# Patient Record
Sex: MALE | Race: BLACK OR AFRICAN AMERICAN | NOT HISPANIC OR LATINO | Employment: FULL TIME | URBAN - METROPOLITAN AREA
[De-identification: names, ages, dates, MRNs, and addresses within clinical notes are randomized per-mention and may not be internally consistent; named-entity substitution may affect disease eponyms.]

---

## 2021-11-22 ENCOUNTER — OFFICE VISIT (OUTPATIENT)
Dept: PRIMARY CARE CLINIC | Facility: CLINIC | Age: 26
End: 2021-11-22
Payer: COMMERCIAL

## 2021-11-22 VITALS
BODY MASS INDEX: 25.65 KG/M2 | WEIGHT: 189.38 LBS | HEIGHT: 72 IN | HEART RATE: 73 BPM | RESPIRATION RATE: 18 BRPM | OXYGEN SATURATION: 97 % | TEMPERATURE: 98 F | SYSTOLIC BLOOD PRESSURE: 129 MMHG | DIASTOLIC BLOOD PRESSURE: 79 MMHG

## 2021-11-22 DIAGNOSIS — K62.5 BRBPR (BRIGHT RED BLOOD PER RECTUM): ICD-10-CM

## 2021-11-22 DIAGNOSIS — Z00.00 WELL ADULT EXAM: Primary | ICD-10-CM

## 2021-11-22 DIAGNOSIS — F32.1 CURRENT MODERATE EPISODE OF MAJOR DEPRESSIVE DISORDER WITHOUT PRIOR EPISODE: ICD-10-CM

## 2021-11-22 DIAGNOSIS — R17 SCLERAL ICTERUS: ICD-10-CM

## 2021-11-22 DIAGNOSIS — D57.1 SICKLE CELL DISEASE WITHOUT CRISIS: ICD-10-CM

## 2021-11-22 PROCEDURE — 99385 PR PREVENTIVE VISIT,NEW,18-39: ICD-10-PCS | Mod: 25,S$GLB,, | Performed by: FAMILY MEDICINE

## 2021-11-22 PROCEDURE — 99999 PR PBB SHADOW E&M-NEW PATIENT-LVL IV: CPT | Mod: PBBFAC,,, | Performed by: FAMILY MEDICINE

## 2021-11-22 PROCEDURE — 99385 PREV VISIT NEW AGE 18-39: CPT | Mod: 25,S$GLB,, | Performed by: FAMILY MEDICINE

## 2021-11-22 PROCEDURE — 99999 PR PBB SHADOW E&M-NEW PATIENT-LVL IV: ICD-10-PCS | Mod: PBBFAC,,, | Performed by: FAMILY MEDICINE

## 2021-11-22 RX ORDER — HYDROXYUREA 500 MG/1
500 CAPSULE ORAL DAILY
Qty: 90 EACH | Refills: 3 | Status: SHIPPED | OUTPATIENT
Start: 2021-11-22 | End: 2023-08-24 | Stop reason: CLARIF

## 2021-11-22 RX ORDER — FOLIC ACID 1 MG/1
1 TABLET ORAL DAILY
Qty: 90 TABLET | Refills: 3 | Status: SHIPPED | OUTPATIENT
Start: 2021-11-22 | End: 2022-11-10

## 2021-11-22 RX ORDER — FLUOXETINE 10 MG/1
10 CAPSULE ORAL DAILY
Qty: 30 CAPSULE | Refills: 0 | Status: SHIPPED | OUTPATIENT
Start: 2021-11-22 | End: 2022-01-03 | Stop reason: SDUPTHER

## 2021-11-29 ENCOUNTER — LAB VISIT (OUTPATIENT)
Dept: LAB | Facility: HOSPITAL | Age: 26
End: 2021-11-29
Attending: FAMILY MEDICINE
Payer: COMMERCIAL

## 2021-11-29 DIAGNOSIS — Z00.00 WELL ADULT EXAM: ICD-10-CM

## 2021-11-29 LAB
BASOPHILS # BLD AUTO: 0.11 K/UL (ref 0–0.2)
BASOPHILS NFR BLD: 1.1 % (ref 0–1.9)
DIFFERENTIAL METHOD: ABNORMAL
EOSINOPHIL # BLD AUTO: 0.7 K/UL (ref 0–0.5)
EOSINOPHIL NFR BLD: 6.6 % (ref 0–8)
ERYTHROCYTE [DISTWIDTH] IN BLOOD BY AUTOMATED COUNT: 22.6 % (ref 11.5–14.5)
HCT VFR BLD AUTO: 20.6 % (ref 40–54)
HGB BLD-MCNC: 7.2 G/DL (ref 14–18)
IMM GRANULOCYTES # BLD AUTO: 0.06 K/UL (ref 0–0.04)
IMM GRANULOCYTES NFR BLD AUTO: 0.6 % (ref 0–0.5)
LYMPHOCYTES # BLD AUTO: 3.1 K/UL (ref 1–4.8)
LYMPHOCYTES NFR BLD: 30.4 % (ref 18–48)
MCH RBC QN AUTO: 28 PG (ref 27–31)
MCHC RBC AUTO-ENTMCNC: 35 G/DL (ref 32–36)
MCV RBC AUTO: 80 FL (ref 82–98)
MONOCYTES # BLD AUTO: 1.1 K/UL (ref 0.3–1)
MONOCYTES NFR BLD: 11 % (ref 4–15)
NEUTROPHILS # BLD AUTO: 5.1 K/UL (ref 1.8–7.7)
NEUTROPHILS NFR BLD: 50.3 % (ref 38–73)
NRBC BLD-RTO: 1 /100 WBC
PLATELET # BLD AUTO: 502 K/UL (ref 150–450)
PMV BLD AUTO: 11 FL (ref 9.2–12.9)
RBC # BLD AUTO: 2.57 M/UL (ref 4.6–6.2)
WBC # BLD AUTO: 10.17 K/UL (ref 3.9–12.7)

## 2021-11-29 PROCEDURE — 80061 LIPID PANEL: CPT | Performed by: FAMILY MEDICINE

## 2021-11-29 PROCEDURE — 36415 COLL VENOUS BLD VENIPUNCTURE: CPT | Mod: PN | Performed by: FAMILY MEDICINE

## 2021-11-29 PROCEDURE — 80053 COMPREHEN METABOLIC PANEL: CPT | Performed by: FAMILY MEDICINE

## 2021-11-29 PROCEDURE — 83036 HEMOGLOBIN GLYCOSYLATED A1C: CPT | Performed by: FAMILY MEDICINE

## 2021-11-29 PROCEDURE — 84443 ASSAY THYROID STIM HORMONE: CPT | Performed by: FAMILY MEDICINE

## 2021-11-29 PROCEDURE — 85025 COMPLETE CBC W/AUTO DIFF WBC: CPT | Performed by: FAMILY MEDICINE

## 2021-11-30 LAB
ALBUMIN SERPL BCP-MCNC: 4 G/DL (ref 3.5–5.2)
ALP SERPL-CCNC: 92 U/L (ref 55–135)
ALT SERPL W/O P-5'-P-CCNC: 34 U/L (ref 10–44)
ANION GAP SERPL CALC-SCNC: 10 MMOL/L (ref 8–16)
AST SERPL-CCNC: 51 U/L (ref 10–40)
BILIRUB SERPL-MCNC: 8.5 MG/DL (ref 0.1–1)
BUN SERPL-MCNC: 5 MG/DL (ref 6–20)
CALCIUM SERPL-MCNC: 9.1 MG/DL (ref 8.7–10.5)
CHLORIDE SERPL-SCNC: 106 MMOL/L (ref 95–110)
CHOLEST SERPL-MCNC: 101 MG/DL (ref 120–199)
CHOLEST/HDLC SERPL: 2.9 {RATIO} (ref 2–5)
CO2 SERPL-SCNC: 19 MMOL/L (ref 23–29)
CREAT SERPL-MCNC: 0.7 MG/DL (ref 0.5–1.4)
EST. GFR  (AFRICAN AMERICAN): >60 ML/MIN/1.73 M^2
EST. GFR  (NON AFRICAN AMERICAN): >60 ML/MIN/1.73 M^2
GLUCOSE SERPL-MCNC: 73 MG/DL (ref 70–110)
HDLC SERPL-MCNC: 35 MG/DL (ref 40–75)
HDLC SERPL: 34.7 % (ref 20–50)
LDLC SERPL CALC-MCNC: 45.4 MG/DL (ref 63–159)
NONHDLC SERPL-MCNC: 66 MG/DL
POTASSIUM SERPL-SCNC: 3.5 MMOL/L (ref 3.5–5.1)
PROT SERPL-MCNC: 7.9 G/DL (ref 6–8.4)
SODIUM SERPL-SCNC: 135 MMOL/L (ref 136–145)
TRIGL SERPL-MCNC: 103 MG/DL (ref 30–150)
TSH SERPL DL<=0.005 MIU/L-ACNC: 1.48 UIU/ML (ref 0.4–4)

## 2021-12-03 DIAGNOSIS — D64.9 SEVERE ANEMIA: Primary | ICD-10-CM

## 2021-12-03 DIAGNOSIS — D57.1 SICKLE CELL DISEASE WITHOUT CRISIS: ICD-10-CM

## 2021-12-03 LAB
EST. AVERAGE GLUCOSE BLD GHB EST-MCNC: NORMAL MG/DL
HBA1C MFR BLD: NORMAL %

## 2021-12-09 ENCOUNTER — PATIENT MESSAGE (OUTPATIENT)
Dept: PRIMARY CARE CLINIC | Facility: CLINIC | Age: 26
End: 2021-12-09
Payer: COMMERCIAL

## 2021-12-09 DIAGNOSIS — D57.1 SICKLE CELL DISEASE WITHOUT CRISIS: Primary | ICD-10-CM

## 2021-12-14 ENCOUNTER — OFFICE VISIT (OUTPATIENT)
Dept: GASTROENTEROLOGY | Facility: CLINIC | Age: 26
End: 2021-12-14
Payer: COMMERCIAL

## 2021-12-14 VITALS — HEIGHT: 72 IN | BODY MASS INDEX: 25.44 KG/M2 | WEIGHT: 187.81 LBS

## 2021-12-14 DIAGNOSIS — K62.5 BRBPR (BRIGHT RED BLOOD PER RECTUM): Primary | ICD-10-CM

## 2021-12-14 DIAGNOSIS — D50.9 IRON DEFICIENCY ANEMIA, UNSPECIFIED IRON DEFICIENCY ANEMIA TYPE: ICD-10-CM

## 2021-12-14 PROCEDURE — 99204 PR OFFICE/OUTPT VISIT, NEW, LEVL IV, 45-59 MIN: ICD-10-PCS | Mod: S$GLB,,, | Performed by: INTERNAL MEDICINE

## 2021-12-14 PROCEDURE — 99999 PR PBB SHADOW E&M-EST. PATIENT-LVL III: ICD-10-PCS | Mod: PBBFAC,,, | Performed by: INTERNAL MEDICINE

## 2021-12-14 PROCEDURE — 99204 OFFICE O/P NEW MOD 45 MIN: CPT | Mod: S$GLB,,, | Performed by: INTERNAL MEDICINE

## 2021-12-14 PROCEDURE — 99999 PR PBB SHADOW E&M-EST. PATIENT-LVL III: CPT | Mod: PBBFAC,,, | Performed by: INTERNAL MEDICINE

## 2021-12-14 RX ORDER — SODIUM, POTASSIUM,MAG SULFATES 17.5-3.13G
1 SOLUTION, RECONSTITUTED, ORAL ORAL DAILY
Qty: 1 KIT | Refills: 0 | Status: SHIPPED | OUTPATIENT
Start: 2021-12-14 | End: 2021-12-16

## 2021-12-23 ENCOUNTER — TELEPHONE (OUTPATIENT)
Dept: HEMATOLOGY/ONCOLOGY | Facility: CLINIC | Age: 26
End: 2021-12-23
Payer: COMMERCIAL

## 2022-01-03 ENCOUNTER — PATIENT MESSAGE (OUTPATIENT)
Dept: PRIMARY CARE CLINIC | Facility: CLINIC | Age: 27
End: 2022-01-03
Payer: COMMERCIAL

## 2022-01-20 ENCOUNTER — PATIENT MESSAGE (OUTPATIENT)
Dept: PRIMARY CARE CLINIC | Facility: CLINIC | Age: 27
End: 2022-01-20
Payer: COMMERCIAL

## 2022-01-20 RX ORDER — FLUOXETINE 10 MG/1
10 CAPSULE ORAL DAILY
Qty: 30 CAPSULE | Refills: 1 | Status: SHIPPED | OUTPATIENT
Start: 2022-01-20 | End: 2022-02-10 | Stop reason: SDUPTHER

## 2022-01-20 NOTE — TELEPHONE ENCOUNTER
No new care gaps identified.  Powered by [employer]. Reference number: [id number].   1/20/2022 4:34:43 PM CST

## 2022-01-27 ENCOUNTER — TELEPHONE (OUTPATIENT)
Dept: GASTROENTEROLOGY | Facility: CLINIC | Age: 27
End: 2022-01-27
Payer: COMMERCIAL

## 2022-01-27 NOTE — TELEPHONE ENCOUNTER
----- Message from Kathie Fraire sent at 1/27/2022  3:08 PM CST -----  Type: Requesting to speak with nurse         Who Called: PT  Regarding: Pt wanting to postpone procedure please advise   Would the patient rather a call back or a response via [employer]? Call back  Best Call Back Number: [phone]  Additional Information: n/a

## 2022-01-31 ENCOUNTER — TELEPHONE (OUTPATIENT)
Dept: ENDOSCOPY | Facility: HOSPITAL | Age: 27
End: 2022-01-31
Payer: COMMERCIAL

## 2022-01-31 NOTE — TELEPHONE ENCOUNTER
Spoke to Parrish Davis to schedule covid test, states he had called to cancel procedure and does not wish to reschedule

## 2022-02-10 ENCOUNTER — OFFICE VISIT (OUTPATIENT)
Dept: PRIMARY CARE CLINIC | Facility: CLINIC | Age: 27
End: 2022-02-10
Payer: COMMERCIAL

## 2022-02-10 ENCOUNTER — LAB VISIT (OUTPATIENT)
Dept: LAB | Facility: HOSPITAL | Age: 27
End: 2022-02-10
Attending: FAMILY MEDICINE
Payer: COMMERCIAL

## 2022-02-10 VITALS
WEIGHT: 193.56 LBS | HEART RATE: 76 BPM | TEMPERATURE: 98 F | OXYGEN SATURATION: 96 % | DIASTOLIC BLOOD PRESSURE: 58 MMHG | SYSTOLIC BLOOD PRESSURE: 126 MMHG | HEIGHT: 72 IN | BODY MASS INDEX: 26.22 KG/M2

## 2022-02-10 DIAGNOSIS — D64.9 SEVERE ANEMIA: ICD-10-CM

## 2022-02-10 DIAGNOSIS — M25.579 ANKLE PAIN, UNSPECIFIED CHRONICITY, UNSPECIFIED LATERALITY: Primary | ICD-10-CM

## 2022-02-10 DIAGNOSIS — M25.579 ANKLE PAIN, UNSPECIFIED CHRONICITY, UNSPECIFIED LATERALITY: ICD-10-CM

## 2022-02-10 DIAGNOSIS — F43.29 MIXED EMOTIONAL FEATURES AS ADJUSTMENT REACTION: ICD-10-CM

## 2022-02-10 DIAGNOSIS — D57.1 SICKLE CELL DISEASE WITHOUT CRISIS: ICD-10-CM

## 2022-02-10 DIAGNOSIS — R17 SCLERAL ICTERUS: ICD-10-CM

## 2022-02-10 PROCEDURE — 82607 VITAMIN B-12: CPT | Performed by: FAMILY MEDICINE

## 2022-02-10 PROCEDURE — 3074F PR MOST RECENT SYSTOLIC BLOOD PRESSURE < 130 MM HG: ICD-10-PCS | Mod: CPTII,S$GLB,, | Performed by: FAMILY MEDICINE

## 2022-02-10 PROCEDURE — 99214 OFFICE O/P EST MOD 30 MIN: CPT | Mod: S$GLB,,, | Performed by: FAMILY MEDICINE

## 2022-02-10 PROCEDURE — 99999 PR PBB SHADOW E&M-EST. PATIENT-LVL IV: CPT | Mod: PBBFAC,,, | Performed by: FAMILY MEDICINE

## 2022-02-10 PROCEDURE — 1159F MED LIST DOCD IN RCRD: CPT | Mod: CPTII,S$GLB,, | Performed by: FAMILY MEDICINE

## 2022-02-10 PROCEDURE — 3074F SYST BP LT 130 MM HG: CPT | Mod: CPTII,S$GLB,, | Performed by: FAMILY MEDICINE

## 2022-02-10 PROCEDURE — 3078F PR MOST RECENT DIASTOLIC BLOOD PRESSURE < 80 MM HG: ICD-10-PCS | Mod: CPTII,S$GLB,, | Performed by: FAMILY MEDICINE

## 2022-02-10 PROCEDURE — 82728 ASSAY OF FERRITIN: CPT | Performed by: FAMILY MEDICINE

## 2022-02-10 PROCEDURE — 1160F RVW MEDS BY RX/DR IN RCRD: CPT | Mod: CPTII,S$GLB,, | Performed by: FAMILY MEDICINE

## 2022-02-10 PROCEDURE — 82746 ASSAY OF FOLIC ACID SERUM: CPT | Performed by: FAMILY MEDICINE

## 2022-02-10 PROCEDURE — 3078F DIAST BP <80 MM HG: CPT | Mod: CPTII,S$GLB,, | Performed by: FAMILY MEDICINE

## 2022-02-10 PROCEDURE — 99214 PR OFFICE/OUTPT VISIT, EST, LEVL IV, 30-39 MIN: ICD-10-PCS | Mod: S$GLB,,, | Performed by: FAMILY MEDICINE

## 2022-02-10 PROCEDURE — 3008F PR BODY MASS INDEX (BMI) DOCUMENTED: ICD-10-PCS | Mod: CPTII,S$GLB,, | Performed by: FAMILY MEDICINE

## 2022-02-10 PROCEDURE — 3008F BODY MASS INDEX DOCD: CPT | Mod: CPTII,S$GLB,, | Performed by: FAMILY MEDICINE

## 2022-02-10 PROCEDURE — 99999 PR PBB SHADOW E&M-EST. PATIENT-LVL IV: ICD-10-PCS | Mod: PBBFAC,,, | Performed by: FAMILY MEDICINE

## 2022-02-10 PROCEDURE — 84550 ASSAY OF BLOOD/URIC ACID: CPT | Performed by: FAMILY MEDICINE

## 2022-02-10 PROCEDURE — 1160F PR REVIEW ALL MEDS BY PRESCRIBER/CLIN PHARMACIST DOCUMENTED: ICD-10-PCS | Mod: CPTII,S$GLB,, | Performed by: FAMILY MEDICINE

## 2022-02-10 PROCEDURE — 85025 COMPLETE CBC W/AUTO DIFF WBC: CPT | Performed by: FAMILY MEDICINE

## 2022-02-10 PROCEDURE — 1159F PR MEDICATION LIST DOCUMENTED IN MEDICAL RECORD: ICD-10-PCS | Mod: CPTII,S$GLB,, | Performed by: FAMILY MEDICINE

## 2022-02-10 PROCEDURE — 84466 ASSAY OF TRANSFERRIN: CPT | Performed by: FAMILY MEDICINE

## 2022-02-10 PROCEDURE — 36415 COLL VENOUS BLD VENIPUNCTURE: CPT | Mod: PN | Performed by: FAMILY MEDICINE

## 2022-02-10 RX ORDER — FLUOXETINE 10 MG/1
10 CAPSULE ORAL DAILY
Qty: 30 CAPSULE | Refills: 0 | Status: SHIPPED | OUTPATIENT
Start: 2022-02-10 | End: 2022-03-09

## 2022-02-10 NOTE — PROGRESS NOTES
Subjective:   Patient ID: Parrish Davis is a 26 y.o. male.    Chief Complaint: Foot Pain and Follow-up      HPI      Ho bilat ankle pain about late nov 21 and then again on the left in about a month.  No trauma.    Patient has sickle cell anemia with recent moderate to severe no crisis now.      Patient queried and denies any further complaints.    ALLERGIES AND MEDICATIONS: updated and reviewed.  Review of patient's allergies indicates:  No Known Allergies    Current Outpatient Medications:     folic acid (FOLVITE) 1 MG tablet, Take 1 tablet (1 mg total) by mouth once daily., Disp: 90 tablet, Rfl: 3    hydroxyurea (HYDREA) 500 mg Cap, Take 1 capsule (500 mg total) by mouth once daily., Disp: 90 each, Rfl: 3    FLUoxetine 10 MG capsule, Take 1 capsule (10 mg total) by mouth once daily., Disp: 30 capsule, Rfl: 0    pneumoc 13-mabel conj-dip cr,PF, (PREVNAR 13, PF,) 0.5 mL Syrg, Inject into the muscle., Disp: 0.5 mL, Rfl: 0    Review of Systems   Constitutional: Negative for activity change, appetite change, chills, diaphoresis, fatigue, fever and unexpected weight change.   HENT: Negative for congestion, ear discharge, ear pain, facial swelling, hearing loss, nosebleeds, postnasal drip, rhinorrhea, sinus pressure, sneezing, sore throat, tinnitus, trouble swallowing and voice change.    Eyes: Negative for photophobia, pain, discharge, redness, itching and visual disturbance.   Respiratory: Negative for cough, chest tightness, shortness of breath and wheezing.    Cardiovascular: Negative for chest pain, palpitations and leg swelling.   Gastrointestinal: Negative for abdominal distention, abdominal pain, anal bleeding, blood in stool, constipation, diarrhea, nausea, rectal pain and vomiting.   Endocrine: Negative for cold intolerance, heat intolerance, polydipsia, polyphagia and polyuria.   Genitourinary: Negative for difficulty urinating, dysuria and flank pain.   Musculoskeletal: Positive for arthralgias.  Negative for back pain, joint swelling, myalgias and neck pain.   Skin: Negative for rash.   Neurological: Negative for dizziness, tremors, seizures, syncope, speech difficulty, weakness, light-headedness, numbness and headaches.   Psychiatric/Behavioral: Negative for behavioral problems, confusion, decreased concentration, dysphoric mood, sleep disturbance and suicidal ideas. The patient is not nervous/anxious and is not hyperactive.        Lab Results   Component Value Date    WBC 7.38 02/10/2022    HGB 7.1 (L) 02/10/2022    HCT 20.1 (L) 02/10/2022    MCV 84 02/10/2022     02/10/2022         CMP  Sodium   Date Value Ref Range Status   11/29/2021 135 (L) 136 - 145 mmol/L Final     Potassium   Date Value Ref Range Status   11/29/2021 3.5 3.5 - 5.1 mmol/L Final     Chloride   Date Value Ref Range Status   11/29/2021 106 95 - 110 mmol/L Final     CO2   Date Value Ref Range Status   11/29/2021 19 (L) 23 - 29 mmol/L Final     Glucose   Date Value Ref Range Status   11/29/2021 73 70 - 110 mg/dL Final     BUN   Date Value Ref Range Status   11/29/2021 5 (L) 6 - 20 mg/dL Final     Creatinine   Date Value Ref Range Status   11/29/2021 0.7 0.5 - 1.4 mg/dL Final     Calcium   Date Value Ref Range Status   11/29/2021 9.1 8.7 - 10.5 mg/dL Final     Total Protein   Date Value Ref Range Status   11/29/2021 7.9 6.0 - 8.4 g/dL Final     Albumin   Date Value Ref Range Status   11/29/2021 4.0 3.5 - 5.2 g/dL Final     Total Bilirubin   Date Value Ref Range Status   11/29/2021 8.5 (H) 0.1 - 1.0 mg/dL Final     Comment:     For infants and newborns, interpretation of results should be based  on gestational age, weight and in agreement with clinical  observations.    Premature Infant recommended reference ranges:  Up to 24 hours.............<8.0 mg/dL  Up to 48 hours............<12.0 mg/dL  3-5 days..................<15.0 mg/dL  6-29 days.................<15.0 mg/dL       Alkaline Phosphatase   Date Value Ref Range Status    11/29/2021 92 55 - 135 U/L Final     AST   Date Value Ref Range Status   11/29/2021 51 (H) 10 - 40 U/L Final     ALT   Date Value Ref Range Status   11/29/2021 34 10 - 44 U/L Final     Anion Gap   Date Value Ref Range Status   11/29/2021 10 8 - 16 mmol/L Final     eGFR if    Date Value Ref Range Status   11/29/2021 >60.0 >60 mL/min/1.73 m^2 Final     eGFR if non    Date Value Ref Range Status   11/29/2021 >60.0 >60 mL/min/1.73 m^2 Final     Comment:     Calculation used to obtain the estimated glomerular filtration  rate (eGFR) is the CKD-EPI equation.           No results found for: LABA1C, HGBA1C     Objective:     Vitals:    02/10/22 1351 02/10/22 1426   BP: (!) 122/50 (!) 126/58   Pulse: 76    Temp: 98 °F (36.7 °C)    TempSrc: Oral    SpO2: 96%    Weight: 87.8 kg (193 lb 9 oz)    Height: 6' (1.829 m)    PainSc: 0-No pain      Body mass index is 26.25 kg/m².    Physical Exam  Vitals and nursing note reviewed.   Constitutional:       Appearance: Normal appearance. He is well-developed and well-nourished.   HENT:      Head: Normocephalic and atraumatic.      Right Ear: Hearing, tympanic membrane, ear canal and external ear normal. No decreased hearing noted. No drainage or swelling.      Left Ear: Hearing, tympanic membrane, ear canal and external ear normal. No decreased hearing noted. No drainage or swelling.      Nose: Nose normal. No rhinorrhea.      Mouth/Throat:      Mouth: Oropharynx is clear and moist.      Pharynx: No oropharyngeal exudate, posterior oropharyngeal edema or posterior oropharyngeal erythema.   Eyes:      General: Lids are normal.         Right eye: No discharge.         Left eye: No discharge.      Extraocular Movements: EOM normal.      Conjunctiva/sclera: Conjunctivae normal.      Right eye: Right conjunctiva is not injected. No exudate.     Left eye: Left conjunctiva is not injected. No exudate.     Pupils: Pupils are equal, round, and reactive to light.    Neck:      Thyroid: No thyroid mass or thyromegaly.      Vascular: Normal carotid pulses. No carotid bruit, hepatojugular reflux or JVD.      Trachea: Trachea normal.   Cardiovascular:      Rate and Rhythm: Normal rate and regular rhythm.      Heart sounds: Normal heart sounds.   Pulmonary:      Effort: Pulmonary effort is normal. No respiratory distress.   Abdominal:      General: Bowel sounds are normal.      Palpations: Abdomen is soft.      Tenderness: There is no abdominal tenderness. Negative signs include Calvert's sign.   Musculoskeletal:      Cervical back: Full passive range of motion without pain. No edema, erythema or rigidity.      Right ankle: Normal.      Left ankle: Normal.   Lymphadenopathy:      Cervical: No cervical adenopathy.   Skin:     General: Skin is warm and dry.   Neurological:      Mental Status: He is alert.   Psychiatric:         Mood and Affect: Mood and affect normal.         Speech: Speech normal.         Behavior: Behavior normal.         Assessment and Plan:   Parrish was seen today for foot pain and follow-up.    Diagnoses and all orders for this visit:    Ankle pain, unspecified chronicity, unspecified laterality  -     Uric Acid; Future    Mixed emotional features as adjustment reaction    Sickle cell disease without crisis    Scleral icterus    Other orders  -     FLUoxetine 10 MG capsule; Take 1 capsule (10 mg total) by mouth once daily.    Check labs as above    Tylenol or anti-inflammatories.  Rest, ice, elevation, compression  Delete    Discussed fluoxetine regarding depressive type symptoms.  Follow-up in about a month to let me know how he is doing.    No follow-ups on file.    THIS NOTE WILL BE SHARED WITH THE PATIENT.

## 2022-02-11 LAB
BASOPHILS # BLD AUTO: 0.12 K/UL (ref 0–0.2)
BASOPHILS NFR BLD: 1.6 % (ref 0–1.9)
DIFFERENTIAL METHOD: ABNORMAL
EOSINOPHIL # BLD AUTO: 0.5 K/UL (ref 0–0.5)
EOSINOPHIL NFR BLD: 6.5 % (ref 0–8)
ERYTHROCYTE [DISTWIDTH] IN BLOOD BY AUTOMATED COUNT: 19.9 % (ref 11.5–14.5)
FERRITIN SERPL-MCNC: 51 NG/ML (ref 20–300)
FOLATE SERPL-MCNC: 11.5 NG/ML (ref 4–24)
HCT VFR BLD AUTO: 20.1 % (ref 40–54)
HGB BLD-MCNC: 7.1 G/DL (ref 14–18)
IMM GRANULOCYTES # BLD AUTO: 0.03 K/UL (ref 0–0.04)
IMM GRANULOCYTES NFR BLD AUTO: 0.4 % (ref 0–0.5)
IRON SERPL-MCNC: 27 UG/DL (ref 45–160)
LYMPHOCYTES # BLD AUTO: 2.4 K/UL (ref 1–4.8)
LYMPHOCYTES NFR BLD: 32.5 % (ref 18–48)
MCH RBC QN AUTO: 30 PG (ref 27–31)
MCHC RBC AUTO-ENTMCNC: 35.7 G/DL (ref 32–36)
MCV RBC AUTO: 84 FL (ref 82–98)
MONOCYTES # BLD AUTO: 1.1 K/UL (ref 0.3–1)
MONOCYTES NFR BLD: 14.5 % (ref 4–15)
NEUTROPHILS # BLD AUTO: 3.3 K/UL (ref 1.8–7.7)
NEUTROPHILS NFR BLD: 44.5 % (ref 38–73)
NRBC BLD-RTO: 1 /100 WBC
PLATELET # BLD AUTO: 314 K/UL (ref 150–450)
PMV BLD AUTO: 11.2 FL (ref 9.2–12.9)
RBC # BLD AUTO: 2.37 M/UL (ref 4.6–6.2)
SATURATED IRON: 7 % (ref 20–50)
TOTAL IRON BINDING CAPACITY: 377 UG/DL (ref 250–450)
TRANSFERRIN SERPL-MCNC: 255 MG/DL (ref 200–375)
URATE SERPL-MCNC: 5 MG/DL (ref 3.4–7)
VIT B12 SERPL-MCNC: 486 PG/ML (ref 210–950)
WBC # BLD AUTO: 7.38 K/UL (ref 3.9–12.7)

## 2022-02-14 PROBLEM — F43.29 MIXED EMOTIONAL FEATURES AS ADJUSTMENT REACTION: Status: ACTIVE | Noted: 2022-02-14

## 2022-02-17 ENCOUNTER — OFFICE VISIT (OUTPATIENT)
Dept: HEMATOLOGY/ONCOLOGY | Facility: CLINIC | Age: 27
End: 2022-02-17
Payer: COMMERCIAL

## 2022-02-17 VITALS
RESPIRATION RATE: 16 BRPM | BODY MASS INDEX: 24.99 KG/M2 | SYSTOLIC BLOOD PRESSURE: 129 MMHG | WEIGHT: 184.5 LBS | HEIGHT: 72 IN | OXYGEN SATURATION: 94 % | DIASTOLIC BLOOD PRESSURE: 63 MMHG | HEART RATE: 64 BPM

## 2022-02-17 DIAGNOSIS — D57.1 SICKLE CELL DISEASE WITHOUT CRISIS: ICD-10-CM

## 2022-02-17 DIAGNOSIS — R17 SCLERAL ICTERUS: Primary | ICD-10-CM

## 2022-02-17 PROCEDURE — 99205 PR OFFICE/OUTPT VISIT, NEW, LEVL V, 60-74 MIN: ICD-10-PCS | Mod: S$GLB,,, | Performed by: INTERNAL MEDICINE

## 2022-02-17 PROCEDURE — 1159F PR MEDICATION LIST DOCUMENTED IN MEDICAL RECORD: ICD-10-PCS | Mod: CPTII,S$GLB,, | Performed by: INTERNAL MEDICINE

## 2022-02-17 PROCEDURE — 3008F PR BODY MASS INDEX (BMI) DOCUMENTED: ICD-10-PCS | Mod: CPTII,S$GLB,, | Performed by: INTERNAL MEDICINE

## 2022-02-17 PROCEDURE — 3078F DIAST BP <80 MM HG: CPT | Mod: CPTII,S$GLB,, | Performed by: INTERNAL MEDICINE

## 2022-02-17 PROCEDURE — 1159F MED LIST DOCD IN RCRD: CPT | Mod: CPTII,S$GLB,, | Performed by: INTERNAL MEDICINE

## 2022-02-17 PROCEDURE — 3074F SYST BP LT 130 MM HG: CPT | Mod: CPTII,S$GLB,, | Performed by: INTERNAL MEDICINE

## 2022-02-17 PROCEDURE — 3074F PR MOST RECENT SYSTOLIC BLOOD PRESSURE < 130 MM HG: ICD-10-PCS | Mod: CPTII,S$GLB,, | Performed by: INTERNAL MEDICINE

## 2022-02-17 PROCEDURE — 99999 PR PBB SHADOW E&M-EST. PATIENT-LVL IV: CPT | Mod: PBBFAC,,, | Performed by: INTERNAL MEDICINE

## 2022-02-17 PROCEDURE — 99999 PR PBB SHADOW E&M-EST. PATIENT-LVL IV: ICD-10-PCS | Mod: PBBFAC,,, | Performed by: INTERNAL MEDICINE

## 2022-02-17 PROCEDURE — 3008F BODY MASS INDEX DOCD: CPT | Mod: CPTII,S$GLB,, | Performed by: INTERNAL MEDICINE

## 2022-02-17 PROCEDURE — 99205 OFFICE O/P NEW HI 60 MIN: CPT | Mod: S$GLB,,, | Performed by: INTERNAL MEDICINE

## 2022-02-17 PROCEDURE — 3078F PR MOST RECENT DIASTOLIC BLOOD PRESSURE < 80 MM HG: ICD-10-PCS | Mod: CPTII,S$GLB,, | Performed by: INTERNAL MEDICINE

## 2022-02-17 PROCEDURE — 84156 ASSAY OF PROTEIN URINE: CPT | Performed by: INTERNAL MEDICINE

## 2022-02-17 NOTE — PROGRESS NOTES
CC: Sickle cell anemia, hematology consultation    HPI: , 26, is here for hematology consultation for sickle cell anemia. He was being followed by pediatrics until age 21. Last pain crisis was in 2016.   He has been experiencing back pain of late. Pain is intermittent, no clear precipitating or alleviating factors.   He had priapism several years ago.        Past Medical History:   Diagnosis Date    Sickle cell anemia     Sickle cell disease          Past Surgical History:   Procedure Laterality Date    chemoport      removed    CHOLECYSTECTOMY  2002    UMBILICAL HERNIA REPAIR  1995       Social History     Socioeconomic History    Marital status: Single   Tobacco Use    Smoking status: Never Smoker    Smokeless tobacco: Never Used   Substance and Sexual Activity    Alcohol use: Yes     Comment: Social drinker on weekends    Drug use: No    Sexual activity: Not Currently       Review of patient's allergies indicates:  No Known Allergies          Current Outpatient Medications   Medication Sig    FLUoxetine 10 MG capsule Take 1 capsule (10 mg total) by mouth once daily.    folic acid (FOLVITE) 1 MG tablet Take 1 tablet (1 mg total) by mouth once daily.    hydroxyurea (HYDREA) 500 mg Cap Take 1 capsule (500 mg total) by mouth once daily.    pneumoc 13-mabel conj-dip cr,PF, (PREVNAR 13, PF,) 0.5 mL Syrg Inject into the muscle.     No current facility-administered medications for this visit.         Review of Systems   Constitutional: Positive for malaise/fatigue. Negative for weight loss.   HENT: Negative for ear discharge and ear pain.    Eyes: Negative for photophobia, pain and discharge.   Respiratory: Negative for hemoptysis, sputum production and shortness of breath.    Cardiovascular: Negative for chest pain, palpitations, orthopnea and claudication.   Gastrointestinal: Negative for blood in stool, constipation, diarrhea, heartburn, melena, nausea and vomiting.   Genitourinary:  Negative for dysuria, frequency, hematuria and urgency.   Musculoskeletal: Negative for back pain, myalgias and neck pain.   Neurological: Negative for dizziness and tremors.   Endo/Heme/Allergies: Does not bruise/bleed easily.   Psychiatric/Behavioral: Negative for substance abuse.           Vitals:    02/17/22 1407   BP: 129/63   Pulse: 64   Resp: 16       Physical Exam  Constitutional:       Appearance: Normal appearance.   HENT:      Head: Normocephalic.   Eyes:      General: Scleral icterus present.   Cardiovascular:      Rate and Rhythm: Normal rate.      Heart sounds: Normal heart sounds. No murmur heard.      Pulmonary:      Effort: Pulmonary effort is normal. No respiratory distress.      Breath sounds: No stridor.   Abdominal:      General: There is no distension.      Palpations: There is no mass.      Tenderness: There is no abdominal tenderness.   Musculoskeletal:         General: No swelling or tenderness.   Lymphadenopathy:      Cervical: No cervical adenopathy.   Skin:     Coloration: Skin is not jaundiced or pale.   Neurological:      General: No focal deficit present.      Mental Status: He is alert and oriented to person, place, and time.      Cranial Nerves: No cranial nerve deficit.       Component      Latest Ref Rng & Units 2/10/2022 11/29/2021 6/1/2016   WBC      3.90 - 12.70 K/uL 7.38     RBC      4.60 - 6.20 M/uL 2.37 (L)     Hemoglobin      14.0 - 18.0 g/dL 7.1 (L)     Hematocrit      40.0 - 54.0 % 20.1 (L)     MCV      82 - 98 fL 84     MCH      27.0 - 31.0 pg 30.0     MCHC      32.0 - 36.0 g/dL 35.7     RDW      11.5 - 14.5 % 19.9 (H)     Platelets      150 - 450 K/uL 314     MPV      9.2 - 12.9 fL 11.2     Immature Granulocytes      0.0 - 0.5 % 0.4     Gran # (ANC)      1.8 - 7.7 K/uL 3.3     Immature Grans (Abs)      0.00 - 0.04 K/uL 0.03     Lymph #      1.0 - 4.8 K/uL 2.4     Mono #      0.3 - 1.0 K/uL 1.1 (H)     Eos #      0.0 - 0.5 K/uL 0.5     Baso #      0.00 - 0.20 K/uL 0.12      nRBC      0 /100 WBC 1 (A)     Gran %      38.0 - 73.0 % 44.5     Lymph %      18.0 - 48.0 % 32.5     Mono %      4.0 - 15.0 % 14.5     Eosinophil %      0.0 - 8.0 % 6.5     Basophil %      0.0 - 1.9 % 1.6     Differential Method       Automated     Hepatitis B Surface Ag         Negative   Hep B C IgM         Negative   Hep A IgM         Negative   Hepatitis C Ab         Negative   Iron      45 - 160 ug/dL 27 (L)     Transferrin      200 - 375 mg/dL 255     TIBC      250 - 450 ug/dL 377     Saturated Iron      20 - 50 % 7 (L)     HIV 1/2 Ag/Ab      Negative   Negative   TSH      0.400 - 4.000 uIU/mL  1.480    Folate      4.0 - 24.0 ng/mL 11.5     Ferritin      20.0 - 300.0 ng/mL 51     Vitamin B-12      210 - 950 pg/mL 486     Uric Acid      3.4 - 7.0 mg/dL 5.0         Assessment:    1. Hemoglobin SS disease      Sickle cell checklist:    1. Annual ophtho exam:   Last done: referral placed on 2/17/22  Findings:      2. Bone health: Ca++d recommended, DEXA ordered on 2/17/22. q3 yr dexa recommended. Vit D: Needs periodic monitoring    3. Screening echo for pHTN:     Last done: ordered on 2/17/22      4. Annual UA + MicroAlb/Cr Ratio:     Last done: 24hr kit given today      5. Folic acid: currently taking 1mg daily    6. Hydrea: currently taking 50 0mg daily    7. Transfusions / iron : Cumulative transfusions: 2  Baseline h/h:   Ferritin: 51 ng/ml on 2/10/22    6. Pain regimen: no regular regimen, PRN Acetaminophen, NSAID  -no frequent admissions with pain crises  -Pain contract: none    7. Osteonecrosis screening: yes    8. Stem cell transplant? No    9. H/o cva? No    10. HCV screening? Negative in 2016    11. Immunizations:     Pneumococcal. 13 then 23. 23 q5 yrs: current  COVID 19 : uptodate  H influenzae:  Annual flu: uptodate  Meningococcal:

## 2022-02-17 NOTE — Clinical Note
24hr urine protein  Referral for dexa Referral for  ophthal clinic ECHO in 1-2 weeks F/u in 3 months with cbc, cmp, ldh, retic count

## 2022-02-22 LAB
PROT 24H UR-MRATE: NORMAL MG/SPEC (ref 0–100)
PROT UR-MCNC: <7 MG/DL (ref 0–15)
URINE COLLECTION DURATION: 24 HR
URINE VOLUME: 2900 ML

## 2022-02-23 ENCOUNTER — TELEPHONE (OUTPATIENT)
Dept: OPHTHALMOLOGY | Facility: CLINIC | Age: 27
End: 2022-02-23
Payer: COMMERCIAL

## 2022-02-23 NOTE — TELEPHONE ENCOUNTER
----- Message from Ayanna Kyle sent at 2/22/2022  8:11 AM CST -----  Regarding: FW: Referral for  ophthal clinic  Needs routine eye exam  ----- Message -----  From: Shannon Toussaint  Sent: 2/21/2022   3:15 PM CST  To: [facility]  Pool, #  Subject: Referral for  ophthal clinic

## 2022-03-03 ENCOUNTER — HOSPITAL ENCOUNTER (OUTPATIENT)
Dept: RADIOLOGY | Facility: CLINIC | Age: 27
Discharge: HOME | End: 2022-03-03
Attending: INTERNAL MEDICINE
Payer: COMMERCIAL

## 2022-03-03 DIAGNOSIS — D57.1 SICKLE CELL DISEASE WITHOUT CRISIS: ICD-10-CM

## 2022-03-03 PROCEDURE — 77080 DEXA BONE DENSITY SPINE HIP: ICD-10-PCS | Mod: 26,,, | Performed by: INTERNAL MEDICINE

## 2022-03-03 PROCEDURE — 77080 DXA BONE DENSITY AXIAL: CPT | Mod: TC

## 2022-03-03 PROCEDURE — 77080 DXA BONE DENSITY AXIAL: CPT | Mod: 26,,, | Performed by: INTERNAL MEDICINE

## 2022-03-04 ENCOUNTER — TELEPHONE (OUTPATIENT)
Dept: RESEARCH | Facility: HOSPITAL | Age: 27
End: 2022-03-04
Payer: COMMERCIAL

## 2022-03-04 NOTE — TELEPHONE ENCOUNTER
Study title: Detection of Reduced Left Ventricular Ejection Fraction and Atrial Arrhymias with Single Lead ECG using Artifical Intelligence  IRB #: 2021.079  IRB approval date: 6/30/2021  Sponsor: [employer]  Site Number: [facility]ef  Subject ID: N/A  Date of Encounter:  3/4/2022    Called to introduce the patient to the EKO clinical trial. Left voicemail with my return number.

## 2022-03-04 NOTE — TELEPHONE ENCOUNTER
Study title: Detection of Reduced Left Ventricular Ejection Fraction and Atrial Arrhymias with Single Lead ECG using Artifical Intelligence  IRB #: 2021.079  IRB approval date: 6/30/2021  Sponsor: [employer]  Site Number: Oef  Subject ID: N/A  Date of Encounter:  3/4/2022     Called patient to introduce the EKO study. Explained overview of study. Patient expressed interest and is willing to see me on 3/7/2022 @[id number]. Voiced understanding. Will schedule accordingly.     Consent form was emailed to patient.

## 2022-03-07 ENCOUNTER — RESEARCH ENCOUNTER (OUTPATIENT)
Dept: RESEARCH | Facility: HOSPITAL | Age: 27
End: 2022-03-07

## 2022-03-07 ENCOUNTER — HOSPITAL ENCOUNTER (OUTPATIENT)
Dept: CARDIOLOGY | Facility: HOSPITAL | Age: 27
Discharge: HOME | End: 2022-03-07
Attending: INTERNAL MEDICINE
Payer: COMMERCIAL

## 2022-03-07 VITALS
HEIGHT: 72 IN | WEIGHT: 184 LBS | BODY MASS INDEX: 24.92 KG/M2 | DIASTOLIC BLOOD PRESSURE: 62 MMHG | SYSTOLIC BLOOD PRESSURE: 150 MMHG | HEART RATE: 85 BPM

## 2022-03-07 DIAGNOSIS — D57.1 SICKLE CELL DISEASE WITHOUT CRISIS: ICD-10-CM

## 2022-03-07 LAB
ASCENDING AORTA: 2.95 CM
AV INDEX (PROSTH): 0.79
AV MEAN GRADIENT: 6 MMHG
AV PEAK GRADIENT: 11 MMHG
AV VALVE AREA: 4.27 CM2
AV VELOCITY RATIO: 0.76
BSA FOR ECHO PROCEDURE: 2.06 M2
CV ECHO LV RWT: 0.25 CM
DOP CALC AO PEAK VEL: 1.66 M/S
DOP CALC AO VTI: 31.06 CM
DOP CALC LVOT AREA: 5.4 CM2
DOP CALC LVOT DIAMETER: 2.62 CM
DOP CALC LVOT PEAK VEL: 1.26 M/S
DOP CALC LVOT STROKE VOLUME: 132.77 CM3
DOP CALCLVOT PEAK VEL VTI: 24.64 CM
E WAVE DECELERATION TIME: 165.59 MSEC
E/A RATIO: 1.77
E/E' RATIO: 4.7 M/S
ECHO LV POSTERIOR WALL: 0.81 CM (ref 0.6–1.1)
EJECTION FRACTION: 60 %
FRACTIONAL SHORTENING: 41 % (ref 28–44)
INTERVENTRICULAR SEPTUM: 0.9 CM (ref 0.6–1.1)
LA MAJOR: 5.7 CM
LA MINOR: 5.13 CM
LA WIDTH: 4.5 CM
LEFT ATRIUM SIZE: 4.54 CM
LEFT ATRIUM VOLUME INDEX MOD: 46.1 ML/M2
LEFT ATRIUM VOLUME INDEX: 45.5 ML/M2
LEFT ATRIUM VOLUME MOD: 95 CM3
LEFT ATRIUM VOLUME: 93.77 CM3
LEFT INTERNAL DIMENSION IN SYSTOLE: 3.82 CM (ref 2.1–4)
LEFT VENTRICLE DIASTOLIC VOLUME INDEX: 103.66 ML/M2
LEFT VENTRICLE DIASTOLIC VOLUME: 213.54 ML
LEFT VENTRICLE MASS INDEX: 112 G/M2
LEFT VENTRICLE SYSTOLIC VOLUME INDEX: 30.4 ML/M2
LEFT VENTRICLE SYSTOLIC VOLUME: 62.62 ML
LEFT VENTRICULAR INTERNAL DIMENSION IN DIASTOLE: 6.47 CM (ref 3.5–6)
LEFT VENTRICULAR MASS: 230.65 G
LV LATERAL E/E' RATIO: 4.04 M/S
LV SEPTAL E/E' RATIO: 5.61 M/S
MV A" WAVE DURATION": 13.42 MSEC
MV PEAK A VEL: 0.57 M/S
MV PEAK E VEL: 1.01 M/S
MV STENOSIS PRESSURE HALF TIME: 48.02 MS
MV VALVE AREA P 1/2 METHOD: 4.58 CM2
PISA TR MAX VEL: 2.32 M/S
PULM VEIN S/D RATIO: 0.76
PV PEAK D VEL: 0.87 M/S
PV PEAK S VEL: 0.66 M/S
QEF: 60 %
RA MAJOR: 4.59 CM
RA PRESSURE: 8 MMHG
RA WIDTH: 4.29 CM
RIGHT VENTRICULAR END-DIASTOLIC DIMENSION: 4.72 CM
RV TISSUE DOPPLER FREE WALL SYSTOLIC VELOCITY 1 (APICAL 4 CHAMBER VIEW): 16.41 CM/S
SINUS: 3.37 CM
STJ: 2.46 CM
TDI LATERAL: 0.25 M/S
TDI SEPTAL: 0.18 M/S
TDI: 0.22 M/S
TR MAX PG: 22 MMHG
TRICUSPID ANNULAR PLANE SYSTOLIC EXCURSION: 3.02 CM
TV REST PULMONARY ARTERY PRESSURE: 30 MMHG

## 2022-03-07 PROCEDURE — 93306 TTE W/DOPPLER COMPLETE: CPT | Mod: 26,,, | Performed by: INTERNAL MEDICINE

## 2022-03-07 PROCEDURE — 93306 ECHO (CUPID ONLY): ICD-10-PCS | Mod: 26,,, | Performed by: INTERNAL MEDICINE

## 2022-03-07 PROCEDURE — 93306 TTE W/DOPPLER COMPLETE: CPT

## 2022-03-07 NOTE — PROGRESS NOTES
Date Consent signed: 3/7/2022    Company/  Name: [employer]     Study Title: Detection of Reduced Left Ventricular Ejection Fraction and Atrial Arrhythmias with Single Lead ECG using  Artificial Intelligence- EKO Study    IRB Number: 2021.079    Principle Investigator: Marcia Bernard MD    Present for Discussion: myself, patient     Is LAR Consenting for Subject: N/A      Juanjo Sims met with patient to review consent form and answer any and all outstanding questions that would present in regard to the EKO clinical trial. After thoroughly discussing the consent form patient did agree to participate in cardiology clinical trial 2021.079.         Prior to the Informed Consent (IC) being signed, or any study protocol required data collection, testing, procedure, or intervention being performed, the following was done and/or discussed:   Patient was given a copy of the IC for review    Purpose of the study and qualifications to participate    Study design, Follow up schedule, and tests or procedures done at each visit   Confidentiality and HIPAA Authorization for Release of Medical Records for the research trial/ subject's rights/research related injury   Risk, Benefits, Alternative Treatments, Compensation and Costs   Participation in the research trial is voluntary and patient may withdraw at anytime   Contact information for study related questions    Patient verbalizes understanding of the above: Yes  Contact information for CRC and PI given to patient: Yes  Patient able to adequately summarize: the purpose of the study, the risks associated with the study, and all procedures, testing, and follow-ups associated with the study: Yes    Parrish Davis signed the informed consent form for the EKO clinical research study with an IRB approval date of 6/30/2021.  Each page of the consent form was reviewed with Parrish Davis and all questions answered satisfactorily. Parrish Davis  signed the consent form and received a copy of same. The original consent was scanned into electronic medical records (EPIC) and filed into the subject's research study binder.      Consent was obtained by: Juanjo Sims       As a participant in the EKO clinical trial, 5 recordings were done by a DUO monitor device. Recordings were done by Juanjo Sims       After all study related procedures were completed, patient was issued a $10.00 stipend for participation via [employer] provided by the sponsor.

## 2022-03-09 ENCOUNTER — PATIENT MESSAGE (OUTPATIENT)
Dept: PRIMARY CARE CLINIC | Facility: CLINIC | Age: 27
End: 2022-03-09
Payer: COMMERCIAL

## 2022-03-15 ENCOUNTER — HOSPITAL ENCOUNTER (EMERGENCY)
Facility: HOSPITAL | Age: 27
Discharge: HOME | End: 2022-03-15
Attending: EMERGENCY MEDICINE
Payer: COMMERCIAL

## 2022-03-15 VITALS
SYSTOLIC BLOOD PRESSURE: 151 MMHG | DIASTOLIC BLOOD PRESSURE: 75 MMHG | TEMPERATURE: 99 F | OXYGEN SATURATION: 98 % | BODY MASS INDEX: 25.06 KG/M2 | HEART RATE: 74 BPM | RESPIRATION RATE: 18 BRPM | HEIGHT: 72 IN | WEIGHT: 185 LBS

## 2022-03-15 DIAGNOSIS — R52 PAIN: Primary | ICD-10-CM

## 2022-03-15 PROCEDURE — 99284 EMERGENCY DEPT VISIT MOD MDM: CPT | Mod: ,,, | Performed by: EMERGENCY MEDICINE

## 2022-03-15 PROCEDURE — 99283 EMERGENCY DEPT VISIT LOW MDM: CPT | Mod: 25

## 2022-03-15 PROCEDURE — 99284 PR EMERGENCY DEPT VISIT,LEVEL IV: ICD-10-PCS | Mod: ,,, | Performed by: EMERGENCY MEDICINE

## 2022-03-15 RX ORDER — HYDROCODONE BITARTRATE AND ACETAMINOPHEN 5; 325 MG/1; MG/1
1 TABLET ORAL EVERY 8 HOURS PRN
Qty: 9 TABLET | Refills: 0 | Status: SHIPPED | OUTPATIENT
Start: 2022-03-15 | End: 2022-03-18

## 2022-03-15 NOTE — ED PROVIDER NOTES
Encounter Date: 3/15/2022       History     Chief Complaint   Patient presents with    Arm Pain     Right FA pain started today, bumped his arm slightly at work today     Parrish Davis is a 26 y.o. male with history of sickle cell disease presenting with chief complaint of right forearm pain.  Patient states he 1st noticed pain they work around 11:00 a.m..  Earlier today around 9:00 a.m. he had very minor trauma stating that he bumped his arm.  He is currently endorsing 5 to 7/10 constant achy and throbbing pain to lateral right forearm.  It is not positional or worse with movement.  He has been taking 400 mg ibuprofen, last dose at 9:45 p.m. patient has also had about a week of of upper respiratory symptoms including cough and sore throat.  He states he had negative COVID test at home.  Patient denies fever shortness of breath.  Patient drove to the emergency department today.  Patient states that his pain crises normally limited to his abdomen.  He states he has never had a pain crisis of his extremities and that this pain is different in quality        Review of patient's allergies indicates:  No Known Allergies  Past Medical History:   Diagnosis Date    Sickle cell anemia     Sickle cell disease      Past Surgical History:   Procedure Laterality Date    chemoport      removed    CHOLECYSTECTOMY  2002    UMBILICAL HERNIA REPAIR  1995     No family history on file.  Social History     Tobacco Use    Smoking status: Never Smoker    Smokeless tobacco: Never Used   Substance Use Topics    Alcohol use: Yes     Comment: Social drinker on weekends    Drug use: No     Review of Systems   Constitutional: Negative for chills and fever.   HENT: Positive for sore throat. Negative for congestion.    Respiratory: Positive for cough. Negative for shortness of breath.    Cardiovascular: Negative for chest pain and palpitations.   Gastrointestinal: Negative for abdominal pain, diarrhea, nausea and vomiting.    Genitourinary: Negative for difficulty urinating and dysuria.   Musculoskeletal: Positive for arthralgias (Right lateral forearm). Negative for myalgias.   Skin: Negative for pallor and rash.   Neurological: Negative for dizziness, weakness and headaches.   Hematological: Does not bruise/bleed easily.   Psychiatric/Behavioral: Negative for agitation and confusion.       Physical Exam     Initial Vitals [03/15/22 0022]   BP Pulse Resp Temp SpO2   (!) 151/75 74 18 98.6 °F (37 °C) 98 %      MAP       --         Physical Exam    Nursing note and vitals reviewed.  Constitutional: He appears well-developed and well-nourished. He is not diaphoretic. No distress.   HENT:   Head: Normocephalic and atraumatic.   Mouth/Throat: Oropharynx is clear and moist.   Eyes: Conjunctivae are normal. No scleral icterus.   Neck: Neck supple. No tracheal deviation present.   Cardiovascular: Normal rate and intact distal pulses.   Pulmonary/Chest: No stridor. No respiratory distress. He has no rales.   Abdominal: Abdomen is soft. Bowel sounds are normal. He exhibits no distension. There is no abdominal tenderness. There is no rebound.   Musculoskeletal:         General: No tenderness or edema.      Cervical back: Neck supple.     Neurological: He is alert and oriented to person, place, and time. He has normal strength. No sensory deficit. GCS score is 15. GCS eye subscore is 4. GCS verbal subscore is 5. GCS motor subscore is 6.   Right arm neurovascularly intact.  No focal tenderness to palpation   Skin: Skin is warm and dry. Capillary refill takes less than 2 seconds. No rash noted. No erythema. No pallor.   Psychiatric: He has a normal mood and affect. Thought content normal.         ED Course   Procedures  Labs Reviewed - No data to display       Imaging Results          X-Ray Forearm Right (Final result)  Result time 03/15/22 01:43:29    Final result by Logan Thorpe MD (03/15/22 01:43:29)                 Impression:      There  is no evidence of fracture or subluxation.      Electronically signed by: Logan Thorpe MD  Date:    03/15/2022  Time:    01:43             Narrative:    EXAMINATION:  XR FOREARM RIGHT    CLINICAL HISTORY:  Pain, unspecified    TECHNIQUE:  AP and lateral views of the right forearm were performed.    COMPARISON:  None    FINDINGS:  No fractures or dislocations.  Ulna minus variance.                                 Medications - No data to display  Medical Decision Making:   Initial Assessment:   26-year-old male with sickle cell disease here with right arm pain  Differential Diagnosis:   Musculoskeletal pain, doubt fracture, doubt sickle cell pain crisis  Clinical Tests:   Radiological Study: Ordered and Reviewed  ED Management:  Plain films right forearm ordered.  Discussed with patient options for pain treatment.  He recently took ibuprofen and is not due for another dose yet.  He drove himself to the ED today and will not be able to get a ride home, patient agreed to defer opioids at this time so he can drive home after workup is complete.  Plain films negative.  Patient likely experiencing musculoskeletal pain unrelated to fracture.  Patient will be discharged with Norco 5 for pain control, instructions to follow-up with PCP and return precautions.            Attending Attestation:   Physician Attestation Statement for Resident:  As the supervising MD   Physician Attestation Statement: I have personally seen and examined this patient.   I agree with the above history. -:   As the supervising MD I agree with the above PE.    As the supervising MD I agree with the above treatment, course, plan, and disposition.  I have reviewed and agree with the residents interpretation of the following: x-rays.  I have reviewed the following: old records at this facility.                         Clinical Impression:   Final diagnoses:  [R52] Pain (Primary)          ED Disposition Condition    Discharge Stable        ED  Prescriptions     Medication Sig Dispense Start Date End Date Auth. Provider    HYDROcodone-acetaminophen (NORCO) 5-325 mg per tablet Take 1 tablet by mouth every 8 (eight) hours as needed for Pain. 9 tablet 3/15/2022 3/18/2022 Yanci Rivera MD        Follow-up Information     Follow up With Specialties Details Why Contact Info    Jovanny Douglas MD Family Medicine Schedule an appointment as soon as possible for a visit   [address] [facility] [address]  [phone]             Amrit Pereyra MD  Resident  03/15/22 0621       Yanci Rivera MD  03/15/22 9307

## 2022-03-15 NOTE — Clinical Note
"Parrish"Mikael Davis was seen and treated in our emergency department on 3/15/2022.  He may return to work on 03/16/2022.       If you have any questions or concerns, please don't hesitate to call.      Yanci Rivera MD"

## 2022-03-15 NOTE — ED TRIAGE NOTES
Pt reports R FA pain after hitting it at work today, he describes that pain as throbbing. He denies numbness/tingling to RUE

## 2022-03-15 NOTE — DISCHARGE INSTRUCTIONS
X-Ray Forearm Right   Final Result      There is no evidence of fracture or subluxation.         Electronically signed by: Logan Thorpe MD   Date:    03/15/2022   Time:    01:43          Treatments you had today:   Medications - No data to display    Follow-Up Plan:  - Follow-up with primary care doctor within 3 - 5 days  - Additional testing and/or evaluation as directed by your primary doctor    Return to the Emergency Department for symptoms including but not limited to: worsening symptoms, shortness of breath or chest pain, vomiting with inability to hold down fluids, fevers greater than 100.4°F, passing out/fainting/unconsciousness, or other concerning symptoms.

## 2022-04-01 RX ORDER — FLUOXETINE 10 MG/1
CAPSULE ORAL
Qty: 90 CAPSULE | Refills: 1 | Status: SHIPPED | OUTPATIENT
Start: 2022-04-01 | End: 2022-10-03 | Stop reason: SDUPTHER

## 2022-04-01 NOTE — TELEPHONE ENCOUNTER
This Rx Request does not qualify for assessment with the [facility]   Please review protocol details and the Care Due Message for extra clinical information    Reasons Rx Request may be deferred:  Patient has been seen in the ED/Hospital since the last PCP visit    Note composed:9:36 AM 04/01/2022

## 2022-04-01 NOTE — TELEPHONE ENCOUNTER
No new care gaps identified.  Powered by [employer]. Reference number: [id number].   4/01/2022 1:27:00 AM CDT

## 2022-05-24 ENCOUNTER — OFFICE VISIT (OUTPATIENT)
Dept: HEMATOLOGY/ONCOLOGY | Facility: CLINIC | Age: 27
End: 2022-05-24
Payer: COMMERCIAL

## 2022-05-24 ENCOUNTER — LAB VISIT (OUTPATIENT)
Dept: LAB | Facility: HOSPITAL | Age: 27
End: 2022-05-24
Attending: FAMILY MEDICINE
Payer: COMMERCIAL

## 2022-05-24 VITALS
RESPIRATION RATE: 16 BRPM | HEIGHT: 72 IN | SYSTOLIC BLOOD PRESSURE: 118 MMHG | OXYGEN SATURATION: 94 % | HEART RATE: 82 BPM | WEIGHT: 185.75 LBS | DIASTOLIC BLOOD PRESSURE: 59 MMHG | BODY MASS INDEX: 25.16 KG/M2

## 2022-05-24 DIAGNOSIS — Z91.89 AT RISK FOR OSTEOPOROSIS: ICD-10-CM

## 2022-05-24 DIAGNOSIS — N48.32 PRIAPISM DUE TO SICKLE CELL DISEASE: ICD-10-CM

## 2022-05-24 DIAGNOSIS — D57.09 PRIAPISM DUE TO SICKLE CELL DISEASE: ICD-10-CM

## 2022-05-24 DIAGNOSIS — D57.1 SICKLE CELL DISEASE WITHOUT CRISIS: Primary | ICD-10-CM

## 2022-05-24 DIAGNOSIS — D57.1 SICKLE CELL DISEASE WITHOUT CRISIS: ICD-10-CM

## 2022-05-24 DIAGNOSIS — R17 SCLERAL ICTERUS: ICD-10-CM

## 2022-05-24 LAB
ALBUMIN SERPL BCP-MCNC: 3.9 G/DL (ref 3.5–5.2)
ALP SERPL-CCNC: 76 U/L (ref 55–135)
ALT SERPL W/O P-5'-P-CCNC: 20 U/L (ref 10–44)
ANION GAP SERPL CALC-SCNC: 7 MMOL/L (ref 8–16)
AST SERPL-CCNC: 34 U/L (ref 10–40)
BASOPHILS # BLD AUTO: 0.14 K/UL (ref 0–0.2)
BASOPHILS NFR BLD: 1.6 % (ref 0–1.9)
BILIRUB SERPL-MCNC: 5.2 MG/DL (ref 0.1–1)
BUN SERPL-MCNC: 7 MG/DL (ref 6–20)
CALCIUM SERPL-MCNC: 9 MG/DL (ref 8.7–10.5)
CHLORIDE SERPL-SCNC: 110 MMOL/L (ref 95–110)
CO2 SERPL-SCNC: 22 MMOL/L (ref 23–29)
CREAT SERPL-MCNC: 0.6 MG/DL (ref 0.5–1.4)
DIFFERENTIAL METHOD: ABNORMAL
EOSINOPHIL # BLD AUTO: 0.5 K/UL (ref 0–0.5)
EOSINOPHIL NFR BLD: 6.3 % (ref 0–8)
ERYTHROCYTE [DISTWIDTH] IN BLOOD BY AUTOMATED COUNT: 21.9 % (ref 11.5–14.5)
EST. GFR  (AFRICAN AMERICAN): >60 ML/MIN/1.73 M^2
EST. GFR  (NON AFRICAN AMERICAN): >60 ML/MIN/1.73 M^2
GLUCOSE SERPL-MCNC: 112 MG/DL (ref 70–110)
HCT VFR BLD AUTO: 20.4 % (ref 40–54)
HGB BLD-MCNC: 7.5 G/DL (ref 14–18)
IMM GRANULOCYTES # BLD AUTO: 0.04 K/UL (ref 0–0.04)
IMM GRANULOCYTES NFR BLD AUTO: 0.5 % (ref 0–0.5)
LDH SERPL L TO P-CCNC: 394 U/L (ref 110–260)
LYMPHOCYTES # BLD AUTO: 2.6 K/UL (ref 1–4.8)
LYMPHOCYTES NFR BLD: 29.9 % (ref 18–48)
MCH RBC QN AUTO: 29.5 PG (ref 27–31)
MCHC RBC AUTO-ENTMCNC: 36.8 G/DL (ref 32–36)
MCV RBC AUTO: 80 FL (ref 82–98)
MONOCYTES # BLD AUTO: 1 K/UL (ref 0.3–1)
MONOCYTES NFR BLD: 11.9 % (ref 4–15)
NEUTROPHILS # BLD AUTO: 4.3 K/UL (ref 1.8–7.7)
NEUTROPHILS NFR BLD: 49.8 % (ref 38–73)
NRBC BLD-RTO: 1 /100 WBC
PLATELET # BLD AUTO: 278 K/UL (ref 150–450)
PMV BLD AUTO: 10.1 FL (ref 9.2–12.9)
POTASSIUM SERPL-SCNC: 4 MMOL/L (ref 3.5–5.1)
PROT SERPL-MCNC: 7.6 G/DL (ref 6–8.4)
RBC # BLD AUTO: 2.54 M/UL (ref 4.6–6.2)
RETICS/RBC NFR AUTO: 16.2 % (ref 0.4–2)
SODIUM SERPL-SCNC: 139 MMOL/L (ref 136–145)
WBC # BLD AUTO: 8.59 K/UL (ref 3.9–12.7)

## 2022-05-24 PROCEDURE — 85025 COMPLETE CBC W/AUTO DIFF WBC: CPT | Performed by: INTERNAL MEDICINE

## 2022-05-24 PROCEDURE — 99999 PR PBB SHADOW E&M-EST. PATIENT-LVL III: CPT | Mod: PBBFAC,,, | Performed by: INTERNAL MEDICINE

## 2022-05-24 PROCEDURE — 3074F SYST BP LT 130 MM HG: CPT | Mod: CPTII,S$GLB,, | Performed by: INTERNAL MEDICINE

## 2022-05-24 PROCEDURE — 99215 OFFICE O/P EST HI 40 MIN: CPT | Mod: S$GLB,,, | Performed by: INTERNAL MEDICINE

## 2022-05-24 PROCEDURE — 99215 PR OFFICE/OUTPT VISIT, EST, LEVL V, 40-54 MIN: ICD-10-PCS | Mod: S$GLB,,, | Performed by: INTERNAL MEDICINE

## 2022-05-24 PROCEDURE — 1159F MED LIST DOCD IN RCRD: CPT | Mod: CPTII,S$GLB,, | Performed by: INTERNAL MEDICINE

## 2022-05-24 PROCEDURE — 1159F PR MEDICATION LIST DOCUMENTED IN MEDICAL RECORD: ICD-10-PCS | Mod: CPTII,S$GLB,, | Performed by: INTERNAL MEDICINE

## 2022-05-24 PROCEDURE — 99999 PR PBB SHADOW E&M-EST. PATIENT-LVL III: ICD-10-PCS | Mod: PBBFAC,,, | Performed by: INTERNAL MEDICINE

## 2022-05-24 PROCEDURE — 3074F PR MOST RECENT SYSTOLIC BLOOD PRESSURE < 130 MM HG: ICD-10-PCS | Mod: CPTII,S$GLB,, | Performed by: INTERNAL MEDICINE

## 2022-05-24 PROCEDURE — 83615 LACTATE (LD) (LDH) ENZYME: CPT | Performed by: INTERNAL MEDICINE

## 2022-05-24 PROCEDURE — 3008F BODY MASS INDEX DOCD: CPT | Mod: CPTII,S$GLB,, | Performed by: INTERNAL MEDICINE

## 2022-05-24 PROCEDURE — 85045 AUTOMATED RETICULOCYTE COUNT: CPT | Performed by: INTERNAL MEDICINE

## 2022-05-24 PROCEDURE — 3008F PR BODY MASS INDEX (BMI) DOCUMENTED: ICD-10-PCS | Mod: CPTII,S$GLB,, | Performed by: INTERNAL MEDICINE

## 2022-05-24 PROCEDURE — 36415 COLL VENOUS BLD VENIPUNCTURE: CPT | Performed by: INTERNAL MEDICINE

## 2022-05-24 PROCEDURE — 3078F DIAST BP <80 MM HG: CPT | Mod: CPTII,S$GLB,, | Performed by: INTERNAL MEDICINE

## 2022-05-24 PROCEDURE — 3078F PR MOST RECENT DIASTOLIC BLOOD PRESSURE < 80 MM HG: ICD-10-PCS | Mod: CPTII,S$GLB,, | Performed by: INTERNAL MEDICINE

## 2022-05-24 PROCEDURE — 80053 COMPREHEN METABOLIC PANEL: CPT | Performed by: INTERNAL MEDICINE

## 2022-05-24 NOTE — PROGRESS NOTES
CC: Sickle cell anemia, hematology follow up    HPI: , 26, is here for hematology follow up for sickle cell anemia. He was being followed by pediatrics until age 21. Last pain crisis was in 2016.   He has been experiencing back pain of late. Pain is intermittent, no clear precipitating or alleviating factors.   He had priapism several years ago.      Interval History: Since his visit here in Feb 2022, he had one ER visit in March 2022 for pain crisis.     Review of patient's allergies indicates:  No Known Allergies          Current Outpatient Medications   Medication Sig    FLUoxetine 10 MG capsule TAKE 1 CAPSULE BY MOUTH EVERY DAY    folic acid (FOLVITE) 1 MG tablet Take 1 tablet (1 mg total) by mouth once daily.    hydroxyurea (HYDREA) 500 mg Cap Take 1 capsule (500 mg total) by mouth once daily.    pneumoc 13-mabel conj-dip cr,PF, (PREVNAR 13, PF,) 0.5 mL Syrg Inject into the muscle.     No current facility-administered medications for this visit.         Review of Systems   Constitutional: Positive for malaise/fatigue. Negative for weight loss.   HENT: Negative for ear discharge and ear pain.    Eyes: Negative for photophobia, pain and discharge.   Respiratory: Negative for hemoptysis, sputum production and shortness of breath.    Cardiovascular: Negative for chest pain, palpitations, orthopnea and claudication.   Gastrointestinal: Negative for blood in stool, constipation, diarrhea, heartburn, melena, nausea and vomiting.   Genitourinary: Negative for dysuria, frequency, hematuria and urgency.   Musculoskeletal: Negative for back pain, myalgias and neck pain.   Neurological: Negative for dizziness and tremors.   Endo/Heme/Allergies: Does not bruise/bleed easily.   Psychiatric/Behavioral: Negative for substance abuse.           Vitals:    05/24/22 0919   BP: (!) 118/59   Pulse: 82   Resp: 16       Physical Exam  Constitutional:       Appearance: Normal appearance.   HENT:      Head:  Normocephalic.   Eyes:      General: Scleral icterus present.   Cardiovascular:      Rate and Rhythm: Normal rate.      Heart sounds: Normal heart sounds. No murmur heard.  Pulmonary:      Effort: Pulmonary effort is normal. No respiratory distress.      Breath sounds: No stridor.   Abdominal:      General: There is no distension.      Palpations: There is no mass.      Tenderness: There is no abdominal tenderness.   Genitourinary:     Penis: Normal.       Testes: Normal.   Musculoskeletal:         General: No swelling or tenderness.   Lymphadenopathy:      Cervical: No cervical adenopathy.   Skin:     Coloration: Skin is not jaundiced or pale.   Neurological:      General: No focal deficit present.      Mental Status: He is alert and oriented to person, place, and time.      Cranial Nerves: No cranial nerve deficit.         2/21/22 24hr urine   Component Ref Range & Units 3 mo ago    Urine Volume mL 2900    Urine Collection Duration Hr 24    Protein, Urine 0 - 15 mg/dL <7    Urine Protein, Timed 0 - 100 mg/Spec Unable to calculate           3/3/22 DEXA scan    Impression:     *Bone density is below the expected range for age.      3/7/22 ECHO     · The left ventricle is mildly enlarged with mild eccentric hypertrophy and normal systolic function.  · The estimated ejection fraction is 60%.  · The quantitatively derived ejection fraction is 60%.  · Normal left ventricular diastolic function.  · Mild right ventricular enlargement with normal right ventricular systolic function.  · The estimated PA systolic pressure is 30 mmHg.  · Intermediate central venous pressure (8 mmHg).         Component      Latest Ref Rng & Units 5/24/2022   WBC      3.90 - 12.70 K/uL 8.59   RBC      4.60 - 6.20 M/uL 2.54 (L)   Hemoglobin      14.0 - 18.0 g/dL 7.5 (L)   Hematocrit      40.0 - 54.0 % 20.4 (L)   MCV      82 - 98 fL 80 (L)   MCH      27.0 - 31.0 pg 29.5   MCHC      32.0 - 36.0 g/dL 36.8 (H)   RDW      11.5 - 14.5 % 21.9 (H)    Platelets      150 - 450 K/uL 278   MPV      9.2 - 12.9 fL 10.1   Immature Granulocytes      0.0 - 0.5 % 0.5   Gran # (ANC)      1.8 - 7.7 K/uL 4.3   Immature Grans (Abs)      0.00 - 0.04 K/uL 0.04   Lymph #      1.0 - 4.8 K/uL 2.6   Mono #      0.3 - 1.0 K/uL 1.0   Eos #      0.0 - 0.5 K/uL 0.5   Baso #      0.00 - 0.20 K/uL 0.14   nRBC      0 /100 WBC 1 (A)   Gran %      38.0 - 73.0 % 49.8   Lymph %      18.0 - 48.0 % 29.9   Mono %      4.0 - 15.0 % 11.9   Eosinophil %      0.0 - 8.0 % 6.3   Basophil %      0.0 - 1.9 % 1.6   Differential Method       Automated   LD      110 - 260 U/L 394 (H)   Retic      0.4 - 2.0 % 16.2 (H)         Assessment:    1. Hemoglobin SS disease      Sickle cell checklist:    1. Annual ophtho exam:   Last done: referral placed on 2/17/22  Findings:      2. Bone health: Ca++d recommended, q3 yr dexa recommended. DEXA done on 3/3/22 showed bone density below the expected range for age.  Vit D: Needs periodic monitoring    3. Screening echo for pHTN:     Last done: 3/7/22; estimated PA systolic pressure is 30 mmHg.    4. Annual UA + MicroAlb/Cr Ratio:     Last done: 24hr urine protein excretion was negligible , on 2/21/22    5. Folic acid: currently taking 1mg daily    6. Hydrea: currently taking 500 mg daily    7. Transfusions / iron : Cumulative transfusions: 2  Baseline h/h:   Ferritin: 51 ng/ml on 2/10/22    6. Pain regimen: no regular regimen, PRN Acetaminophen, NSAID  -no frequent admissions with pain crises  -Pain contract: none    7. Osteonecrosis screening: yes    8. Stem cell transplant? No    9. H/o cva? No    10. Priapism: Yes, once since last visit ( April 2022)  --education provided    10. HCV screening? Negative in 2016    11. Immunizations:     Pneumococcal. 13 then 23. 23 q5 yrs: current  COVID 19 : uptodate  H influenzae:  Annual flu: uptodate  Meningococcal:          BMT Chart Routing      Follow up with physician 6 months.   Follow up with [facility]    Labs CBC, CMP, LDH and  other   Lab interval:  Retic count   Imaging    Pharmacy appointment    Other referrals

## 2022-06-29 ENCOUNTER — OFFICE VISIT (OUTPATIENT)
Dept: OPTOMETRY | Facility: CLINIC | Age: 27
End: 2022-06-29
Payer: COMMERCIAL

## 2022-06-29 DIAGNOSIS — H47.092 ASYMMETRY OF OPTIC NERVE OF LEFT EYE: ICD-10-CM

## 2022-06-29 DIAGNOSIS — D57.1 SICKLE CELL DISEASE WITHOUT CRISIS: Primary | ICD-10-CM

## 2022-06-29 PROCEDURE — 1159F PR MEDICATION LIST DOCUMENTED IN MEDICAL RECORD: ICD-10-PCS | Mod: CPTII,S$GLB,, | Performed by: OPTOMETRIST

## 2022-06-29 PROCEDURE — 92004 COMPRE OPH EXAM NEW PT 1/>: CPT | Mod: S$GLB,,, | Performed by: OPTOMETRIST

## 2022-06-29 PROCEDURE — 92004 PR EYE EXAM, NEW PATIENT,COMPREHESV: ICD-10-PCS | Mod: S$GLB,,, | Performed by: OPTOMETRIST

## 2022-06-29 PROCEDURE — 92250 COLOR FUNDUS PHOTOGRAPHY - OU - BOTH EYES: ICD-10-PCS | Mod: S$GLB,,, | Performed by: OPTOMETRIST

## 2022-06-29 PROCEDURE — 1159F MED LIST DOCD IN RCRD: CPT | Mod: CPTII,S$GLB,, | Performed by: OPTOMETRIST

## 2022-06-29 PROCEDURE — 99999 PR PBB SHADOW E&M-EST. PATIENT-LVL II: CPT | Mod: PBBFAC,,, | Performed by: OPTOMETRIST

## 2022-06-29 PROCEDURE — 99999 PR PBB SHADOW E&M-EST. PATIENT-LVL II: ICD-10-PCS | Mod: PBBFAC,,, | Performed by: OPTOMETRIST

## 2022-06-29 PROCEDURE — 92250 FUNDUS PHOTOGRAPHY W/I&R: CPT | Mod: S$GLB,,, | Performed by: OPTOMETRIST

## 2022-06-29 NOTE — PROGRESS NOTES
HPI     Annual eye exam  JOSE 8 years ago    Patient is here for annual eye exam. No vision issues or complaints  (--)Flashes (-)Floaters  (-)Itch, (-)tear, (-)burn, (-)Dryness. (-) OTC Drops   (--)Photophobia  (-)Glare (-)diplopia (-) headaches          Last edited by KAY Hunt on 6/29/2022 11:04 AM. (History)            Assessment /Plan     For exam results, see Encounter Report.    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

## 2022-10-03 ENCOUNTER — OFFICE VISIT (OUTPATIENT)
Dept: PRIMARY CARE CLINIC | Facility: CLINIC | Age: 27
End: 2022-10-03
Payer: COMMERCIAL

## 2022-10-03 VITALS
DIASTOLIC BLOOD PRESSURE: 58 MMHG | HEART RATE: 72 BPM | HEIGHT: 72 IN | BODY MASS INDEX: 26.37 KG/M2 | WEIGHT: 194.69 LBS | SYSTOLIC BLOOD PRESSURE: 130 MMHG

## 2022-10-03 DIAGNOSIS — D84.821 DRUG-INDUCED IMMUNODEFICIENCY: ICD-10-CM

## 2022-10-03 DIAGNOSIS — R17 SCLERAL ICTERUS: ICD-10-CM

## 2022-10-03 DIAGNOSIS — D57.1 SICKLE CELL DISEASE WITHOUT CRISIS: ICD-10-CM

## 2022-10-03 DIAGNOSIS — F32.1 CURRENT MODERATE EPISODE OF MAJOR DEPRESSIVE DISORDER WITHOUT PRIOR EPISODE: Primary | ICD-10-CM

## 2022-10-03 DIAGNOSIS — Z79.899 DRUG-INDUCED IMMUNODEFICIENCY: ICD-10-CM

## 2022-10-03 PROCEDURE — 99999 PR PBB SHADOW E&M-EST. PATIENT-LVL III: ICD-10-PCS | Mod: PBBFAC,,, | Performed by: FAMILY MEDICINE

## 2022-10-03 PROCEDURE — 1160F PR REVIEW ALL MEDS BY PRESCRIBER/CLIN PHARMACIST DOCUMENTED: ICD-10-PCS | Mod: CPTII,S$GLB,, | Performed by: FAMILY MEDICINE

## 2022-10-03 PROCEDURE — 1159F MED LIST DOCD IN RCRD: CPT | Mod: CPTII,S$GLB,, | Performed by: FAMILY MEDICINE

## 2022-10-03 PROCEDURE — 99213 PR OFFICE/OUTPT VISIT, EST, LEVL III, 20-29 MIN: ICD-10-PCS | Mod: S$GLB,,, | Performed by: FAMILY MEDICINE

## 2022-10-03 PROCEDURE — 3078F PR MOST RECENT DIASTOLIC BLOOD PRESSURE < 80 MM HG: ICD-10-PCS | Mod: CPTII,S$GLB,, | Performed by: FAMILY MEDICINE

## 2022-10-03 PROCEDURE — 99999 PR PBB SHADOW E&M-EST. PATIENT-LVL III: CPT | Mod: PBBFAC,,, | Performed by: FAMILY MEDICINE

## 2022-10-03 PROCEDURE — 1160F RVW MEDS BY RX/DR IN RCRD: CPT | Mod: CPTII,S$GLB,, | Performed by: FAMILY MEDICINE

## 2022-10-03 PROCEDURE — 3008F BODY MASS INDEX DOCD: CPT | Mod: CPTII,S$GLB,, | Performed by: FAMILY MEDICINE

## 2022-10-03 PROCEDURE — 3075F SYST BP GE 130 - 139MM HG: CPT | Mod: CPTII,S$GLB,, | Performed by: FAMILY MEDICINE

## 2022-10-03 PROCEDURE — 3075F PR MOST RECENT SYSTOLIC BLOOD PRESS GE 130-139MM HG: ICD-10-PCS | Mod: CPTII,S$GLB,, | Performed by: FAMILY MEDICINE

## 2022-10-03 PROCEDURE — 3078F DIAST BP <80 MM HG: CPT | Mod: CPTII,S$GLB,, | Performed by: FAMILY MEDICINE

## 2022-10-03 PROCEDURE — 99213 OFFICE O/P EST LOW 20 MIN: CPT | Mod: S$GLB,,, | Performed by: FAMILY MEDICINE

## 2022-10-03 PROCEDURE — 3008F PR BODY MASS INDEX (BMI) DOCUMENTED: ICD-10-PCS | Mod: CPTII,S$GLB,, | Performed by: FAMILY MEDICINE

## 2022-10-03 PROCEDURE — 1159F PR MEDICATION LIST DOCUMENTED IN MEDICAL RECORD: ICD-10-PCS | Mod: CPTII,S$GLB,, | Performed by: FAMILY MEDICINE

## 2022-10-03 RX ORDER — FLUOXETINE 10 MG/1
10 CAPSULE ORAL DAILY
Qty: 90 CAPSULE | Refills: 3 | Status: SHIPPED | OUTPATIENT
Start: 2022-10-03 | End: 2022-11-07 | Stop reason: SDUPTHER

## 2022-10-05 PROBLEM — F32.1 CURRENT MODERATE EPISODE OF MAJOR DEPRESSIVE DISORDER WITHOUT PRIOR EPISODE: Status: ACTIVE | Noted: 2022-10-05

## 2022-10-05 PROBLEM — F43.29 MIXED EMOTIONAL FEATURES AS ADJUSTMENT REACTION: Status: RESOLVED | Noted: 2022-02-14 | Resolved: 2022-10-05

## 2022-10-05 PROBLEM — Z79.899 DRUG-INDUCED IMMUNODEFICIENCY: Status: ACTIVE | Noted: 2022-10-05

## 2022-10-05 PROBLEM — D84.821 DRUG-INDUCED IMMUNODEFICIENCY: Status: ACTIVE | Noted: 2022-10-05

## 2022-10-05 NOTE — PROGRESS NOTES
BP (!) 130/58 (BP Location: Left arm, Patient Position: Sitting, BP Method: Medium (Manual))   Pulse 72   Ht 6' (1.829 m)   Wt 88.3 kg (194 lb 10.7 oz)   BMI 26.40 kg/m²     Chief Complaint: Follow-up        Parrish Davis is a 27 y.o. male here for    Follow-up  This is a new problem. The current episode started more than 1 month ago. The problem occurs constantly. The problem has been rapidly improving. Pertinent negatives include no abdominal pain, anorexia, arthralgias, change in bowel habit, chest pain, chills, congestion, coughing, diaphoresis, fatigue, fever, headaches, joint swelling, myalgias, nausea, neck pain, numbness, rash, sore throat, swollen glands, urinary symptoms, vertigo, visual change, vomiting or weakness.   Depression  Visit Type: follow-up  Patient is not experiencing: anhedonia, chest pain, choking sensation, compulsions, confusion, decreased concentration, depressed mood (improved significantly), dizziness, dry mouth, excessive worry, fatigue, feelings of hopelessness, feelings of worthlessness, hypersomnia, hyperventilation, impotence, insomnia, irritability, malaise, memory impairment, muscle tension, nausea, nervousness/anxiety, obsessions, palpitations, panic, psychomotor agitation, psychomotor retardation, restlessness, shortness of breath, suicidal ideas, suicidal planning, thoughts of death, weight gain and weight loss.  Frequency of symptoms: most days   Severity: mild   Sleep quality: good  Nighttime awakenings: occasional  Compliance with medications:  %        Patient queried and denies any further complaints    SURGICAL AND MEDICAL HISTORY: updated and reviewed.  ALLERGIES updated and reviewed.  Review of patient's allergies indicates:  No Known Allergies  CURRENT OUTPATIENT MEDICATIONS updated and reviewed    Current Outpatient Medications:     folic acid (FOLVITE) 1 MG tablet, Take 1 tablet (1 mg total) by mouth once daily., Disp: 90 tablet, Rfl: 3    hydroxyurea  (HYDREA) 500 mg Cap, Take 1 capsule (500 mg total) by mouth once daily., Disp: 90 each, Rfl: 3    FLUoxetine 10 MG capsule, Take 1 capsule (10 mg total) by mouth once daily., Disp: 90 capsule, Rfl: 3    pneumoc 13-mabel conj-dip cr,PF, (PREVNAR 13, PF,) 0.5 mL Syrg, Inject into the muscle. (Patient not taking: Reported on 10/3/2022), Disp: 0.5 mL, Rfl: 0    Review of Systems   Constitutional:  Negative for activity change, appetite change, chills, diaphoresis, fatigue, fever, irritability, unexpected weight change, weight gain and weight loss.   HENT:  Negative for congestion, ear discharge, ear pain, facial swelling, hearing loss, nosebleeds, postnasal drip, rhinorrhea, sinus pressure, sneezing, sore throat, tinnitus, trouble swallowing and voice change.    Eyes:  Negative for photophobia, pain, discharge, redness, itching and visual disturbance.   Respiratory:  Negative for cough, choking, chest tightness, shortness of breath and wheezing.    Cardiovascular:  Negative for chest pain, palpitations and leg swelling.   Gastrointestinal:  Negative for abdominal distention, abdominal pain, anal bleeding, anorexia, blood in stool, change in bowel habit, constipation, diarrhea, nausea, rectal pain and vomiting.   Endocrine: Negative for cold intolerance, heat intolerance, polydipsia, polyphagia and polyuria.   Genitourinary:  Negative for difficulty urinating, dysuria, flank pain and impotence.   Musculoskeletal:  Negative for arthralgias, back pain, joint swelling, myalgias and neck pain.   Skin:  Negative for rash.   Neurological:  Negative for dizziness, vertigo, tremors, seizures, syncope, speech difficulty, weakness, light-headedness, numbness and headaches.   Psychiatric/Behavioral:  Positive for depression. Negative for behavioral problems, confusion, decreased concentration, dysphoric mood, sleep disturbance and suicidal ideas. The patient is not nervous/anxious, does not have insomnia and is not hyperactive.       BP (!) 130/58 (BP Location: Left arm, Patient Position: Sitting, BP Method: Medium (Manual))   Pulse 72   Ht 6' (1.829 m)   Wt 88.3 kg (194 lb 10.7 oz)   BMI 26.40 kg/m²   Physical Exam  Vitals and nursing note reviewed.   Constitutional:       General: He is not in acute distress.     Appearance: Normal appearance. He is not ill-appearing, toxic-appearing or diaphoretic.   HENT:      Head: Normocephalic and atraumatic.   Eyes:      General: No scleral icterus.        Right eye: No discharge.         Left eye: No discharge.      Extraocular Movements: Extraocular movements intact.      Conjunctiva/sclera: Conjunctivae normal.   Skin:     General: Skin is warm and dry.   Neurological:      Mental Status: He is alert.   Psychiatric:         Mood and Affect: Mood normal.         Behavior: Behavior normal.     Parrish was seen today for follow-up.    Diagnoses and all orders for this visit:    Current moderate episode of major depressive disorder without prior episode    Scleral icterus    Sickle cell disease without crisis    Drug-induced immunodeficiency    Other orders  -     FLUoxetine 10 MG capsule; Take 1 capsule (10 mg total) by mouth once daily.      Reports doing very well w fluoxetine. Refill. F/u 6 mo    Jovanny Douglas MD        ===========================================

## 2022-10-19 ENCOUNTER — HOSPITAL ENCOUNTER (INPATIENT)
Facility: HOSPITAL | Age: 27
LOS: 2 days | Discharge: HOME | DRG: 812 | End: 2022-10-23
Attending: EMERGENCY MEDICINE | Admitting: STUDENT IN AN ORGANIZED HEALTH CARE EDUCATION/TRAINING PROGRAM
Payer: COMMERCIAL

## 2022-10-19 DIAGNOSIS — D57.00 SICKLE CELL ANEMIA WITH PAIN: Primary | ICD-10-CM

## 2022-10-19 DIAGNOSIS — D57.00 SICKLE CELL PAIN CRISIS: ICD-10-CM

## 2022-10-19 LAB
ALBUMIN SERPL BCP-MCNC: 4.3 G/DL (ref 3.5–5.2)
ALP SERPL-CCNC: 97 U/L (ref 55–135)
ALT SERPL W/O P-5'-P-CCNC: 15 U/L (ref 10–44)
ANION GAP SERPL CALC-SCNC: 11 MMOL/L (ref 8–16)
ANISOCYTOSIS BLD QL SMEAR: ABNORMAL
AST SERPL-CCNC: 30 U/L (ref 10–40)
BASO STIPL BLD QL SMEAR: ABNORMAL
BASOPHILS # BLD AUTO: 0.09 K/UL (ref 0–0.2)
BASOPHILS NFR BLD: 0.7 % (ref 0–1.9)
BILIRUB SERPL-MCNC: 7.7 MG/DL (ref 0.1–1)
BUN SERPL-MCNC: 6 MG/DL (ref 6–20)
CALCIUM SERPL-MCNC: 9 MG/DL (ref 8.7–10.5)
CHLORIDE SERPL-SCNC: 104 MMOL/L (ref 95–110)
CO2 SERPL-SCNC: 18 MMOL/L (ref 23–29)
CREAT SERPL-MCNC: 0.7 MG/DL (ref 0.5–1.4)
DIFFERENTIAL METHOD: ABNORMAL
EOSINOPHIL # BLD AUTO: 0 K/UL (ref 0–0.5)
EOSINOPHIL NFR BLD: 0.2 % (ref 0–8)
ERYTHROCYTE [DISTWIDTH] IN BLOOD BY AUTOMATED COUNT: 21.1 % (ref 11.5–14.5)
EST. GFR  (NO RACE VARIABLE): >60 ML/MIN/1.73 M^2
GLUCOSE SERPL-MCNC: 108 MG/DL (ref 70–110)
HCT VFR BLD AUTO: 22.1 % (ref 40–54)
HCV AB SERPL QL IA: NORMAL
HGB BLD-MCNC: 7.8 G/DL (ref 14–18)
HIV 1+2 AB+HIV1 P24 AG SERPL QL IA: NORMAL
HOWELL-JOLLY BOD BLD QL SMEAR: ABNORMAL
HYPOCHROMIA BLD QL SMEAR: ABNORMAL
IMM GRANULOCYTES # BLD AUTO: 0.04 K/UL (ref 0–0.04)
IMM GRANULOCYTES NFR BLD AUTO: 0.3 % (ref 0–0.5)
LACTATE SERPL-SCNC: 0.7 MMOL/L (ref 0.5–2.2)
LDH SERPL L TO P-CCNC: 377 U/L (ref 110–260)
LYMPHOCYTES # BLD AUTO: 1.3 K/UL (ref 1–4.8)
LYMPHOCYTES NFR BLD: 10.6 % (ref 18–48)
MCH RBC QN AUTO: 28.7 PG (ref 27–31)
MCHC RBC AUTO-ENTMCNC: 35.3 G/DL (ref 32–36)
MCV RBC AUTO: 81 FL (ref 82–98)
MONOCYTES # BLD AUTO: 1.3 K/UL (ref 0.3–1)
MONOCYTES NFR BLD: 10.4 % (ref 4–15)
NEUTROPHILS # BLD AUTO: 9.9 K/UL (ref 1.8–7.7)
NEUTROPHILS NFR BLD: 77.8 % (ref 38–73)
NRBC BLD-RTO: 0 /100 WBC
OVALOCYTES BLD QL SMEAR: ABNORMAL
PAPPENHEIMER BOD BLD QL SMEAR: PRESENT
PLATELET # BLD AUTO: 293 K/UL (ref 150–450)
PLATELET BLD QL SMEAR: ABNORMAL
PMV BLD AUTO: 10.1 FL (ref 9.2–12.9)
POIKILOCYTOSIS BLD QL SMEAR: SLIGHT
POLYCHROMASIA BLD QL SMEAR: ABNORMAL
POTASSIUM SERPL-SCNC: 3.6 MMOL/L (ref 3.5–5.1)
PROT SERPL-MCNC: 8.4 G/DL (ref 6–8.4)
RBC # BLD AUTO: 2.72 M/UL (ref 4.6–6.2)
RETICS/RBC NFR AUTO: 15.2 % (ref 0.4–2)
SICKLE CELLS BLD QL SMEAR: ABNORMAL
SODIUM SERPL-SCNC: 133 MMOL/L (ref 136–145)
TARGETS BLD QL SMEAR: ABNORMAL
WBC # BLD AUTO: 12.67 K/UL (ref 3.9–12.7)

## 2022-10-19 PROCEDURE — G0378 HOSPITAL OBSERVATION PER HR: HCPCS

## 2022-10-19 PROCEDURE — 93010 EKG 12-LEAD: ICD-10-PCS | Mod: ,,, | Performed by: INTERNAL MEDICINE

## 2022-10-19 PROCEDURE — 85025 COMPLETE CBC W/AUTO DIFF WBC: CPT | Performed by: EMERGENCY MEDICINE

## 2022-10-19 PROCEDURE — 83615 LACTATE (LD) (LDH) ENZYME: CPT | Performed by: EMERGENCY MEDICINE

## 2022-10-19 PROCEDURE — 96376 TX/PRO/DX INJ SAME DRUG ADON: CPT

## 2022-10-19 PROCEDURE — 94761 N-INVAS EAR/PLS OXIMETRY MLT: CPT

## 2022-10-19 PROCEDURE — 63600175 PHARM REV CODE 636 W HCPCS: Performed by: HOSPITALIST

## 2022-10-19 PROCEDURE — 99285 EMERGENCY DEPT VISIT HI MDM: CPT | Mod: 25

## 2022-10-19 PROCEDURE — 87389 HIV-1 AG W/HIV-1&-2 AB AG IA: CPT | Performed by: PHYSICIAN ASSISTANT

## 2022-10-19 PROCEDURE — 63600175 PHARM REV CODE 636 W HCPCS: Performed by: EMERGENCY MEDICINE

## 2022-10-19 PROCEDURE — 99285 EMERGENCY DEPT VISIT HI MDM: CPT | Mod: ,,, | Performed by: EMERGENCY MEDICINE

## 2022-10-19 PROCEDURE — 96375 TX/PRO/DX INJ NEW DRUG ADDON: CPT

## 2022-10-19 PROCEDURE — S5010 5% DEXTROSE AND 0.45% SALINE: HCPCS | Performed by: HOSPITALIST

## 2022-10-19 PROCEDURE — 86803 HEPATITIS C AB TEST: CPT | Performed by: PHYSICIAN ASSISTANT

## 2022-10-19 PROCEDURE — 85045 AUTOMATED RETICULOCYTE COUNT: CPT | Performed by: EMERGENCY MEDICINE

## 2022-10-19 PROCEDURE — 25000003 PHARM REV CODE 250: Performed by: HOSPITALIST

## 2022-10-19 PROCEDURE — 93005 ELECTROCARDIOGRAM TRACING: CPT

## 2022-10-19 PROCEDURE — 80053 COMPREHEN METABOLIC PANEL: CPT | Performed by: EMERGENCY MEDICINE

## 2022-10-19 PROCEDURE — 93010 ELECTROCARDIOGRAM REPORT: CPT | Mod: ,,, | Performed by: INTERNAL MEDICINE

## 2022-10-19 PROCEDURE — 83605 ASSAY OF LACTIC ACID: CPT | Performed by: EMERGENCY MEDICINE

## 2022-10-19 PROCEDURE — 99285 PR EMERGENCY DEPT VISIT,LEVEL V: ICD-10-PCS | Mod: ,,, | Performed by: EMERGENCY MEDICINE

## 2022-10-19 RX ORDER — AMOXICILLIN 250 MG
1 CAPSULE ORAL 2 TIMES DAILY
Status: DISCONTINUED | OUTPATIENT
Start: 2022-10-19 | End: 2022-10-23 | Stop reason: HOSPADM

## 2022-10-19 RX ORDER — PROCHLORPERAZINE EDISYLATE 5 MG/ML
5 INJECTION INTRAMUSCULAR; INTRAVENOUS EVERY 6 HOURS PRN
Status: DISCONTINUED | OUTPATIENT
Start: 2022-10-19 | End: 2022-10-23 | Stop reason: HOSPADM

## 2022-10-19 RX ORDER — HYDROMORPHONE HYDROCHLORIDE 1 MG/ML
2 INJECTION, SOLUTION INTRAMUSCULAR; INTRAVENOUS; SUBCUTANEOUS
Status: DISCONTINUED | OUTPATIENT
Start: 2022-10-19 | End: 2022-10-22

## 2022-10-19 RX ORDER — HYDROMORPHONE HYDROCHLORIDE 1 MG/ML
1 INJECTION, SOLUTION INTRAMUSCULAR; INTRAVENOUS; SUBCUTANEOUS
Status: COMPLETED | OUTPATIENT
Start: 2022-10-19 | End: 2022-10-19

## 2022-10-19 RX ORDER — ACETAMINOPHEN 325 MG/1
650 TABLET ORAL EVERY 6 HOURS PRN
Status: DISCONTINUED | OUTPATIENT
Start: 2022-10-19 | End: 2022-10-23 | Stop reason: HOSPADM

## 2022-10-19 RX ORDER — NALOXONE HCL 0.4 MG/ML
0.02 VIAL (ML) INJECTION
Status: DISCONTINUED | OUTPATIENT
Start: 2022-10-19 | End: 2022-10-23 | Stop reason: HOSPADM

## 2022-10-19 RX ORDER — DEXTROSE MONOHYDRATE AND SODIUM CHLORIDE 5; .45 G/100ML; G/100ML
INJECTION, SOLUTION INTRAVENOUS CONTINUOUS
Status: DISCONTINUED | OUTPATIENT
Start: 2022-10-19 | End: 2022-10-20

## 2022-10-19 RX ORDER — FLUOXETINE 10 MG/1
10 CAPSULE ORAL DAILY
Status: DISCONTINUED | OUTPATIENT
Start: 2022-10-20 | End: 2022-10-23 | Stop reason: HOSPADM

## 2022-10-19 RX ORDER — KETOROLAC TROMETHAMINE 30 MG/ML
30 INJECTION, SOLUTION INTRAMUSCULAR; INTRAVENOUS EVERY 6 HOURS
Status: COMPLETED | OUTPATIENT
Start: 2022-10-20 | End: 2022-10-21

## 2022-10-19 RX ORDER — FOLIC ACID 1 MG/1
1 TABLET ORAL DAILY
Status: DISCONTINUED | OUTPATIENT
Start: 2022-10-20 | End: 2022-10-23 | Stop reason: HOSPADM

## 2022-10-19 RX ORDER — ONDANSETRON 2 MG/ML
4 INJECTION INTRAMUSCULAR; INTRAVENOUS
Status: COMPLETED | OUTPATIENT
Start: 2022-10-19 | End: 2022-10-19

## 2022-10-19 RX ORDER — HYDROXYUREA 500 MG/1
500 CAPSULE ORAL DAILY
Status: DISCONTINUED | OUTPATIENT
Start: 2022-10-20 | End: 2022-10-23 | Stop reason: HOSPADM

## 2022-10-19 RX ORDER — POLYETHYLENE GLYCOL 3350 17 G/17G
17 POWDER, FOR SOLUTION ORAL 2 TIMES DAILY PRN
Status: DISCONTINUED | OUTPATIENT
Start: 2022-10-19 | End: 2022-10-23 | Stop reason: HOSPADM

## 2022-10-19 RX ORDER — HYDROMORPHONE HYDROCHLORIDE 1 MG/ML
1 INJECTION, SOLUTION INTRAMUSCULAR; INTRAVENOUS; SUBCUTANEOUS
Status: DISCONTINUED | OUTPATIENT
Start: 2022-10-19 | End: 2022-10-22

## 2022-10-19 RX ORDER — BISACODYL 10 MG
10 SUPPOSITORY, RECTAL RECTAL DAILY PRN
Status: DISCONTINUED | OUTPATIENT
Start: 2022-10-19 | End: 2022-10-23 | Stop reason: HOSPADM

## 2022-10-19 RX ORDER — KETOROLAC TROMETHAMINE 30 MG/ML
10 INJECTION, SOLUTION INTRAMUSCULAR; INTRAVENOUS
Status: COMPLETED | OUTPATIENT
Start: 2022-10-19 | End: 2022-10-19

## 2022-10-19 RX ORDER — SODIUM CHLORIDE 0.9 % (FLUSH) 0.9 %
10 SYRINGE (ML) INJECTION
Status: DISCONTINUED | OUTPATIENT
Start: 2022-10-19 | End: 2022-10-23 | Stop reason: HOSPADM

## 2022-10-19 RX ORDER — ONDANSETRON 2 MG/ML
4 INJECTION INTRAMUSCULAR; INTRAVENOUS EVERY 6 HOURS PRN
Status: DISCONTINUED | OUTPATIENT
Start: 2022-10-19 | End: 2022-10-23 | Stop reason: HOSPADM

## 2022-10-19 RX ADMIN — HYDROMORPHONE HYDROCHLORIDE 1 MG: 1 INJECTION, SOLUTION INTRAMUSCULAR; INTRAVENOUS; SUBCUTANEOUS at 07:10

## 2022-10-19 RX ADMIN — SODIUM CHLORIDE, SODIUM LACTATE, POTASSIUM CHLORIDE, AND CALCIUM CHLORIDE 1000 ML: .6; .31; .03; .02 INJECTION, SOLUTION INTRAVENOUS at 07:10

## 2022-10-19 RX ADMIN — HYDROMORPHONE HYDROCHLORIDE 1 MG: 1 INJECTION, SOLUTION INTRAMUSCULAR; INTRAVENOUS; SUBCUTANEOUS at 09:10

## 2022-10-19 RX ADMIN — HYDROMORPHONE HYDROCHLORIDE 2 MG: 1 INJECTION, SOLUTION INTRAMUSCULAR; INTRAVENOUS; SUBCUTANEOUS at 11:10

## 2022-10-19 RX ADMIN — KETOROLAC TROMETHAMINE 10 MG: 30 INJECTION, SOLUTION INTRAMUSCULAR; INTRAVENOUS at 07:10

## 2022-10-19 RX ADMIN — KETOROLAC TROMETHAMINE 30 MG: 30 INJECTION, SOLUTION INTRAMUSCULAR; INTRAVENOUS at 11:10

## 2022-10-19 RX ADMIN — DEXTROSE AND SODIUM CHLORIDE: 5; .45 INJECTION, SOLUTION INTRAVENOUS at 09:10

## 2022-10-19 RX ADMIN — ONDANSETRON 4 MG: 2 INJECTION INTRAMUSCULAR; INTRAVENOUS at 07:10

## 2022-10-20 LAB
ABO + RH BLD: NORMAL
ABO + RH BLD: NORMAL
ALBUMIN SERPL BCP-MCNC: 3.7 G/DL (ref 3.5–5.2)
ALP SERPL-CCNC: 95 U/L (ref 55–135)
ALT SERPL W/O P-5'-P-CCNC: 17 U/L (ref 10–44)
ANION GAP SERPL CALC-SCNC: 8 MMOL/L (ref 8–16)
APTT BLDCRRT: 27.7 SEC (ref 21–32)
AST SERPL-CCNC: 34 U/L (ref 10–40)
BASOPHILS # BLD AUTO: 0.1 K/UL (ref 0–0.2)
BASOPHILS NFR BLD: 0.8 % (ref 0–1.9)
BILIRUB SERPL-MCNC: 10.2 MG/DL (ref 0.1–1)
BILIRUB UR QL STRIP: NEGATIVE
BILIRUB UR QL STRIP: NEGATIVE
BLD GP AB SCN CELLS X3 SERPL QL: NORMAL
BLD GP AB SCN CELLS X3 SERPL QL: NORMAL
BLD PROD TYP BPU: NORMAL
BLOOD UNIT EXPIRATION DATE: NORMAL
BLOOD UNIT TYPE CODE: 9500
BLOOD UNIT TYPE: NORMAL
BUN SERPL-MCNC: 5 MG/DL (ref 6–20)
CALCIUM SERPL-MCNC: 8.6 MG/DL (ref 8.7–10.5)
CHLORIDE SERPL-SCNC: 106 MMOL/L (ref 95–110)
CLARITY UR REFRACT.AUTO: CLEAR
CLARITY UR REFRACT.AUTO: CLEAR
CO2 SERPL-SCNC: 22 MMOL/L (ref 23–29)
CODING SYSTEM: NORMAL
COLOR UR AUTO: YELLOW
COLOR UR AUTO: YELLOW
CREAT SERPL-MCNC: 0.8 MG/DL (ref 0.5–1.4)
DIFFERENTIAL METHOD: ABNORMAL
DISPENSE STATUS: NORMAL
EOSINOPHIL # BLD AUTO: 0.2 K/UL (ref 0–0.5)
EOSINOPHIL NFR BLD: 1.4 % (ref 0–8)
ERYTHROCYTE [DISTWIDTH] IN BLOOD BY AUTOMATED COUNT: 21.1 % (ref 11.5–14.5)
EST. GFR  (NO RACE VARIABLE): >60 ML/MIN/1.73 M^2
GLUCOSE SERPL-MCNC: 101 MG/DL (ref 70–110)
GLUCOSE UR QL STRIP: NEGATIVE
GLUCOSE UR QL STRIP: NEGATIVE
HCT VFR BLD AUTO: 19.2 % (ref 40–54)
HGB BLD-MCNC: 6.7 G/DL (ref 14–18)
HGB UR QL STRIP: NEGATIVE
HGB UR QL STRIP: NEGATIVE
IMM GRANULOCYTES # BLD AUTO: 0.08 K/UL (ref 0–0.04)
IMM GRANULOCYTES NFR BLD AUTO: 0.6 % (ref 0–0.5)
INR PPP: 1.3 (ref 0.8–1.2)
KETONES UR QL STRIP: NEGATIVE
KETONES UR QL STRIP: NEGATIVE
LEUKOCYTE ESTERASE UR QL STRIP: NEGATIVE
LEUKOCYTE ESTERASE UR QL STRIP: NEGATIVE
LYMPHOCYTES # BLD AUTO: 2.8 K/UL (ref 1–4.8)
LYMPHOCYTES NFR BLD: 21.9 % (ref 18–48)
MCH RBC QN AUTO: 29.4 PG (ref 27–31)
MCHC RBC AUTO-ENTMCNC: 34.9 G/DL (ref 32–36)
MCV RBC AUTO: 84 FL (ref 82–98)
MONOCYTES # BLD AUTO: 1.6 K/UL (ref 0.3–1)
MONOCYTES NFR BLD: 12.7 % (ref 4–15)
NEUTROPHILS # BLD AUTO: 8.1 K/UL (ref 1.8–7.7)
NEUTROPHILS NFR BLD: 62.6 % (ref 38–73)
NITRITE UR QL STRIP: NEGATIVE
NITRITE UR QL STRIP: NEGATIVE
NRBC BLD-RTO: 1 /100 WBC
PH UR STRIP: 5 [PH] (ref 5–8)
PH UR STRIP: 5 [PH] (ref 5–8)
PHOSPHATE SERPL-MCNC: 4.2 MG/DL (ref 2.7–4.5)
PLATELET # BLD AUTO: 194 K/UL (ref 150–450)
PMV BLD AUTO: 10.7 FL (ref 9.2–12.9)
POTASSIUM SERPL-SCNC: 3.8 MMOL/L (ref 3.5–5.1)
PROT SERPL-MCNC: 7.5 G/DL (ref 6–8.4)
PROT UR QL STRIP: NEGATIVE
PROT UR QL STRIP: NEGATIVE
PROTHROMBIN TIME: 12.9 SEC (ref 9–12.5)
RBC # BLD AUTO: 2.28 M/UL (ref 4.6–6.2)
SODIUM SERPL-SCNC: 136 MMOL/L (ref 136–145)
SP GR UR STRIP: 1.01 (ref 1–1.03)
SP GR UR STRIP: 1.01 (ref 1–1.03)
TRANS ERYTHROCYTES VOL PATIENT: NORMAL ML
URN SPEC COLLECT METH UR: NORMAL
URN SPEC COLLECT METH UR: NORMAL
WBC # BLD AUTO: 12.86 K/UL (ref 3.9–12.7)

## 2022-10-20 PROCEDURE — 63700000 PHARM REV CODE 250 ALT 637 W/O HCPCS: Performed by: STUDENT IN AN ORGANIZED HEALTH CARE EDUCATION/TRAINING PROGRAM

## 2022-10-20 PROCEDURE — 96361 HYDRATE IV INFUSION ADD-ON: CPT

## 2022-10-20 PROCEDURE — 84100 ASSAY OF PHOSPHORUS: CPT | Performed by: HOSPITALIST

## 2022-10-20 PROCEDURE — 85025 COMPLETE CBC W/AUTO DIFF WBC: CPT | Performed by: HOSPITALIST

## 2022-10-20 PROCEDURE — 94761 N-INVAS EAR/PLS OXIMETRY MLT: CPT

## 2022-10-20 PROCEDURE — 63600175 PHARM REV CODE 636 W HCPCS: Performed by: STUDENT IN AN ORGANIZED HEALTH CARE EDUCATION/TRAINING PROGRAM

## 2022-10-20 PROCEDURE — 63600175 PHARM REV CODE 636 W HCPCS: Performed by: HOSPITALIST

## 2022-10-20 PROCEDURE — 81003 URINALYSIS AUTO W/O SCOPE: CPT | Performed by: EMERGENCY MEDICINE

## 2022-10-20 PROCEDURE — 36415 COLL VENOUS BLD VENIPUNCTURE: CPT | Performed by: STUDENT IN AN ORGANIZED HEALTH CARE EDUCATION/TRAINING PROGRAM

## 2022-10-20 PROCEDURE — 25000003 PHARM REV CODE 250: Performed by: HOSPITALIST

## 2022-10-20 PROCEDURE — 27201040 HC RC 50 FILTER: Performed by: STUDENT IN AN ORGANIZED HEALTH CARE EDUCATION/TRAINING PROGRAM

## 2022-10-20 PROCEDURE — 85610 PROTHROMBIN TIME: CPT | Performed by: HOSPITALIST

## 2022-10-20 PROCEDURE — P9021 RED BLOOD CELLS UNIT: HCPCS | Performed by: STUDENT IN AN ORGANIZED HEALTH CARE EDUCATION/TRAINING PROGRAM

## 2022-10-20 PROCEDURE — 99220 PR INITIAL OBSERVATION CARE,LEVL III: ICD-10-PCS | Mod: ,,, | Performed by: HOSPITALIST

## 2022-10-20 PROCEDURE — 25000003 PHARM REV CODE 250: Performed by: STUDENT IN AN ORGANIZED HEALTH CARE EDUCATION/TRAINING PROGRAM

## 2022-10-20 PROCEDURE — 85730 THROMBOPLASTIN TIME PARTIAL: CPT | Performed by: HOSPITALIST

## 2022-10-20 PROCEDURE — G0378 HOSPITAL OBSERVATION PER HR: HCPCS

## 2022-10-20 PROCEDURE — 99220 PR INITIAL OBSERVATION CARE,LEVL III: CPT | Mod: ,,, | Performed by: HOSPITALIST

## 2022-10-20 PROCEDURE — 36415 COLL VENOUS BLD VENIPUNCTURE: CPT | Performed by: HOSPITALIST

## 2022-10-20 PROCEDURE — 96365 THER/PROPH/DIAG IV INF INIT: CPT

## 2022-10-20 PROCEDURE — 86901 BLOOD TYPING SEROLOGIC RH(D): CPT | Mod: 91 | Performed by: STUDENT IN AN ORGANIZED HEALTH CARE EDUCATION/TRAINING PROGRAM

## 2022-10-20 PROCEDURE — 36430 TRANSFUSION BLD/BLD COMPNT: CPT

## 2022-10-20 PROCEDURE — 80053 COMPREHEN METABOLIC PANEL: CPT | Performed by: HOSPITALIST

## 2022-10-20 PROCEDURE — 86902 BLOOD TYPE ANTIGEN DONOR EA: CPT | Performed by: STUDENT IN AN ORGANIZED HEALTH CARE EDUCATION/TRAINING PROGRAM

## 2022-10-20 PROCEDURE — 86905 BLOOD TYPING RBC ANTIGENS: CPT | Performed by: STUDENT IN AN ORGANIZED HEALTH CARE EDUCATION/TRAINING PROGRAM

## 2022-10-20 PROCEDURE — 96376 TX/PRO/DX INJ SAME DRUG ADON: CPT

## 2022-10-20 PROCEDURE — 86920 COMPATIBILITY TEST SPIN: CPT | Performed by: STUDENT IN AN ORGANIZED HEALTH CARE EDUCATION/TRAINING PROGRAM

## 2022-10-20 RX ORDER — ACETAMINOPHEN 500 MG
1000 TABLET ORAL ONCE
Status: COMPLETED | OUTPATIENT
Start: 2022-10-20 | End: 2022-10-20

## 2022-10-20 RX ORDER — SODIUM CHLORIDE 9 MG/ML
INJECTION, SOLUTION INTRAVENOUS CONTINUOUS
Status: DISCONTINUED | OUTPATIENT
Start: 2022-10-20 | End: 2022-10-23 | Stop reason: HOSPADM

## 2022-10-20 RX ORDER — AZITHROMYCIN 250 MG/1
500 TABLET, FILM COATED ORAL DAILY
Status: DISCONTINUED | OUTPATIENT
Start: 2022-10-20 | End: 2022-10-23 | Stop reason: HOSPADM

## 2022-10-20 RX ORDER — HYDROCODONE BITARTRATE AND ACETAMINOPHEN 500; 5 MG/1; MG/1
TABLET ORAL
Status: DISCONTINUED | OUTPATIENT
Start: 2022-10-20 | End: 2022-10-23 | Stop reason: HOSPADM

## 2022-10-20 RX ADMIN — AZITHROMYCIN MONOHYDRATE 500 MG: 250 TABLET ORAL at 08:10

## 2022-10-20 RX ADMIN — KETOROLAC TROMETHAMINE 30 MG: 30 INJECTION, SOLUTION INTRAMUSCULAR; INTRAVENOUS at 05:10

## 2022-10-20 RX ADMIN — HYDROMORPHONE HYDROCHLORIDE 1 MG: 1 INJECTION, SOLUTION INTRAMUSCULAR; INTRAVENOUS; SUBCUTANEOUS at 03:10

## 2022-10-20 RX ADMIN — KETOROLAC TROMETHAMINE 30 MG: 30 INJECTION, SOLUTION INTRAMUSCULAR; INTRAVENOUS at 11:10

## 2022-10-20 RX ADMIN — HYDROMORPHONE HYDROCHLORIDE 1 MG: 1 INJECTION, SOLUTION INTRAMUSCULAR; INTRAVENOUS; SUBCUTANEOUS at 09:10

## 2022-10-20 RX ADMIN — FOLIC ACID 1 MG: 1 TABLET ORAL at 08:10

## 2022-10-20 RX ADMIN — HYDROXYUREA 500 MG: 500 CAPSULE ORAL at 08:10

## 2022-10-20 RX ADMIN — SENNOSIDES AND DOCUSATE SODIUM 1 TABLET: 50; 8.6 TABLET ORAL at 08:10

## 2022-10-20 RX ADMIN — FLUOXETINE 10 MG: 10 CAPSULE ORAL at 08:10

## 2022-10-20 RX ADMIN — SODIUM CHLORIDE: 0.9 INJECTION, SOLUTION INTRAVENOUS at 05:10

## 2022-10-20 RX ADMIN — CEFTRIAXONE 1 G: 1 INJECTION, SOLUTION INTRAVENOUS at 09:10

## 2022-10-20 RX ADMIN — HYDROMORPHONE HYDROCHLORIDE 2 MG: 1 INJECTION, SOLUTION INTRAMUSCULAR; INTRAVENOUS; SUBCUTANEOUS at 05:10

## 2022-10-20 RX ADMIN — ACETAMINOPHEN 1000 MG: 500 TABLET ORAL at 12:10

## 2022-10-20 RX ADMIN — HYDROMORPHONE HYDROCHLORIDE 2 MG: 1 INJECTION, SOLUTION INTRAMUSCULAR; INTRAVENOUS; SUBCUTANEOUS at 02:10

## 2022-10-20 RX ADMIN — SENNOSIDES AND DOCUSATE SODIUM 1 TABLET: 50; 8.6 TABLET ORAL at 09:10

## 2022-10-20 NOTE — PLAN OF CARE
[facility] - Med Surg  Initial Discharge Assessment       Primary Care Provider: Jovanny Douglas MD    Admission Diagnosis: Sickle cell anemia with pain [D57.00]  Sickle cell pain crisis [D57.00]    Admission Date: 10/19/2022  Expected Discharge Date: 10/22/2022    Discharge Barriers Identified: None    Payor: BLUE CROSS BLUE SHIELD / Plan: BLUE CONNECT / Product Type: HMO /     Extended Emergency Contact Information  Primary Emergency Contact: Davis,Roz   [facility]  Home Phone: [phone]  Relation: Mother    Discharge Plan A: Home  Discharge Plan B: Home with family      [id number] - [place] - [address]  [address]  [place] [address]  Phone: [phone] Fax: [phone]    [id number] - [place], [place] - [address]  [address]  [place] [address]  Phone: [phone] Fax: [phone]      Initial Assessment (most recent)       Adult Discharge Assessment - 10/20/22 1317          Discharge Assessment    Assessment Type Discharge Planning Assessment     Confirmed/corrected address, phone number and insurance Yes     Confirmed Demographics Correct on Facesheet     Source of Information patient     Communicated [place] with patient/caregiver Yes     Reason For Admission sickle cell pain     Lives With other (see comments)   roommates    Do you expect to return to your current living situation? Yes     Do you have help at home or someone to help you manage your care at home? Yes     Who are your caregiver(s) and their phone number(s)? roz davis (mother) [phone]     Prior to hospitilization cognitive status: Alert/Oriented     Current cognitive status: Alert/Oriented     Walking or Climbing Stairs Difficulty none     Dressing/Bathing Difficulty none     Equipment Currently Used at Home none     Readmission within 30 days? No     Patient currently being followed by outpatient case management? No     Do you currently have service(s)  that help you manage your care at home? No     Do you take prescription medications? Yes     Do you have prescription coverage? Yes     Coverage bcbs     Do you have any problems affording any of your prescribed medications? No     Is the patient taking medications as prescribed? yes     Who is going to help you get home at discharge? rambo brown (mother) [phone]     How do you get to doctors appointments? car, drives self     Are you on dialysis? No     Do you take coumadin? No     Discharge Plan A Home     Discharge Plan B Home with family     DME Needed Upon Discharge  none     Discharge Plan discussed with: Patient     Discharge Barriers Identified None                      Cm spoke to patient in the room.  Lives with roommates at the address on the facesheet. No home health. No home o2.  No dme.  IADL's.  He will call someone at [place] , but may need a ride home.     DCP: home     Lennie Gee RN [facility] [phone]

## 2022-10-20 NOTE — PLAN OF CARE
Hospital Medicine Plan of Care Note    Admission H&P dated earlier this morning reviewed, and agree with assessment and plan as documented. Pt seen and examined this morning on rounds, GERMAN.     Pain well-controlled on current regimen. 1u pRBCs transfused this morning (Hb 6.7). Overall clinically improving.      Yosi Rios MD  Attending Physician  Department of Hospital Medicine  Epic secure chat preferred, or ext. [phone]  10/20/2022

## 2022-10-20 NOTE — ASSESSMENT & PLAN NOTE
Start patient on hypotonic fluids, p.r.n. IV pain medications, scheduled Toradol for 48 hours, p.r.n. acetaminophen.  -no acute infectious concerns chest x-ray clear no hypoxia no acute concern for acute chest syndrome.  -continue home medications of folic acid and hydroxyurea  -trend CBC, transfuse if hemoglobin less than 7   Trend CMP

## 2022-10-20 NOTE — ED PROVIDER NOTES
Encounter Date: 10/19/2022    SCRIBE #1 NOTE: I, Vale Davis, am scribing for, and in the presence of,  Richar Davila DO. I have scribed the entire note.     History     Chief Complaint   Patient presents with    Sickle Cell Pain Crisis     Whole L side of body and lower back     Time patient was seen by the provider: 7:10 PM      The patient is a 27 y.o. male with past medical history of sickle cell anemia and sickle cell disease who presents to the ED with a complaint of a sickle cell crisis with onset today. The patient notes that he is only feeling pain to the left side of his body but denies any focal neurologic deficits, weakness, paresthesias or paralysis.. Associated symptoms include 1 episode of rectal bleeding that was bright red.  He denies any associated abdominal pain, melena, hematemesis or hemoptysis.  He denies hematuria, cough, nausea, fever, chills, or any concern for an STD.  His pain is moderate severe, located on his joints of his upper and lower extremity.  He denies any chest pain, lightheadedness, dizziness, headaches or visual changes.  He does not have pain medication at home and is currently only on hydroxyurea.    The history is provided by the patient and medical records. No  was used.   Review of patient's allergies indicates:  No Known Allergies  Past Medical History:   Diagnosis Date    Sickle cell anemia     Sickle cell disease      Past Surgical History:   Procedure Laterality Date    chemoport      removed    CHOLECYSTECTOMY  2002    UMBILICAL HERNIA REPAIR  1995     History reviewed. No pertinent family history.  Social History     Tobacco Use    Smoking status: Never    Smokeless tobacco: Never   Substance Use Topics    Alcohol use: Yes     Comment: Social drinker on weekends    Drug use: No     Review of Systems   Constitutional:  Negative for chills and fever.   HENT:  Negative for congestion.    Eyes:  Negative for photophobia and visual disturbance.    Respiratory:  Negative for cough, chest tightness and shortness of breath.    Cardiovascular:  Negative for chest pain, palpitations and leg swelling.   Gastrointestinal:  Positive for blood in stool. Negative for abdominal pain, anal bleeding, diarrhea, nausea and vomiting.   Endocrine: Negative for polyphagia and polyuria.   Genitourinary:  Negative for frequency and hematuria.   Musculoskeletal:  Positive for arthralgias and myalgias. Negative for gait problem, joint swelling, neck pain and neck stiffness.   Skin:  Negative for color change and wound.   Neurological:  Negative for tremors, seizures, facial asymmetry, speech difficulty, weakness, light-headedness, numbness and headaches.     Physical Exam     Initial Vitals [10/19/22 1835]   BP Pulse Resp Temp SpO2   (!) 144/79 100 18 97.4 °F (36.3 °C) 97 %      MAP       --         Physical Exam    Nursing note and vitals reviewed.    Gen/Constitutional: Interactive.  Moderate emotional distress  Head: Normocephalic, Atraumatic  Neck: supple, no masses or LAD, no JVD  Eyes: PERRLA, conjunctiva clear  Ears, Nose and Throat: No rhinorrhea or stridor.  Cardiac:  Regular rate, Reg Rhythm, No murmur  Pulmonary: CTA Bilat, no wheezes, rhonchi, rales.  No increased work of breathing.  GI: Abdomen soft, non-tender, non-distended; no rebound or guarding  : No CVA tenderness.  Musculoskeletal: Extremities warm, well perfused, no erythema, no edema; proximal joints including hip, pelvis, knee and distal joints at the ankle, wrist without swelling, edema, erythema or limited range of motion.  2+ distal pulses, sensory intact to light touch.  Skin: No rashes, cyanosis or jaundice.  Neuro: Alert and Oriented x 3; No focal motor or sensory deficits.    Psych: Normal affect     ED Course   Procedures  Labs Reviewed   CBC W/ AUTO DIFFERENTIAL - Abnormal; Notable for the following components:       Result Value    RBC 2.72 (*)     Hemoglobin 7.8 (*)     Hematocrit 22.1 (*)      MCV 81 (*)     RDW 21.1 (*)     Gran # (ANC) 9.9 (*)     Mono # 1.3 (*)     Gran % 77.8 (*)     Lymph % 10.6 (*)     Sickle Cells Moderate (*)     All other components within normal limits   COMPREHENSIVE METABOLIC PANEL - Abnormal; Notable for the following components:    Sodium 133 (*)     CO2 18 (*)     Total Bilirubin 7.7 (*)     All other components within normal limits   RETICULOCYTES - Abnormal; Notable for the following components:    Retic 15.2 (*)     All other components within normal limits   LACTATE DEHYDROGENASE - Abnormal; Notable for the following components:     (*)     All other components within normal limits   HIV 1 / 2 ANTIBODY    Narrative:     Release to patient->Immediate   HEPATITIS C ANTIBODY    Narrative:     Release to patient->Immediate   LACTIC ACID, PLASMA     EKG Readings: (Independently Interpreted)   Initial Reading: No STEMI. Rhythm: Normal Sinus Rhythm. Heart Rate: 94. Axis: Normal.   Normal Intervals   ECG Results              EKG 12-lead (Final result)  Result time 10/20/22 12:16:10      Final result by Interface, Lab [facility] (10/20/22 12:16:10)                   Narrative:    Test Reason : D57.00,    Vent. Rate : 094 BPM     Atrial Rate : 094 BPM     P-R Int : 158 ms          QRS Dur : 088 ms      QT Int : 374 ms       P-R-T Axes : 083 046 041 degrees     QTc Int : 467 ms    Normal sinus rhythm  Normal ECG  No previous ECGs available  Confirmed by ROMANA PRESTON MD ([id number]) on 10/20/2022 12:16:02 PM    Referred By: [email or web address]   SELF           Confirmed By:ROMANA PRESTON MD                                  Imaging Results              X-Ray Chest PA And Lateral (Final result)  Result time 10/19/22 21:00:41      Final result by Logan Thorpe MD (10/19/22 21:00:41)                   Impression:      No acute cardiopulmonary process.      Electronically signed by: Logan Thorpe MD  Date:    10/19/2022  Time:    21:00               Narrative:    EXAMINATION:  XR CHEST  PA AND LATERAL    CLINICAL HISTORY:  sickle cell pain;    TECHNIQUE:  PA and lateral views of the chest were performed.    COMPARISON:  06/01/2016.    FINDINGS:  There is no consolidation, effusion, or pneumothorax.    Cardiomediastinal silhouette is unremarkable.    Regional osseous structures are similar to prior.                                    X-Rays:   Independently Interpreted Readings:   Chest X-Ray: Normal heart size.  No infiltrates.  No acute abnormalities. No pneumothorax or free air   Medications   HYDROmorphone injection 2 mg (2 mg Intravenous Given 10/20/22 0553)   HYDROmorphone injection 1 mg (1 mg Intravenous Given 10/20/22 1554)   ketorolac injection 30 mg (30 mg Intravenous Given 10/20/22 1723)   ondansetron injection 4 mg (has no administration in time range)   prochlorperazine injection Soln 5 mg (has no administration in time range)   FLUoxetine capsule 10 mg (10 mg Oral Given 10/20/22 0831)   folic acid tablet 1 mg (1 mg Oral Given 10/20/22 0831)   hydroxyurea capsule 500 mg (500 mg Oral Given 10/20/22 0831)   sodium chloride 0.9% flush 10 mL (has no administration in time range)   naloxone 0.4 mg/mL injection 0.02 mg (has no administration in time range)   senna-docusate 8.6-50 mg per tablet 1 tablet (1 tablet Oral Given 10/20/22 0831)   acetaminophen tablet 650 mg (has no administration in time range)   polyethylene glycol packet 17 g (has no administration in time range)   bisacodyL suppository 10 mg (has no administration in time range)   0.9%  NaCl infusion ( Intravenous New Bag 10/20/22 0552)   0.9%  NaCl infusion (for blood administration) (has no administration in time range)   azithromycin tablet 500 mg (500 mg Oral Given 10/20/22 0831)   cefTRIAXone (ROCEPHIN) 1 g/50 mL D5W IVPB (0 g Intravenous Stopped 10/20/22 1002)   HYDROmorphone injection 1 mg (1 mg Intravenous Given 10/19/22 1925)   ondansetron injection 4 mg (4 mg Intravenous Given 10/19/22 1925)   ketorolac injection 9.999  mg (9.999 mg Intravenous Given 10/19/22 1925)   lactated ringers bolus 1,000 mL (0 mLs Intravenous Stopped 10/19/22 2046)   HYDROmorphone injection 1 mg (1 mg Intravenous Given 10/19/22 1953)   HYDROmorphone injection 1 mg (1 mg Intravenous Given 10/19/22 2129)   acetaminophen tablet 1,000 mg (1,000 mg Oral Given 10/20/22 0056)     Medical Decision Making:   History:   Old Medical Records: I decided to obtain old medical records.  Initial Assessment:   The patient is a 27 y.o. male with past medical history of sickle cell anemia and sickle cell disease who presents to the ED with a complaint of a sickle cell crisis with onset today.   Differential Diagnosis:   Sickle cell pain crisis, sickle cell anemia, septic arthritis, acute chest syndrome, poor pain control, rectal bleeding  Independently Interpreted Test(s):   I have ordered and independently interpreted X-rays - see prior notes.  I have ordered and independently interpreted EKG Reading(s) - see prior notes  Clinical Tests:   Lab Tests: Ordered and Reviewed  Radiological Study: Ordered and Reviewed  Medical Tests: Ordered and Reviewed  Other:   I have discussed this case with another health care provider.       <> Summary of the Discussion: Hospital medicine     Emergent evaluation of patient presenting with sickle cell disease with vaso-occlusive pain and concern for sickle cell crisis.  He is currently afebrile vital signs are stable.  He has tried anti-inflammatory/NSAID at home without relief.  He is not currently on any opiate pain medication.  He continues to take folic acid and hydroxyurea.  He is currently afebrile on re-evaluation without tachycardia.  ECG obtained with no signs of ischemia or STEMI on my read.  Chest x-ray negative for acute infectious etiology, pneumonia, pneumothorax or free air on my read.  Remainder of his physical exam findings without significant swelling of his proximal joints or distal joints.  He is ambulatory and able to  bear weight.  Pain is moderate severe.  IV line placed, patient given IV fluids, IV Toradol, and IV opiate for pain control.  He has moderate pain relief initially but this returns very quickly.  He requires 2 other doses of IV pain treatment.  Labs stable from baseline with no significant anemia.  No significant lactic acidosis, LDH stable and reticulocytes elevated.  Discussed case with hospital medicine will admit to observation for vaso-occlusive crisis, anemia/RBC trend and pain control.  Patient agreeable to observation plan.    Complexity: High - level 5       Scribe Attestation:   Scribe #1: I performed the above scribed service and the documentation accurately describes the services I performed. I attest to the accuracy of the note.            I, Dr. Richar Davila, personally performed the services described in this documentation. All medical record entries made by the scribe were at my direction and in my presence.  I have reviewed the chart and agree that the record reflects my personal performance and is accurate and complete.          Clinical Impression:   Final diagnoses:  [D57.00] Sickle cell pain crisis  [D57.00] Sickle cell anemia with pain (Primary)      ED Disposition Condition    Observation Stable               Richar Davila DO, FAAEM  Emergency Staff Physician   Dept of Emergency Medicine   [facility]  Spectralink: [id number]        Disclaimer: This note has been generated using voice-recognition software. There may be typographical errors that have been missed during proof-reading.       Richar Davila DO  10/20/22 2111

## 2022-10-20 NOTE — ED TRIAGE NOTES
Parrish Davis, a 27 y.o. male presents to the ED w/ complaint of sickle cell pain crisis. Generalized body pain.     Triage note:  Chief Complaint   Patient presents with    Sickle Cell Pain Crisis     Whole L side of body and lower back     Review of patient's allergies indicates:  No Known Allergies  Past Medical History:   Diagnosis Date    Sickle cell anemia     Sickle cell disease

## 2022-10-20 NOTE — ASSESSMENT & PLAN NOTE
Patient has unspecified depression which is mild and is currently controlled. Will Continue anti-depressant medications. We will not consult psychiatry at this time. Patient does not display psychosis at this time. Continue to monitor closely and adjust plan of care as needed.    Continue home fluoxetine

## 2022-10-20 NOTE — SUBJECTIVE & OBJECTIVE
Past Medical History:   Diagnosis Date    Sickle cell anemia     Sickle cell disease        Past Surgical History:   Procedure Laterality Date    chemoport      removed    CHOLECYSTECTOMY  2002    UMBILICAL HERNIA REPAIR  1995       Review of patient's allergies indicates:  No Known Allergies    No current facility-administered medications on file prior to encounter.     Current Outpatient Medications on File Prior to Encounter   Medication Sig    FLUoxetine 10 MG capsule Take 1 capsule (10 mg total) by mouth once daily.    folic acid (FOLVITE) 1 MG tablet Take 1 tablet (1 mg total) by mouth once daily.    hydroxyurea (HYDREA) 500 mg Cap Take 1 capsule (500 mg total) by mouth once daily.     Family History    None       Tobacco Use    Smoking status: Never    Smokeless tobacco: Never   Substance and Sexual Activity    Alcohol use: Yes     Comment: Social drinker on weekends    Drug use: No    Sexual activity: Not Currently     Review of Systems   Constitutional:  Positive for activity change. Negative for appetite change, chills, fatigue and fever.   HENT:  Negative for sinus pressure, trouble swallowing and voice change.    Respiratory:  Negative for cough, chest tightness and stridor.    Cardiovascular:  Negative for chest pain, palpitations and leg swelling.   Gastrointestinal:  Positive for blood in stool. Negative for abdominal distention, abdominal pain, diarrhea and vomiting.   Genitourinary:  Negative for dysuria.   Musculoskeletal:  Negative for back pain and joint swelling.   Skin:  Negative for rash.   Neurological:  Negative for light-headedness and numbness.   Psychiatric/Behavioral:  Negative for confusion.    Objective:     Vital Signs (Most Recent):  Temp: 98.4 °F (36.9 °C) (10/19/22 2320)  Pulse: (!) 57 (10/19/22 2320)  Resp: 18 (10/19/22 2331)  BP: 137/73 (10/19/22 2320)  SpO2: 95 % (10/19/22 2320)   Vital Signs (24h Range):  Temp:  [97.1 °F (36.2 °C)-98.4 °F (36.9 °C)] 98.4 °F (36.9 °C)  Pulse:   [] 57  Resp:  [17-22] 18  SpO2:  [95 %-97 %] 95 %  BP: (115-144)/(69-79) 137/73     Weight: 87.1 kg (192 lb)  Body mass index is 26.04 kg/m².    Physical Exam  Vitals and nursing note reviewed.   Constitutional:       General: He is not in acute distress.     Appearance: He is not toxic-appearing.   HENT:      Head: Normocephalic and atraumatic.      Mouth/Throat:      Pharynx: No oropharyngeal exudate.   Eyes:      General: No scleral icterus.  Cardiovascular:      Rate and Rhythm: Normal rate and regular rhythm.      Pulses: Normal pulses.      Heart sounds: No murmur heard.  Pulmonary:      Effort: Pulmonary effort is normal. No respiratory distress.      Breath sounds: Normal breath sounds. No wheezing or rales.   Abdominal:      General: Bowel sounds are normal. There is no distension.      Palpations: Abdomen is soft.      Tenderness: There is no abdominal tenderness. There is no guarding.   Musculoskeletal:      Comments: Tenderness of the right lower leg below the knee, there is no bilateral knee joint effusions or swelling   Lymphadenopathy:      Cervical: No cervical adenopathy.   Skin:     General: Skin is warm and dry.      Coloration: Skin is not jaundiced.      Findings: No bruising.   Neurological:      General: No focal deficit present.      Mental Status: He is alert and oriented to person, place, and time.           Significant Labs: All pertinent labs within the past 24 hours have been reviewed.  CBC:   Recent Labs   Lab 10/19/22  1919   WBC 12.67   HGB 7.8*   HCT 22.1*        CMP:   Recent Labs   Lab 10/19/22  1919   *   K 3.6      CO2 18*      BUN 6   CREATININE 0.7   CALCIUM 9.0   PROT 8.4   ALBUMIN 4.3   BILITOT 7.7*   ALKPHOS 97   AST 30   ALT 15   ANIONGAP 11     Cardiac Markers: No results for input(s): CKMB, MYOGLOBIN, BNP, TROPISTAT in the last 48 hours.  Coagulation: No results for input(s): PT, INR, APTT in the last 48 hours.  Lactic Acid:   Recent Labs    Lab 10/19/22  1919   LACTATE 0.7     Urine Studies: No results for input(s): COLORU, APPEARANCEUA, PHUR, SPECGRAV, PROTEINUA, GLUCUA, KETONESU, BILIRUBINUA, OCCULTUA, NITRITE, UROBILINOGEN, LEUKOCYTESUR, RBCUA, WBCUA, BACTERIA, SQUAMEPITHEL, HYALINECASTS in the last 48 hours.    Invalid input(s): WRIGHTSUR    Significant Imaging: I have reviewed all pertinent imaging results/findings within the past 24 hours.  XR CHEST PA AND LATERAL     CLINICAL HISTORY:  sickle cell pain;     TECHNIQUE:  PA and lateral views of the chest were performed.     COMPARISON:  06/01/2016.     FINDINGS:  There is no consolidation, effusion, or pneumothorax.     Cardiomediastinal silhouette is unremarkable.     Regional osseous structures are similar to prior.     Impression:     No acute cardiopulmonary process.        Electronically signed by: Logan Thorpe MD  Date:                                            10/19/2022  Time:                                           21:00

## 2022-10-20 NOTE — HPI
25-year-old  man with history of sickle cell anemia, status post cholecystectomy, presents to the emergency department with concerns of uncontrolled pain.  He reports in the last 2 days at the onset of left-sided pain in musculoskeletal areas his left shoulder arm left-sided chest and left thigh, uncontrolled with use of NSAIDs at home, patient is not on any long-acting or immediate release home p.r.n. opiate medications.  He denies any potential precipitating factors for worsening pains and, last hospitalization for vaso-occlusive crisis was in 2016.    His primary care doctor is Jovanny Douglas and hematologist is Dr. Hi @ [facility], he denies any URI symptoms and or sick contacts    He denies any chest pain chest pressure no nausea vomiting or bowel habit changes, no joint pain or swelling no history of AVN in the past.  No tobacco alcohol or drug use.    ED treatment received Dilaudid 1 mg IV x3, Toradol 10 mg IV x1, lactated Ringer's 1 L bolus x1

## 2022-10-20 NOTE — H&P
[facility] Medicine  History & Physical    Patient Name: Parrish Davis  MRN: [id number]  Patient Class: OP- Observation  Admission Date: 10/19/2022  Attending Physician: Yosi Rios MD [facility]  Admitting Physician: Brigido Hope MD  Primary Care Provider: Jovanny Douglas MD       Patient information was obtained from patient, past medical records and ER records.     Subjective:     Principal Problem:Sickle-cell disease with vaso-occlusive pain    Chief Complaint:   Chief Complaint   Patient presents with    Sickle Cell Pain Crisis     Whole L side of body and lower back        HPI: 25-year-old  man with history of sickle cell anemia, status post cholecystectomy, presents to the emergency department with concerns of uncontrolled pain.  He reports in the last 2 days at the onset of left-sided pain in musculoskeletal areas his left shoulder arm left-sided chest and left thigh, uncontrolled with use of NSAIDs at home, patient is not on any long-acting or immediate release home p.r.n. opiate medications.  He denies any potential precipitating factors for worsening pains and, last hospitalization for vaso-occlusive crisis was in 2016.    His primary care doctor is Jovanny Douglas and hematologist is Dr. Hi @ [facility], he denies any URI symptoms and or sick contacts    He denies any chest pain chest pressure no nausea vomiting or bowel habit changes, no joint pain or swelling no history of AVN in the past.  No tobacco alcohol or drug use.    ED treatment received Dilaudid 1 mg IV x3, Toradol 10 mg IV x1, lactated Ringer's 1 L bolus x1      Past Medical History:   Diagnosis Date    Sickle cell anemia     Sickle cell disease        Past Surgical History:   Procedure Laterality Date    chemoport      removed    CHOLECYSTECTOMY  2002    UMBILICAL HERNIA REPAIR  1995       Review of patient's allergies indicates:  No Known Allergies    No current facility-administered  medications on file prior to encounter.     Current Outpatient Medications on File Prior to Encounter   Medication Sig    FLUoxetine 10 MG capsule Take 1 capsule (10 mg total) by mouth once daily.    folic acid (FOLVITE) 1 MG tablet Take 1 tablet (1 mg total) by mouth once daily.    hydroxyurea (HYDREA) 500 mg Cap Take 1 capsule (500 mg total) by mouth once daily.     Family History    None       Tobacco Use    Smoking status: Never    Smokeless tobacco: Never   Substance and Sexual Activity    Alcohol use: Yes     Comment: Social drinker on weekends    Drug use: No    Sexual activity: Not Currently     Review of Systems   Constitutional:  Positive for activity change. Negative for appetite change, chills, fatigue and fever.   HENT:  Negative for sinus pressure, trouble swallowing and voice change.    Respiratory:  Negative for cough, chest tightness and stridor.    Cardiovascular:  Negative for chest pain, palpitations and leg swelling.   Gastrointestinal:  Positive for blood in stool. Negative for abdominal distention, abdominal pain, diarrhea and vomiting.   Genitourinary:  Negative for dysuria.   Musculoskeletal:  Negative for back pain and joint swelling.   Skin:  Negative for rash.   Neurological:  Negative for light-headedness and numbness.   Psychiatric/Behavioral:  Negative for confusion.    Objective:     Vital Signs (Most Recent):  Temp: 98.4 °F (36.9 °C) (10/19/22 2320)  Pulse: (!) 57 (10/19/22 2320)  Resp: 18 (10/19/22 2331)  BP: 137/73 (10/19/22 2320)  SpO2: 95 % (10/19/22 2320)   Vital Signs (24h Range):  Temp:  [97.1 °F (36.2 °C)-98.4 °F (36.9 °C)] 98.4 °F (36.9 °C)  Pulse:  [] 57  Resp:  [17-22] 18  SpO2:  [95 %-97 %] 95 %  BP: (115-144)/(69-79) 137/73     Weight: 87.1 kg (192 lb)  Body mass index is 26.04 kg/m².    Physical Exam  Vitals and nursing note reviewed.   Constitutional:       General: He is not in acute distress.     Appearance: He is not toxic-appearing.   HENT:      Head:  Normocephalic and atraumatic.      Mouth/Throat:      Pharynx: No oropharyngeal exudate.   Eyes:      General: No scleral icterus.  Cardiovascular:      Rate and Rhythm: Normal rate and regular rhythm.      Pulses: Normal pulses.      Heart sounds: No murmur heard.  Pulmonary:      Effort: Pulmonary effort is normal. No respiratory distress.      Breath sounds: Normal breath sounds. No wheezing or rales.   Abdominal:      General: Bowel sounds are normal. There is no distension.      Palpations: Abdomen is soft.      Tenderness: There is no abdominal tenderness. There is no guarding.   Musculoskeletal:      Comments: Tenderness of the right lower leg below the knee, there is no bilateral knee joint effusions or swelling   Lymphadenopathy:      Cervical: No cervical adenopathy.   Skin:     General: Skin is warm and dry.      Coloration: Skin is not jaundiced.      Findings: No bruising.   Neurological:      General: No focal deficit present.      Mental Status: He is alert and oriented to person, place, and time.           Significant Labs: All pertinent labs within the past 24 hours have been reviewed.  CBC:   Recent Labs   Lab 10/19/22  1919   WBC 12.67   HGB 7.8*   HCT 22.1*        CMP:   Recent Labs   Lab 10/19/22  1919   *   K 3.6      CO2 18*      BUN 6   CREATININE 0.7   CALCIUM 9.0   PROT 8.4   ALBUMIN 4.3   BILITOT 7.7*   ALKPHOS 97   AST 30   ALT 15   ANIONGAP 11     Cardiac Markers: No results for input(s): CKMB, MYOGLOBIN, BNP, TROPISTAT in the last 48 hours.  Coagulation: No results for input(s): PT, INR, APTT in the last 48 hours.  Lactic Acid:   Recent Labs   Lab 10/19/22  1919   LACTATE 0.7     Urine Studies: No results for input(s): COLORU, APPEARANCEUA, PHUR, SPECGRAV, PROTEINUA, GLUCUA, KETONESU, BILIRUBINUA, OCCULTUA, NITRITE, UROBILINOGEN, LEUKOCYTESUR, RBCUA, WBCUA, BACTERIA, SQUAMEPITHEL, HYALINECASTS in the last 48 hours.    Invalid input(s): WRIGHTSUR    Significant  Imaging: I have reviewed all pertinent imaging results/findings within the past 24 hours.  XR CHEST PA AND LATERAL     CLINICAL HISTORY:  sickle cell pain;     TECHNIQUE:  PA and lateral views of the chest were performed.     COMPARISON:  06/01/2016.     FINDINGS:  There is no consolidation, effusion, or pneumothorax.     Cardiomediastinal silhouette is unremarkable.     Regional osseous structures are similar to prior.     Impression:     No acute cardiopulmonary process.        Electronically signed by: Logan Thorpe MD  Date:                                            10/19/2022  Time:                                           21:00      Assessment/Plan:     * Sickle-cell disease with vaso-occlusive pain  Start patient on hypotonic fluids, p.r.n. IV pain medications, scheduled Toradol for 48 hours, p.r.n. acetaminophen.  -no acute infectious concerns chest x-ray clear no hypoxia no acute concern for acute chest syndrome.  -continue home medications of folic acid and hydroxyurea  -trend CBC, transfuse if hemoglobin less than 7   Trend CMP      Current moderate episode of major depressive disorder without prior episode  Patient has unspecified depression which is mild and is currently controlled. Will Continue anti-depressant medications. We will not consult psychiatry at this time. Patient does not display psychosis at this time. Continue to monitor closely and adjust plan of care as needed.    Continue home fluoxetine      VTE Risk Mitigation (From admission, onward)         Ordered     Place sequential compression device  Until discontinued         10/19/22 2147     IP VTE LOW RISK PATIENT  Once         10/19/22 2147                   Brigido Hope MD  Department of Hospital Medicine   [facility]

## 2022-10-21 LAB
ALBUMIN SERPL BCP-MCNC: 3.4 G/DL (ref 3.5–5.2)
ALP SERPL-CCNC: 100 U/L (ref 55–135)
ALT SERPL W/O P-5'-P-CCNC: 20 U/L (ref 10–44)
ANION GAP SERPL CALC-SCNC: 4 MMOL/L (ref 8–16)
AST SERPL-CCNC: 36 U/L (ref 10–40)
BASOPHILS # BLD AUTO: 0.1 K/UL (ref 0–0.2)
BASOPHILS NFR BLD: 0.9 % (ref 0–1.9)
BILIRUB SERPL-MCNC: 8.2 MG/DL (ref 0.1–1)
BUN SERPL-MCNC: 8 MG/DL (ref 6–20)
CALCIUM SERPL-MCNC: 8.4 MG/DL (ref 8.7–10.5)
CHLORIDE SERPL-SCNC: 107 MMOL/L (ref 95–110)
CO2 SERPL-SCNC: 24 MMOL/L (ref 23–29)
CREAT SERPL-MCNC: 0.7 MG/DL (ref 0.5–1.4)
DIFFERENTIAL METHOD: ABNORMAL
EOSINOPHIL # BLD AUTO: 0.4 K/UL (ref 0–0.5)
EOSINOPHIL NFR BLD: 3.4 % (ref 0–8)
ERYTHROCYTE [DISTWIDTH] IN BLOOD BY AUTOMATED COUNT: 21.5 % (ref 11.5–14.5)
EST. GFR  (NO RACE VARIABLE): >60 ML/MIN/1.73 M^2
GLUCOSE SERPL-MCNC: 70 MG/DL (ref 70–110)
HCT VFR BLD AUTO: 23.1 % (ref 40–54)
HGB BLD-MCNC: 8 G/DL (ref 14–18)
IMM GRANULOCYTES # BLD AUTO: 0.1 K/UL (ref 0–0.04)
IMM GRANULOCYTES NFR BLD AUTO: 0.9 % (ref 0–0.5)
LYMPHOCYTES # BLD AUTO: 3.1 K/UL (ref 1–4.8)
LYMPHOCYTES NFR BLD: 27.4 % (ref 18–48)
MAGNESIUM SERPL-MCNC: 1.8 MG/DL (ref 1.6–2.6)
MCH RBC QN AUTO: 29.5 PG (ref 27–31)
MCHC RBC AUTO-ENTMCNC: 34.6 G/DL (ref 32–36)
MCV RBC AUTO: 85 FL (ref 82–98)
MONOCYTES # BLD AUTO: 1.5 K/UL (ref 0.3–1)
MONOCYTES NFR BLD: 13.4 % (ref 4–15)
NEUTROPHILS # BLD AUTO: 6.2 K/UL (ref 1.8–7.7)
NEUTROPHILS NFR BLD: 54 % (ref 38–73)
NRBC BLD-RTO: 1 /100 WBC
PHOSPHATE SERPL-MCNC: 4.1 MG/DL (ref 2.7–4.5)
PLATELET # BLD AUTO: 283 K/UL (ref 150–450)
PMV BLD AUTO: 10.5 FL (ref 9.2–12.9)
POTASSIUM SERPL-SCNC: 4.3 MMOL/L (ref 3.5–5.1)
PROT SERPL-MCNC: 6.9 G/DL (ref 6–8.4)
RBC # BLD AUTO: 2.71 M/UL (ref 4.6–6.2)
SODIUM SERPL-SCNC: 135 MMOL/L (ref 136–145)
WBC # BLD AUTO: 11.38 K/UL (ref 3.9–12.7)

## 2022-10-21 PROCEDURE — 36415 COLL VENOUS BLD VENIPUNCTURE: CPT | Performed by: STUDENT IN AN ORGANIZED HEALTH CARE EDUCATION/TRAINING PROGRAM

## 2022-10-21 PROCEDURE — 85025 COMPLETE CBC W/AUTO DIFF WBC: CPT | Performed by: STUDENT IN AN ORGANIZED HEALTH CARE EDUCATION/TRAINING PROGRAM

## 2022-10-21 PROCEDURE — 63700000 PHARM REV CODE 250 ALT 637 W/O HCPCS: Performed by: STUDENT IN AN ORGANIZED HEALTH CARE EDUCATION/TRAINING PROGRAM

## 2022-10-21 PROCEDURE — 96366 THER/PROPH/DIAG IV INF ADDON: CPT

## 2022-10-21 PROCEDURE — 83735 ASSAY OF MAGNESIUM: CPT | Performed by: STUDENT IN AN ORGANIZED HEALTH CARE EDUCATION/TRAINING PROGRAM

## 2022-10-21 PROCEDURE — 11000001 HC ACUTE MED/SURG PRIVATE ROOM

## 2022-10-21 PROCEDURE — 80053 COMPREHEN METABOLIC PANEL: CPT | Performed by: STUDENT IN AN ORGANIZED HEALTH CARE EDUCATION/TRAINING PROGRAM

## 2022-10-21 PROCEDURE — 99226 PR SUBSEQUENT OBSERVATION CARE,LEVEL III: CPT | Mod: ,,, | Performed by: STUDENT IN AN ORGANIZED HEALTH CARE EDUCATION/TRAINING PROGRAM

## 2022-10-21 PROCEDURE — 84100 ASSAY OF PHOSPHORUS: CPT | Performed by: STUDENT IN AN ORGANIZED HEALTH CARE EDUCATION/TRAINING PROGRAM

## 2022-10-21 PROCEDURE — 99226 PR SUBSEQUENT OBSERVATION CARE,LEVEL III: ICD-10-PCS | Mod: ,,, | Performed by: STUDENT IN AN ORGANIZED HEALTH CARE EDUCATION/TRAINING PROGRAM

## 2022-10-21 PROCEDURE — 25000003 PHARM REV CODE 250: Performed by: HOSPITALIST

## 2022-10-21 PROCEDURE — 96361 HYDRATE IV INFUSION ADD-ON: CPT

## 2022-10-21 PROCEDURE — 96376 TX/PRO/DX INJ SAME DRUG ADON: CPT

## 2022-10-21 PROCEDURE — 94761 N-INVAS EAR/PLS OXIMETRY MLT: CPT

## 2022-10-21 PROCEDURE — 63600175 PHARM REV CODE 636 W HCPCS: Performed by: STUDENT IN AN ORGANIZED HEALTH CARE EDUCATION/TRAINING PROGRAM

## 2022-10-21 PROCEDURE — 25000003 PHARM REV CODE 250: Performed by: STUDENT IN AN ORGANIZED HEALTH CARE EDUCATION/TRAINING PROGRAM

## 2022-10-21 PROCEDURE — 63600175 PHARM REV CODE 636 W HCPCS: Performed by: HOSPITALIST

## 2022-10-21 RX ADMIN — KETOROLAC TROMETHAMINE 30 MG: 30 INJECTION, SOLUTION INTRAMUSCULAR; INTRAVENOUS at 06:10

## 2022-10-21 RX ADMIN — HYDROMORPHONE HYDROCHLORIDE 1 MG: 1 INJECTION, SOLUTION INTRAMUSCULAR; INTRAVENOUS; SUBCUTANEOUS at 09:10

## 2022-10-21 RX ADMIN — HYDROXYUREA 500 MG: 500 CAPSULE ORAL at 09:10

## 2022-10-21 RX ADMIN — SODIUM CHLORIDE: 0.9 INJECTION, SOLUTION INTRAVENOUS at 06:10

## 2022-10-21 RX ADMIN — FLUOXETINE 10 MG: 10 CAPSULE ORAL at 09:10

## 2022-10-21 RX ADMIN — SODIUM CHLORIDE: 0.9 INJECTION, SOLUTION INTRAVENOUS at 02:10

## 2022-10-21 RX ADMIN — KETOROLAC TROMETHAMINE 30 MG: 30 INJECTION, SOLUTION INTRAMUSCULAR; INTRAVENOUS at 11:10

## 2022-10-21 RX ADMIN — AZITHROMYCIN MONOHYDRATE 500 MG: 250 TABLET ORAL at 09:10

## 2022-10-21 RX ADMIN — FOLIC ACID 1 MG: 1 TABLET ORAL at 09:10

## 2022-10-21 RX ADMIN — CEFTRIAXONE 1 G: 1 INJECTION, SOLUTION INTRAVENOUS at 11:10

## 2022-10-21 RX ADMIN — HYDROMORPHONE HYDROCHLORIDE 2 MG: 1 INJECTION, SOLUTION INTRAMUSCULAR; INTRAVENOUS; SUBCUTANEOUS at 09:10

## 2022-10-21 RX ADMIN — SENNOSIDES AND DOCUSATE SODIUM 1 TABLET: 50; 8.6 TABLET ORAL at 09:10

## 2022-10-21 RX ADMIN — HYDROMORPHONE HYDROCHLORIDE 1 MG: 1 INJECTION, SOLUTION INTRAMUSCULAR; INTRAVENOUS; SUBCUTANEOUS at 04:10

## 2022-10-21 RX ADMIN — HYDROMORPHONE HYDROCHLORIDE 2 MG: 1 INJECTION, SOLUTION INTRAMUSCULAR; INTRAVENOUS; SUBCUTANEOUS at 02:10

## 2022-10-21 RX ADMIN — KETOROLAC TROMETHAMINE 30 MG: 30 INJECTION, SOLUTION INTRAMUSCULAR; INTRAVENOUS at 05:10

## 2022-10-21 RX ADMIN — SENNOSIDES AND DOCUSATE SODIUM 1 TABLET: 50; 8.6 TABLET ORAL at 08:10

## 2022-10-21 RX ADMIN — SODIUM CHLORIDE: 0.9 INJECTION, SOLUTION INTRAVENOUS at 10:10

## 2022-10-21 NOTE — PROGRESS NOTES
[facility] Medicine  Progress Note    Patient Name: Parrish Davis  MRN: [id number]  Patient Class: OP- Observation   Admission Date: 10/19/2022  Length of Stay: 0 days  Attending Physician: Yosi Rios MD  Primary Care Provider: Jovanny Douglas MD        Subjective:     Principal Problem:Sickle-cell disease with vaso-occlusive pain        HPI:  25-year-old  man with history of sickle cell anemia, status post cholecystectomy, presents to the emergency department with concerns of uncontrolled pain.  He reports in the last 2 days at the onset of left-sided pain in musculoskeletal areas his left shoulder arm left-sided chest and left thigh, uncontrolled with use of NSAIDs at home, patient is not on any long-acting or immediate release home p.r.n. opiate medications.  He denies any potential precipitating factors for worsening pains and, last hospitalization for vaso-occlusive crisis was in 2016.    His primary care doctor is Jovanny Douglas and hematologist is Dr. Hi @ [facility], he denies any URI symptoms and or sick contacts    He denies any chest pain chest pressure no nausea vomiting or bowel habit changes, no joint pain or swelling no history of AVN in the past.  No tobacco alcohol or drug use.    ED treatment received Dilaudid 1 mg IV x3, Toradol 10 mg IV x1, lactated Ringer's 1 L bolus x1      Overview/Hospital Course:  Pt admitted to [facility]. 1u pRBCs transfused on 10/20 for Hb 6.7 with subsequent improvement. Mild leukocytosis resolved with azithro/rocephin (no infectious source identified). Pain mildly improved into 10/21.      Interval History: Pt seen and examined this morning on eugene PORTER. BL leg pain persists, though improved. No additional complaints. Care plan reviewed. Otherwise, doing well and with no further complaints at this time.      Objective:     Vital Signs (Most Recent):  Temp: 98.4 °F (36.9 °C) (10/21/22 0747)  Pulse: 70 (10/21/22 0747)  Resp: 16  (10/21/22 0926)  BP: (!) 141/78 (10/21/22 0747)  SpO2: (!) 93 % (10/21/22 0747)   Vital Signs (24h Range):  Temp:  [96.5 °F (35.8 °C)-98.4 °F (36.9 °C)] 98.4 °F (36.9 °C)  Pulse:  [60-86] 70  Resp:  [16-18] 16  SpO2:  [92 %-95 %] 93 %  BP: (129-152)/(67-79) 141/78     Weight: 87.1 kg (192 lb)  Body mass index is 26.04 kg/m².    Intake/Output Summary (Last 24 hours) at 10/21/2022 1242  Last data filed at 10/21/2022 0931  Gross per 24 hour   Intake 975.83 ml   Output 2250 ml   Net -1274.17 ml        Physical Exam  Gen: in NAD, appears stated age  Neuro: AAOx4, CN2-12 grossly intact BL; motor, sensory, and strength grossly intact BL  HEENT: NTNC, EOMI, PERRLA, MMM; no thyromegaly or lymphadenopathy; no JVD appreciated  CVS: RRR, no m/r/g; S1/S2 auscultated with no S3 or S4; capillary refill < 2 sec  Resp: lungs CTAB, no w/r/r; no belabored breathing or accessory muscle use appreciated   Abd: BS+ in all 4 quadrants; NTND, soft to palpation; no organomegaly appreciated   Extrem: pulses full, equal, and regular over all 4 extremities; no UE or LE edema BL      Significant Labs: All pertinent labs within the past 24 hours have been reviewed.    Significant Imaging: I have reviewed all pertinent imaging results/findings within the past 24 hours.      Assessment/Plan:      * Sickle-cell disease with vaso-occlusive pain  - Interval history and physical exam findings as described above  - Pain consistent with usual sickle cell pain crises  - H/H improved s/p 1u pRBCs  - Continue home PO pain regimen  - PRN IV provided for breakthrough  - Supportive IVF provided  - Supplemental O2 NC  - PRN zofran for nausea  - PRN benadryl for itching  - Bowel regimen provided  - Continue home hydroxyurea and folic acid  - Will continue to monitor    Current moderate episode of major depressive disorder without prior episode  - Currently asymptomatic  - Continue home SSRI regimen      VTE Risk Mitigation (From admission, onward)          Ordered     Place sequential compression device  Until discontinued         10/19/22 2147     IP VTE LOW RISK PATIENT  Once         10/19/22 2147                Discharge Planning   [place]: 10/23/2022     Code Status: Full Code   Is the patient medically ready for discharge?: No    Reason for patient still in hospital (select all that apply): Patient trending condition  Discharge Plan A: Home          Yosi Rios MD  Attending Physician  Department of Hospital Medicine  Epic secure chat preferred, or ext. [phone]  10/21/2022

## 2022-10-21 NOTE — HOSPITAL COURSE
Pt admitted to [facility]. 1u pRBCs transfused on 10/20 for Hb 6.7 with subsequent improvement. Mild leukocytosis resolved with azithro/rocephin (no infectious source identified). Pain mildly improved into 10/21. Pain continued to improve and he was weaned to oral pain meds prior to discharge home.

## 2022-10-21 NOTE — CONSULTS
Thank you for your consult to [facility]. We have reviewed the patient chart. This patient does not meet criteria for [facility] service at this time due to Patient is a new admission. Will hand back to In-house service.    Felecia Sams MD

## 2022-10-21 NOTE — PLAN OF CARE
Problem: Adult Inpatient Plan of Care  Goal: Plan of Care Review  Outcome: Ongoing, Progressing  Goal: Patient-Specific Goal (Individualized)  Outcome: Ongoing, Progressing  Goal: Absence of Hospital-Acquired Illness or Injury  Outcome: Ongoing, Progressing  Goal: Optimal Comfort and Wellbeing  Outcome: Ongoing, Progressing  Goal: Readiness for Transition of Care  Outcome: Ongoing, Progressing     Problem: Fall Injury Risk  Goal: Absence of Fall and Fall-Related Injury  Outcome: Ongoing, Progressing       Reviewed plan of care with emphasis on sickle cell disease process and pain management.  Patient verbalized understanding and agreement with plan of care.

## 2022-10-21 NOTE — ASSESSMENT & PLAN NOTE
- Interval history and physical exam findings as described above  - Pain consistent with usual sickle cell pain crises  - H/H improved s/p 1u pRBCs  - Continue home PO pain regimen  - PRN IV provided for breakthrough  - Supportive IVF provided  - Supplemental O2 NC  - PRN zofran for nausea  - PRN benadryl for itching  - Bowel regimen provided  - Continue home hydroxyurea and folic acid  - Will continue to monitor

## 2022-10-21 NOTE — PLAN OF CARE
Problem: Adult Inpatient Plan of Care  Goal: Plan of Care Review  Outcome: Ongoing, Progressing  Goal: Patient-Specific Goal (Individualized)  Outcome: Ongoing, Progressing  Goal: Absence of Hospital-Acquired Illness or Injury  Outcome: Ongoing, Progressing  Goal: Optimal Comfort and Wellbeing  Outcome: Ongoing, Progressing  Goal: Readiness for Transition of Care  Outcome: Ongoing, Progressing   VSS. Uneventful shift.

## 2022-10-21 NOTE — SUBJECTIVE & OBJECTIVE
Interval History: Pt seen and examined this morning on eugene DINGGUSTAVOON. BL leg pain persists, though improved. No additional complaints. Care plan reviewed. Otherwise, doing well and with no further complaints at this time.      Objective:     Vital Signs (Most Recent):  Temp: 98.4 °F (36.9 °C) (10/21/22 0747)  Pulse: 70 (10/21/22 0747)  Resp: 16 (10/21/22 0926)  BP: (!) 141/78 (10/21/22 0747)  SpO2: (!) 93 % (10/21/22 0747)   Vital Signs (24h Range):  Temp:  [96.5 °F (35.8 °C)-98.4 °F (36.9 °C)] 98.4 °F (36.9 °C)  Pulse:  [60-86] 70  Resp:  [16-18] 16  SpO2:  [92 %-95 %] 93 %  BP: (129-152)/(67-79) 141/78     Weight: 87.1 kg (192 lb)  Body mass index is 26.04 kg/m².    Intake/Output Summary (Last 24 hours) at 10/21/2022 1242  Last data filed at 10/21/2022 0931  Gross per 24 hour   Intake 975.83 ml   Output 2250 ml   Net -1274.17 ml        Physical Exam  Gen: in NAD, appears stated age  Neuro: AAOx4, CN2-12 grossly intact BL; motor, sensory, and strength grossly intact BL  HEENT: NTNC, EOMI, PERRLA, MMM; no thyromegaly or lymphadenopathy; no JVD appreciated  CVS: RRR, no m/r/g; S1/S2 auscultated with no S3 or S4; capillary refill < 2 sec  Resp: lungs CTAB, no w/r/r; no belabored breathing or accessory muscle use appreciated   Abd: BS+ in all 4 quadrants; NTND, soft to palpation; no organomegaly appreciated   Extrem: pulses full, equal, and regular over all 4 extremities; no UE or LE edema BL      Significant Labs: All pertinent labs within the past 24 hours have been reviewed.    Significant Imaging: I have reviewed all pertinent imaging results/findings within the past 24 hours.

## 2022-10-22 PROBLEM — R60.0 LEG EDEMA, RIGHT: Status: ACTIVE | Noted: 2022-10-22

## 2022-10-22 LAB
ALBUMIN SERPL BCP-MCNC: 3.4 G/DL (ref 3.5–5.2)
ALP SERPL-CCNC: 110 U/L (ref 55–135)
ALT SERPL W/O P-5'-P-CCNC: 20 U/L (ref 10–44)
ANION GAP SERPL CALC-SCNC: 5 MMOL/L (ref 8–16)
AST SERPL-CCNC: 38 U/L (ref 10–40)
BASOPHILS # BLD AUTO: 0.08 K/UL (ref 0–0.2)
BASOPHILS NFR BLD: 0.8 % (ref 0–1.9)
BILIRUB SERPL-MCNC: 6.6 MG/DL (ref 0.1–1)
BUN SERPL-MCNC: 7 MG/DL (ref 6–20)
CALCIUM SERPL-MCNC: 8.6 MG/DL (ref 8.7–10.5)
CHLORIDE SERPL-SCNC: 109 MMOL/L (ref 95–110)
CO2 SERPL-SCNC: 24 MMOL/L (ref 23–29)
CREAT SERPL-MCNC: 0.7 MG/DL (ref 0.5–1.4)
DIFFERENTIAL METHOD: ABNORMAL
EOSINOPHIL # BLD AUTO: 0.5 K/UL (ref 0–0.5)
EOSINOPHIL NFR BLD: 5 % (ref 0–8)
ERYTHROCYTE [DISTWIDTH] IN BLOOD BY AUTOMATED COUNT: 22.4 % (ref 11.5–14.5)
EST. GFR  (NO RACE VARIABLE): >60 ML/MIN/1.73 M^2
GLUCOSE SERPL-MCNC: 71 MG/DL (ref 70–110)
HCT VFR BLD AUTO: 22.4 % (ref 40–54)
HGB BLD-MCNC: 7.7 G/DL (ref 14–18)
IMM GRANULOCYTES # BLD AUTO: 0.06 K/UL (ref 0–0.04)
IMM GRANULOCYTES NFR BLD AUTO: 0.6 % (ref 0–0.5)
LYMPHOCYTES # BLD AUTO: 2.8 K/UL (ref 1–4.8)
LYMPHOCYTES NFR BLD: 28.8 % (ref 18–48)
MAGNESIUM SERPL-MCNC: 1.8 MG/DL (ref 1.6–2.6)
MCH RBC QN AUTO: 29.5 PG (ref 27–31)
MCHC RBC AUTO-ENTMCNC: 34.4 G/DL (ref 32–36)
MCV RBC AUTO: 86 FL (ref 82–98)
MONOCYTES # BLD AUTO: 1.2 K/UL (ref 0.3–1)
MONOCYTES NFR BLD: 12.2 % (ref 4–15)
NEUTROPHILS # BLD AUTO: 5.1 K/UL (ref 1.8–7.7)
NEUTROPHILS NFR BLD: 52.6 % (ref 38–73)
NRBC BLD-RTO: 1 /100 WBC
PHOSPHATE SERPL-MCNC: 4.1 MG/DL (ref 2.7–4.5)
PLATELET # BLD AUTO: 275 K/UL (ref 150–450)
PMV BLD AUTO: 10.6 FL (ref 9.2–12.9)
POTASSIUM SERPL-SCNC: 4 MMOL/L (ref 3.5–5.1)
PROT SERPL-MCNC: 7.2 G/DL (ref 6–8.4)
RBC # BLD AUTO: 2.61 M/UL (ref 4.6–6.2)
SODIUM SERPL-SCNC: 138 MMOL/L (ref 136–145)
WBC # BLD AUTO: 9.66 K/UL (ref 3.9–12.7)

## 2022-10-22 PROCEDURE — 63700000 PHARM REV CODE 250 ALT 637 W/O HCPCS: Performed by: STUDENT IN AN ORGANIZED HEALTH CARE EDUCATION/TRAINING PROGRAM

## 2022-10-22 PROCEDURE — 84100 ASSAY OF PHOSPHORUS: CPT | Performed by: STUDENT IN AN ORGANIZED HEALTH CARE EDUCATION/TRAINING PROGRAM

## 2022-10-22 PROCEDURE — 99233 SBSQ HOSP IP/OBS HIGH 50: CPT | Mod: GT,,, | Performed by: INTERNAL MEDICINE

## 2022-10-22 PROCEDURE — 80053 COMPREHEN METABOLIC PANEL: CPT | Performed by: STUDENT IN AN ORGANIZED HEALTH CARE EDUCATION/TRAINING PROGRAM

## 2022-10-22 PROCEDURE — 99233 PR SUBSEQUENT HOSPITAL CARE,LEVL III: ICD-10-PCS | Mod: GT,,, | Performed by: INTERNAL MEDICINE

## 2022-10-22 PROCEDURE — 25000003 PHARM REV CODE 250: Performed by: INTERNAL MEDICINE

## 2022-10-22 PROCEDURE — 11000001 HC ACUTE MED/SURG PRIVATE ROOM

## 2022-10-22 PROCEDURE — 63600175 PHARM REV CODE 636 W HCPCS: Performed by: HOSPITALIST

## 2022-10-22 PROCEDURE — 63600175 PHARM REV CODE 636 W HCPCS: Performed by: STUDENT IN AN ORGANIZED HEALTH CARE EDUCATION/TRAINING PROGRAM

## 2022-10-22 PROCEDURE — 36415 COLL VENOUS BLD VENIPUNCTURE: CPT | Performed by: STUDENT IN AN ORGANIZED HEALTH CARE EDUCATION/TRAINING PROGRAM

## 2022-10-22 PROCEDURE — 85025 COMPLETE CBC W/AUTO DIFF WBC: CPT | Performed by: STUDENT IN AN ORGANIZED HEALTH CARE EDUCATION/TRAINING PROGRAM

## 2022-10-22 PROCEDURE — 25000003 PHARM REV CODE 250: Performed by: HOSPITALIST

## 2022-10-22 PROCEDURE — 83735 ASSAY OF MAGNESIUM: CPT | Performed by: STUDENT IN AN ORGANIZED HEALTH CARE EDUCATION/TRAINING PROGRAM

## 2022-10-22 PROCEDURE — 25000003 PHARM REV CODE 250: Performed by: STUDENT IN AN ORGANIZED HEALTH CARE EDUCATION/TRAINING PROGRAM

## 2022-10-22 RX ORDER — OXYCODONE HYDROCHLORIDE 10 MG/1
10 TABLET ORAL EVERY 4 HOURS PRN
Status: DISCONTINUED | OUTPATIENT
Start: 2022-10-22 | End: 2022-10-23 | Stop reason: HOSPADM

## 2022-10-22 RX ORDER — HYDROMORPHONE HYDROCHLORIDE 1 MG/ML
2 INJECTION, SOLUTION INTRAMUSCULAR; INTRAVENOUS; SUBCUTANEOUS
Status: DISCONTINUED | OUTPATIENT
Start: 2022-10-22 | End: 2022-10-23 | Stop reason: HOSPADM

## 2022-10-22 RX ADMIN — OXYCODONE HYDROCHLORIDE 10 MG: 10 TABLET ORAL at 11:10

## 2022-10-22 RX ADMIN — FLUOXETINE 10 MG: 10 CAPSULE ORAL at 08:10

## 2022-10-22 RX ADMIN — HYDROXYUREA 500 MG: 500 CAPSULE ORAL at 08:10

## 2022-10-22 RX ADMIN — OXYCODONE HYDROCHLORIDE 10 MG: 10 TABLET ORAL at 05:10

## 2022-10-22 RX ADMIN — SODIUM CHLORIDE: 0.9 INJECTION, SOLUTION INTRAVENOUS at 12:10

## 2022-10-22 RX ADMIN — SODIUM CHLORIDE: 0.9 INJECTION, SOLUTION INTRAVENOUS at 06:10

## 2022-10-22 RX ADMIN — IBUPROFEN 600 MG: 200 TABLET, FILM COATED ORAL at 11:10

## 2022-10-22 RX ADMIN — HYDROMORPHONE HYDROCHLORIDE 1 MG: 1 INJECTION, SOLUTION INTRAMUSCULAR; INTRAVENOUS; SUBCUTANEOUS at 01:10

## 2022-10-22 RX ADMIN — FOLIC ACID 1 MG: 1 TABLET ORAL at 08:10

## 2022-10-22 RX ADMIN — SENNOSIDES AND DOCUSATE SODIUM 1 TABLET: 50; 8.6 TABLET ORAL at 08:10

## 2022-10-22 RX ADMIN — SODIUM CHLORIDE: 0.9 INJECTION, SOLUTION INTRAVENOUS at 08:10

## 2022-10-22 RX ADMIN — AZITHROMYCIN MONOHYDRATE 500 MG: 250 TABLET ORAL at 08:10

## 2022-10-22 RX ADMIN — HYDROMORPHONE HYDROCHLORIDE 1 MG: 1 INJECTION, SOLUTION INTRAMUSCULAR; INTRAVENOUS; SUBCUTANEOUS at 06:10

## 2022-10-22 RX ADMIN — CEFTRIAXONE 1 G: 1 INJECTION, SOLUTION INTRAVENOUS at 08:10

## 2022-10-22 NOTE — ASSESSMENT & PLAN NOTE
Associated with pain - LE US ordered and neg; likely due to sickle cell crisis; improved at discharge

## 2022-10-22 NOTE — PLAN OF CARE
Problem: Adult Inpatient Plan of Care  Goal: Plan of Care Review  Outcome: Ongoing, Progressing  Goal: Patient-Specific Goal (Individualized)  Outcome: Ongoing, Progressing  Goal: Absence of Hospital-Acquired Illness or Injury  Outcome: Ongoing, Progressing  Goal: Optimal Comfort and Wellbeing  Outcome: Ongoing, Progressing  Goal: Readiness for Transition of Care  Outcome: Ongoing, Progressing     Problem: Fall Injury Risk  Goal: Absence of Fall and Fall-Related Injury  Outcome: Ongoing, Progressing   VSS. Uneventful shift.

## 2022-10-22 NOTE — NURSING
AOX4, room air, ambulates independently.  Had lower extremity ultrasound today.  Pain med regiment was decreased and tolerated well.

## 2022-10-22 NOTE — PLAN OF CARE
Problem: Adult Inpatient Plan of Care  Goal: Plan of Care Review  Outcome: Ongoing, Progressing  Goal: Patient-Specific Goal (Individualized)  Outcome: Ongoing, Progressing  Goal: Absence of Hospital-Acquired Illness or Injury  Outcome: Ongoing, Progressing  Goal: Optimal Comfort and Wellbeing  Outcome: Ongoing, Progressing  Goal: Readiness for Transition of Care  Outcome: Ongoing, Progressing     Problem: Fall Injury Risk  Goal: Absence of Fall and Fall-Related Injury  Outcome: Ongoing, Progressing       Reviewed plan of care with emphasis on pain management and safety.  Patient verbalized understanding and agreement with plan of care.

## 2022-10-22 NOTE — PROGRESS NOTES
[facility] Medicine  Telemedicine Progress Note    Patient Name: Parrish Davis  MRN: [id number]  Patient Class: IP- Inpatient   Admission Date: 10/19/2022  Length of Stay: 1 days  Attending Physician: Felecia Sams MD  Primary Care Provider: Jovanny Douglas MD          Subjective:     Principal Problem:Sickle-cell disease with vaso-occlusive pain        HPI:  25-year-old  man with history of sickle cell anemia, status post cholecystectomy, presents to the emergency department with concerns of uncontrolled pain.  He reports in the last 2 days at the onset of left-sided pain in musculoskeletal areas his left shoulder arm left-sided chest and left thigh, uncontrolled with use of NSAIDs at home, patient is not on any long-acting or immediate release home p.r.n. opiate medications.  He denies any potential precipitating factors for worsening pains and, last hospitalization for vaso-occlusive crisis was in 2016.    His primary care doctor is Jovanny Douglas and hematologist is Dr. Hi @ [facility], he denies any URI symptoms and or sick contacts    He denies any chest pain chest pressure no nausea vomiting or bowel habit changes, no joint pain or swelling no history of AVN in the past.  No tobacco alcohol or drug use.    ED treatment received Dilaudid 1 mg IV x3, Toradol 10 mg IV x1, lactated Ringer's 1 L bolus x1      Overview/Hospital Course:  Pt admitted to [facility]. 1u pRBCs transfused on 10/20 for Hb 6.7 with subsequent improvement. Mild leukocytosis resolved with azithro/rocephin (no infectious source identified). Pain mildly improved into 10/21.        This encounter was provided through telemedicine.  Patient was transferred to the telemedicine service on:  10/22/2022   The patient location is: 650/650 A admitted 10/19/2022  7:06 PM.    Interval History/Overnight Events:   Clinical record since admit reviewed.    Patient able to provide adequate history.  Complains of right  leg pain rated 4-5/10; right leg mildly edematous compared to left; he has been having regular BM's; he usually takes OTC pain meds at home    Review of Systems   Constitutional:  Positive for fatigue. Negative for activity change and fever.   Respiratory:  Negative for shortness of breath.    Cardiovascular:  Negative for chest pain.   Gastrointestinal:  Negative for constipation.      Inpatient Medications:  Scheduled Meds:   azithromycin  500 mg Oral Daily    cefTRIAXone (ROCEPHIN) IVPB  1 g Intravenous Q24H    FLUoxetine  10 mg Oral Daily    folic acid  1 mg Oral Daily    hydroxyurea  500 mg Oral Daily    senna-docusate 8.6-50 mg  1 tablet Oral BID     Continuous Infusions:   sodium chloride 0.9% Stopped (10/22/22 1145)     PRN Meds:.sodium chloride, acetaminophen, bisacodyL, HYDROmorphone, ibuprofen, naloxone, ondansetron, oxyCODONE, polyethylene glycol, prochlorperazine, sodium chloride 0.9%      Objective:     Temp:  [97 °F (36.1 °C)-98.6 °F (37 °C)] 98.6 °F (37 °C)  Pulse:  [56-66] 66  Resp:  [16-20] 16  SpO2:  [92 %-96 %] 92 %  BP: (135-140)/(77-83) 140/83      Intake/Output Summary (Last 24 hours) at 10/22/2022 1507  Last data filed at 10/22/2022 1334  Gross per 24 hour   Intake 4922.18 ml   Output 1800 ml   Net 3122.18 ml        Body mass index is 29.19 kg/m².    Physical Exam  Vitals and nursing note reviewed.   Constitutional:       General: He is not in acute distress.     Appearance: Normal appearance. He is normal weight. He is not ill-appearing.   HENT:      Head: Normocephalic and atraumatic.      Right Ear: Hearing normal.      Left Ear: Hearing normal.      Nose: Nose normal.   Eyes:      General: No scleral icterus.        Right eye: No discharge.         Left eye: No discharge.      Extraocular Movements: Extraocular movements intact.   Cardiovascular:      Rate and Rhythm: Normal rate.   Pulmonary:      Effort: Pulmonary effort is normal. No accessory muscle usage or respiratory distress.    Musculoskeletal:      Right lower leg: Edema present.   Neurological:      General: No focal deficit present.      Mental Status: He is alert and oriented to person, place, and time.      Cranial Nerves: No cranial nerve deficit.      Motor: No weakness.   Psychiatric:         Attention and Perception: Attention normal.         Mood and Affect: Mood normal.         Speech: Speech normal.         Behavior: Behavior is cooperative.        Labs:  Recent Results (from the past 24 hour(s))   Phosphorus    Collection Time: 10/22/22  4:06 AM   Result Value Ref Range    Phosphorus 4.1 2.7 - 4.5 mg/dL   Magnesium    Collection Time: 10/22/22  4:06 AM   Result Value Ref Range    Magnesium 1.8 1.6 - 2.6 mg/dL   Comprehensive Metabolic Panel    Collection Time: 10/22/22  4:06 AM   Result Value Ref Range    Sodium 138 136 - 145 mmol/L    Potassium 4.0 3.5 - 5.1 mmol/L    Chloride 109 95 - 110 mmol/L    CO2 24 23 - 29 mmol/L    Glucose 71 70 - 110 mg/dL    BUN 7 6 - 20 mg/dL    Creatinine 0.7 0.5 - 1.4 mg/dL    Calcium 8.6 (L) 8.7 - 10.5 mg/dL    Total Protein 7.2 6.0 - 8.4 g/dL    Albumin 3.4 (L) 3.5 - 5.2 g/dL    Total Bilirubin 6.6 (H) 0.1 - 1.0 mg/dL    Alkaline Phosphatase 110 55 - 135 U/L    AST 38 10 - 40 U/L    ALT 20 10 - 44 U/L    Anion Gap 5 (L) 8 - 16 mmol/L    eGFR >60.0 >60 mL/min/1.73 m^2   CBC Auto Differential    Collection Time: 10/22/22  4:06 AM   Result Value Ref Range    WBC 9.66 3.90 - 12.70 K/uL    RBC 2.61 (L) 4.60 - 6.20 M/uL    Hemoglobin 7.7 (L) 14.0 - 18.0 g/dL    Hematocrit 22.4 (L) 40.0 - 54.0 %    MCV 86 82 - 98 fL    MCH 29.5 27.0 - 31.0 pg    MCHC 34.4 32.0 - 36.0 g/dL    RDW 22.4 (H) 11.5 - 14.5 %    Platelets 275 150 - 450 K/uL    MPV 10.6 9.2 - 12.9 fL    Immature Granulocytes 0.6 (H) 0.0 - 0.5 %    Gran # (ANC) 5.1 1.8 - 7.7 K/uL    Immature Grans (Abs) 0.06 (H) 0.00 - 0.04 K/uL    Lymph # 2.8 1.0 - 4.8 K/uL    Mono # 1.2 (H) 0.3 - 1.0 K/uL    Eos # 0.5 0.0 - 0.5 K/uL    Baso # 0.08 0.00 -  0.20 K/uL    nRBC 1 (A) 0 /100 WBC    Gran % 52.6 38.0 - 73.0 %    Lymph % 28.8 18.0 - 48.0 %    Mono % 12.2 4.0 - 15.0 %    Eosinophil % 5.0 0.0 - 8.0 %    Basophil % 0.8 0.0 - 1.9 %    Differential Method Automated         No results found for: [id number]    Recent Labs   Lab 10/20/22  0313 10/21/22  0456 10/22/22  0406   WBC 12.86* 11.38 9.66   LYMPH 21.9  2.8 27.4  3.1 28.8  2.8   HGB 6.7* 8.0* 7.7*   HCT 19.2* 23.1* 22.4*    283 275     Recent Labs   Lab 10/20/22  0313 10/21/22  0456 10/22/22  0406    135* 138   K 3.8 4.3 4.0    107 109   CO2 22* 24 24   BUN 5* 8 7   CREATININE 0.8 0.7 0.7    70 71   CALCIUM 8.6* 8.4* 8.6*   MG  --  1.8 1.8   PHOS 4.2 4.1 4.1     Recent Labs   Lab 10/20/22  0313 10/21/22  0456 10/22/22  0406   ALKPHOS 95 100 110   ALT 17 20 20   AST 34 36 38   ALBUMIN 3.7 3.4* 3.4*   PROT 7.5 6.9 7.2   BILITOT 10.2* 8.2* 6.6*   INR 1.3*  --   --         Recent Labs     10/19/22  1919   *       All labs within the last 24 hours were reviewed.     Microbiology:  Microbiology Results (last 7 days)       ** No results found for the last 168 hours. **              Imaging  ECG Results              EKG 12-lead (Final result)  Result time 10/20/22 12:16:10      Final result by Interface, Lab In [facility] (10/20/22 12:16:10)                   Narrative:    Test Reason : D57.00,    Vent. Rate : 094 BPM     Atrial Rate : 094 BPM     P-R Int : 158 ms          QRS Dur : 088 ms      QT Int : 374 ms       P-R-T Axes : 083 046 041 degrees     QTc Int : 467 ms    Normal sinus rhythm  Normal ECG  No previous ECGs available  Confirmed by ROMANA PRESTNO MD ([id number]) on 10/20/2022 12:16:02 PM    Referred By: TERESITA   SELF           Confirmed By:ROMANA PRSETON MD                                    Results for orders placed during the hospital encounter of 03/07/22    Echo    Interpretation Summary  · Moderate left atrial enlargement.  · The left ventricle is mildly enlarged with  mild eccentric hypertrophy and normal systolic function.  · The estimated ejection fraction is 60%.  · The quantitatively derived ejection fraction is 60%.  · Normal left ventricular diastolic function.  · Mild right ventricular enlargement with normal right ventricular systolic function.  · The estimated PA systolic pressure is 30 mmHg.  · Intermediate central venous pressure (8 mmHg).      US Lower Extremity Veins Bilateral  Narrative: EXAMINATION:  US LOWER EXTREMITY VEINS BILATERAL    CLINICAL HISTORY:  right LE edema/pain;    TECHNIQUE:  Duplex and color flow Doppler and dynamic compression was performed of the bilateral lower extremity veins was performed.    COMPARISON:  None    FINDINGS:  Right thigh veins: The common femoral, femoral, popliteal, upper greater saphenous, and deep femoral veins are patent and free of thrombus. The veins are normally compressible and have normal phasic flow and augmentation response.    Right calf veins: The visualized calf veins are patent.    Left thigh veins: The common femoral, femoral, popliteal, upper greater saphenous, and deep femoral veins are patent and free of thrombus. The veins are normally compressible and have normal phasic flow and augmentation response.    Left calf veins: The visualized calf veins are patent.    Miscellaneous: None  Impression: No evidence of deep venous thrombosis in either lower extremity.    Electronically signed by resident: Norma Viramontes  Date:    10/22/2022  Time:    13:35    Electronically signed by: Jaleesa Stallings MD  Date:    10/22/2022  Time:    14:30      All imaging within the last 24 hours was reviewed.       Discharge Planning   [place]: 10/23/2022     Code Status: Full Code   Is the patient medically ready for discharge?: No    Reason for patient still in hospital (select all that apply): Patient trending condition and Treatment  Discharge Plan A: Home            Assessment/Plan:      * Sickle-cell disease with vaso-occlusive  pain  - Interval history and physical exam findings as described above  - Pain consistent with usual sickle cell pain crises  - H/H improved s/p 1u pRBCs  - Continue home PO pain regimen  - PRN IV provided for breakthrough  - Supportive IVF provided  - Supplemental O2 NC  - PRN zofran for nausea  - PRN benadryl for itching  - Bowel regimen provided  - Continue home hydroxyurea and folic acid  - oxycodone po started on 10/22 in anticipation of discharge on 10/23  - Will continue to monitor    Leg edema, right  Associated with pain - LE US ordered but may be due to sickle cell crisis      Current moderate episode of major depressive disorder without prior episode  - Currently asymptomatic  - Continue home SSRI regimen    VTE Risk Mitigation (From admission, onward)           Ordered     Place sequential compression device  Until discontinued         10/19/22 2147     IP VTE LOW RISK PATIENT  Once         10/19/22 2147                    High Risk Conditions:  Patient is currently receiving parenteral controlled substances: Dilaudid      I have completed this tele-visit with the assistance of a telepresenter.    The attending portion of this evaluation, treatment, and documentation was performed per Felecia Sams MD via Telemedicine [employer] using the secure [employer] software platform with 2 way audio/video. The provider was located off-site and the patient is located in the hospital. The aforementioned video software was utilized to document the relevant history and physical exam    Felecia Sams MD  Department of Hospital Medicine   [facility]

## 2022-10-22 NOTE — CONSULTS
Thank you for your consult to [facility]. We have reviewed the patient chart. This patient does meet criteria for [facility] service at this time. Will assume care on 10/22/22 at 7AM.    Felecia Sams MD

## 2022-10-22 NOTE — ASSESSMENT & PLAN NOTE
- Interval history and physical exam findings as described above  - Pain consistent with usual sickle cell pain crises  - H/H improved s/p 1u pRBCs  - Continue home PO pain regimen  - PRN IV provided for breakthrough  - Supportive IVF provided  - Supplemental O2 NC  - PRN zofran for nausea  - PRN benadryl for itching  - Bowel regimen provided  - Continue home hydroxyurea and folic acid  - oxycodone po started on 10/22 in anticipation of discharge on 10/23  - Will continue to monitor

## 2022-10-22 NOTE — SUBJECTIVE & OBJECTIVE
This encounter was provided through telemedicine.  Patient was transferred to the telemedicine service on:  10/22/2022   The patient location is: 650/650 A admitted 10/19/2022  7:06 PM.    Interval History/Overnight Events:   Clinical record since admit reviewed.    Patient able to provide adequate history.  Complains of right leg pain rated 4-5/10; right leg mildly edematous compared to left; he has been having regular BM's; he usually takes OTC pain meds at home    Review of Systems   Constitutional:  Positive for fatigue. Negative for activity change and fever.   Respiratory:  Negative for shortness of breath.    Cardiovascular:  Negative for chest pain.   Gastrointestinal:  Negative for constipation.      Inpatient Medications:  Scheduled Meds:   azithromycin  500 mg Oral Daily    cefTRIAXone (ROCEPHIN) IVPB  1 g Intravenous Q24H    FLUoxetine  10 mg Oral Daily    folic acid  1 mg Oral Daily    hydroxyurea  500 mg Oral Daily    senna-docusate 8.6-50 mg  1 tablet Oral BID     Continuous Infusions:   sodium chloride 0.9% Stopped (10/22/22 1145)     PRN Meds:.sodium chloride, acetaminophen, bisacodyL, HYDROmorphone, ibuprofen, naloxone, ondansetron, oxyCODONE, polyethylene glycol, prochlorperazine, sodium chloride 0.9%      Objective:     Temp:  [97 °F (36.1 °C)-98.6 °F (37 °C)] 98.6 °F (37 °C)  Pulse:  [56-66] 66  Resp:  [16-20] 16  SpO2:  [92 %-96 %] 92 %  BP: (135-140)/(77-83) 140/83      Intake/Output Summary (Last 24 hours) at 10/22/2022 1507  Last data filed at 10/22/2022 1334  Gross per 24 hour   Intake 4922.18 ml   Output 1800 ml   Net 3122.18 ml        Body mass index is 29.19 kg/m².    Physical Exam  Vitals and nursing note reviewed.   Constitutional:       General: He is not in acute distress.     Appearance: Normal appearance. He is normal weight. He is not ill-appearing.   HENT:      Head: Normocephalic and atraumatic.      Right Ear: Hearing normal.      Left Ear: Hearing normal.      Nose: Nose  normal.   Eyes:      General: No scleral icterus.        Right eye: No discharge.         Left eye: No discharge.      Extraocular Movements: Extraocular movements intact.   Cardiovascular:      Rate and Rhythm: Normal rate.   Pulmonary:      Effort: Pulmonary effort is normal. No accessory muscle usage or respiratory distress.   Musculoskeletal:      Right lower leg: Edema present.   Neurological:      General: No focal deficit present.      Mental Status: He is alert and oriented to person, place, and time.      Cranial Nerves: No cranial nerve deficit.      Motor: No weakness.   Psychiatric:         Attention and Perception: Attention normal.         Mood and Affect: Mood normal.         Speech: Speech normal.         Behavior: Behavior is cooperative.        Labs:  Recent Results (from the past 24 hour(s))   Phosphorus    Collection Time: 10/22/22  4:06 AM   Result Value Ref Range    Phosphorus 4.1 2.7 - 4.5 mg/dL   Magnesium    Collection Time: 10/22/22  4:06 AM   Result Value Ref Range    Magnesium 1.8 1.6 - 2.6 mg/dL   Comprehensive Metabolic Panel    Collection Time: 10/22/22  4:06 AM   Result Value Ref Range    Sodium 138 136 - 145 mmol/L    Potassium 4.0 3.5 - 5.1 mmol/L    Chloride 109 95 - 110 mmol/L    CO2 24 23 - 29 mmol/L    Glucose 71 70 - 110 mg/dL    BUN 7 6 - 20 mg/dL    Creatinine 0.7 0.5 - 1.4 mg/dL    Calcium 8.6 (L) 8.7 - 10.5 mg/dL    Total Protein 7.2 6.0 - 8.4 g/dL    Albumin 3.4 (L) 3.5 - 5.2 g/dL    Total Bilirubin 6.6 (H) 0.1 - 1.0 mg/dL    Alkaline Phosphatase 110 55 - 135 U/L    AST 38 10 - 40 U/L    ALT 20 10 - 44 U/L    Anion Gap 5 (L) 8 - 16 mmol/L    eGFR >60.0 >60 mL/min/1.73 m^2   CBC Auto Differential    Collection Time: 10/22/22  4:06 AM   Result Value Ref Range    WBC 9.66 3.90 - 12.70 K/uL    RBC 2.61 (L) 4.60 - 6.20 M/uL    Hemoglobin 7.7 (L) 14.0 - 18.0 g/dL    Hematocrit 22.4 (L) 40.0 - 54.0 %    MCV 86 82 - 98 fL    MCH 29.5 27.0 - 31.0 pg    MCHC 34.4 32.0 - 36.0 g/dL     RDW 22.4 (H) 11.5 - 14.5 %    Platelets 275 150 - 450 K/uL    MPV 10.6 9.2 - 12.9 fL    Immature Granulocytes 0.6 (H) 0.0 - 0.5 %    Gran # (ANC) 5.1 1.8 - 7.7 K/uL    Immature Grans (Abs) 0.06 (H) 0.00 - 0.04 K/uL    Lymph # 2.8 1.0 - 4.8 K/uL    Mono # 1.2 (H) 0.3 - 1.0 K/uL    Eos # 0.5 0.0 - 0.5 K/uL    Baso # 0.08 0.00 - 0.20 K/uL    nRBC 1 (A) 0 /100 WBC    Gran % 52.6 38.0 - 73.0 %    Lymph % 28.8 18.0 - 48.0 %    Mono % 12.2 4.0 - 15.0 %    Eosinophil % 5.0 0.0 - 8.0 %    Basophil % 0.8 0.0 - 1.9 %    Differential Method Automated         No results found for: [id number]    Recent Labs   Lab 10/20/22  0313 10/21/22  0456 10/22/22  0406   WBC 12.86* 11.38 9.66   LYMPH 21.9  2.8 27.4  3.1 28.8  2.8   HGB 6.7* 8.0* 7.7*   HCT 19.2* 23.1* 22.4*    283 275     Recent Labs   Lab 10/20/22  0313 10/21/22  0456 10/22/22  0406    135* 138   K 3.8 4.3 4.0    107 109   CO2 22* 24 24   BUN 5* 8 7   CREATININE 0.8 0.7 0.7    70 71   CALCIUM 8.6* 8.4* 8.6*   MG  --  1.8 1.8   PHOS 4.2 4.1 4.1     Recent Labs   Lab 10/20/22  0313 10/21/22  0456 10/22/22  0406   ALKPHOS 95 100 110   ALT 17 20 20   AST 34 36 38   ALBUMIN 3.7 3.4* 3.4*   PROT 7.5 6.9 7.2   BILITOT 10.2* 8.2* 6.6*   INR 1.3*  --   --         Recent Labs     10/19/22  1919   *       All labs within the last 24 hours were reviewed.     Microbiology:  Microbiology Results (last 7 days)       ** No results found for the last 168 hours. **              Imaging  ECG Results              EKG 12-lead (Final result)  Result time 10/20/22 12:16:10      Final result by Interface, Lab In [facility] (10/20/22 12:16:10)                   Narrative:    Test Reason : D57.00,    Vent. Rate : 094 BPM     Atrial Rate : 094 BPM     P-R Int : 158 ms          QRS Dur : 088 ms      QT Int : 374 ms       P-R-T Axes : 083 046 041 degrees     QTc Int : 467 ms    Normal sinus rhythm  Normal ECG  No previous ECGs available  Confirmed by KARY CONTRERAS,  ROMANA ([id number]) on 10/20/2022 12:16:02 PM    Referred By: TERESITA   SELF           Confirmed By:ROMANA PRESTON MD                                    Results for orders placed during the hospital encounter of 03/07/22    Echo    Interpretation Summary  · Moderate left atrial enlargement.  · The left ventricle is mildly enlarged with mild eccentric hypertrophy and normal systolic function.  · The estimated ejection fraction is 60%.  · The quantitatively derived ejection fraction is 60%.  · Normal left ventricular diastolic function.  · Mild right ventricular enlargement with normal right ventricular systolic function.  · The estimated PA systolic pressure is 30 mmHg.  · Intermediate central venous pressure (8 mmHg).      US Lower Extremity Veins Bilateral  Narrative: EXAMINATION:  US LOWER EXTREMITY VEINS BILATERAL    CLINICAL HISTORY:  right LE edema/pain;    TECHNIQUE:  Duplex and color flow Doppler and dynamic compression was performed of the bilateral lower extremity veins was performed.    COMPARISON:  None    FINDINGS:  Right thigh veins: The common femoral, femoral, popliteal, upper greater saphenous, and deep femoral veins are patent and free of thrombus. The veins are normally compressible and have normal phasic flow and augmentation response.    Right calf veins: The visualized calf veins are patent.    Left thigh veins: The common femoral, femoral, popliteal, upper greater saphenous, and deep femoral veins are patent and free of thrombus. The veins are normally compressible and have normal phasic flow and augmentation response.    Left calf veins: The visualized calf veins are patent.    Miscellaneous: None  Impression: No evidence of deep venous thrombosis in either lower extremity.    Electronically signed by resident: Norma Viramontes  Date:    10/22/2022  Time:    13:35    Electronically signed by: Jaleesa Stallings MD  Date:    10/22/2022  Time:    14:30      All imaging within the last 24 hours was  reviewed.       Discharge Planning   [place]: 10/23/2022     Code Status: Full Code   Is the patient medically ready for discharge?: No    Reason for patient still in hospital (select all that apply): Patient trending condition and Treatment  Discharge Plan A: Home

## 2022-10-23 VITALS
TEMPERATURE: 97 F | SYSTOLIC BLOOD PRESSURE: 138 MMHG | HEART RATE: 81 BPM | BODY MASS INDEX: 29.1 KG/M2 | DIASTOLIC BLOOD PRESSURE: 75 MMHG | RESPIRATION RATE: 18 BRPM | WEIGHT: 192 LBS | OXYGEN SATURATION: 95 % | HEIGHT: 68 IN

## 2022-10-23 LAB
ALBUMIN SERPL BCP-MCNC: 3.7 G/DL (ref 3.5–5.2)
ALP SERPL-CCNC: 133 U/L (ref 55–135)
ALT SERPL W/O P-5'-P-CCNC: 23 U/L (ref 10–44)
ANION GAP SERPL CALC-SCNC: 6 MMOL/L (ref 8–16)
AST SERPL-CCNC: 36 U/L (ref 10–40)
BASOPHILS # BLD AUTO: 0.09 K/UL (ref 0–0.2)
BASOPHILS NFR BLD: 1 % (ref 0–1.9)
BILIRUB SERPL-MCNC: 6.5 MG/DL (ref 0.1–1)
BUN SERPL-MCNC: 8 MG/DL (ref 6–20)
CALCIUM SERPL-MCNC: 9 MG/DL (ref 8.7–10.5)
CHLORIDE SERPL-SCNC: 108 MMOL/L (ref 95–110)
CO2 SERPL-SCNC: 24 MMOL/L (ref 23–29)
CREAT SERPL-MCNC: 0.7 MG/DL (ref 0.5–1.4)
DIFFERENTIAL METHOD: ABNORMAL
EOSINOPHIL # BLD AUTO: 0.5 K/UL (ref 0–0.5)
EOSINOPHIL NFR BLD: 5 % (ref 0–8)
ERYTHROCYTE [DISTWIDTH] IN BLOOD BY AUTOMATED COUNT: 22.3 % (ref 11.5–14.5)
EST. GFR  (NO RACE VARIABLE): >60 ML/MIN/1.73 M^2
GLUCOSE SERPL-MCNC: 83 MG/DL (ref 70–110)
HCT VFR BLD AUTO: 22.2 % (ref 40–54)
HGB BLD-MCNC: 7.5 G/DL (ref 14–18)
IMM GRANULOCYTES # BLD AUTO: 0.06 K/UL (ref 0–0.04)
IMM GRANULOCYTES NFR BLD AUTO: 0.7 % (ref 0–0.5)
LYMPHOCYTES # BLD AUTO: 2.2 K/UL (ref 1–4.8)
LYMPHOCYTES NFR BLD: 23.4 % (ref 18–48)
MAGNESIUM SERPL-MCNC: 1.7 MG/DL (ref 1.6–2.6)
MCH RBC QN AUTO: 29.5 PG (ref 27–31)
MCHC RBC AUTO-ENTMCNC: 33.8 G/DL (ref 32–36)
MCV RBC AUTO: 87 FL (ref 82–98)
MONOCYTES # BLD AUTO: 1.3 K/UL (ref 0.3–1)
MONOCYTES NFR BLD: 14 % (ref 4–15)
NEUTROPHILS # BLD AUTO: 5.2 K/UL (ref 1.8–7.7)
NEUTROPHILS NFR BLD: 55.9 % (ref 38–73)
NRBC BLD-RTO: 1 /100 WBC
PHOSPHATE SERPL-MCNC: 4.1 MG/DL (ref 2.7–4.5)
PLATELET # BLD AUTO: 247 K/UL (ref 150–450)
PMV BLD AUTO: 10.2 FL (ref 9.2–12.9)
POTASSIUM SERPL-SCNC: 4 MMOL/L (ref 3.5–5.1)
PROT SERPL-MCNC: 7.6 G/DL (ref 6–8.4)
RBC # BLD AUTO: 2.54 M/UL (ref 4.6–6.2)
SODIUM SERPL-SCNC: 138 MMOL/L (ref 136–145)
WBC # BLD AUTO: 9.22 K/UL (ref 3.9–12.7)

## 2022-10-23 PROCEDURE — 25000003 PHARM REV CODE 250: Performed by: INTERNAL MEDICINE

## 2022-10-23 PROCEDURE — 63600175 PHARM REV CODE 636 W HCPCS: Performed by: STUDENT IN AN ORGANIZED HEALTH CARE EDUCATION/TRAINING PROGRAM

## 2022-10-23 PROCEDURE — 36415 COLL VENOUS BLD VENIPUNCTURE: CPT | Performed by: STUDENT IN AN ORGANIZED HEALTH CARE EDUCATION/TRAINING PROGRAM

## 2022-10-23 PROCEDURE — 99239 PR HOSPITAL DISCHARGE DAY,>30 MIN: ICD-10-PCS | Mod: GT,,, | Performed by: INTERNAL MEDICINE

## 2022-10-23 PROCEDURE — 85025 COMPLETE CBC W/AUTO DIFF WBC: CPT | Performed by: STUDENT IN AN ORGANIZED HEALTH CARE EDUCATION/TRAINING PROGRAM

## 2022-10-23 PROCEDURE — 83735 ASSAY OF MAGNESIUM: CPT | Performed by: STUDENT IN AN ORGANIZED HEALTH CARE EDUCATION/TRAINING PROGRAM

## 2022-10-23 PROCEDURE — 25000003 PHARM REV CODE 250: Performed by: HOSPITALIST

## 2022-10-23 PROCEDURE — 99239 HOSP IP/OBS DSCHRG MGMT >30: CPT | Mod: GT,,, | Performed by: INTERNAL MEDICINE

## 2022-10-23 PROCEDURE — 63700000 PHARM REV CODE 250 ALT 637 W/O HCPCS: Performed by: STUDENT IN AN ORGANIZED HEALTH CARE EDUCATION/TRAINING PROGRAM

## 2022-10-23 PROCEDURE — 25000003 PHARM REV CODE 250: Performed by: STUDENT IN AN ORGANIZED HEALTH CARE EDUCATION/TRAINING PROGRAM

## 2022-10-23 PROCEDURE — 84100 ASSAY OF PHOSPHORUS: CPT | Performed by: STUDENT IN AN ORGANIZED HEALTH CARE EDUCATION/TRAINING PROGRAM

## 2022-10-23 PROCEDURE — 80053 COMPREHEN METABOLIC PANEL: CPT | Performed by: STUDENT IN AN ORGANIZED HEALTH CARE EDUCATION/TRAINING PROGRAM

## 2022-10-23 RX ORDER — OXYCODONE HYDROCHLORIDE 10 MG/1
10 TABLET ORAL EVERY 6 HOURS PRN
Qty: 17 TABLET | Refills: 0 | Status: ON HOLD | OUTPATIENT
Start: 2022-10-23 | End: 2022-10-27 | Stop reason: HOSPADM

## 2022-10-23 RX ADMIN — SODIUM CHLORIDE: 0.9 INJECTION, SOLUTION INTRAVENOUS at 01:10

## 2022-10-23 RX ADMIN — HYDROXYUREA 500 MG: 500 CAPSULE ORAL at 10:10

## 2022-10-23 RX ADMIN — SODIUM CHLORIDE: 0.9 INJECTION, SOLUTION INTRAVENOUS at 10:10

## 2022-10-23 RX ADMIN — AZITHROMYCIN MONOHYDRATE 500 MG: 250 TABLET ORAL at 10:10

## 2022-10-23 RX ADMIN — FLUOXETINE 10 MG: 10 CAPSULE ORAL at 10:10

## 2022-10-23 RX ADMIN — SENNOSIDES AND DOCUSATE SODIUM 1 TABLET: 50; 8.6 TABLET ORAL at 10:10

## 2022-10-23 RX ADMIN — CEFTRIAXONE 1 G: 1 INJECTION, SOLUTION INTRAVENOUS at 10:10

## 2022-10-23 RX ADMIN — OXYCODONE HYDROCHLORIDE 10 MG: 10 TABLET ORAL at 04:10

## 2022-10-23 RX ADMIN — FOLIC ACID 1 MG: 1 TABLET ORAL at 10:10

## 2022-10-23 NOTE — PLAN OF CARE
Problem: Adult Inpatient Plan of Care  Goal: Plan of Care Review  Outcome: Ongoing, Progressing  Goal: Absence of Hospital-Acquired Illness or Injury  Outcome: Ongoing, Progressing     Problem: Fall Injury Risk  Goal: Absence of Fall and Fall-Related Injury  Outcome: Ongoing, Progressing     Problem: Pain Acute  Goal: Acceptable Pain Control and Functional Ability  Outcome: Ongoing, Progressing       POC reviewed with patient. Good pain control during night.

## 2022-10-23 NOTE — NURSING
AOX4, room air, ambulates independently.  Pain is more controlled  with less meds than on admit.  Patient demonstrates a good understanding of POC.  Cleared for discharge.

## 2022-10-23 NOTE — PLAN OF CARE
Problem: Adult Inpatient Plan of Care  Goal: Plan of Care Review  Outcome: Ongoing, Progressing  Goal: Patient-Specific Goal (Individualized)  Outcome: Ongoing, Progressing  Goal: Absence of Hospital-Acquired Illness or Injury  Outcome: Ongoing, Progressing  Goal: Optimal Comfort and Wellbeing  Outcome: Ongoing, Progressing  Goal: Readiness for Transition of Care  Outcome: Ongoing, Progressing     Problem: Fall Injury Risk  Goal: Absence of Fall and Fall-Related Injury  Outcome: Ongoing, Progressing     Problem: Pain Acute  Goal: Acceptable Pain Control and Functional Ability  Outcome: Ongoing, Progressing       Reviewed plan of care with emphasis on sickle cell disease process and meds.  Patient verbalized understanding and agreement with plan of care.

## 2022-10-24 PROCEDURE — 94761 N-INVAS EAR/PLS OXIMETRY MLT: CPT

## 2022-10-24 PROCEDURE — 93010 ELECTROCARDIOGRAM REPORT: CPT | Mod: ,,, | Performed by: INTERNAL MEDICINE

## 2022-10-24 PROCEDURE — 99285 PR EMERGENCY DEPT VISIT,LEVEL V: ICD-10-PCS | Mod: ,,, | Performed by: STUDENT IN AN ORGANIZED HEALTH CARE EDUCATION/TRAINING PROGRAM

## 2022-10-24 PROCEDURE — 99285 EMERGENCY DEPT VISIT HI MDM: CPT | Mod: ,,, | Performed by: STUDENT IN AN ORGANIZED HEALTH CARE EDUCATION/TRAINING PROGRAM

## 2022-10-24 PROCEDURE — 93010 EKG 12-LEAD: ICD-10-PCS | Mod: ,,, | Performed by: INTERNAL MEDICINE

## 2022-10-24 PROCEDURE — 93005 ELECTROCARDIOGRAM TRACING: CPT

## 2022-10-25 ENCOUNTER — PATIENT OUTREACH (OUTPATIENT)
Dept: ADMINISTRATIVE | Facility: CLINIC | Age: 27
End: 2022-10-25
Payer: COMMERCIAL

## 2022-10-25 ENCOUNTER — HOSPITAL ENCOUNTER (INPATIENT)
Facility: HOSPITAL | Age: 27
LOS: 2 days | Discharge: HOME | DRG: 812 | End: 2022-10-27
Attending: STUDENT IN AN ORGANIZED HEALTH CARE EDUCATION/TRAINING PROGRAM | Admitting: STUDENT IN AN ORGANIZED HEALTH CARE EDUCATION/TRAINING PROGRAM
Payer: COMMERCIAL

## 2022-10-25 DIAGNOSIS — D72.829 LEUKOCYTOSIS, UNSPECIFIED TYPE: ICD-10-CM

## 2022-10-25 DIAGNOSIS — D57.00 SICKLE CELL CRISIS: Primary | ICD-10-CM

## 2022-10-25 DIAGNOSIS — R07.9 CHEST PAIN, UNSPECIFIED TYPE: ICD-10-CM

## 2022-10-25 DIAGNOSIS — R07.9 CHEST PAIN: ICD-10-CM

## 2022-10-25 PROBLEM — K62.89 RECTAL PAIN: Status: ACTIVE | Noted: 2022-10-25

## 2022-10-25 LAB
ALBUMIN SERPL BCP-MCNC: 4 G/DL (ref 3.5–5.2)
ALBUMIN SERPL BCP-MCNC: 4 G/DL (ref 3.5–5.2)
ALBUMIN SERPL BCP-MCNC: 4.4 G/DL (ref 3.5–5.2)
ALP SERPL-CCNC: 177 U/L (ref 55–135)
ALP SERPL-CCNC: 193 U/L (ref 55–135)
ALT SERPL W/O P-5'-P-CCNC: 38 U/L (ref 10–44)
ALT SERPL W/O P-5'-P-CCNC: 41 U/L (ref 10–44)
ANION GAP SERPL CALC-SCNC: 10 MMOL/L (ref 8–16)
ANION GAP SERPL CALC-SCNC: 11 MMOL/L (ref 8–16)
AST SERPL-CCNC: 68 U/L (ref 10–40)
AST SERPL-CCNC: 71 U/L (ref 10–40)
BASOPHILS # BLD AUTO: 0.14 K/UL (ref 0–0.2)
BASOPHILS NFR BLD: 1.1 % (ref 0–1.9)
BILIRUB DIRECT SERPL-MCNC: 2.1 MG/DL (ref 0.1–0.3)
BILIRUB SERPL-MCNC: 9.2 MG/DL (ref 0.1–1)
BILIRUB SERPL-MCNC: 9.9 MG/DL (ref 0.1–1)
BILIRUB UR QL STRIP: NEGATIVE
BUN SERPL-MCNC: 10 MG/DL (ref 6–20)
BUN SERPL-MCNC: 11 MG/DL (ref 6–20)
CALCIUM SERPL-MCNC: 9.2 MG/DL (ref 8.7–10.5)
CALCIUM SERPL-MCNC: 9.5 MG/DL (ref 8.7–10.5)
CHLORIDE SERPL-SCNC: 103 MMOL/L (ref 95–110)
CHLORIDE SERPL-SCNC: 103 MMOL/L (ref 95–110)
CLARITY UR REFRACT.AUTO: CLEAR
CO2 SERPL-SCNC: 19 MMOL/L (ref 23–29)
CO2 SERPL-SCNC: 20 MMOL/L (ref 23–29)
COLOR UR AUTO: YELLOW
CREAT SERPL-MCNC: 0.8 MG/DL (ref 0.5–1.4)
CREAT SERPL-MCNC: 0.8 MG/DL (ref 0.5–1.4)
DIFFERENTIAL METHOD: ABNORMAL
EOSINOPHIL # BLD AUTO: 0.1 K/UL (ref 0–0.5)
EOSINOPHIL NFR BLD: 0.6 % (ref 0–8)
ERYTHROCYTE [DISTWIDTH] IN BLOOD BY AUTOMATED COUNT: 21.6 % (ref 11.5–14.5)
EST. GFR  (NO RACE VARIABLE): >60 ML/MIN/1.73 M^2
EST. GFR  (NO RACE VARIABLE): >60 ML/MIN/1.73 M^2
GLUCOSE SERPL-MCNC: 114 MG/DL (ref 70–110)
GLUCOSE SERPL-MCNC: 97 MG/DL (ref 70–110)
GLUCOSE UR QL STRIP: NEGATIVE
HAPTOGLOB SERPL-MCNC: <10 MG/DL (ref 30–250)
HCT VFR BLD AUTO: 23.6 % (ref 40–54)
HGB BLD-MCNC: 8.4 G/DL (ref 14–18)
HGB UR QL STRIP: NEGATIVE
IMM GRANULOCYTES # BLD AUTO: 0.18 K/UL (ref 0–0.04)
IMM GRANULOCYTES NFR BLD AUTO: 1.4 % (ref 0–0.5)
KETONES UR QL STRIP: NEGATIVE
LACTATE SERPL-SCNC: 2 MMOL/L (ref 0.5–2.2)
LDH SERPL L TO P-CCNC: 536 U/L (ref 110–260)
LEUKOCYTE ESTERASE UR QL STRIP: NEGATIVE
LYMPHOCYTES # BLD AUTO: 1.7 K/UL (ref 1–4.8)
LYMPHOCYTES NFR BLD: 12.6 % (ref 18–48)
MCH RBC QN AUTO: 29.4 PG (ref 27–31)
MCHC RBC AUTO-ENTMCNC: 35.6 G/DL (ref 32–36)
MCV RBC AUTO: 83 FL (ref 82–98)
MONOCYTES # BLD AUTO: 1.6 K/UL (ref 0.3–1)
MONOCYTES NFR BLD: 12.1 % (ref 4–15)
NEUTROPHILS # BLD AUTO: 9.4 K/UL (ref 1.8–7.7)
NEUTROPHILS NFR BLD: 72.2 % (ref 38–73)
NITRITE UR QL STRIP: NEGATIVE
NRBC BLD-RTO: 1 /100 WBC
PH UR STRIP: 6 [PH] (ref 5–8)
PHOSPHATE SERPL-MCNC: 4.5 MG/DL (ref 2.7–4.5)
PLATELET # BLD AUTO: 306 K/UL (ref 150–450)
PMV BLD AUTO: 9.4 FL (ref 9.2–12.9)
POTASSIUM SERPL-SCNC: 3.8 MMOL/L (ref 3.5–5.1)
POTASSIUM SERPL-SCNC: 4.3 MMOL/L (ref 3.5–5.1)
PROCALCITONIN SERPL IA-MCNC: 0.11 NG/ML
PROT SERPL-MCNC: 8.2 G/DL (ref 6–8.4)
PROT SERPL-MCNC: 8.9 G/DL (ref 6–8.4)
PROT UR QL STRIP: NEGATIVE
RBC # BLD AUTO: 2.86 M/UL (ref 4.6–6.2)
RETICS/RBC NFR AUTO: 12.4 % (ref 0.4–2)
SODIUM SERPL-SCNC: 132 MMOL/L (ref 136–145)
SODIUM SERPL-SCNC: 134 MMOL/L (ref 136–145)
SP GR UR STRIP: 1.01 (ref 1–1.03)
TROPONIN I SERPL DL<=0.01 NG/ML-MCNC: 0.01 NG/ML (ref 0–0.03)
TROPONIN I SERPL DL<=0.01 NG/ML-MCNC: <0.006 NG/ML (ref 0–0.03)
URN SPEC COLLECT METH UR: NORMAL
WBC # BLD AUTO: 13.05 K/UL (ref 3.9–12.7)

## 2022-10-25 PROCEDURE — 25500020 PHARM REV CODE 255: Performed by: STUDENT IN AN ORGANIZED HEALTH CARE EDUCATION/TRAINING PROGRAM

## 2022-10-25 PROCEDURE — 80053 COMPREHEN METABOLIC PANEL: CPT | Performed by: STUDENT IN AN ORGANIZED HEALTH CARE EDUCATION/TRAINING PROGRAM

## 2022-10-25 PROCEDURE — 20600001 HC STEP DOWN PRIVATE ROOM

## 2022-10-25 PROCEDURE — 85025 COMPLETE CBC W/AUTO DIFF WBC: CPT | Performed by: STUDENT IN AN ORGANIZED HEALTH CARE EDUCATION/TRAINING PROGRAM

## 2022-10-25 PROCEDURE — 99285 EMERGENCY DEPT VISIT HI MDM: CPT | Mod: 25

## 2022-10-25 PROCEDURE — 81003 URINALYSIS AUTO W/O SCOPE: CPT | Performed by: STUDENT IN AN ORGANIZED HEALTH CARE EDUCATION/TRAINING PROGRAM

## 2022-10-25 PROCEDURE — 27000221 HC OXYGEN, UP TO 24 HOURS

## 2022-10-25 PROCEDURE — 96361 HYDRATE IV INFUSION ADD-ON: CPT

## 2022-10-25 PROCEDURE — 84484 ASSAY OF TROPONIN QUANT: CPT | Mod: 91 | Performed by: STUDENT IN AN ORGANIZED HEALTH CARE EDUCATION/TRAINING PROGRAM

## 2022-10-25 PROCEDURE — 84484 ASSAY OF TROPONIN QUANT: CPT

## 2022-10-25 PROCEDURE — 96375 TX/PRO/DX INJ NEW DRUG ADDON: CPT

## 2022-10-25 PROCEDURE — 84145 PROCALCITONIN (PCT): CPT

## 2022-10-25 PROCEDURE — 63600175 PHARM REV CODE 636 W HCPCS: Performed by: STUDENT IN AN ORGANIZED HEALTH CARE EDUCATION/TRAINING PROGRAM

## 2022-10-25 PROCEDURE — 80069 RENAL FUNCTION PANEL: CPT

## 2022-10-25 PROCEDURE — 83605 ASSAY OF LACTIC ACID: CPT | Performed by: STUDENT IN AN ORGANIZED HEALTH CARE EDUCATION/TRAINING PROGRAM

## 2022-10-25 PROCEDURE — 25000003 PHARM REV CODE 250

## 2022-10-25 PROCEDURE — 63600175 PHARM REV CODE 636 W HCPCS

## 2022-10-25 PROCEDURE — 87040 BLOOD CULTURE FOR BACTERIA: CPT | Performed by: STUDENT IN AN ORGANIZED HEALTH CARE EDUCATION/TRAINING PROGRAM

## 2022-10-25 PROCEDURE — 84075 ASSAY ALKALINE PHOSPHATASE: CPT

## 2022-10-25 PROCEDURE — 99223 1ST HOSP IP/OBS HIGH 75: CPT | Mod: ,,, | Performed by: STUDENT IN AN ORGANIZED HEALTH CARE EDUCATION/TRAINING PROGRAM

## 2022-10-25 PROCEDURE — 94761 N-INVAS EAR/PLS OXIMETRY MLT: CPT

## 2022-10-25 PROCEDURE — 36415 COLL VENOUS BLD VENIPUNCTURE: CPT

## 2022-10-25 PROCEDURE — 83615 LACTATE (LD) (LDH) ENZYME: CPT

## 2022-10-25 PROCEDURE — 83010 ASSAY OF HAPTOGLOBIN QUANT: CPT

## 2022-10-25 PROCEDURE — 96374 THER/PROPH/DIAG INJ IV PUSH: CPT

## 2022-10-25 PROCEDURE — 63700000 PHARM REV CODE 250 ALT 637 W/O HCPCS

## 2022-10-25 PROCEDURE — 99223 PR INITIAL HOSPITAL CARE,LEVL III: ICD-10-PCS | Mod: ,,, | Performed by: STUDENT IN AN ORGANIZED HEALTH CARE EDUCATION/TRAINING PROGRAM

## 2022-10-25 PROCEDURE — 25000003 PHARM REV CODE 250: Performed by: STUDENT IN AN ORGANIZED HEALTH CARE EDUCATION/TRAINING PROGRAM

## 2022-10-25 PROCEDURE — 85045 AUTOMATED RETICULOCYTE COUNT: CPT | Performed by: STUDENT IN AN ORGANIZED HEALTH CARE EDUCATION/TRAINING PROGRAM

## 2022-10-25 PROCEDURE — 96376 TX/PRO/DX INJ SAME DRUG ADON: CPT

## 2022-10-25 RX ORDER — IBUPROFEN 200 MG
16 TABLET ORAL
Status: DISCONTINUED | OUTPATIENT
Start: 2022-10-25 | End: 2022-10-27 | Stop reason: HOSPADM

## 2022-10-25 RX ORDER — FOLIC ACID 1 MG/1
1 TABLET ORAL DAILY
Status: DISCONTINUED | OUTPATIENT
Start: 2022-10-25 | End: 2022-10-27 | Stop reason: HOSPADM

## 2022-10-25 RX ORDER — HYDROMORPHONE HYDROCHLORIDE 1 MG/ML
1 INJECTION, SOLUTION INTRAMUSCULAR; INTRAVENOUS; SUBCUTANEOUS
Status: COMPLETED | OUTPATIENT
Start: 2022-10-25 | End: 2022-10-25

## 2022-10-25 RX ORDER — NALOXONE HCL 0.4 MG/ML
0.02 VIAL (ML) INJECTION
Status: DISCONTINUED | OUTPATIENT
Start: 2022-10-25 | End: 2022-10-27 | Stop reason: HOSPADM

## 2022-10-25 RX ORDER — AZITHROMYCIN 250 MG/1
500 TABLET, FILM COATED ORAL DAILY
Status: DISCONTINUED | OUTPATIENT
Start: 2022-10-25 | End: 2022-10-25

## 2022-10-25 RX ORDER — TALC
6 POWDER (GRAM) TOPICAL NIGHTLY PRN
Status: DISCONTINUED | OUTPATIENT
Start: 2022-10-25 | End: 2022-10-27 | Stop reason: HOSPADM

## 2022-10-25 RX ORDER — SODIUM CHLORIDE 450 MG/100ML
INJECTION, SOLUTION INTRAVENOUS CONTINUOUS
Status: DISCONTINUED | OUTPATIENT
Start: 2022-10-25 | End: 2022-10-26

## 2022-10-25 RX ORDER — FLUOXETINE 10 MG/1
10 CAPSULE ORAL DAILY
Status: DISCONTINUED | OUTPATIENT
Start: 2022-10-25 | End: 2022-10-27 | Stop reason: HOSPADM

## 2022-10-25 RX ORDER — ONDANSETRON 8 MG/1
8 TABLET, ORALLY DISINTEGRATING ORAL EVERY 8 HOURS PRN
Status: DISCONTINUED | OUTPATIENT
Start: 2022-10-25 | End: 2022-10-27 | Stop reason: HOSPADM

## 2022-10-25 RX ORDER — ACETAMINOPHEN 325 MG/1
650 TABLET ORAL EVERY 4 HOURS PRN
Status: DISCONTINUED | OUTPATIENT
Start: 2022-10-25 | End: 2022-10-27 | Stop reason: HOSPADM

## 2022-10-25 RX ORDER — TALC
6 POWDER (GRAM) TOPICAL NIGHTLY PRN
Status: DISCONTINUED | OUTPATIENT
Start: 2022-10-25 | End: 2022-10-25

## 2022-10-25 RX ORDER — HYDROCODONE BITARTRATE AND ACETAMINOPHEN 10; 325 MG/1; MG/1
1 TABLET ORAL EVERY 6 HOURS PRN
Status: DISCONTINUED | OUTPATIENT
Start: 2022-10-25 | End: 2022-10-27 | Stop reason: HOSPADM

## 2022-10-25 RX ORDER — SODIUM CHLORIDE 0.9 % (FLUSH) 0.9 %
10 SYRINGE (ML) INJECTION
Status: DISCONTINUED | OUTPATIENT
Start: 2022-10-25 | End: 2022-10-27 | Stop reason: HOSPADM

## 2022-10-25 RX ORDER — MORPHINE SULFATE 2 MG/ML
6 INJECTION, SOLUTION INTRAMUSCULAR; INTRAVENOUS EVERY 4 HOURS PRN
Status: DISCONTINUED | OUTPATIENT
Start: 2022-10-25 | End: 2022-10-26

## 2022-10-25 RX ORDER — IBUPROFEN 600 MG/1
600 TABLET ORAL EVERY 6 HOURS PRN
Status: DISCONTINUED | OUTPATIENT
Start: 2022-10-25 | End: 2022-10-27 | Stop reason: HOSPADM

## 2022-10-25 RX ORDER — ACETAMINOPHEN 500 MG
500 TABLET ORAL EVERY 8 HOURS PRN
COMMUNITY

## 2022-10-25 RX ORDER — CALCIUM CARBONATE 200(500)MG
2-3 TABLET,CHEWABLE ORAL 2 TIMES DAILY PRN
COMMUNITY

## 2022-10-25 RX ORDER — AMOXICILLIN 250 MG
2 CAPSULE ORAL 2 TIMES DAILY
Status: DISCONTINUED | OUTPATIENT
Start: 2022-10-25 | End: 2022-10-27 | Stop reason: HOSPADM

## 2022-10-25 RX ORDER — IBUPROFEN 200 MG
24 TABLET ORAL
Status: DISCONTINUED | OUTPATIENT
Start: 2022-10-25 | End: 2022-10-27 | Stop reason: HOSPADM

## 2022-10-25 RX ORDER — HYDROMORPHONE HYDROCHLORIDE 1 MG/ML
1 INJECTION, SOLUTION INTRAMUSCULAR; INTRAVENOUS; SUBCUTANEOUS EVERY 4 HOURS PRN
Status: DISCONTINUED | OUTPATIENT
Start: 2022-10-25 | End: 2022-10-25

## 2022-10-25 RX ORDER — KETOROLAC TROMETHAMINE 30 MG/ML
10 INJECTION, SOLUTION INTRAMUSCULAR; INTRAVENOUS
Status: COMPLETED | OUTPATIENT
Start: 2022-10-25 | End: 2022-10-25

## 2022-10-25 RX ORDER — SODIUM CHLORIDE, SODIUM LACTATE, POTASSIUM CHLORIDE, CALCIUM CHLORIDE 600; 310; 30; 20 MG/100ML; MG/100ML; MG/100ML; MG/100ML
INJECTION, SOLUTION INTRAVENOUS CONTINUOUS
Status: DISCONTINUED | OUTPATIENT
Start: 2022-10-25 | End: 2022-10-25

## 2022-10-25 RX ORDER — HYDROCODONE BITARTRATE AND ACETAMINOPHEN 5; 325 MG/1; MG/1
1 TABLET ORAL EVERY 6 HOURS PRN
Status: DISCONTINUED | OUTPATIENT
Start: 2022-10-25 | End: 2022-10-25

## 2022-10-25 RX ORDER — POLYETHYLENE GLYCOL 3350 17 G/17G
17 POWDER, FOR SOLUTION ORAL DAILY
Status: DISCONTINUED | OUTPATIENT
Start: 2022-10-25 | End: 2022-10-27 | Stop reason: HOSPADM

## 2022-10-25 RX ORDER — GLUCAGON 1 MG
1 KIT INJECTION
Status: DISCONTINUED | OUTPATIENT
Start: 2022-10-25 | End: 2022-10-27 | Stop reason: HOSPADM

## 2022-10-25 RX ORDER — HYDROXYUREA 500 MG/1
500 CAPSULE ORAL DAILY
Status: DISCONTINUED | OUTPATIENT
Start: 2022-10-25 | End: 2022-10-27 | Stop reason: HOSPADM

## 2022-10-25 RX ORDER — SODIUM CHLORIDE 0.9 % (FLUSH) 0.9 %
10 SYRINGE (ML) INJECTION EVERY 12 HOURS PRN
Status: DISCONTINUED | OUTPATIENT
Start: 2022-10-25 | End: 2022-10-27 | Stop reason: HOSPADM

## 2022-10-25 RX ADMIN — SODIUM CHLORIDE, SODIUM LACTATE, POTASSIUM CHLORIDE, AND CALCIUM CHLORIDE 1000 ML: .6; .31; .03; .02 INJECTION, SOLUTION INTRAVENOUS at 03:10

## 2022-10-25 RX ADMIN — HYDROXYUREA 500 MG: 500 CAPSULE ORAL at 08:10

## 2022-10-25 RX ADMIN — HYDROMORPHONE HYDROCHLORIDE 1 MG: 1 INJECTION, SOLUTION INTRAMUSCULAR; INTRAVENOUS; SUBCUTANEOUS at 07:10

## 2022-10-25 RX ADMIN — HYDROCODONE BITARTRATE AND ACETAMINOPHEN 1 TABLET: 10; 325 TABLET ORAL at 06:10

## 2022-10-25 RX ADMIN — POLYETHYLENE GLYCOL 3350 17 G: 17 POWDER, FOR SOLUTION ORAL at 08:10

## 2022-10-25 RX ADMIN — SENNOSIDES AND DOCUSATE SODIUM 2 TABLET: 50; 8.6 TABLET ORAL at 08:10

## 2022-10-25 RX ADMIN — SODIUM CHLORIDE, SODIUM LACTATE, POTASSIUM CHLORIDE, AND CALCIUM CHLORIDE: .6; .31; .03; .02 INJECTION, SOLUTION INTRAVENOUS at 05:10

## 2022-10-25 RX ADMIN — FLUOXETINE 10 MG: 10 CAPSULE ORAL at 08:10

## 2022-10-25 RX ADMIN — AZITHROMYCIN MONOHYDRATE 500 MG: 250 TABLET ORAL at 08:10

## 2022-10-25 RX ADMIN — IOHEXOL 100 ML: 350 INJECTION, SOLUTION INTRAVENOUS at 02:10

## 2022-10-25 RX ADMIN — FOLIC ACID 1 MG: 1 TABLET ORAL at 08:10

## 2022-10-25 RX ADMIN — MORPHINE SULFATE 6 MG: 2 INJECTION, SOLUTION INTRAMUSCULAR; INTRAVENOUS at 09:10

## 2022-10-25 RX ADMIN — SODIUM CHLORIDE: 450 INJECTION, SOLUTION INTRAVENOUS at 07:10

## 2022-10-25 RX ADMIN — KETOROLAC TROMETHAMINE 10 MG: 30 INJECTION, SOLUTION INTRAMUSCULAR; INTRAVENOUS at 01:10

## 2022-10-25 RX ADMIN — MORPHINE SULFATE 6 MG: 2 INJECTION, SOLUTION INTRAMUSCULAR; INTRAVENOUS at 11:10

## 2022-10-25 RX ADMIN — HYDROMORPHONE HYDROCHLORIDE 1 MG: 1 INJECTION, SOLUTION INTRAMUSCULAR; INTRAVENOUS; SUBCUTANEOUS at 03:10

## 2022-10-25 RX ADMIN — IBUPROFEN 600 MG: 600 TABLET ORAL at 11:10

## 2022-10-25 RX ADMIN — CEFTRIAXONE 1 G: 1 INJECTION, SOLUTION INTRAVENOUS at 06:10

## 2022-10-25 RX ADMIN — SENNOSIDES AND DOCUSATE SODIUM 2 TABLET: 50; 8.6 TABLET ORAL at 09:10

## 2022-10-25 RX ADMIN — HYDROMORPHONE HYDROCHLORIDE 1 MG: 1 INJECTION, SOLUTION INTRAMUSCULAR; INTRAVENOUS; SUBCUTANEOUS at 01:10

## 2022-10-25 NOTE — HPI
27 y.o M with depression and sickle cell disease s/p cholecystectomy presenting with sickle cell pain crisis. Patient reports he started experiencing burning, aching anterior chest wall pain radiating to his back and upper arms immediately after participating in a sexual encounter. The pain's severity progressed and it became associated with shortness of breath. The patient took his home PO roxicodone 10mg 3 hours apart but felt no relief and thus presented to the ED for evaluation.     Of note, patient recently hospitalized 10/19-10/23 for vaso-occlusive pain involving his left chest and left LE requiring transfusion of 1u pRBCs. Prior to then his last crisis was in March 2022 and prior to that in 2016. Patient reports that he has been having good PO intake and maintains adequate hydration everyday. Endorsed medication adherence to his hydroxyurea and folvite. Denied fevers, chills, nausea, vomiting, dysuria or URI symptoms. Denied sick contacts. Denied history of acute chest syndrome or exchange transfusions in the past. Follows with Hematologist Dr. Hi.     In the ED, patient was afebrile, tachycardic to 105bpm, normotensive, saturating >95% on room air. Labs notable for leukocytosis (WBC 13), Hg/hct 8.4/23.6 (previously 7.5/22.2, showing degree of hemoconcentration), Na 134, CO2 20, TBili 9.9 (previously 6.5), , AST 71. Reticulocytes 12.4%. Trop negative. LA WNL. UA without infection. EKG showed sinus tachycardia with non-specific ST changes. CXR was negative for acute process. Chest CTA was negative for PE but did show slight left basilar ground grass attentuation that could be related to motion artifact vs edema vs infectious/inflammatory causes. He was administered Dilaudid 1mg x2, IV toradol 10mg and 1L LR.

## 2022-10-25 NOTE — PLAN OF CARE
Pt involved in plan of care and communicating needs throughout shift. Pt alert and oriented x4. PRN pain medications given for c/o of pain with moderate relief. Cont half normal saline @ 75 ml/hr. PO intake adeqaute. All VSS; no acute events so far this shift.  Pt remaining free from falls or injury throughout shift; bed locked and in lowest position; call light within reach.  Pt instructed to call for assistance as needed.  Q1H rounding done on pt. WCTM.

## 2022-10-25 NOTE — MEDICAL/APP STUDENT
Hospital Medicine Student   History and Physical  [facility] HOSP MED 1  10/25/2022  12:05 PM    SUBJECTIVE:     Chief Complaint:  Sickle cell pain crisis (chest pain)    History of Present Illness:  Mr. Parrish Davis is a 27 y.o. male with a relevant medical history of sickle cell disease and depression who is being treated by [facility] for Sickle-cell disease with vaso-occlusive pain.    Mr. Davis was recently hospitalized (Oct 19th to 23rd for left chest vast-occlusive pain, left LE req 1u pRBC transfusion). Crises before: March 2022, 2016. Prior to this admission, was participating in sexual activity, had burning, aching anterior chest wall pain (radiating to back and upper arms). Pain progressed 10/10, also started experiencing shortness of breath. Took prescribed 2x PO roxicodone 10mg 3 hours apart and had no relief, presented to ED. Was also having blood on wiping after bowel motion bright red, rectal pain with stools.     Pertinent positives: good PO intake, adequate hydration, adherent to hydroxyurea and folic acid  Pertinent negatives: no fevers, chills, nausea, vomiting, dysuria, URI sx, sick contacts, hx of acute chest syndrome, exchange transfusions    Review of Systems   Constitutional: Negative.    HENT: Negative.     Eyes: Negative.    Respiratory: Negative.     Cardiovascular:  Positive for chest pain (3-4/10, dull/numb).   Gastrointestinal:  Positive for blood in stool (Hematochezia).   Genitourinary: Negative.    Musculoskeletal: Negative.    Skin: Negative.    Neurological: Negative.    Endo/Heme/Allergies: Negative.    Psychiatric/Behavioral: Negative.     (Conducted this morning)      HISTORY:     Past Medical History:   Diagnosis Date    Sickle cell anemia     Sickle cell disease        Past Surgical History:   Procedure Laterality Date    chemoport      removed    CHOLECYSTECTOMY  2002    UMBILICAL HERNIA REPAIR  1995       History reviewed. No pertinent family history.    Social  History     Socioeconomic History    Marital status: Single   Tobacco Use    Smoking status: Never    Smokeless tobacco: Never   Substance and Sexual Activity    Alcohol use: Yes     Comment: Social drinker on weekends    Drug use: No    Sexual activity: Not Currently       MEDICATIONS & ALLERGIES:     No current facility-administered medications on file prior to encounter.     Current Outpatient Medications on File Prior to Encounter   Medication Sig Dispense Refill    FLUoxetine 10 MG capsule Take 1 capsule (10 mg total) by mouth once daily. 90 capsule 3    oxyCODONE (ROXICODONE) 10 mg Tab immediate release tablet Take 1 tablet (10 mg total) by mouth every 6 (six) hours as needed for Pain. 17 tablet 0    folic acid (FOLVITE) 1 MG tablet Take 1 tablet (1 mg total) by mouth once daily. 90 tablet 3    hydroxyurea (HYDREA) 500 mg Cap Take 1 capsule (500 mg total) by mouth once daily. 90 each 3       Review of patient's allergies indicates:  No Known Allergies    OBJECTIVE:     Vital Signs Recent:  Temp: 98.3 °F (36.8 °C) (10/25/22 1129)  Pulse: 65 (10/25/22 1129)  Resp: 17 (10/25/22 1156)  BP: 130/63 (10/25/22 1129)  SpO2: 97 % (10/25/22 1129)  Oxygen Documentation:    Flow (L/min): 2           O2 Device (Oxygen Therapy): nasal cannula         Vital Signs Range (Last 24H):  Temp:  [98.1 °F (36.7 °C)-99.1 °F (37.3 °C)]   Pulse:  []   Resp:  [17-20]   BP: (120-136)/(57-74)   SpO2:  [88 %-97 %]        Weight:  Body mass index is 26.43 kg/m².  Wt Readings from Last 3 Encounters:   10/25/22 88.4 kg (194 lb 14.2 oz)   10/19/22 87.1 kg (192 lb)   10/03/22 88.3 kg (194 lb 10.7 oz)        Physical Exam  Constitutional:       Appearance: Normal appearance. He is normal weight.   HENT:      Head: Normocephalic.   Eyes:      Pupils: Pupils are equal, round, and reactive to light.   Cardiovascular:      Rate and Rhythm: Normal rate and regular rhythm.      Pulses: Normal pulses.      Heart sounds: Normal heart sounds.    Pulmonary:      Effort: Pulmonary effort is normal.      Breath sounds: Normal breath sounds.   Abdominal:      General: There is no distension.      Palpations: Abdomen is soft.      Tenderness: There is no guarding or rebound.   Musculoskeletal:         General: Normal range of motion.   Skin:     General: Skin is warm.   Neurological:      General: No focal deficit present.      Mental Status: He is alert and oriented to person, place, and time.   Psychiatric:         Mood and Affect: Mood normal.         Behavior: Behavior normal.          Sodium (mmol/L)   Date Value   10/25/2022 132 (L)   10/25/2022 134 (L)   10/23/2022 138     Potassium (mmol/L)   Date Value   10/25/2022 4.3   10/25/2022 3.8   10/23/2022 4.0     Chloride (mmol/L)   Date Value   10/25/2022 103   10/25/2022 103   10/23/2022 108     CO2 (mmol/L)   Date Value   10/25/2022 19 (L)   10/25/2022 20 (L)   10/23/2022 24     BUN (mg/dL)   Date Value   10/25/2022 10   10/25/2022 11   10/23/2022 8     Creatinine (mg/dL)   Date Value   10/25/2022 0.8   10/25/2022 0.8   10/23/2022 0.7     Glucose (mg/dL)   Date Value   10/25/2022 97   10/25/2022 114 (H)   10/23/2022 83     Calcium (mg/dL)   Date Value   10/25/2022 9.2   10/25/2022 9.5   10/23/2022 9.0     Magnesium (mg/dL)   Date Value   10/23/2022 1.7   10/22/2022 1.8   10/21/2022 1.8     Phosphorus (mg/dL)   Date Value   10/25/2022 4.5   10/23/2022 4.1   10/22/2022 4.1     Alkaline Phosphatase (U/L)   Date Value   10/25/2022 177 (H)   10/25/2022 193 (H)   10/23/2022 133     ALT (U/L)   Date Value   10/25/2022 38   10/25/2022 41   10/23/2022 23     AST (U/L)   Date Value   10/25/2022 68 (H)   10/25/2022 71 (H)   10/23/2022 36     Albumin (g/dL)   Date Value   10/25/2022 4.0   10/25/2022 4.0   10/25/2022 4.4     Total Protein (g/dL)   Date Value   10/25/2022 8.2   10/25/2022 8.9 (H)   10/23/2022 7.6     Total Bilirubin (mg/dL)   Date Value   10/25/2022 9.2 (H)   10/25/2022 9.9 (H)   10/23/2022 6.5 (H)      INR (no units)   Date Value   10/20/2022 1.3 (H)       WBC (K/uL)   Date Value   10/25/2022 13.05 (H)   10/23/2022 9.22   10/22/2022 9.66     Hemoglobin (g/dL)   Date Value   10/25/2022 8.4 (L)   10/23/2022 7.5 (L)   10/22/2022 7.7 (L)     Platelets (K/uL)   Date Value   10/25/2022 306   10/23/2022 247   10/22/2022 275       Diagnostic Results:    Haptoglobin <10  Na 132  CO2 19  Total Bilii 9.2  Direct Bili 2.1  AST 68  Alk Phos 177    ASSESSMENT -- PLAN:   Mr. Parrish Davis is a 27 y.o. male with a relevant medical history of sickle cell disease and depression who is being treated by Hospital Medicine for Sickle-cell disease with vaso-occlusive pain. He is currently stable.    Active Hospital Problems    Diagnosis  POA    *Sickle-cell disease with vaso-occlusive pain [D57.00]  Yes    Rectal pain [K62.89]  Yes    Current moderate episode of major depressive disorder without prior episode [F32.1]  Yes      Resolved Hospital Problems   No resolved problems to display.        Plan    Acute Chest Syndrome  Previously assumed CAP with azithromycin + ceftriaxone. Ceasing antibiotics for time being due to lack of evidence for acute / ongoing infection.    Sickle Cell Disease  Continue hydroxyurea and folic acid  Consult heme onc team if persistent chest pain or worsening symptoms, or for adjustment/addition of medications; recs appreciated.    Pain medications    IVF fluids    Monitor vitals    Moderate depressive episode  On fluoxetine    Rectal pain / bleeding  Most likely external hemorrhoids.   Phenyleph-min oil-petrolatum ointment    Discharge planning:   Discharge dependent on resolution of sickle cell crisis

## 2022-10-25 NOTE — ASSESSMENT & PLAN NOTE
Patient endorsed pain with stooling and blood upon wiping concerning for anal fissure, however given no privacy in ED CCR, rectal exam was not performed to confirm.   He reports one sexual partner since May with consistent condom use, however would need to r/o anal warts as cause for rectal bleeding and pain  - Perform physical exam once patient is in private room  - Analpram prn for rectal pain if needed

## 2022-10-25 NOTE — ED NOTES
Parrish Davis, an 27 y.o. male presents to the ED c/o chest pain that began about 1930. Reports pain is all over chest, not any specific area. Reports it feels like he can't breathe. Denies cough, congestion, fever. Patient bent over in recliner, moaning in pain. Took oxycodone at 1930 and 2230, no relief. Hx of sickle cell.      Review of patient's allergies indicates:  No Known Allergies  Chief Complaint   Patient presents with    Shortness of Breath     SOB and CP that started around 1830. Pt also endorses back pain. Pt moan and groaning in triage.      Past Medical History:   Diagnosis Date    Sickle cell anemia     Sickle cell disease

## 2022-10-25 NOTE — ED PROVIDER NOTES
Source of History  Patient  Prior documentation    Chief Complaint    Shortness of Breath (SOB and CP that started around 1830. Pt also endorses back pain. Pt moan and groaning in triage. )      History of Present Illness    Parrish Davis is a 27 y.o. male presenting with sickle cell crisis with unrelenting chest pain for the last several hours.  Acute in onset, no strenuous activity with onset, never had this kind of chest pain before.  He has never had acute chest syndrome.  Was recently admitted for pain in the abdomen and left side, has been taking the oxycodone at home without relief.  Poorly described chest pain.  He takes hydroxyurea and folate.  He follows with hematologist Dr Hi and PCP Dr Douglas.  He reports that he has no associated diaphoresis, nausea, vomiting.  No known cardiac disease, in himself or family members.  He denies any leg swelling.  No fever or URI symptoms.  When he was discharged from the hospital 2 days ago he did not have any associated chest pain at that time.    Review of Systems    As per HPI and below:  Constitutional symptoms:  No chills, no sweats, no fever   Skin symptoms:  No rash    Eye symptoms:  No blurred vision  ENMT symptoms:  No sore throat    Respiratory symptoms:  No shortness of breath  Cardiovascular symptoms:  + chest pain   Gastrointestinal symptoms:  No abdominal pain, no nausea, no vomiting, no diarrhea  Genitourinary symptoms:  No dysuria  Musculoskeletal symptoms:  No back pain, No joint pains or aches    Neurologic symptoms:  No headache, no weakness  Endocrine symptoms:  None except as in HPI     Past History    As per HPI and below:  Past Medical History:   Diagnosis Date    Sickle cell anemia     Sickle cell disease        Past Surgical History:   Procedure Laterality Date    chemoport      removed    CHOLECYSTECTOMY  2002    UMBILICAL HERNIA REPAIR  1995       Social History     Socioeconomic History    Marital status: Single   Tobacco Use     Smoking status: Never    Smokeless tobacco: Never   Substance and Sexual Activity    Alcohol use: Yes     Comment: Social drinker on weekends    Drug use: No    Sexual activity: Not Currently       History reviewed. No pertinent family history.    Review of patient's allergies indicates:  No Known Allergies    No current facility-administered medications on file prior to encounter.     Current Outpatient Medications on File Prior to Encounter   Medication Sig Dispense Refill    FLUoxetine 10 MG capsule Take 1 capsule (10 mg total) by mouth once daily. 90 capsule 3    oxyCODONE (ROXICODONE) 10 mg Tab immediate release tablet Take 1 tablet (10 mg total) by mouth every 6 (six) hours as needed for Pain. 17 tablet 0    folic acid (FOLVITE) 1 MG tablet Take 1 tablet (1 mg total) by mouth once daily. 90 tablet 3    hydroxyurea (HYDREA) 500 mg Cap Take 1 capsule (500 mg total) by mouth once daily. 90 each 3       Physical Exam    Reviewed nursing notes.  Vitals:    10/25/22 0139 10/25/22 0300 10/25/22 0354 10/25/22 0512   BP:   (!) 120/57 135/65   Pulse:   80 73   Resp: 20 18 18 18   Temp:   98.3 °F (36.8 °C) 99 °F (37.2 °C)   TempSrc:   Oral Oral   SpO2:   95% (!) 92%   Weight:         General:  Alert, uncomfortable in the room, prefers his side.   Skin:  Warm, dry, intact.  No rash.  Head:  Normocephalic, atraumatic.    Neck:  Supple.   HEENT:  Pupils are equal and round, appropriate for room, extraocular movements are intact.  Normal phonation.  Moist mucous membranes.  Cardiovascular:  tachycardic rate and rhythm, Normal peripheral perfusion, No edema.  Normal pulses.  Respiratory:  Lungs are clear to auscultation, respirations are non-labored, breath sounds are equal.    Gastrointestinal:  Soft, Nontender, Non distended.    Back:  Nontender. Normal gait.  Ambulatory.  Musculoskeletal:  Normal range of motion observed.  Neurological:  Alert and oriented to person, place, time, and situation.  No focal deficits observed.    Psychiatric:  Cooperative, appropriate mood & affect.       Initial MDM    27-year-old male with sickle cell disease who is presenting with acute onset of chest pain without any associated provocative factors.  He does not typically have chest pain with his sickle cell crisis.  This is atypical for him.  Tachycardic initially, not hypoxic.  Blood pressure within normal limits.  Afebrile.  Concern for acute chest syndrome..  Will check chest x-ray, CBC, CMP, troponin, EKG.  Will give pain control for now.    I decided to obtain the patient's medical records.    Medications   sodium chloride 0.9% flush 10 mL (has no administration in time range)   melatonin tablet 6 mg (has no administration in time range)   sodium chloride 0.9% flush 10 mL (has no administration in time range)   naloxone 0.4 mg/mL injection 0.02 mg (has no administration in time range)   glucose chewable tablet 16 g (has no administration in time range)   glucose chewable tablet 24 g (has no administration in time range)   dextrose 10% bolus 125 mL (has no administration in time range)   dextrose 10% bolus 250 mL (has no administration in time range)   glucagon (human recombinant) injection 1 mg (has no administration in time range)   acetaminophen tablet 650 mg (has no administration in time range)   HYDROcodone-acetaminophen 5-325 mg per tablet 1 tablet (has no administration in time range)   HYDROcodone-acetaminophen  mg per tablet 1 tablet (has no administration in time range)   HYDROmorphone injection 1 mg (has no administration in time range)   ondansetron disintegrating tablet 8 mg (has no administration in time range)   lactated ringers infusion (has no administration in time range)   FLUoxetine capsule 10 mg (has no administration in time range)   folic acid tablet 1 mg (has no administration in time range)   hydroxyurea capsule 500 mg (has no administration in time range)   cefTRIAXone (ROCEPHIN) 1 g/50 mL D5W IVPB (has no  administration in time range)   azithromycin tablet 500 mg (has no administration in time range)   HYDROmorphone injection 1 mg (1 mg Intravenous Given 10/25/22 0139)   ketorolac injection 9.999 mg (9.999 mg Intravenous Given 10/25/22 0139)   iohexoL (OMNIPAQUE 350) injection 100 mL (100 mLs Intravenous Given 10/25/22 0252)   HYDROmorphone injection 1 mg (1 mg Intravenous Given 10/25/22 0300)   lactated ringers bolus 1,000 mL (1,000 mLs Intravenous New Bag 10/25/22 0335)       Results and ED Course    Labs Reviewed   CBC W/ AUTO DIFFERENTIAL - Abnormal; Notable for the following components:       Result Value    WBC 13.05 (*)     RBC 2.86 (*)     Hemoglobin 8.4 (*)     Hematocrit 23.6 (*)     RDW 21.6 (*)     Immature Granulocytes 1.4 (*)     Gran # (ANC) 9.4 (*)     Immature Grans (Abs) 0.18 (*)     Mono # 1.6 (*)     nRBC 1 (*)     Lymph % 12.6 (*)     All other components within normal limits   COMPREHENSIVE METABOLIC PANEL - Abnormal; Notable for the following components:    Sodium 134 (*)     CO2 20 (*)     Glucose 114 (*)     Total Protein 8.9 (*)     Total Bilirubin 9.9 (*)     Alkaline Phosphatase 193 (*)     AST 71 (*)     All other components within normal limits   RETICULOCYTES - Abnormal; Notable for the following components:    Retic 12.4 (*)     All other components within normal limits   CULTURE, BLOOD   CULTURE, BLOOD   URINALYSIS, REFLEX TO URINE CULTURE    Narrative:     Specimen Source->Urine   TROPONIN I   LACTIC ACID, PLASMA       Imaging Results              CTA Chest Non-Coronary (PE Studies) (Final result)  Result time 10/25/22 04:17:27      Final result by Denise Morales MD (10/25/22 04:17:27)                   Impression:      1. Technically limited exam due to suboptimal opacification of the pulmonary arteries.  No definite evidence of large central pulmonary embolus or compelling evidence of pulmonary embolus to the proximal segmental levels.  Evaluation for more distal pulmonary embolus  is precluded.Correlation with d-dimer and lower extremity venous Doppler ultrasound as clinically warranted.  2. Slight left basilar ground-glass attenuation which could relate to respiratory motion artifact versus asymmetric edema or infectious or non infectious inflammatory process.      Electronically signed by: Denise Morales MD  Date:    10/25/2022  Time:    04:17               Narrative:    EXAMINATION:  CTA CHEST NON CORONARY (PE STUDIES)    CLINICAL HISTORY:  Pulmonary embolism (PE) suspected, high prob;    TECHNIQUE:  Low dose axial images, sagittal and coronal reformations were obtained from the thoracic inlet to the lung bases following the IV administration of 100 mL of Omnipaque 350.  Contrast timing was optimized to evaluate the pulmonary arteries.  MIP images were performed.    COMPARISON:  None    FINDINGS:  The thoracic aorta maintains normal caliber, contour, and course without significant atherosclerotic calcification within its course.  There is no evidence of aneurysmal dilation or dissection. The heart is not enlarged and there is no evidence of pericardial effusion.  Slight increased soft tissue attenuation the anterior mediastinum presumably relates to residual thymic tissue.  The esophagus maintains a normal course and caliber.There is no axillary or mediastinal adenopathy.  Hilar contours are within normal limits..    Trachea is midline and the proximal airways are patent.  Please note evaluation of pulmonary parenchyma is limited due to respiratory motion artifact.  There is no evidence of confluent airspace consolidation, pleural fluid or pneumothorax.  Questionable slight left basilar ground-glass attenuation which could be artifactual relate to asymmetric edema versus infectious or non infectious inflammatory process.  Head    Please note there is suboptimal opacification of the pulmonary arteries which limits evaluation for pulmonary embolus.  No large central filling defect to suggest  central pulmonary embolus or compelling evidence of pulmonary embolus 2 the proximal segmental level.  Evaluation for more distal PE, most notably at the lung bases is precluded.  Correlation with d-dimer and lower extremity venous Doppler ultrasound as clinically warranted.    Visualized structures of the upper abdomen demonstrate cholecystectomy and diminutive size of the spleen.  Visualized osseous structures are intact.                                       X-Ray Chest PA And Lateral (Final result)  Result time 10/25/22 02:06:11      Final result by Denise Morales MD (10/25/22 02:06:11)                   Impression:      No radiographic evidence of acute intra-thoracic process.      Electronically signed by: Denise Morales MD  Date:    10/25/2022  Time:    02:06               Narrative:    EXAMINATION:  XR CHEST PA AND LATERAL    CLINICAL HISTORY:  chest pain;    TECHNIQUE:  PA and lateral views of the chest were performed.    COMPARISON:  10/19/2022    FINDINGS:  The cardiomediastinal silhouette is within normal limits. Mediastinal structures are midline.  The lungs appear symmetrically aerated without definite focal alveolar consolidation. No large pleural effusion or pneumothorax is appreciated.The visualized osseous structures are intact.                                               EKG (if obtained)    EKG  Performed: 2353  Rate/Rhythm/Axis: 106 bpm, nml rhythm, nml axis  QRS 90 ms  Qtc 464 ms  Impression:  sinus tachycardia.  EKG without evidence of Na channel blockade, K channel blockade, there is no prolonged QTc or digoxin effect.  There is no STEMI or signs of ischemia.      Impression and Plan    27 y.o. male with concern for sickle cell crisis, no opacity on chest x-ray.  CT PE study ordered for further evaluation given chest x-ray did not diagnostic.  There is no pulmonary embolism.  Troponin negative.  Pain control provided.  White blood cell count of 13.  Added blood cultures.  Will observe off  antibiotics for now given no fever.  If fever does present, then would consider antibiotics at that time.  Admit to Hospital Medicine for further care.  Patient in agreement with plan.         Final diagnoses:  [D57.00] Sickle cell crisis (Primary)  [R07.9] Chest pain, unspecified type  [D72.829] Leukocytosis, unspecified type      ED Disposition Condition    Admit Stable                  Andrew Stephen DO  10/25/22 0536

## 2022-10-25 NOTE — ASSESSMENT & PLAN NOTE
- Continue home Fluoxetine 10mg     Problem: Falls - Risk of  Goal: *Absence of Falls  Description  Document Patti Kline Fall Risk and appropriate interventions in the flowsheet. Outcome: Progressing Towards Goal  Note: Fall Risk Interventions:  Mobility Interventions: Bed/chair exit alarm, Patient to call before getting OOB    Mentation Interventions: Bed/chair exit alarm    Medication Interventions: Bed/chair exit alarm, Patient to call before getting OOB, Teach patient to arise slowly    Elimination Interventions: Call light in reach    History of Falls Interventions: Bed/chair exit alarm         Problem: Patient Education: Go to Patient Education Activity  Goal: Patient/Family Education  Outcome: Progressing Towards Goal     Problem: Discharge Planning  Goal: *Discharge to safe environment  Outcome: Progressing Towards Goal     Problem: Patient Education: Go to Patient Education Activity  Goal: Patient/Family Education  Outcome: Progressing Towards Goal     Problem: Pressure Injury - Risk of  Goal: *Prevention of pressure injury  Description  Document Juan Scale and appropriate interventions in the flowsheet.   Outcome: Progressing Towards Goal  Note: Pressure Injury Interventions:  Sensory Interventions: Assess changes in LOC, Float heels, Maintain/enhance activity level    Moisture Interventions: Check for incontinence Q2 hours and as needed    Activity Interventions: Increase time out of bed    Mobility Interventions: HOB 30 degrees or less    Nutrition Interventions: Document food/fluid/supplement intake    Friction and Shear Interventions: HOB 30 degrees or less, Minimize layers                Problem: Patient Education: Go to Patient Education Activity  Goal: Patient/Family Education  Outcome: Progressing Towards Goal     Problem: Pain  Goal: *Control of Pain  Outcome: Progressing Towards Goal     Problem: Breathing Pattern - Ineffective  Goal: *Absence of hypoxia  Outcome: Progressing Towards Goal  Goal: *Use of effective breathing techniques  Outcome: Progressing Towards Goal  Goal: *PALLIATIVE CARE:  Alleviation of Dyspnea  Outcome: Progressing Towards Goal     Problem: Patient Education: Go to Patient Education Activity  Goal: Patient/Family Education  Outcome: Progressing Towards Goal

## 2022-10-25 NOTE — H&P
[facility]adon Lin - Oncology ([facility])  [facility] Medicine  History & Physical    Patient Name: Parrish Davis  MRN: [id number]  Patient Class: IP- Inpatient  Admission Date: 10/25/2022  Attending Physician: Shantanu Adams MD   Primary Care Provider: Jovanny Douglas MD         Patient information was obtained from patient, past medical records and ER records.     Subjective:     Principal Problem:Sickle-cell disease with vaso-occlusive pain    Chief Complaint:   Chief Complaint   Patient presents with    Shortness of Breath     SOB and CP that started around 1830. Pt also endorses back pain. Pt moan and groaning in triage.         HPI: 27 y.o M with depression and sickle cell disease s/p cholecystectomy presenting with sickle cell pain crisis. Patient reports he started experiencing burning, aching anterior chest wall pain radiating to his back and upper arms immediately after participating in a sexual encounter. The pain's severity progressed and it became associated with shortness of breath. The patient took his home PO roxicodone 10mg 3 hours apart but felt no relief and thus presented to the ED for evaluation.     Of note, patient recently hospitalized 10/19-10/23 for vaso-occlusive pain involving his left chest and left LE requiring transfusion of 1u pRBCs. Prior to then his last crisis was in March 2022 and prior to that in 2016. Patient reports that he has been having good PO intake and maintains adequate hydration everyday. Endorsed medication adherence to his hydroxyurea and folvite. Denied fevers, chills, nausea, vomiting, dysuria or URI symptoms. Denied sick contacts. Denied history of acute chest syndrome or exchange transfusions in the past. Follows with Hematologist Dr. Hi.     In the ED, patient was afebrile, tachycardic to 105bpm, normotensive, saturating >95% on room air. Labs notable for leukocytosis (WBC 13), Hg/hct 8.4/23.6 (previously 7.5/22.2, showing degree of hemoconcentration), Na 134, CO2  20, TBili 9.9 (previously 6.5), , AST 71. Reticulocytes 12.4%. Trop negative. LA WNL. UA without infection. EKG showed sinus tachycardia with non-specific ST changes. CXR was negative for acute process. Chest CTA was negative for PE but did show slight left basilar ground grass attentuation that could be related to motion artifact vs edema vs infectious/inflammatory causes. He was administered Dilaudid 1mg x2, IV toradol 10mg and 1L LR.      Past Medical History:   Diagnosis Date    Sickle cell anemia     Sickle cell disease        Past Surgical History:   Procedure Laterality Date    chemoport      removed    CHOLECYSTECTOMY  2002    UMBILICAL HERNIA REPAIR  1995       Review of patient's allergies indicates:  No Known Allergies    No current facility-administered medications on file prior to encounter.     Current Outpatient Medications on File Prior to Encounter   Medication Sig    FLUoxetine 10 MG capsule Take 1 capsule (10 mg total) by mouth once daily.    oxyCODONE (ROXICODONE) 10 mg Tab immediate release tablet Take 1 tablet (10 mg total) by mouth every 6 (six) hours as needed for Pain.    folic acid (FOLVITE) 1 MG tablet Take 1 tablet (1 mg total) by mouth once daily.    hydroxyurea (HYDREA) 500 mg Cap Take 1 capsule (500 mg total) by mouth once daily.     Family History    None       Tobacco Use    Smoking status: Never    Smokeless tobacco: Never   Substance and Sexual Activity    Alcohol use: Yes     Comment: Social drinker on weekends    Drug use: No    Sexual activity: Not Currently     Review of Systems   Constitutional:  Negative for appetite change, chills, diaphoresis and fever.   HENT:  Negative for congestion and rhinorrhea.    Eyes:  Negative for visual disturbance.   Respiratory:  Positive for chest tightness and shortness of breath. Negative for cough and wheezing.    Cardiovascular:  Positive for chest pain. Negative for palpitations and leg swelling.   Gastrointestinal:   Positive for blood in stool (Blood on wiping after BM) and rectal pain (with stooling). Negative for abdominal pain, constipation, diarrhea and nausea.   Genitourinary:  Negative for dysuria, frequency and urgency.   Musculoskeletal:  Positive for arthralgias and back pain.   Skin:  Negative for rash and wound.   Neurological:  Negative for weakness and headaches.   Psychiatric/Behavioral:  Negative for confusion and decreased concentration.    Objective:     Vital Signs (Most Recent):  Temp: 99 °F (37.2 °C) (10/25/22 0512)  Pulse: 73 (10/25/22 0512)  Resp: 18 (10/25/22 0512)  BP: 135/65 (10/25/22 0512)  SpO2: (!) 92 % (10/25/22 0512)   Vital Signs (24h Range):  Temp:  [98.3 °F (36.8 °C)-99.1 °F (37.3 °C)] 99 °F (37.2 °C)  Pulse:  [] 73  Resp:  [18-20] 18  SpO2:  [92 %-97 %] 92 %  BP: (120-136)/(57-74) 135/65     Weight: 86.2 kg (190 lb)  Body mass index is 28.89 kg/m².    Physical Exam  Vitals reviewed.   Constitutional:       Appearance: Normal appearance. He is ill-appearing.   HENT:      Head: Normocephalic and atraumatic.      Nose: Nose normal.      Mouth/Throat:      Mouth: Mucous membranes are moist.      Pharynx: Oropharynx is clear.   Eyes:      General: Scleral icterus present.      Extraocular Movements: Extraocular movements intact.   Cardiovascular:      Rate and Rhythm: Normal rate and regular rhythm.      Pulses: Normal pulses.      Heart sounds: Normal heart sounds.   Pulmonary:      Effort: Pulmonary effort is normal. No respiratory distress.      Breath sounds: Normal breath sounds. No wheezing or rales.   Abdominal:      General: Abdomen is flat. Bowel sounds are normal.      Palpations: Abdomen is soft.      Tenderness: There is no abdominal tenderness.   Musculoskeletal:         General: Tenderness (ankles and knees TTP) present. No swelling. Normal range of motion.      Cervical back: Normal range of motion.   Skin:     General: Skin is warm and dry.      Capillary Refill: Capillary  refill takes less than 2 seconds.   Neurological:      General: No focal deficit present.      Mental Status: He is alert. Mental status is at baseline.   Psychiatric:         Mood and Affect: Mood normal.         Behavior: Behavior normal.         Thought Content: Thought content normal.         Judgment: Judgment normal.           Significant Labs: All pertinent labs within the past 24 hours have been reviewed.  CBC:   Recent Labs   Lab 10/23/22  0558 10/25/22  0134   WBC 9.22 13.05*   HGB 7.5* 8.4*   HCT 22.2* 23.6*    306     CMP:   Recent Labs   Lab 10/23/22  0558 10/25/22  0134    134*   K 4.0 3.8    103   CO2 24 20*   GLU 83 114*   BUN 8 11   CREATININE 0.7 0.8   CALCIUM 9.0 9.5   PROT 7.6 8.9*   ALBUMIN 3.7 4.4   BILITOT 6.5* 9.9*   ALKPHOS 133 193*   AST 36 71*   ALT 23 41   ANIONGAP 6* 11     Cardiac Markers: No results for input(s): CKMB, MYOGLOBIN, BNP, TROPISTAT in the last 48 hours.  Lactic Acid:   Recent Labs   Lab 10/25/22  0134   LACTATE 2.0     Urine Studies:   Recent Labs   Lab 10/25/22  0226   COLORU Yellow   APPEARANCEUA Clear   PHUR 6.0   SPECGRAV 1.010   PROTEINUA Negative   GLUCUA Negative   KETONESU Negative   BILIRUBINUA Negative   OCCULTUA Negative   NITRITE Negative   LEUKOCYTESUR Negative       Significant Imaging: I have reviewed all pertinent imaging results/findings within the past 24 hours.  CTA Chest Non-Coronary (PE Studies)   Final Result      1. Technically limited exam due to suboptimal opacification of the pulmonary arteries.  No definite evidence of large central pulmonary embolus or compelling evidence of pulmonary embolus to the proximal segmental levels.  Evaluation for more distal pulmonary embolus is precluded.Correlation with d-dimer and lower extremity venous Doppler ultrasound as clinically warranted.   2. Slight left basilar ground-glass attenuation which could relate to respiratory motion artifact versus asymmetric edema or infectious or non  infectious inflammatory process.         Electronically signed by: Denise Morales MD   Date:    10/25/2022   Time:    04:17      X-Ray Chest PA And Lateral   Final Result      No radiographic evidence of acute intra-thoracic process.         Electronically signed by: Denise Morales MD   Date:    10/25/2022   Time:    02:06            Assessment/Plan:     * Sickle-cell disease with vaso-occlusive pain  27 y.o M with SCD and depression presenting with vaso-occlusive crisis involving his chest, back, BL UE precipitated by sexual activity. EKG showed sinus tachycardia with nonspecific ST changes. Troponin negative. CXR did not show pulmonary infiltrate but Chest CTA did show slight left basilar ground grass opacity that could be related to motion artifact vs edema vs infectious vs inflammatory process. Pain had improved significantly by time of assessment s/p 1L LR, 10mg IV toradol and 2x 1mg IV dilaudid administered in ED. Labs notable for leukocytosis (WBC 13), Hg/hct 8.4/23.6 (previously 7.5/22.2, showing degree of hemoconcentration), Na 134, CO2 20, TBili 9.9 (previously 6.5), , AST 71. Reticulocytes 12.4%. Trop negative. LA WNL. UA without infection.     - Continuous IVF with LR 75/hr, close monitoring of volume status to ensure patient does not develop hypervolemia.  - Continuous pulse oximetry, provide supplemental oxygen if needed for goal SpO2 > 95%  - F/u Procal, Resp Cx. Will treat for CAP out of abundance of caution given CTA findings and leukocytosis.   - Continue home hydroxyurea 500mg qd + Folvite 1mg q.d.  - F/u LDH, Haptoglobin, repeat Troponin  - Continuous telemetry  - IV + PO PRN pain meds available, if not adequate, patient may need PCA pump  - Scheduled oral bowel regimen while patient is on substantial amounts of opioids   - No need for pRBC transfusion at this time  - Low threshold to consult H/O if patients CP persists    Current moderate episode of major depressive disorder without prior  episode  - Continue home Fluoxetine 10mg      Rectal pain  Patient endorsed pain with stooling and blood upon wiping concerning for anal fissure, however given no privacy in ED CCR, rectal exam was not performed to confirm.   He reports one sexual partner since May with consistent condom use, however would need to r/o anal warts as cause for rectal bleeding and pain  - Perform physical exam once patient is in private room  - Analpram prn for rectal pain if needed      VTE Risk Mitigation (From admission, onward)         Ordered     IP VTE LOW RISK PATIENT  Once         10/25/22 0520     Place sequential compression device  Until discontinued         10/25/22 0422                   Emerita Glover MD  Department of Hospital Medicine   [facility] - Oncology ([facility])

## 2022-10-25 NOTE — ASSESSMENT & PLAN NOTE
27 y.o M with SCD and depression presenting with vaso-occlusive crisis involving his chest, back, BL UE precipitated by sexual activity. EKG showed sinus tachycardia with nonspecific ST changes. Troponin negative. CXR did not show pulmonary infiltrate but Chest CTA did show slight left basilar ground grass opacity that could be related to motion artifact vs edema vs infectious vs inflammatory process. Pain had improved significantly by time of assessment s/p 1L LR, 10mg IV toradol and 2x 1mg IV dilaudid administered in ED. Labs notable for leukocytosis (WBC 13), Hg/hct 8.4/23.6 (previously 7.5/22.2, showing degree of hemoconcentration), Na 134, CO2 20, TBili 9.9 (previously 6.5), , AST 71. Reticulocytes 12.4%. Trop negative. LA WNL. UA without infection.     - Continuous IVF with LR 75/hr, close monitoring of volume status to ensure patient does not develop hypervolemia.  - Continuous pulse oximetry, provide supplemental oxygen if needed for goal SpO2 > 95%  - F/u Procal, Resp Cx. Will treat for CAP out of abundance of caution given CTA findings and leukocytosis.   - Continue home hydroxyurea 500mg qd + Folvite 1mg q.d.  - F/u LDH, Haptoglobin, repeat Troponin  - Continuous telemetry  - IV + PO PRN pain meds available, if not adequate, patient may need PCA pump  - Scheduled oral bowel regimen while patient is on substantial amounts of opioids   - No need for pRBC transfusion at this time  - Low threshold to consult H/O if patients CP persists

## 2022-10-25 NOTE — SUBJECTIVE & OBJECTIVE
Past Medical History:   Diagnosis Date    Sickle cell anemia     Sickle cell disease        Past Surgical History:   Procedure Laterality Date    chemoport      removed    CHOLECYSTECTOMY  2002    UMBILICAL HERNIA REPAIR  1995       Review of patient's allergies indicates:  No Known Allergies    No current facility-administered medications on file prior to encounter.     Current Outpatient Medications on File Prior to Encounter   Medication Sig    FLUoxetine 10 MG capsule Take 1 capsule (10 mg total) by mouth once daily.    oxyCODONE (ROXICODONE) 10 mg Tab immediate release tablet Take 1 tablet (10 mg total) by mouth every 6 (six) hours as needed for Pain.    folic acid (FOLVITE) 1 MG tablet Take 1 tablet (1 mg total) by mouth once daily.    hydroxyurea (HYDREA) 500 mg Cap Take 1 capsule (500 mg total) by mouth once daily.     Family History    None       Tobacco Use    Smoking status: Never    Smokeless tobacco: Never   Substance and Sexual Activity    Alcohol use: Yes     Comment: Social drinker on weekends    Drug use: No    Sexual activity: Not Currently     Review of Systems   Constitutional:  Negative for appetite change, chills, diaphoresis and fever.   HENT:  Negative for congestion and rhinorrhea.    Eyes:  Negative for visual disturbance.   Respiratory:  Positive for chest tightness and shortness of breath. Negative for cough and wheezing.    Cardiovascular:  Positive for chest pain. Negative for palpitations and leg swelling.   Gastrointestinal:  Positive for blood in stool (Blood on wiping after BM) and rectal pain (with stooling). Negative for abdominal pain, constipation, diarrhea and nausea.   Genitourinary:  Negative for dysuria, frequency and urgency.   Musculoskeletal:  Positive for arthralgias and back pain.   Skin:  Negative for rash and wound.   Neurological:  Negative for weakness and headaches.   Psychiatric/Behavioral:  Negative for confusion and decreased concentration.    Objective:      Vital Signs (Most Recent):  Temp: 99 °F (37.2 °C) (10/25/22 0512)  Pulse: 73 (10/25/22 0512)  Resp: 18 (10/25/22 0512)  BP: 135/65 (10/25/22 0512)  SpO2: (!) 92 % (10/25/22 0512)   Vital Signs (24h Range):  Temp:  [98.3 °F (36.8 °C)-99.1 °F (37.3 °C)] 99 °F (37.2 °C)  Pulse:  [] 73  Resp:  [18-20] 18  SpO2:  [92 %-97 %] 92 %  BP: (120-136)/(57-74) 135/65     Weight: 86.2 kg (190 lb)  Body mass index is 28.89 kg/m².    Physical Exam  Vitals reviewed.   Constitutional:       Appearance: Normal appearance. He is ill-appearing.   HENT:      Head: Normocephalic and atraumatic.      Nose: Nose normal.      Mouth/Throat:      Mouth: Mucous membranes are moist.      Pharynx: Oropharynx is clear.   Eyes:      General: Scleral icterus present.      Extraocular Movements: Extraocular movements intact.   Cardiovascular:      Rate and Rhythm: Normal rate and regular rhythm.      Pulses: Normal pulses.      Heart sounds: Normal heart sounds.   Pulmonary:      Effort: Pulmonary effort is normal. No respiratory distress.      Breath sounds: Normal breath sounds. No wheezing or rales.   Abdominal:      General: Abdomen is flat. Bowel sounds are normal.      Palpations: Abdomen is soft.      Tenderness: There is no abdominal tenderness.   Musculoskeletal:         General: Tenderness (ankles and knees TTP) present. No swelling. Normal range of motion.      Cervical back: Normal range of motion.   Skin:     General: Skin is warm and dry.      Capillary Refill: Capillary refill takes less than 2 seconds.   Neurological:      General: No focal deficit present.      Mental Status: He is alert. Mental status is at baseline.   Psychiatric:         Mood and Affect: Mood normal.         Behavior: Behavior normal.         Thought Content: Thought content normal.         Judgment: Judgment normal.           Significant Labs: All pertinent labs within the past 24 hours have been reviewed.  CBC:   Recent Labs   Lab 10/23/22  6780  10/25/22  0134   WBC 9.22 13.05*   HGB 7.5* 8.4*   HCT 22.2* 23.6*    306     CMP:   Recent Labs   Lab 10/23/22  0558 10/25/22  0134    134*   K 4.0 3.8    103   CO2 24 20*   GLU 83 114*   BUN 8 11   CREATININE 0.7 0.8   CALCIUM 9.0 9.5   PROT 7.6 8.9*   ALBUMIN 3.7 4.4   BILITOT 6.5* 9.9*   ALKPHOS 133 193*   AST 36 71*   ALT 23 41   ANIONGAP 6* 11     Cardiac Markers: No results for input(s): CKMB, MYOGLOBIN, BNP, TROPISTAT in the last 48 hours.  Lactic Acid:   Recent Labs   Lab 10/25/22  0134   LACTATE 2.0     Urine Studies:   Recent Labs   Lab 10/25/22  0226   COLORU Yellow   APPEARANCEUA Clear   PHUR 6.0   SPECGRAV 1.010   PROTEINUA Negative   GLUCUA Negative   KETONESU Negative   BILIRUBINUA Negative   OCCULTUA Negative   NITRITE Negative   LEUKOCYTESUR Negative       Significant Imaging: I have reviewed all pertinent imaging results/findings within the past 24 hours.  CTA Chest Non-Coronary (PE Studies)   Final Result      1. Technically limited exam due to suboptimal opacification of the pulmonary arteries.  No definite evidence of large central pulmonary embolus or compelling evidence of pulmonary embolus to the proximal segmental levels.  Evaluation for more distal pulmonary embolus is precluded.Correlation with d-dimer and lower extremity venous Doppler ultrasound as clinically warranted.   2. Slight left basilar ground-glass attenuation which could relate to respiratory motion artifact versus asymmetric edema or infectious or non infectious inflammatory process.         Electronically signed by: Denise Morales MD   Date:    10/25/2022   Time:    04:17      X-Ray Chest PA And Lateral   Final Result      No radiographic evidence of acute intra-thoracic process.         Electronically signed by: Denise Morales MD   Date:    10/25/2022   Time:    02:06

## 2022-10-25 NOTE — PHARMACY MED REC
"Admission Medication History     The home medication history was taken by Neli Crawford.    You may go to "Admission" then "Reconcile Home Medications" tabs to review and/or act upon these items.     The home medication list has been updated by the Pharmacy department.   Please read ALL comments highlighted in yellow.   Please address this information as you see fit.    Feel free to contact us if you have any questions or require assistance.        Medications listed below were obtained from: Patient/family  PTA Medications   Medication Sig    acetaminophen (TYLENOL) 500 MG tablet   Take 500 mg by mouth every 8 (eight) hours as needed for Pain.    calcium carbonate (TUMS) 200 mg calcium (500 mg) chewable tablet   Take 2-3 tablets by mouth 2 (two) times daily as needed for Heartburn.    FLUoxetine 10 MG capsule   Take 1 capsule (10 mg total) by mouth once daily.    folic acid (FOLVITE) 1 MG tablet   Take 1 tablet (1 mg total) by mouth once daily.    hydroxyurea (HYDREA) 500 mg Cap   Take 1 capsule (500 mg total) by mouth once daily.    oxyCODONE (ROXICODONE) 10 mg Tab immediate release tablet Take 1 tablet (10 mg total) by mouth every 6 (six) hours as needed for Pain.       Neli Crawford  EXT [id number]                  .        "

## 2022-10-25 NOTE — PROGRESS NOTES
Pt transferred from ED and placed in 804without any complications. AAOx4. Oriented to nurse, room, and call light. Assessment performed. No skin breakdown noted. VSS. NSR on telemetry. All questions answered at this time. Will cont to juany pt.

## 2022-10-26 LAB
ALBUMIN SERPL BCP-MCNC: 3.6 G/DL (ref 3.5–5.2)
ALBUMIN SERPL BCP-MCNC: 3.6 G/DL (ref 3.5–5.2)
ALP SERPL-CCNC: 166 U/L (ref 55–135)
ALT SERPL W/O P-5'-P-CCNC: 34 U/L (ref 10–44)
ANION GAP SERPL CALC-SCNC: 7 MMOL/L (ref 8–16)
AST SERPL-CCNC: 56 U/L (ref 10–40)
BASOPHILS # BLD AUTO: 0.09 K/UL (ref 0–0.2)
BASOPHILS NFR BLD: 0.8 % (ref 0–1.9)
BILIRUB DIRECT SERPL-MCNC: 1.7 MG/DL (ref 0.1–0.3)
BILIRUB SERPL-MCNC: 7.9 MG/DL (ref 0.1–1)
BUN SERPL-MCNC: 8 MG/DL (ref 6–20)
CALCIUM SERPL-MCNC: 8.9 MG/DL (ref 8.7–10.5)
CHLORIDE SERPL-SCNC: 104 MMOL/L (ref 95–110)
CO2 SERPL-SCNC: 22 MMOL/L (ref 23–29)
CREAT SERPL-MCNC: 0.6 MG/DL (ref 0.5–1.4)
DIFFERENTIAL METHOD: ABNORMAL
EOSINOPHIL # BLD AUTO: 0.5 K/UL (ref 0–0.5)
EOSINOPHIL NFR BLD: 4 % (ref 0–8)
ERYTHROCYTE [DISTWIDTH] IN BLOOD BY AUTOMATED COUNT: 21.8 % (ref 11.5–14.5)
EST. GFR  (NO RACE VARIABLE): >60 ML/MIN/1.73 M^2
GLUCOSE SERPL-MCNC: 86 MG/DL (ref 70–110)
HCT VFR BLD AUTO: 21.1 % (ref 40–54)
HGB BLD-MCNC: 7.6 G/DL (ref 14–18)
IMM GRANULOCYTES # BLD AUTO: 0.07 K/UL (ref 0–0.04)
IMM GRANULOCYTES NFR BLD AUTO: 0.6 % (ref 0–0.5)
LYMPHOCYTES # BLD AUTO: 2.5 K/UL (ref 1–4.8)
LYMPHOCYTES NFR BLD: 21.6 % (ref 18–48)
MCH RBC QN AUTO: 30.4 PG (ref 27–31)
MCHC RBC AUTO-ENTMCNC: 36 G/DL (ref 32–36)
MCV RBC AUTO: 84 FL (ref 82–98)
MONOCYTES # BLD AUTO: 1.5 K/UL (ref 0.3–1)
MONOCYTES NFR BLD: 12.8 % (ref 4–15)
NEUTROPHILS # BLD AUTO: 6.8 K/UL (ref 1.8–7.7)
NEUTROPHILS NFR BLD: 60.2 % (ref 38–73)
NRBC BLD-RTO: 1 /100 WBC
PHOSPHATE SERPL-MCNC: 3.7 MG/DL (ref 2.7–4.5)
PLATELET # BLD AUTO: 268 K/UL (ref 150–450)
PMV BLD AUTO: 9.9 FL (ref 9.2–12.9)
POTASSIUM SERPL-SCNC: 3.9 MMOL/L (ref 3.5–5.1)
PROT SERPL-MCNC: 7.5 G/DL (ref 6–8.4)
RBC # BLD AUTO: 2.5 M/UL (ref 4.6–6.2)
SODIUM SERPL-SCNC: 133 MMOL/L (ref 136–145)
WBC # BLD AUTO: 11.38 K/UL (ref 3.9–12.7)

## 2022-10-26 PROCEDURE — 63600175 PHARM REV CODE 636 W HCPCS: Performed by: STUDENT IN AN ORGANIZED HEALTH CARE EDUCATION/TRAINING PROGRAM

## 2022-10-26 PROCEDURE — 25000003 PHARM REV CODE 250

## 2022-10-26 PROCEDURE — 63600175 PHARM REV CODE 636 W HCPCS

## 2022-10-26 PROCEDURE — 94799 UNLISTED PULMONARY SVC/PX: CPT

## 2022-10-26 PROCEDURE — 80069 RENAL FUNCTION PANEL: CPT

## 2022-10-26 PROCEDURE — 20600001 HC STEP DOWN PRIVATE ROOM

## 2022-10-26 PROCEDURE — 84075 ASSAY ALKALINE PHOSPHATASE: CPT

## 2022-10-26 PROCEDURE — 36415 COLL VENOUS BLD VENIPUNCTURE: CPT

## 2022-10-26 PROCEDURE — 85025 COMPLETE CBC W/AUTO DIFF WBC: CPT

## 2022-10-26 PROCEDURE — 99233 PR SUBSEQUENT HOSPITAL CARE,LEVL III: ICD-10-PCS | Mod: ,,, | Performed by: STUDENT IN AN ORGANIZED HEALTH CARE EDUCATION/TRAINING PROGRAM

## 2022-10-26 PROCEDURE — 99233 SBSQ HOSP IP/OBS HIGH 50: CPT | Mod: ,,, | Performed by: STUDENT IN AN ORGANIZED HEALTH CARE EDUCATION/TRAINING PROGRAM

## 2022-10-26 RX ORDER — MORPHINE SULFATE 4 MG/ML
4 INJECTION, SOLUTION INTRAMUSCULAR; INTRAVENOUS EVERY 8 HOURS PRN
Status: DISCONTINUED | OUTPATIENT
Start: 2022-10-26 | End: 2022-10-27

## 2022-10-26 RX ORDER — ENOXAPARIN SODIUM 100 MG/ML
40 INJECTION SUBCUTANEOUS EVERY 24 HOURS
Status: DISCONTINUED | OUTPATIENT
Start: 2022-10-26 | End: 2022-10-27 | Stop reason: HOSPADM

## 2022-10-26 RX ADMIN — POLYETHYLENE GLYCOL 3350 17 G: 17 POWDER, FOR SOLUTION ORAL at 08:10

## 2022-10-26 RX ADMIN — HYDROCODONE BITARTRATE AND ACETAMINOPHEN 1 TABLET: 10; 325 TABLET ORAL at 05:10

## 2022-10-26 RX ADMIN — FOLIC ACID 1 MG: 1 TABLET ORAL at 08:10

## 2022-10-26 RX ADMIN — HYDROCODONE BITARTRATE AND ACETAMINOPHEN 1 TABLET: 10; 325 TABLET ORAL at 09:10

## 2022-10-26 RX ADMIN — SENNOSIDES AND DOCUSATE SODIUM 2 TABLET: 50; 8.6 TABLET ORAL at 08:10

## 2022-10-26 RX ADMIN — ENOXAPARIN SODIUM 40 MG: 100 INJECTION SUBCUTANEOUS at 05:10

## 2022-10-26 RX ADMIN — HYDROCODONE BITARTRATE AND ACETAMINOPHEN 1 TABLET: 10; 325 TABLET ORAL at 01:10

## 2022-10-26 RX ADMIN — MORPHINE SULFATE 6 MG: 2 INJECTION, SOLUTION INTRAMUSCULAR; INTRAVENOUS at 06:10

## 2022-10-26 RX ADMIN — SENNOSIDES AND DOCUSATE SODIUM 2 TABLET: 50; 8.6 TABLET ORAL at 10:10

## 2022-10-26 RX ADMIN — FLUOXETINE 10 MG: 10 CAPSULE ORAL at 08:10

## 2022-10-26 RX ADMIN — MORPHINE SULFATE 4 MG: 4 INJECTION INTRAVENOUS at 10:10

## 2022-10-26 RX ADMIN — HYDROXYUREA 500 MG: 500 CAPSULE ORAL at 08:10

## 2022-10-26 NOTE — PLAN OF CARE
[facility] - Oncology (Hospital)  Initial Discharge Assessment       Primary Care Provider: Jovanny Douglas MD    Admission Diagnosis: Sickle cell crisis [D57.00]  Chest pain [R07.9]  Leukocytosis, unspecified type [D72.829]  Chest pain, unspecified type [R07.9]    Admission Date: 10/25/2022  Expected Discharge Date: 10/29/2022    Discharge Barriers Identified: None    Payor: BLUE CROSS BLUE [employer]IELD / Plan: BLUE CONNECT / Product Type: HMO /     Extended Emergency Contact Information  Primary Emergency Contact: DavisGregorRoz   [facility]  Home Phone: [phone]  Relation: Mother    Discharge Plan A: Home with family  Discharge Plan B: Home      [id number] - [place], [place] - [address]  [address]  [place] [address]  Phone: [phone] Fax: [phone]      Transferred from:     Past Medical History:   Diagnosis Date    Sickle cell anemia     Sickle cell disease          CM met with patient in room for Discharge Planning Assessment.  Patient was able to answer questions.  Per patient, he lives with an unnamed roomate in a 2-story home with 20 steps to upstairs bedroom, and 3 step(s) to enter.   Per patient, he was independent with ADLS and used no DME for ambulation. Per patient, he is not on dialysis and does not take Coumadin.   Patient will have help from his unnamed roomate upon discharge.   Discharge Planning discussed with patient.  All questions addressed.  CM will follow for needs.      Initial Assessment (most recent)       Adult Discharge Assessment - 10/26/22 1023          Discharge Assessment    Assessment Type Discharge Planning Assessment     Confirmed/corrected address, phone number and insurance Yes     Confirmed Demographics Correct on Facesheet     Source of Information patient     When was your last doctors appointment? 10/03/22     Communicated [place] with patient/caregiver Date not available/Unable to determine     Reason For Admission Sickle-cell  disease with vaso-occlusive pain     Lives With other (see comments)   unnamed roomate    Facility Arrived From: Home     Do you expect to return to your current living situation? Yes     Do you have help at home or someone to help you manage your care at home? Yes     Who are your caregiver(s) and their phone number(s)? unnamed roomate     Prior to hospitilization cognitive status: Alert/Oriented     Current cognitive status: Alert/Oriented     Walking or Climbing Stairs Difficulty none     Dressing/Bathing Difficulty none     Equipment Currently Used at Home none     Readmission within 30 days? --   Discharged recently 10/23/2022 [facility]    Patient currently being followed by outpatient case management? No     Do you currently have service(s) that help you manage your care at home? No     Do you take prescription medications? Yes     Do you have prescription coverage? Yes     Coverage [employer] - BLUE CONNECT     Do you have any problems affording any of your prescribed medications? No     Is the patient taking medications as prescribed? yes     Who is going to help you get home at discharge? unnamed roomate     How do you get to doctors appointments? car, drives self     Are you on dialysis? No     Do you take coumadin? No     Discharge Plan A Home with family     Discharge Plan B Home     DME Needed Upon Discharge  other (see comments)   TBD    Discharge Plan discussed with: Patient     Discharge Barriers Identified None                            PCP:  Jovanny Douglas MD  [phone]        Pharmacy:    [id number] - [place], [place] - [address]  [address]  [place] [address]  Phone: [phone] Fax: [phone]        Emergency Contacts:  Extended Emergency Contact Information  Primary Emergency Contact: Roz Davis   [facility]  Home Phone: [phone]  Relation: Mother      Insurance:    Payor: BLUE CROSS BLUE SHIELD / Plan: BLUE CONNECT /  Product Type: HMO /     Sherrill You RN     [phone]      10/26/2022  10:34 AM

## 2022-10-26 NOTE — ASSESSMENT & PLAN NOTE
27 y.o M with SCD and depression presenting with vaso-occlusive crisis involving his chest, back, BL UE precipitated by sexual activity. EKG showed sinus tachycardia with nonspecific ST changes. Troponin negative. CXR did not show pulmonary infiltrate but Chest CTA did show slight left basilar ground grass opacity that could be related to motion artifact vs edema vs infectious vs inflammatory process. Pain had improved significantly by time of assessment s/p 1L LR, 10mg IV toradol and 2x 1mg IV dilaudid administered in ED. Labs notable for leukocytosis (WBC 13), Hg/hct 8.4/23.6 (previously 7.5/22.2, showing degree of hemoconcentration), Na 134, CO2 20, TBili 9.9 (previously 6.5), , AST 71. Reticulocytes 12.4%. Trop negative. LA WNL. UA without infection.     - Continuous pulse oximetry, provide supplemental oxygen if needed for goal SpO2 > 95%  - F/u Procal, Resp Cx. Will treat for CAP out of abundance of caution given CTA findings and leukocytosis.   - Continue home hydroxyurea 500mg qd + Folvite 1mg q.d.  - Continuous telemetry  - IV + PO PRN pain meds available, if not adequate, patient may need PCA pump  - Scheduled oral bowel regimen while patient is on substantial amounts of opioids   - Added Gorge for L-glutamine supplementation  - Added incentive spirometry to prevent atelectasis

## 2022-10-26 NOTE — SUBJECTIVE & OBJECTIVE
Interval History: Pt reports chest pain at 9PM last night, which was resolved mostly after taking pain PRNs. Discontinued IVF due to patient feeling a little short of breath when lying down. Patient currently reports feeling fine and not having pain. Will add Gorge for L-glutamine supplementation. No new complaints.    Review of Systems   Constitutional:  Negative for appetite change, chills, diaphoresis and fever.   HENT:  Negative for congestion and rhinorrhea.    Eyes:  Negative for visual disturbance.   Respiratory:  Positive for shortness of breath. Negative for cough, chest tightness and wheezing.    Cardiovascular:  Positive for chest pain. Negative for palpitations and leg swelling.   Gastrointestinal:  Negative for abdominal pain, blood in stool and rectal pain.   Genitourinary:  Negative for dysuria, frequency and urgency.   Musculoskeletal:  Negative for arthralgias and back pain.   Skin:  Negative for rash and wound.   Neurological:  Negative for weakness and headaches.   Psychiatric/Behavioral:  Negative for confusion and decreased concentration.    Objective:     Vital Signs (Most Recent):  Temp: 99.7 °F (37.6 °C) (10/26/22 1530)  Pulse: 76 (10/26/22 1543)  Resp: 18 (10/26/22 1543)  BP: 123/62 (10/26/22 1530)  SpO2: 97 % (10/26/22 1543) Vital Signs (24h Range):  Temp:  [97.5 °F (36.4 °C)-99.7 °F (37.6 °C)] 99.7 °F (37.6 °C)  Pulse:  [62-86] 76  Resp:  [16-20] 18  SpO2:  [89 %-97 %] 97 %  BP: (121-146)/(60-67) 123/62     Weight: 88.4 kg (194 lb 14.2 oz)  Body mass index is 26.43 kg/m².    Intake/Output Summary (Last 24 hours) at 10/26/2022 1700  Last data filed at 10/26/2022 1610  Gross per 24 hour   Intake 2413.38 ml   Output --   Net 2413.38 ml      Physical Exam  Vitals reviewed.   Constitutional:       Appearance: Normal appearance. He is not ill-appearing.   HENT:      Head: Normocephalic and atraumatic.      Nose: Nose normal.      Mouth/Throat:      Mouth: Mucous membranes are moist.       Pharynx: Oropharynx is clear.   Eyes:      General: Scleral icterus present.      Extraocular Movements: Extraocular movements intact.   Cardiovascular:      Rate and Rhythm: Normal rate and regular rhythm.      Pulses: Normal pulses.      Heart sounds: Normal heart sounds.   Pulmonary:      Effort: Pulmonary effort is normal. No respiratory distress.      Breath sounds: Normal breath sounds. No wheezing or rales.   Abdominal:      General: Abdomen is flat. Bowel sounds are normal.      Palpations: Abdomen is soft.      Tenderness: There is no abdominal tenderness.   Musculoskeletal:         General: No swelling. Normal range of motion.      Cervical back: Normal range of motion.   Skin:     General: Skin is warm and dry.      Capillary Refill: Capillary refill takes less than 2 seconds.   Neurological:      General: No focal deficit present.      Mental Status: He is alert. Mental status is at baseline.   Psychiatric:         Mood and Affect: Mood normal.         Behavior: Behavior normal.         Thought Content: Thought content normal.         Judgment: Judgment normal.       Significant Labs: All pertinent labs within the past 24 hours have been reviewed.  CBC:   Recent Labs   Lab 10/25/22  0134 10/26/22  0546   WBC 13.05* 11.38   HGB 8.4* 7.6*   HCT 23.6* 21.1*    268     CMP:   Recent Labs   Lab 10/25/22  0134 10/25/22  0525 10/26/22  0546   * 132* 133*   K 3.8 4.3 3.9    103 104   CO2 20* 19* 22*   * 97 86   BUN 11 10 8   CREATININE 0.8 0.8 0.6   CALCIUM 9.5 9.2 8.9   PROT 8.9* 8.2 7.5   ALBUMIN 4.4 4.0  4.0 3.6  3.6   BILITOT 9.9* 9.2* 7.9*   ALKPHOS 193* 177* 166*   AST 71* 68* 56*   ALT 41 38 34   ANIONGAP 11 10 7*       Significant Imaging: I have reviewed all pertinent imaging results/findings within the past 24 hours.

## 2022-10-26 NOTE — HOSPITAL COURSE
Patient was admitted to hospital medicine for further management of acute pain episode due to sickle cell disease. Patient was started on IVF at 75 ml/hr and was continued on folic acid and hydroxyurea, with pain medication PRN to manage additional pain. Pt was pain-free until 9PM when he had one episode of chest pain that was not as severe as on presentation. Was relieved with pain PRNs. Pt reported being pain-free the following morning and throughout the day. Began supplementation with Gorge for L-glutamine and kept patient overnight for monitoring. On 10/27, patient remained pain free until later in the morning when he endorsed some mild chest pain which resolved on its own. Patient was medically stable otherwise and ready for discharge, will be given a short course of pain medication to take home if pain recurs. Pt was educated on supplementation with L-glutamine in the hopes of preventing future pain episodes. All questions and concerns were addressed.

## 2022-10-26 NOTE — PLAN OF CARE
Plan of care reviewed with patient. Afebrile. Free from falls or injury. Norco and Morphine given prn for pain. 1/2NS infusing at 75. Senna given as scheduled. Pt up independently. Bed locked in lowest position, non skid socks on, call light within reach. Pt instructed to call if any assistance is needed. Vitals stable. Will cont to juany pt.   no

## 2022-10-26 NOTE — PROGRESS NOTES
[facility] - Oncology ([facility])  [facility] Medicine  Progress Note    Patient Name: Parrish Davis  MRN: [id number]  Patient Class: IP- Inpatient   Admission Date: 10/25/2022  Length of Stay: 1 days  Attending Physician: Shantanu Adams MD  Primary Care Provider: Jovanny Douglas MD        Subjective:     Principal Problem:Sickle-cell disease with vaso-occlusive pain      HPI:  27 y.o M with depression and sickle cell disease s/p cholecystectomy presenting with sickle cell pain crisis. Patient reports he started experiencing burning, aching anterior chest wall pain radiating to his back and upper arms immediately after participating in a sexual encounter. The pain's severity progressed and it became associated with shortness of breath. The patient took his home PO roxicodone 10mg 3 hours apart but felt no relief and thus presented to the ED for evaluation.     Of note, patient recently hospitalized 10/19-10/23 for vaso-occlusive pain involving his left chest and left LE requiring transfusion of 1u pRBCs. Prior to then his last crisis was in March 2022 and prior to that in 2016. Patient reports that he has been having good PO intake and maintains adequate hydration everyday. Endorsed medication adherence to his hydroxyurea and folvite. Denied fevers, chills, nausea, vomiting, dysuria or URI symptoms. Denied sick contacts. Denied history of acute chest syndrome or exchange transfusions in the past. Follows with Hematologist Dr. Hi.     In the ED, patient was afebrile, tachycardic to 105bpm, normotensive, saturating >95% on room air. Labs notable for leukocytosis (WBC 13), Hg/hct 8.4/23.6 (previously 7.5/22.2, showing degree of hemoconcentration), Na 134, CO2 20, TBili 9.9 (previously 6.5), , AST 71. Reticulocytes 12.4%. Trop negative. LA WNL. UA without infection. EKG showed sinus tachycardia with non-specific ST changes. CXR was negative for acute process. Chest CTA was negative for PE but did show  slight left basilar ground grass attentuation that could be related to motion artifact vs edema vs infectious/inflammatory causes. He was administered Dilaudid 1mg x2, IV toradol 10mg and 1L LR.      Overview/Hospital Course:  Patient was admitted to hospital medicine for further management of acute pain episode due to sickle cell disease. Patient was started on IVF at 75 ml/hr and was continued on folic acid and hydroxyurea, with pain medication PRN to manage additional pain. Pt was pain-free until 9PM when he had one episode of chest pain that was not as severe as on presentation. Was relieved with pain PRNs. Pt reported being pain-free the following morning and throughout the day. Began supplementation with Gorge for L-glutamine. Continuing to monitor pt and evaluate for pain.      Interval History: Pt reports chest pain at 9PM last night, which was resolved mostly after taking pain PRNs. Discontinued IVF due to patient feeling a little short of breath when lying down. Patient currently reports feeling fine and not having pain. Will add Gorge for L-glutamine supplementation. No new complaints.    Review of Systems   Constitutional:  Negative for appetite change, chills, diaphoresis and fever.   HENT:  Negative for congestion and rhinorrhea.    Eyes:  Negative for visual disturbance.   Respiratory:  Positive for shortness of breath. Negative for cough, chest tightness and wheezing.    Cardiovascular:  Positive for chest pain. Negative for palpitations and leg swelling.   Gastrointestinal:  Negative for abdominal pain, blood in stool and rectal pain.   Genitourinary:  Negative for dysuria, frequency and urgency.   Musculoskeletal:  Negative for arthralgias and back pain.   Skin:  Negative for rash and wound.   Neurological:  Negative for weakness and headaches.   Psychiatric/Behavioral:  Negative for confusion and decreased concentration.    Objective:     Vital Signs (Most Recent):  Temp: 99.7 °F (37.6 °C)  (10/26/22 1530)  Pulse: 76 (10/26/22 1543)  Resp: 18 (10/26/22 1543)  BP: 123/62 (10/26/22 1530)  SpO2: 97 % (10/26/22 1543) Vital Signs (24h Range):  Temp:  [97.5 °F (36.4 °C)-99.7 °F (37.6 °C)] 99.7 °F (37.6 °C)  Pulse:  [62-86] 76  Resp:  [16-20] 18  SpO2:  [89 %-97 %] 97 %  BP: (121-146)/(60-67) 123/62     Weight: 88.4 kg (194 lb 14.2 oz)  Body mass index is 26.43 kg/m².    Intake/Output Summary (Last 24 hours) at 10/26/2022 1700  Last data filed at 10/26/2022 1610  Gross per 24 hour   Intake 2413.38 ml   Output --   Net 2413.38 ml      Physical Exam  Vitals reviewed.   Constitutional:       Appearance: Normal appearance. He is not ill-appearing.   HENT:      Head: Normocephalic and atraumatic.      Nose: Nose normal.      Mouth/Throat:      Mouth: Mucous membranes are moist.      Pharynx: Oropharynx is clear.   Eyes:      General: Scleral icterus present.      Extraocular Movements: Extraocular movements intact.   Cardiovascular:      Rate and Rhythm: Normal rate and regular rhythm.      Pulses: Normal pulses.      Heart sounds: Normal heart sounds.   Pulmonary:      Effort: Pulmonary effort is normal. No respiratory distress.      Breath sounds: Normal breath sounds. No wheezing or rales.   Abdominal:      General: Abdomen is flat. Bowel sounds are normal.      Palpations: Abdomen is soft.      Tenderness: There is no abdominal tenderness.   Musculoskeletal:         General: No swelling. Normal range of motion.      Cervical back: Normal range of motion.   Skin:     General: Skin is warm and dry.      Capillary Refill: Capillary refill takes less than 2 seconds.   Neurological:      General: No focal deficit present.      Mental Status: He is alert. Mental status is at baseline.   Psychiatric:         Mood and Affect: Mood normal.         Behavior: Behavior normal.         Thought Content: Thought content normal.         Judgment: Judgment normal.       Significant Labs: All pertinent labs within the past 24  hours have been reviewed.  CBC:   Recent Labs   Lab 10/25/22  0134 10/26/22  0546   WBC 13.05* 11.38   HGB 8.4* 7.6*   HCT 23.6* 21.1*    268     CMP:   Recent Labs   Lab 10/25/22  0134 10/25/22  0525 10/26/22  0546   * 132* 133*   K 3.8 4.3 3.9    103 104   CO2 20* 19* 22*   * 97 86   BUN 11 10 8   CREATININE 0.8 0.8 0.6   CALCIUM 9.5 9.2 8.9   PROT 8.9* 8.2 7.5   ALBUMIN 4.4 4.0  4.0 3.6  3.6   BILITOT 9.9* 9.2* 7.9*   ALKPHOS 193* 177* 166*   AST 71* 68* 56*   ALT 41 38 34   ANIONGAP 11 10 7*       Significant Imaging: I have reviewed all pertinent imaging results/findings within the past 24 hours.      Assessment/Plan:      * Sickle-cell disease with vaso-occlusive pain  27 y.o M with SCD and depression presenting with vaso-occlusive crisis involving his chest, back, BL UE precipitated by sexual activity. EKG showed sinus tachycardia with nonspecific ST changes. Troponin negative. CXR did not show pulmonary infiltrate but Chest CTA did show slight left basilar ground grass opacity that could be related to motion artifact vs edema vs infectious vs inflammatory process. Pain had improved significantly by time of assessment s/p 1L LR, 10mg IV toradol and 2x 1mg IV dilaudid administered in ED. Labs notable for leukocytosis (WBC 13), Hg/hct 8.4/23.6 (previously 7.5/22.2, showing degree of hemoconcentration), Na 134, CO2 20, TBili 9.9 (previously 6.5), , AST 71. Reticulocytes 12.4%. Trop negative. LA WNL. UA without infection.     - Continuous pulse oximetry, provide supplemental oxygen if needed for goal SpO2 > 95%  - F/u Procal, Resp Cx. Will treat for CAP out of abundance of caution given CTA findings and leukocytosis.   - Continue home hydroxyurea 500mg qd + Folvite 1mg q.d.  - Continuous telemetry  - IV + PO PRN pain meds available, if not adequate, patient may need PCA pump  - Scheduled oral bowel regimen while patient is on substantial amounts of opioids   - Added Gorge for  L-glutamine supplementation  - Added incentive spirometry to prevent atelectasis    Rectal pain  Patient endorsed pain with stooling and blood upon wiping concerning for anal fissure, however given no privacy in ED CCR, rectal exam was not performed to confirm.   He reports one sexual partner since May with consistent condom use, however would need to r/o anal warts as cause for rectal bleeding and pain  - Perform physical exam once patient is in private room  - Analpram prn for rectal pain if needed    Current moderate episode of major depressive disorder without prior episode  - Continue home Fluoxetine 10mg      VTE Risk Mitigation (From admission, onward)           Ordered     enoxaparin injection 40 mg  Daily         10/26/22 1134     IP VTE LOW RISK PATIENT  Once         10/25/22 0520     Place sequential compression device  Until discontinued         10/25/22 0422                    Discharge Planning   [place]: 10/29/2022     Code Status: Full Code   Is the patient medically ready for discharge?:     Reason for patient still in hospital (select all that apply): Patient trending condition and Pending disposition  Discharge Plan A: Home with family          Tal Pearce MD  Department of Hospital Medicine   [facility] - Oncology ([facility])

## 2022-10-27 VITALS
BODY MASS INDEX: 26.4 KG/M2 | WEIGHT: 194.88 LBS | RESPIRATION RATE: 20 BRPM | HEART RATE: 81 BPM | HEIGHT: 72 IN | OXYGEN SATURATION: 95 % | TEMPERATURE: 98 F | DIASTOLIC BLOOD PRESSURE: 59 MMHG | SYSTOLIC BLOOD PRESSURE: 120 MMHG

## 2022-10-27 LAB
ALBUMIN SERPL BCP-MCNC: 3.7 G/DL (ref 3.5–5.2)
ALBUMIN SERPL BCP-MCNC: 3.7 G/DL (ref 3.5–5.2)
ALP SERPL-CCNC: 170 U/L (ref 55–135)
ALT SERPL W/O P-5'-P-CCNC: 37 U/L (ref 10–44)
ANION GAP SERPL CALC-SCNC: 12 MMOL/L (ref 8–16)
AST SERPL-CCNC: 56 U/L (ref 10–40)
BASOPHILS # BLD AUTO: 0.1 K/UL (ref 0–0.2)
BASOPHILS NFR BLD: 0.9 % (ref 0–1.9)
BILIRUB DIRECT SERPL-MCNC: 2.1 MG/DL (ref 0.1–0.3)
BILIRUB SERPL-MCNC: 9.1 MG/DL (ref 0.1–1)
BUN SERPL-MCNC: 7 MG/DL (ref 6–20)
CALCIUM SERPL-MCNC: 9 MG/DL (ref 8.7–10.5)
CHLORIDE SERPL-SCNC: 105 MMOL/L (ref 95–110)
CO2 SERPL-SCNC: 19 MMOL/L (ref 23–29)
CREAT SERPL-MCNC: 0.7 MG/DL (ref 0.5–1.4)
DIFFERENTIAL METHOD: ABNORMAL
EOSINOPHIL # BLD AUTO: 0.5 K/UL (ref 0–0.5)
EOSINOPHIL NFR BLD: 3.9 % (ref 0–8)
ERYTHROCYTE [DISTWIDTH] IN BLOOD BY AUTOMATED COUNT: 22.2 % (ref 11.5–14.5)
EST. GFR  (NO RACE VARIABLE): >60 ML/MIN/1.73 M^2
GLUCOSE SERPL-MCNC: 81 MG/DL (ref 70–110)
HCT VFR BLD AUTO: 22.4 % (ref 40–54)
HGB BLD-MCNC: 7.9 G/DL (ref 14–18)
IMM GRANULOCYTES # BLD AUTO: 0.06 K/UL (ref 0–0.04)
IMM GRANULOCYTES NFR BLD AUTO: 0.5 % (ref 0–0.5)
LYMPHOCYTES # BLD AUTO: 2.5 K/UL (ref 1–4.8)
LYMPHOCYTES NFR BLD: 21.7 % (ref 18–48)
MCH RBC QN AUTO: 30.5 PG (ref 27–31)
MCHC RBC AUTO-ENTMCNC: 35.3 G/DL (ref 32–36)
MCV RBC AUTO: 87 FL (ref 82–98)
MONOCYTES # BLD AUTO: 1.7 K/UL (ref 0.3–1)
MONOCYTES NFR BLD: 14.9 % (ref 4–15)
NEUTROPHILS # BLD AUTO: 6.6 K/UL (ref 1.8–7.7)
NEUTROPHILS NFR BLD: 58.1 % (ref 38–73)
NRBC BLD-RTO: 1 /100 WBC
PHOSPHATE SERPL-MCNC: 3.8 MG/DL (ref 2.7–4.5)
PLATELET # BLD AUTO: 278 K/UL (ref 150–450)
PMV BLD AUTO: 10.6 FL (ref 9.2–12.9)
POTASSIUM SERPL-SCNC: 4 MMOL/L (ref 3.5–5.1)
PROT SERPL-MCNC: 7.8 G/DL (ref 6–8.4)
RBC # BLD AUTO: 2.59 M/UL (ref 4.6–6.2)
SODIUM SERPL-SCNC: 136 MMOL/L (ref 136–145)
WBC # BLD AUTO: 11.41 K/UL (ref 3.9–12.7)

## 2022-10-27 PROCEDURE — 99239 HOSP IP/OBS DSCHRG MGMT >30: CPT | Mod: ,,, | Performed by: STUDENT IN AN ORGANIZED HEALTH CARE EDUCATION/TRAINING PROGRAM

## 2022-10-27 PROCEDURE — 84075 ASSAY ALKALINE PHOSPHATASE: CPT

## 2022-10-27 PROCEDURE — 25000003 PHARM REV CODE 250

## 2022-10-27 PROCEDURE — 99239 PR HOSPITAL DISCHARGE DAY,>30 MIN: ICD-10-PCS | Mod: ,,, | Performed by: STUDENT IN AN ORGANIZED HEALTH CARE EDUCATION/TRAINING PROGRAM

## 2022-10-27 PROCEDURE — 36415 COLL VENOUS BLD VENIPUNCTURE: CPT

## 2022-10-27 PROCEDURE — 80069 RENAL FUNCTION PANEL: CPT

## 2022-10-27 PROCEDURE — 85025 COMPLETE CBC W/AUTO DIFF WBC: CPT

## 2022-10-27 RX ORDER — HYDROCODONE BITARTRATE AND ACETAMINOPHEN 10; 325 MG/1; MG/1
1 TABLET ORAL EVERY 6 HOURS PRN
Qty: 6 TABLET | Refills: 0 | Status: SHIPPED | OUTPATIENT
Start: 2022-10-27 | End: 2022-10-27

## 2022-10-27 RX ORDER — HYDROCODONE BITARTRATE AND ACETAMINOPHEN 10; 325 MG/1; MG/1
1 TABLET ORAL EVERY 6 HOURS PRN
Qty: 6 TABLET | Refills: 0 | Status: SHIPPED | OUTPATIENT
Start: 2022-10-27 | End: 2022-10-27 | Stop reason: SDUPTHER

## 2022-10-27 RX ORDER — HYDROCODONE BITARTRATE AND ACETAMINOPHEN 10; 325 MG/1; MG/1
1 TABLET ORAL EVERY 6 HOURS PRN
Qty: 20 TABLET | Refills: 0 | Status: SHIPPED | OUTPATIENT
Start: 2022-10-27 | End: 2022-10-27 | Stop reason: SDUPTHER

## 2022-10-27 RX ADMIN — SENNOSIDES AND DOCUSATE SODIUM 2 TABLET: 50; 8.6 TABLET ORAL at 08:10

## 2022-10-27 RX ADMIN — FOLIC ACID 1 MG: 1 TABLET ORAL at 08:10

## 2022-10-27 RX ADMIN — HYDROXYUREA 500 MG: 500 CAPSULE ORAL at 08:10

## 2022-10-27 RX ADMIN — FLUOXETINE 10 MG: 10 CAPSULE ORAL at 08:10

## 2022-10-27 RX ADMIN — HYDROCODONE BITARTRATE AND ACETAMINOPHEN 1 TABLET: 10; 325 TABLET ORAL at 10:10

## 2022-10-27 NOTE — PLAN OF CARE
[facility] - Oncology (Hospital)  Discharge Final Note    Primary Care Provider: Jovanny Douglas MD    Expected Discharge Date: 10/27/2022    Patient discharged 10/27/2022 home with no needs.    Future Appointments   Date Time Provider Department Center   10/31/2022  9:00 AM Jovanny Douglas MD [facility] [place]         Final Discharge Note (most recent)       Final Note - 10/27/22 1626          Final Note    Assessment Type Final Discharge Note     Anticipated Discharge Disposition Home or Self Care     Hospital Resources/Appts/Education Provided Appointments scheduled and added to AVS        Post-Acute Status    Post-Acute Authorization Other     Other Status No Post-Acute Service Needs     Discharge Delays None known at this time                     Important Message from Medicare             Contact Info       Jovanny Douglas MD   Specialty: Family Medicine   Relationship: PCP - General    [address]  [facility] [address]   Phone: [phone]       Next Steps: Follow up in 2 week(s)    Nidhi Hi MD   Specialty: Hematology and Oncology    [address]  [facility] [address]   Phone: [phone]       Next Steps: Follow up in 1 week(s)          Sherrill You RN     [phone]

## 2022-10-27 NOTE — DISCHARGE SUMMARY
[facility] - Oncology ([facility])  [facility] Medicine  Discharge Summary      Patient Name: Parrish Davis  MRN: [id number]  Patient Class: IP- Inpatient  Admission Date: 10/25/2022  Hospital Length of Stay: 2 days  Discharge Date and Time:  10/27/2022 5:10 PM  Attending Physician: No att. providers found   Discharging Provider: Tal Pearec MD  Primary Care Provider: Jovanny Douglas MD  [facility] Medicine Team: [facility]OSP MED 1 Tal Pearce MD    HPI:   27 y.o M with depression and sickle cell disease s/p cholecystectomy presenting with sickle cell pain crisis. Patient reports he started experiencing burning, aching anterior chest wall pain radiating to his back and upper arms immediately after participating in a sexual encounter. The pain's severity progressed and it became associated with shortness of breath. The patient took his home PO roxicodone 10mg 3 hours apart but felt no relief and thus presented to the ED for evaluation.     Of note, patient recently hospitalized 10/19-10/23 for vaso-occlusive pain involving his left chest and left LE requiring transfusion of 1u pRBCs. Prior to then his last crisis was in March 2022 and prior to that in 2016. Patient reports that he has been having good PO intake and maintains adequate hydration everyday. Endorsed medication adherence to his hydroxyurea and folvite. Denied fevers, chills, nausea, vomiting, dysuria or URI symptoms. Denied sick contacts. Denied history of acute chest syndrome or exchange transfusions in the past. Follows with Hematologist Dr. Hi.     In the ED, patient was afebrile, tachycardic to 105bpm, normotensive, saturating >95% on room air. Labs notable for leukocytosis (WBC 13), Hg/hct 8.4/23.6 (previously 7.5/22.2, showing degree of hemoconcentration), Na 134, CO2 20, TBili 9.9 (previously 6.5), , AST 71. Reticulocytes 12.4%. Trop negative. LA WNL. UA without infection. EKG showed sinus tachycardia with non-specific ST changes. CXR was  negative for acute process. Chest CTA was negative for PE but did show slight left basilar ground grass attentuation that could be related to motion artifact vs edema vs infectious/inflammatory causes. He was administered Dilaudid 1mg x2, IV toradol 10mg and 1L LR.      * No surgery found *      Hospital Course:   Patient was admitted to hospital medicine for further management of acute pain episode due to sickle cell disease. Patient was started on IVF at 75 ml/hr and was continued on folic acid and hydroxyurea, with pain medication PRN to manage additional pain. Pt was pain-free until 9PM when he had one episode of chest pain that was not as severe as on presentation. Was relieved with pain PRNs. Pt reported being pain-free the following morning and throughout the day. Began supplementation with Gorge for L-glutamine and kept patient overnight for monitoring. On 10/27, patient remained pain free until later in the morning when he endorsed some mild chest pain which resolved on its own. Patient was medically stable otherwise and ready for discharge, will be given a short course of pain medication to take home if pain recurs. Pt was educated on supplementation with L-glutamine in the hopes of preventing future pain episodes. All questions and concerns were addressed.       Goals of Care Treatment Preferences:  Code Status: Full Code      Consults:     * Sickle-cell disease with vaso-occlusive pain  27 y.o M with SCD and depression presenting with vaso-occlusive crisis involving his chest, back, BL UE precipitated by sexual activity. EKG showed sinus tachycardia with nonspecific ST changes. Troponin negative. CXR did not show pulmonary infiltrate but Chest CTA did show slight left basilar ground grass opacity that could be related to motion artifact vs edema vs infectious vs inflammatory process. Pain had improved significantly by time of assessment s/p 1L LR, 10mg IV toradol and 2x 1mg IV dilaudid administered in  ED. Labs notable for leukocytosis (WBC 13), Hg/hct 8.4/23.6 (previously 7.5/22.2, showing degree of hemoconcentration), Na 134, CO2 20, TBili 9.9 (previously 6.5), , AST 71. Reticulocytes 12.4%. Trop negative. LA WNL. UA without infection.     - Continuous pulse oximetry, provide supplemental oxygen if needed for goal SpO2 > 95%  - F/u Procal, Resp Cx. Will treat for CAP out of abundance of caution given CTA findings and leukocytosis.   - Continue home hydroxyurea 500mg qd + Folvite 1mg q.d.  - Continuous telemetry  - IV + PO PRN pain meds available, if not adequate, patient may need PCA pump  - Scheduled oral bowel regimen while patient is on substantial amounts of opioids   - Added Gorge for L-glutamine supplementation  - Added incentive spirometry to prevent atelectasis      Final Active Diagnoses:    Diagnosis Date Noted POA    PRINCIPAL PROBLEM:  Sickle-cell disease with vaso-occlusive pain [D57.00] 10/19/2022 Yes    Rectal pain [K62.89] 10/25/2022 Yes    Current moderate episode of major depressive disorder without prior episode [F32.1] 10/05/2022 Yes      Problems Resolved During this Admission:       Discharged Condition: stable    Disposition: Home or Self Care    Follow Up:   Follow-up Information     Jovanny Douglas MD Follow up in 2 week(s).    Specialty: Family Medicine  Contact information:  [address]  [facility] [id number] [phone]             Nidhi Hi MD Follow up in 1 week(s).    Specialty: Hematology and Oncology  Contact information:  [address]  [facility] [address] [phone]                       Patient Instructions:   No discharge procedures on file.    Significant Diagnostic Studies: Labs:   CMP   Recent Labs   Lab 10/26/22  0546 10/27/22  0448   * 136   K 3.9 4.0    105   CO2 22* 19*   GLU 86 81   BUN 8 7   CREATININE 0.6 0.7   CALCIUM 8.9 9.0   PROT 7.5 7.8   ALBUMIN 3.6  3.6 3.7  3.7   BILITOT 7.9* 9.1*   ALKPHOS 166* 170*   AST 56*  56*   ALT 34 37   ANIONGAP 7* 12    and CBC   Recent Labs   Lab 10/26/22  0546 10/27/22  0448   WBC 11.38 11.41   HGB 7.6* 7.9*   HCT 21.1* 22.4*    278       Pending Diagnostic Studies:     None         Medications:  Reconciled Home Medications:      Medication List      START taking these medications    phenyleph-min oil-petrolatum 0.25-14-74.9 % Oint  Place 1 applicator rectally 4 (four) times daily as needed (Hemorrhoid discomfort).        CONTINUE taking these medications    acetaminophen 500 MG tablet  Commonly known as: TYLENOL  Take 500 mg by mouth every 8 (eight) hours as needed for Pain.     calcium carbonate 200 mg calcium (500 mg) chewable tablet  Commonly known as: TUMS  Take 2-3 tablets by mouth 2 (two) times daily as needed for Heartburn.     FLUoxetine 10 MG capsule  Take 1 capsule (10 mg total) by mouth once daily.     folic acid 1 MG tablet  Commonly known as: FOLVITE  Take 1 tablet (1 mg total) by mouth once daily.     hydroxyurea 500 mg Cap  Commonly known as: HYDREA  Take 1 capsule (500 mg total) by mouth once daily.        STOP taking these medications    oxyCODONE 10 mg Tab immediate release tablet  Commonly known as: ROXICODONE            Indwelling Lines/Drains at time of discharge:   Lines/Drains/Airways     None                 Time spent on the discharge of patient: 35 minutes         Tal Pearce MD  Department of Hospital Medicine  [facility] - Oncology ([facility])

## 2022-10-27 NOTE — PLAN OF CARE
Plan of care reviewed with patient. Afebrile. Free from falls or injury. Morphine given once for pain. Senna given as scheduled. Pt up independently. BM this shift. Bed locked in lowest position, non skid socks on, call light within reach. Pt instructed to call if any assistance is needed. Vitals stable. Plan to d/c home today. Will cont to juany pt.

## 2022-10-27 NOTE — NURSING
POC reviewed with patient, no questions at this time. No acute events. VSS on room air, afebrile. Pt denied severe pain this shift. Mild discomfort in chest reported, MD aware. Ambulates to the bathroom with steady gait. Discharge education and medication regimen discussed with patient, pt reports no questions at this time. PIV removed. Medications delivered to bedside from pharmacy. Pt discharged home in private car with close follow up outpatient.

## 2022-10-27 NOTE — DISCHARGE SUMMARY
[facility] Medicine  Discharge Summary      Patient Name: Parrish Davis  MRN: [id number]  Patient Class: IP- Inpatient  Admission Date: 10/19/2022  Hospital Length of Stay: 2 days  Discharge Date and Time: 10/23/2022  1:01 PM  Attending Physician: No att. providers found   Discharging Provider: Felecia Sams MD  Primary Care Provider: Jovanny Douglas MD      HPI:   25-year-old  man with history of sickle cell anemia, status post cholecystectomy, presents to the emergency department with concerns of uncontrolled pain.  He reports in the last 2 days at the onset of left-sided pain in musculoskeletal areas his left shoulder arm left-sided chest and left thigh, uncontrolled with use of NSAIDs at home, patient is not on any long-acting or immediate release home p.r.n. opiate medications.  He denies any potential precipitating factors for worsening pains and, last hospitalization for vaso-occlusive crisis was in 2016.    His primary care doctor is Jovanny Douglas and hematologist is Dr. Hi @ [facility], he denies any URI symptoms and or sick contacts    He denies any chest pain chest pressure no nausea vomiting or bowel habit changes, no joint pain or swelling no history of AVN in the past.  No tobacco alcohol or drug use.    ED treatment received Dilaudid 1 mg IV x3, Toradol 10 mg IV x1, lactated Ringer's 1 L bolus x1      * No surgery found *      Hospital Course:   Pt admitted to [facility]. 1u pRBCs transfused on 10/20 for Hb 6.7 with subsequent improvement. Mild leukocytosis resolved with azithro/rocephin (no infectious source identified). Pain mildly improved into 10/21. Pain continued to improve and he was weaned to oral pain meds prior to discharge home.        Goals of Care Treatment Preferences:  Code Status: Full Code      Consults:   Consults (From admission, onward)        Status Ordering Provider     Inpatient virtual consult to Hospital Medicine  Once        Provider:  (Not  yet assigned)    Completed YESSENIA WEBB     Inpatient virtual consult to Hospital Medicine  Once        Provider:  (Not yet assigned)    Completed YESSENIA WEBB          * Sickle-cell disease with vaso-occlusive pain  - Interval history and physical exam findings as described above  - Pain consistent with usual sickle cell pain crises  - H/H improved s/p 1u pRBCs  - Continue home PO pain regimen  - PRN IV provided for breakthrough  - Supportive IVF provided  - Supplemental O2 NC  - PRN zofran for nausea  - PRN benadryl for itching  - Bowel regimen provided  - Continue home hydroxyurea and folic acid  - oxycodone po started on 10/22 in anticipation of discharge on 10/23  - Will continue to monitor    Leg edema, right  Associated with pain - LE US ordered and neg; likely due to sickle cell crisis; improved at discharge      Current moderate episode of major depressive disorder without prior episode  - Currently asymptomatic  - Continue home SSRI regimen      Final Active Diagnoses:    Diagnosis Date Noted POA    PRINCIPAL PROBLEM:  Sickle-cell disease with vaso-occlusive pain [D57.00] 10/19/2022 Yes    Leg edema, right [R60.0] 10/22/2022 Yes    Current moderate episode of major depressive disorder without prior episode [F32.1] 10/05/2022 Yes      Problems Resolved During this Admission:       Discharged Condition: stable    Disposition: Home or Self Care    Follow Up:    Patient Instructions:      Diet Adult Regular     Notify your health care provider if you experience any of the following:  temperature >100.4     Notify your health care provider if you experience any of the following:  persistent nausea and vomiting or diarrhea     Notify your health care provider if you experience any of the following:  severe uncontrolled pain     Notify your health care provider if you experience any of the following:  difficulty breathing or increased cough     Notify your health care provider if you experience any of  the following:  severe persistent headache     Notify your health care provider if you experience any of the following:  worsening rash     Notify your health care provider if you experience any of the following:  persistent dizziness, light-headedness, or visual disturbances     Notify your health care provider if you experience any of the following:  increased confusion or weakness     Activity as tolerated       Significant Diagnostic Studies: as above    Pending Diagnostic Studies:     None         Medications:  Reconciled Home Medications:      Medication List      CONTINUE taking these medications    FLUoxetine 10 MG capsule  Take 1 capsule (10 mg total) by mouth once daily.     folic acid 1 MG tablet  Commonly known as: FOLVITE  Take 1 tablet (1 mg total) by mouth once daily.     hydroxyurea 500 mg Cap  Commonly known as: HYDREA  Take 1 capsule (500 mg total) by mouth once daily.            Indwelling Lines/Drains at time of discharge:   Lines/Drains/Airways     None                 Time spent on the discharge of patient: 32 minutes         The attending portion of this evaluation, treatment, and documentation was performed per Felecia Sams MD via Telemedicine AudioVisual using the secure One[employer] software platform with 2 way audio/video. The provider was located off-site and the patient is located in the hospital. The aforementioned video software was utilized to document the relevant history and physical exam    Felecia Sams MD  Department of Hospital Medicine  [facility]

## 2022-10-27 NOTE — DISCHARGE INSTRUCTIONS
Please begin supplementing with L-glutamine, which you can buy as an over the counter supplement, typically in powder form. Based on weight-based dosing, the amount we recommend that you take daily is about 15g in order to receive benefits of supplementation. There has been evidence to suggest that L-glutamine can prevent recurrences of sickle cell pain crisis episodes. Please follow up with your primary care provider and your hematologist when you can to discuss further management of your condition. If your symptoms return and are more severe or frequent, please return to the emergency department.

## 2022-10-30 LAB
BACTERIA BLD CULT: NORMAL
BACTERIA BLD CULT: NORMAL

## 2022-10-31 ENCOUNTER — TELEPHONE (OUTPATIENT)
Dept: HEMATOLOGY/ONCOLOGY | Facility: CLINIC | Age: 27
End: 2022-10-31
Payer: COMMERCIAL

## 2022-10-31 ENCOUNTER — OFFICE VISIT (OUTPATIENT)
Dept: PRIMARY CARE CLINIC | Facility: CLINIC | Age: 27
End: 2022-10-31
Payer: COMMERCIAL

## 2022-10-31 VITALS
WEIGHT: 186.5 LBS | HEIGHT: 72 IN | DIASTOLIC BLOOD PRESSURE: 60 MMHG | TEMPERATURE: 98 F | HEART RATE: 73 BPM | BODY MASS INDEX: 25.26 KG/M2 | SYSTOLIC BLOOD PRESSURE: 120 MMHG

## 2022-10-31 DIAGNOSIS — F32.1 CURRENT MODERATE EPISODE OF MAJOR DEPRESSIVE DISORDER WITHOUT PRIOR EPISODE: ICD-10-CM

## 2022-10-31 DIAGNOSIS — D64.9 SYMPTOMATIC ANEMIA: ICD-10-CM

## 2022-10-31 DIAGNOSIS — R17 SCLERAL ICTERUS: ICD-10-CM

## 2022-10-31 DIAGNOSIS — D57.1 SICKLE CELL DISEASE WITHOUT CRISIS: ICD-10-CM

## 2022-10-31 DIAGNOSIS — Z09 HOSPITAL DISCHARGE FOLLOW-UP: Primary | ICD-10-CM

## 2022-10-31 PROCEDURE — 3074F PR MOST RECENT SYSTOLIC BLOOD PRESSURE < 130 MM HG: ICD-10-PCS | Mod: CPTII,S$GLB,, | Performed by: FAMILY MEDICINE

## 2022-10-31 PROCEDURE — 99214 PR OFFICE/OUTPT VISIT, EST, LEVL IV, 30-39 MIN: ICD-10-PCS | Mod: S$GLB,,, | Performed by: FAMILY MEDICINE

## 2022-10-31 PROCEDURE — 3074F SYST BP LT 130 MM HG: CPT | Mod: CPTII,S$GLB,, | Performed by: FAMILY MEDICINE

## 2022-10-31 PROCEDURE — 99999 PR PBB SHADOW E&M-EST. PATIENT-LVL III: CPT | Mod: PBBFAC,,, | Performed by: FAMILY MEDICINE

## 2022-10-31 PROCEDURE — 99999 PR PBB SHADOW E&M-EST. PATIENT-LVL III: ICD-10-PCS | Mod: PBBFAC,,, | Performed by: FAMILY MEDICINE

## 2022-10-31 PROCEDURE — 1160F RVW MEDS BY RX/DR IN RCRD: CPT | Mod: CPTII,S$GLB,, | Performed by: FAMILY MEDICINE

## 2022-10-31 PROCEDURE — 1160F PR REVIEW ALL MEDS BY PRESCRIBER/CLIN PHARMACIST DOCUMENTED: ICD-10-PCS | Mod: CPTII,S$GLB,, | Performed by: FAMILY MEDICINE

## 2022-10-31 PROCEDURE — 3078F PR MOST RECENT DIASTOLIC BLOOD PRESSURE < 80 MM HG: ICD-10-PCS | Mod: CPTII,S$GLB,, | Performed by: FAMILY MEDICINE

## 2022-10-31 PROCEDURE — 99214 OFFICE O/P EST MOD 30 MIN: CPT | Mod: S$GLB,,, | Performed by: FAMILY MEDICINE

## 2022-10-31 PROCEDURE — 1159F MED LIST DOCD IN RCRD: CPT | Mod: CPTII,S$GLB,, | Performed by: FAMILY MEDICINE

## 2022-10-31 PROCEDURE — 3078F DIAST BP <80 MM HG: CPT | Mod: CPTII,S$GLB,, | Performed by: FAMILY MEDICINE

## 2022-10-31 PROCEDURE — 1159F PR MEDICATION LIST DOCUMENTED IN MEDICAL RECORD: ICD-10-PCS | Mod: CPTII,S$GLB,, | Performed by: FAMILY MEDICINE

## 2022-10-31 NOTE — PROGRESS NOTES
"/60 (BP Location: Left arm, Patient Position: Sitting, BP Method: Medium (Manual))   Pulse 73   Temp 98.1 °F (36.7 °C) (Oral)   Ht 6' (1.829 m)   Wt 84.6 kg (186 lb 8.2 oz)   BMI 25.30 kg/m²     Chief Complaint: No chief complaint on file.        Parrish Davis is a very nice 27 y.o. male here for    HPI    Hosp discharge f/u:    Doing well post discharge.    Hosp discharge summary for 10/27/22 as follows:    "Patient Name: Parrish Davis  MRN: [id number]  Patient Class: IP- Inpatient  Admission Date: 10/19/2022  Hospital Length of Stay: 2 days  Discharge Date and Time: 10/23/2022  1:01 PM  Attending Physician: No att. providers found   Discharging Provider: Felecia Sams MD  Primary Care Provider: Jovanny Douglas MD        HPI:   25-year-old  man with history of sickle cell anemia, status post cholecystectomy, presents to the emergency department with concerns of uncontrolled pain.  He reports in the last 2 days at the onset of left-sided pain in musculoskeletal areas his left shoulder arm left-sided chest and left thigh, uncontrolled with use of NSAIDs at home, patient is not on any long-acting or immediate release home p.r.n. opiate medications.  He denies any potential precipitating factors for worsening pains and, last hospitalization for vaso-occlusive crisis was in 2016.    His primary care doctor is Jovanny Douglsa and hematologist is Dr. Hi @ [facility], he denies any URI symptoms and or sick contacts    He denies any chest pain chest pressure no nausea vomiting or bowel habit changes, no joint pain or swelling no history of AVN in the past.  No tobacco alcohol or drug use.    ED treatment received Dilaudid 1 mg IV x3, Toradol 10 mg IV x1, lactated Ringer's 1 L bolus x1        * No surgery found *       Hospital Course:   Pt admitted to [facility]. 1u pRBCs transfused on 10/20 for Hb 6.7 with subsequent improvement. Mild leukocytosis resolved with azithro/rocephin (no infectious " "source identified). Pain mildly improved into 10/21. Pain continued to improve and he was weaned to oral pain meds prior to discharge home."                 Patient queried and denies any further complaints    SURGICAL AND MEDICAL HISTORY: updated and reviewed.  ALLERGIES updated and reviewed.  Review of patient's allergies indicates:  No Known Allergies  CURRENT OUTPATIENT MEDICATIONS updated and reviewed    Current Outpatient Medications:     acetaminophen (TYLENOL) 500 MG tablet, Take 500 mg by mouth every 8 (eight) hours as needed for Pain., Disp: , Rfl:     calcium carbonate (TUMS) 200 mg calcium (500 mg) chewable tablet, Take 2-3 tablets by mouth 2 (two) times daily as needed for Heartburn., Disp: , Rfl:     FLUoxetine 10 MG capsule, Take 1 capsule (10 mg total) by mouth once daily., Disp: 90 capsule, Rfl: 3    folic acid (FOLVITE) 1 MG tablet, Take 1 tablet (1 mg total) by mouth once daily., Disp: 90 tablet, Rfl: 3    hydroxyurea (HYDREA) 500 mg Cap, Take 1 capsule (500 mg total) by mouth once daily., Disp: 90 each, Rfl: 3    phenyleph-min oil-petrolatum 0.25-14-74.9 % Oint, Place 1 applicator rectally 4 (four) times daily as needed (Hemorrhoid discomfort)., Disp: 56 g, Rfl: 1    Review of Systems   Constitutional:  Negative for activity change, appetite change, chills, diaphoresis, fatigue, fever and unexpected weight change.   HENT:  Negative for congestion, ear discharge, ear pain, facial swelling, hearing loss, nosebleeds, postnasal drip, rhinorrhea, sinus pressure, sneezing, sore throat, tinnitus, trouble swallowing and voice change.    Eyes:  Negative for photophobia, pain, discharge, redness, itching and visual disturbance.   Respiratory:  Negative for cough, chest tightness, shortness of breath and wheezing.    Cardiovascular:  Negative for chest pain, palpitations and leg swelling.   Gastrointestinal:  Negative for abdominal distention, abdominal pain, anal bleeding, blood in stool, constipation, " diarrhea, nausea, rectal pain and vomiting.   Endocrine: Negative for cold intolerance, heat intolerance, polydipsia, polyphagia and polyuria.   Genitourinary:  Negative for difficulty urinating, dysuria and flank pain.   Musculoskeletal:  Negative for arthralgias, back pain, joint swelling, myalgias and neck pain.   Skin:  Negative for rash.   Neurological:  Negative for dizziness, tremors, seizures, syncope, speech difficulty, weakness, light-headedness, numbness and headaches.   Psychiatric/Behavioral:  Negative for behavioral problems, confusion, decreased concentration, dysphoric mood, sleep disturbance and suicidal ideas. The patient is not nervous/anxious and is not hyperactive.      /60 (BP Location: Left arm, Patient Position: Sitting, BP Method: Medium (Manual))   Pulse 73   Temp 98.1 °F (36.7 °C) (Oral)   Ht 6' (1.829 m)   Wt 84.6 kg (186 lb 8.2 oz)   BMI 25.30 kg/m²   Physical Exam  Vitals and nursing note reviewed.   Constitutional:       General: He is not in acute distress.     Appearance: Normal appearance. He is not ill-appearing, toxic-appearing or diaphoretic.   HENT:      Head: Normocephalic and atraumatic.   Eyes:      General: Scleral icterus present.         Right eye: No discharge.         Left eye: No discharge.      Extraocular Movements: Extraocular movements intact.      Conjunctiva/sclera: Conjunctivae normal.   Skin:     General: Skin is warm and dry.   Neurological:      Mental Status: He is alert.   Psychiatric:         Mood and Affect: Mood normal.         Behavior: Behavior normal.     Diagnoses and all orders for this visit:    Hospital discharge follow-up    Symptomatic anemia    Sickle cell disease without crisis    Scleral icterus    Current moderate episode of major depressive disorder without prior episode      Reviewed old pertinent medical records available.     All lab results personally reviewed and interpreted by me including last year, if available.    Feels  like he is doing well with fluoxetine    Follow-up with Hematology as recommended.    Follow-up with me in 3-6 months.  Since he is seeing Hematology regularly, 6 months it is adequate    Has had flu immunization this year.  Pneumonia vaccine up today.  Tdap needed soon      Jovanny Douglas MD    ========

## 2022-10-31 NOTE — TELEPHONE ENCOUNTER
----- Message from Cathleen Cheng sent at 10/31/2022  9:14 AM CDT -----  Please assist with scheduling patient a follow up appointment to see  based on recommendation from PCP.

## 2022-11-01 PROBLEM — D64.9 SYMPTOMATIC ANEMIA: Status: ACTIVE | Noted: 2022-11-01

## 2022-11-08 ENCOUNTER — OFFICE VISIT (OUTPATIENT)
Dept: HEMATOLOGY/ONCOLOGY | Facility: CLINIC | Age: 27
End: 2022-11-08
Payer: COMMERCIAL

## 2022-11-08 VITALS
DIASTOLIC BLOOD PRESSURE: 60 MMHG | RESPIRATION RATE: 17 BRPM | WEIGHT: 187.81 LBS | HEIGHT: 72 IN | HEART RATE: 84 BPM | TEMPERATURE: 98 F | BODY MASS INDEX: 25.44 KG/M2 | SYSTOLIC BLOOD PRESSURE: 132 MMHG | OXYGEN SATURATION: 94 %

## 2022-11-08 DIAGNOSIS — D57.00 SICKLE-CELL DISEASE WITH VASO-OCCLUSIVE PAIN: ICD-10-CM

## 2022-11-08 DIAGNOSIS — D64.9 SYMPTOMATIC ANEMIA: ICD-10-CM

## 2022-11-08 DIAGNOSIS — D57.1 SICKLE CELL DISEASE WITHOUT CRISIS: Primary | ICD-10-CM

## 2022-11-08 PROCEDURE — 1111F DSCHRG MED/CURRENT MED MERGE: CPT | Mod: CPTII,S$GLB,, | Performed by: INTERNAL MEDICINE

## 2022-11-08 PROCEDURE — 3078F DIAST BP <80 MM HG: CPT | Mod: CPTII,S$GLB,, | Performed by: INTERNAL MEDICINE

## 2022-11-08 PROCEDURE — 99999 PR PBB SHADOW E&M-EST. PATIENT-LVL III: CPT | Mod: PBBFAC,,, | Performed by: INTERNAL MEDICINE

## 2022-11-08 PROCEDURE — 99999 PR PBB SHADOW E&M-EST. PATIENT-LVL III: ICD-10-PCS | Mod: PBBFAC,,, | Performed by: INTERNAL MEDICINE

## 2022-11-08 PROCEDURE — 3075F PR MOST RECENT SYSTOLIC BLOOD PRESS GE 130-139MM HG: ICD-10-PCS | Mod: CPTII,S$GLB,, | Performed by: INTERNAL MEDICINE

## 2022-11-08 PROCEDURE — 3075F SYST BP GE 130 - 139MM HG: CPT | Mod: CPTII,S$GLB,, | Performed by: INTERNAL MEDICINE

## 2022-11-08 PROCEDURE — 3008F PR BODY MASS INDEX (BMI) DOCUMENTED: ICD-10-PCS | Mod: CPTII,S$GLB,, | Performed by: INTERNAL MEDICINE

## 2022-11-08 PROCEDURE — 3078F PR MOST RECENT DIASTOLIC BLOOD PRESSURE < 80 MM HG: ICD-10-PCS | Mod: CPTII,S$GLB,, | Performed by: INTERNAL MEDICINE

## 2022-11-08 PROCEDURE — 1111F PR DISCHARGE MEDS RECONCILED W/ CURRENT OUTPATIENT MED LIST: ICD-10-PCS | Mod: CPTII,S$GLB,, | Performed by: INTERNAL MEDICINE

## 2022-11-08 PROCEDURE — 99215 PR OFFICE/OUTPT VISIT, EST, LEVL V, 40-54 MIN: ICD-10-PCS | Mod: S$GLB,,, | Performed by: INTERNAL MEDICINE

## 2022-11-08 PROCEDURE — 3008F BODY MASS INDEX DOCD: CPT | Mod: CPTII,S$GLB,, | Performed by: INTERNAL MEDICINE

## 2022-11-08 PROCEDURE — 99215 OFFICE O/P EST HI 40 MIN: CPT | Mod: S$GLB,,, | Performed by: INTERNAL MEDICINE

## 2022-11-08 RX ORDER — HYDROCODONE BITARTRATE AND ACETAMINOPHEN 5; 325 MG/1; MG/1
1 TABLET ORAL EVERY 6 HOURS PRN
Qty: 60 TABLET | Refills: 0 | Status: SHIPPED | OUTPATIENT
Start: 2022-11-08 | End: 2023-08-24

## 2022-11-08 NOTE — PROGRESS NOTES
CC: Sickle cell anemia, hematology follow up    HPI: , 27, is here for hematology follow up for sickle cell anemia. He was being followed by pediatrics until age 21. Last pain crisis was in 2016.   He has been experiencing back pain of late. Pain is intermittent, no clear precipitating or alleviating factors.   He had priapism several years ago.      Interval History: Since his visit here in May 2022, he had one ER visit in October 2022 and again hospitalization for sickle pain nikolay in the same month. No clear triggers were identified for these episodes.      Review of patient's allergies indicates:  No Known Allergies          Current Outpatient Medications   Medication Sig    acetaminophen (TYLENOL) 500 MG tablet Take 500 mg by mouth every 8 (eight) hours as needed for Pain.    calcium carbonate (TUMS) 200 mg calcium (500 mg) chewable tablet Take 2-3 tablets by mouth 2 (two) times daily as needed for Heartburn.    FLUoxetine 10 MG capsule Take 1 capsule (10 mg total) by mouth once daily.    folic acid (FOLVITE) 1 MG tablet Take 1 tablet (1 mg total) by mouth once daily.    hydroxyurea (HYDREA) 500 mg Cap Take 1 capsule (500 mg total) by mouth once daily.    phenyleph-min oil-petrolatum 0.25-14-74.9 % Oint Place 1 applicator rectally 4 (four) times daily as needed (Hemorrhoid discomfort).     No current facility-administered medications for this visit.         Review of Systems   Constitutional:  Positive for malaise/fatigue. Negative for weight loss.   HENT:  Negative for ear discharge and ear pain.    Eyes:  Negative for photophobia, pain and discharge.   Respiratory:  Negative for hemoptysis, sputum production and shortness of breath.    Cardiovascular:  Negative for chest pain, palpitations, orthopnea and claudication.   Gastrointestinal:  Negative for blood in stool, constipation, diarrhea, heartburn, melena, nausea and vomiting.   Genitourinary:  Negative for dysuria, frequency,  hematuria and urgency.   Musculoskeletal:  Negative for back pain, myalgias and neck pain.   Neurological:  Negative for dizziness and tremors.   Endo/Heme/Allergies:  Does not bruise/bleed easily.   Psychiatric/Behavioral:  Negative for substance abuse.          Vitals:    11/08/22 0825   BP: 132/60   Pulse: 84   Resp: 17   Temp: 98.3 °F (36.8 °C)       Physical Exam  Constitutional:       Appearance: Normal appearance.   HENT:      Head: Normocephalic.   Eyes:      General: Scleral icterus present.   Cardiovascular:      Rate and Rhythm: Normal rate.      Heart sounds: Normal heart sounds. No murmur heard.  Pulmonary:      Effort: Pulmonary effort is normal. No respiratory distress.      Breath sounds: No stridor.   Abdominal:      General: There is no distension.      Palpations: There is no mass.      Tenderness: There is no abdominal tenderness.   Genitourinary:     Penis: Normal.       Testes: Normal.   Musculoskeletal:         General: No swelling or tenderness.   Lymphadenopathy:      Cervical: No cervical adenopathy.   Skin:     Coloration: Skin is not jaundiced or pale.   Neurological:      General: No focal deficit present.      Mental Status: He is alert and oriented to person, place, and time.      Cranial Nerves: No cranial nerve deficit.       2/21/22 24hr urine   Component Ref Range & Units 3 mo ago    Urine Volume mL 2900    Urine Collection Duration Hr 24    Protein, Urine 0 - 15 mg/dL <7    Urine Protein, Timed 0 - 100 mg/Spec Unable to calculate           3/3/22 DEXA scan    Impression:     *Bone density is below the expected range for age.      3/7/22 ECHO     The left ventricle is mildly enlarged with mild eccentric hypertrophy and normal systolic function.  The estimated ejection fraction is 60%.  The quantitatively derived ejection fraction is 60%.  Normal left ventricular diastolic function.  Mild right ventricular enlargement with normal right ventricular systolic function.  The estimated  PA systolic pressure is 30 mmHg.  Intermediate central venous pressure (8 mmHg).       Component      Latest Ref Rng & Units 10/27/2022 10/27/2022 10/25/2022           4:48 AM  4:48 AM    WBC      3.90 - 12.70 K/uL  11.41    RBC      4.60 - 6.20 M/uL  2.59 (L)    Hemoglobin      14.0 - 18.0 g/dL  7.9 (L)    Hematocrit      40.0 - 54.0 %  22.4 (L)    MCV      82 - 98 fL  87    MCH      27.0 - 31.0 pg  30.5    MCHC      32.0 - 36.0 g/dL  35.3    RDW      11.5 - 14.5 %  22.2 (H)    Platelets      150 - 450 K/uL  278    MPV      9.2 - 12.9 fL  10.6    Immature Granulocytes      0.0 - 0.5 %  0.5    Gran # (ANC)      1.8 - 7.7 K/uL  6.6    Immature Grans (Abs)      0.00 - 0.04 K/uL  0.06 (H)    Lymph #      1.0 - 4.8 K/uL  2.5    Mono #      0.3 - 1.0 K/uL  1.7 (H)    Eos #      0.0 - 0.5 K/uL  0.5    Baso #      0.00 - 0.20 K/uL  0.10    nRBC      0 /100 WBC  1 (A)    Gran %      38.0 - 73.0 %  58.1    Lymph %      18.0 - 48.0 %  21.7    Mono %      4.0 - 15.0 %  14.9    Eosinophil %      0.0 - 8.0 %  3.9    Basophil %      0.0 - 1.9 %  0.9    Differential Method        Automated    Glucose      70 - 110 mg/dL  81    Sodium      136 - 145 mmol/L  136    Potassium      3.5 - 5.1 mmol/L  4.0    Chloride      95 - 110 mmol/L  105    CO2      23 - 29 mmol/L  19 (L)    BUN      6 - 20 mg/dL  7    Calcium      8.7 - 10.5 mg/dL  9.0    Creatinine      0.5 - 1.4 mg/dL  0.7    Albumin      3.5 - 5.2 g/dL 3.7 3.7    Phosphorus      2.7 - 4.5 mg/dL  3.8    eGFR      >60 mL/min/1.73 m:2  >60.0    Anion Gap      8 - 16 mmol/L  12    PROTEIN TOTAL      6.0 - 8.4 g/dL 7.8     BILIRUBIN TOTAL      0.1 - 1.0 mg/dL 9.1 (H)     Bilirubin, Direct      0.1 - 0.3 mg/dL 2.1 (H)     AST      10 - 40 U/L 56 (H)     ALT      10 - 44 U/L 37     Alkaline Phosphatase      55 - 135 U/L 170 (H)     Retic      0.4 - 2.0 %   12.4 (H)   LD      110 - 260 U/L   536 (H)   Haptoglobin      30 - 250 mg/dL   <10 (L)         Assessment:    1. Hemoglobin SS  disease    -He has had 3-4 pain crises in 2022  -Discussed starting voxletor   -In the phase 3 clinical trial, a  total of 274 participants were randomly assigned in a 1:1:1 ratio to receive a once-daily oral dose of 1500 mg of voxelotor, 900 mg of voxelotor, or placebo.   -Most participants had sickle cell anemia (homozygous hemoglobin S or hemoglobin Sß0-thalassemia), and approximately two thirds were receiving hydroxyurea at baseline.   -In the intention-to-treat analysis, a significantly higher percentage of participants had a hemoglobin response in the 1500-mg voxelotor group (51%; 95% confidence interval [CI], 41 to 61) than in the placebo group (7%; 95% CI, 1 to 12).   -Anemia worsened between baseline and week 24 in fewer participants in each voxelotor dose group than in those receiving placebo.  - At week 24, the 1500-mg voxelotor group had significantly greater reductions from baseline in the indirect bilirubin level and percentage of reticulocytes than the placebo group.   -The percentage of participants with an adverse event that occurred or worsened during the treatment period was similar across the trial groups. Adverse events of at least grade 3 occurred in 26% of the participants in the 1500-mg voxelotor group, 23% in the 900-mg voxelotor group, and 26% in the placebo group.   -discussed the potential adverse effects  -he will use imodium for diarrhea  -he will use acetaminophen for sickle cell pain; if pain persists, he will use hydrocodone-APAP    Adverse effects of voxeletor:      >10%:  Dermatologic: Skin rash (15%)  Gastrointestinal: Abdominal pain (23%), diarrhea (23%), nausea (19%)  Nervous system: Headache (32%; severe headache: 1%)  Miscellaneous: Fever (15%)  1% to 10%:  Cardiovascular: Pulmonary embolism (1%)  Hypersensitivity: Hypersensitivity reaction (<10%; severe hypersensitivity reaction: 1%)    Sickle cell checklist:    1. Annual ophtho exam:   Last done: referral placed on  2/17/22  Findings:      2. Bone health: Ca++d recommended, q3 yr dexa recommended. DEXA done on 3/3/22 showed bone density below the expected range for age.  Vit D: Needs periodic monitoring    3. Screening echo for pHTN:     Last done: 3/7/22; estimated PA systolic pressure is 30 mmHg.    4. Annual UA + MicroAlb/Cr Ratio:     Last done: 24hr urine protein excretion was negligible , on 2/21/22    5. Folic acid: currently taking 1mg daily    6. Hydrea: currently taking 500 mg daily    7. Transfusions / iron : Cumulative transfusions: 2  Baseline h/h:   Ferritin: 51 ng/ml on 2/10/22    6. Pain regimen: no regular regimen, PRN Acetaminophen, NSAID  -no frequent admissions with pain crises  -Pain contract: none    7. Osteonecrosis screening: yes    8. Stem cell transplant? No    9. H/o cva? No    10. Priapism: Yes, once since last visit ( April 2022)  --education provided    10. HCV screening? Negative in 2016    11. Immunizations:     Pneumococcal. 12 then 23. 23 q5 yrs: current  COVID 19 : uptodate  H influenzae:  Annual flu: uptodate  Meningococcal:        BMT Chart Routing      Follow up with physician 4 weeks.   Follow up with [facility]    Provider visit type    Infusion scheduling note    Injection scheduling note    Labs CBC, CMP, LDH, reticulocytes, ferritin and iron and TIBC   Lab interval:     Imaging    Pharmacy appointment    Other referrals

## 2022-12-07 ENCOUNTER — LAB VISIT (OUTPATIENT)
Dept: LAB | Facility: HOSPITAL | Age: 27
End: 2022-12-07
Payer: COMMERCIAL

## 2022-12-07 ENCOUNTER — OFFICE VISIT (OUTPATIENT)
Dept: HEMATOLOGY/ONCOLOGY | Facility: CLINIC | Age: 27
End: 2022-12-07
Payer: COMMERCIAL

## 2022-12-07 VITALS
SYSTOLIC BLOOD PRESSURE: 118 MMHG | TEMPERATURE: 99 F | RESPIRATION RATE: 17 BRPM | DIASTOLIC BLOOD PRESSURE: 56 MMHG | BODY MASS INDEX: 25.63 KG/M2 | HEART RATE: 67 BPM | OXYGEN SATURATION: 96 % | WEIGHT: 189.25 LBS | HEIGHT: 72 IN

## 2022-12-07 DIAGNOSIS — D57.1 SICKLE CELL DISEASE WITHOUT CRISIS: Primary | ICD-10-CM

## 2022-12-07 DIAGNOSIS — R17 SCLERAL ICTERUS: ICD-10-CM

## 2022-12-07 DIAGNOSIS — D57.1 SICKLE CELL DISEASE WITHOUT CRISIS: ICD-10-CM

## 2022-12-07 DIAGNOSIS — R60.0 LEG EDEMA, RIGHT: ICD-10-CM

## 2022-12-07 DIAGNOSIS — D64.9 SYMPTOMATIC ANEMIA: ICD-10-CM

## 2022-12-07 LAB
ALBUMIN SERPL BCP-MCNC: 4.2 G/DL (ref 3.5–5.2)
ALP SERPL-CCNC: 85 U/L (ref 55–135)
ALT SERPL W/O P-5'-P-CCNC: 20 U/L (ref 10–44)
ANION GAP SERPL CALC-SCNC: 9 MMOL/L (ref 8–16)
AST SERPL-CCNC: 37 U/L (ref 10–40)
BASOPHILS # BLD AUTO: 0.11 K/UL (ref 0–0.2)
BASOPHILS NFR BLD: 1.5 % (ref 0–1.9)
BILIRUB SERPL-MCNC: 8.7 MG/DL (ref 0.1–1)
BUN SERPL-MCNC: 6 MG/DL (ref 6–20)
CALCIUM SERPL-MCNC: 9 MG/DL (ref 8.7–10.5)
CHLORIDE SERPL-SCNC: 108 MMOL/L (ref 95–110)
CO2 SERPL-SCNC: 21 MMOL/L (ref 23–29)
CREAT SERPL-MCNC: 0.7 MG/DL (ref 0.5–1.4)
DIFFERENTIAL METHOD: ABNORMAL
EOSINOPHIL # BLD AUTO: 0.2 K/UL (ref 0–0.5)
EOSINOPHIL NFR BLD: 3.3 % (ref 0–8)
ERYTHROCYTE [DISTWIDTH] IN BLOOD BY AUTOMATED COUNT: 18.6 % (ref 11.5–14.5)
EST. GFR  (NO RACE VARIABLE): >60 ML/MIN/1.73 M^2
FERRITIN SERPL-MCNC: 72 NG/ML (ref 20–300)
GLUCOSE SERPL-MCNC: 87 MG/DL (ref 70–110)
HCT VFR BLD AUTO: 23 % (ref 40–54)
HGB BLD-MCNC: 8.1 G/DL (ref 14–18)
IMM GRANULOCYTES # BLD AUTO: 0.03 K/UL (ref 0–0.04)
IMM GRANULOCYTES NFR BLD AUTO: 0.4 % (ref 0–0.5)
IRON SERPL-MCNC: 44 UG/DL (ref 45–160)
LDH SERPL L TO P-CCNC: 423 U/L (ref 110–260)
LYMPHOCYTES # BLD AUTO: 2.5 K/UL (ref 1–4.8)
LYMPHOCYTES NFR BLD: 35.2 % (ref 18–48)
MCH RBC QN AUTO: 32.4 PG (ref 27–31)
MCHC RBC AUTO-ENTMCNC: 35.2 G/DL (ref 32–36)
MCV RBC AUTO: 92 FL (ref 82–98)
MONOCYTES # BLD AUTO: 1.1 K/UL (ref 0.3–1)
MONOCYTES NFR BLD: 14.7 % (ref 4–15)
NEUTROPHILS # BLD AUTO: 3.2 K/UL (ref 1.8–7.7)
NEUTROPHILS NFR BLD: 44.9 % (ref 38–73)
NRBC BLD-RTO: 1 /100 WBC
PLATELET # BLD AUTO: 294 K/UL (ref 150–450)
PMV BLD AUTO: 10.3 FL (ref 9.2–12.9)
POTASSIUM SERPL-SCNC: 3.7 MMOL/L (ref 3.5–5.1)
PROT SERPL-MCNC: 8.2 G/DL (ref 6–8.4)
RBC # BLD AUTO: 2.5 M/UL (ref 4.6–6.2)
RETICS/RBC NFR AUTO: 13.2 % (ref 0.4–2)
SATURATED IRON: 11 % (ref 20–50)
SODIUM SERPL-SCNC: 138 MMOL/L (ref 136–145)
TOTAL IRON BINDING CAPACITY: 391 UG/DL (ref 250–450)
TRANSFERRIN SERPL-MCNC: 264 MG/DL (ref 200–375)
WBC # BLD AUTO: 7.22 K/UL (ref 3.9–12.7)

## 2022-12-07 PROCEDURE — 3008F PR BODY MASS INDEX (BMI) DOCUMENTED: ICD-10-PCS | Mod: CPTII,S$GLB,, | Performed by: INTERNAL MEDICINE

## 2022-12-07 PROCEDURE — 3008F BODY MASS INDEX DOCD: CPT | Mod: CPTII,S$GLB,, | Performed by: INTERNAL MEDICINE

## 2022-12-07 PROCEDURE — 85045 AUTOMATED RETICULOCYTE COUNT: CPT | Performed by: INTERNAL MEDICINE

## 2022-12-07 PROCEDURE — 1159F PR MEDICATION LIST DOCUMENTED IN MEDICAL RECORD: ICD-10-PCS | Mod: CPTII,S$GLB,, | Performed by: INTERNAL MEDICINE

## 2022-12-07 PROCEDURE — 99215 OFFICE O/P EST HI 40 MIN: CPT | Mod: S$GLB,,, | Performed by: INTERNAL MEDICINE

## 2022-12-07 PROCEDURE — 3078F DIAST BP <80 MM HG: CPT | Mod: CPTII,S$GLB,, | Performed by: INTERNAL MEDICINE

## 2022-12-07 PROCEDURE — 99999 PR PBB SHADOW E&M-EST. PATIENT-LVL III: CPT | Mod: PBBFAC,,, | Performed by: INTERNAL MEDICINE

## 2022-12-07 PROCEDURE — 84466 ASSAY OF TRANSFERRIN: CPT | Performed by: INTERNAL MEDICINE

## 2022-12-07 PROCEDURE — 3074F SYST BP LT 130 MM HG: CPT | Mod: CPTII,S$GLB,, | Performed by: INTERNAL MEDICINE

## 2022-12-07 PROCEDURE — 3078F PR MOST RECENT DIASTOLIC BLOOD PRESSURE < 80 MM HG: ICD-10-PCS | Mod: CPTII,S$GLB,, | Performed by: INTERNAL MEDICINE

## 2022-12-07 PROCEDURE — 36415 COLL VENOUS BLD VENIPUNCTURE: CPT | Performed by: INTERNAL MEDICINE

## 2022-12-07 PROCEDURE — 99215 PR OFFICE/OUTPT VISIT, EST, LEVL V, 40-54 MIN: ICD-10-PCS | Mod: S$GLB,,, | Performed by: INTERNAL MEDICINE

## 2022-12-07 PROCEDURE — 99999 PR PBB SHADOW E&M-EST. PATIENT-LVL III: ICD-10-PCS | Mod: PBBFAC,,, | Performed by: INTERNAL MEDICINE

## 2022-12-07 PROCEDURE — 1159F MED LIST DOCD IN RCRD: CPT | Mod: CPTII,S$GLB,, | Performed by: INTERNAL MEDICINE

## 2022-12-07 PROCEDURE — 82728 ASSAY OF FERRITIN: CPT | Performed by: INTERNAL MEDICINE

## 2022-12-07 PROCEDURE — 80053 COMPREHEN METABOLIC PANEL: CPT | Performed by: INTERNAL MEDICINE

## 2022-12-07 PROCEDURE — 85025 COMPLETE CBC W/AUTO DIFF WBC: CPT | Performed by: INTERNAL MEDICINE

## 2022-12-07 PROCEDURE — 83615 LACTATE (LD) (LDH) ENZYME: CPT | Performed by: INTERNAL MEDICINE

## 2022-12-07 PROCEDURE — 3074F PR MOST RECENT SYSTOLIC BLOOD PRESSURE < 130 MM HG: ICD-10-PCS | Mod: CPTII,S$GLB,, | Performed by: INTERNAL MEDICINE

## 2022-12-07 NOTE — PROGRESS NOTES
CC: Sickle cell anemia, hematology follow up    HPI: , 27, is here for hematology follow up for sickle cell anemia. He was being followed by pediatrics until age 21. Last pain crisis was in 2016.   He has been experiencing back pain of late. Pain is intermittent, no clear precipitating or alleviating factors.   He had priapism several years ago.      He had one ER visit in October 2022 and again hospitalization for sickle pain nikolay in the same month. No clear triggers were identified for these episodes.    Interval History: He feels tired today. No pain. No hospitalizations or ER visits since last visit here. He has not received oxbryta yet,.       Review of patient's allergies indicates:  No Known Allergies          Current Outpatient Medications   Medication Sig    acetaminophen (TYLENOL) 500 MG tablet Take 500 mg by mouth every 8 (eight) hours as needed for Pain.    calcium carbonate (TUMS) 200 mg calcium (500 mg) chewable tablet Take 2-3 tablets by mouth 2 (two) times daily as needed for Heartburn.    FLUoxetine 10 MG capsule Take 1 capsule (10 mg total) by mouth once daily.    folic acid (FOLVITE) 1 MG tablet TAKE 1 TABLET BY MOUTH EVERY DAY    HYDROcodone-acetaminophen (NORCO) 5-325 mg per tablet Take 1 tablet by mouth every 6 (six) hours as needed for Pain.    hydroxyurea (HYDREA) 500 mg Cap Take 1 capsule (500 mg total) by mouth once daily.    hydroxyurea (HYDREA) 500 mg Cap Take 1 capsule (500 mg total) by mouth once daily.    phenyleph-min oil-petrolatum 0.25-14-74.9 % Oint Place 1 applicator rectally 4 (four) times daily as needed (Hemorrhoid discomfort).     No current facility-administered medications for this visit.         Review of Systems   Constitutional:  Positive for malaise/fatigue. Negative for weight loss.   HENT:  Negative for ear discharge and ear pain.    Eyes:  Negative for photophobia, pain and discharge.   Respiratory:  Negative for hemoptysis, sputum production and  shortness of breath.    Cardiovascular:  Negative for chest pain, palpitations, orthopnea and claudication.   Gastrointestinal:  Negative for blood in stool, constipation, diarrhea, heartburn, melena, nausea and vomiting.   Genitourinary:  Negative for dysuria, frequency, hematuria and urgency.   Musculoskeletal:  Negative for back pain, myalgias and neck pain.   Neurological:  Negative for dizziness and tremors.   Endo/Heme/Allergies:  Does not bruise/bleed easily.   Psychiatric/Behavioral:  Negative for substance abuse.          Vitals:    12/07/22 1433   BP: (!) 118/56   Pulse: 67   Resp: 17   Temp: 98.7 °F (37.1 °C)       Physical Exam  Constitutional:       Appearance: Normal appearance.   HENT:      Head: Normocephalic.   Eyes:      General: Scleral icterus present.   Cardiovascular:      Rate and Rhythm: Normal rate.      Heart sounds: Normal heart sounds. No murmur heard.  Pulmonary:      Effort: Pulmonary effort is normal. No respiratory distress.      Breath sounds: No stridor.   Abdominal:      General: There is no distension.      Palpations: There is no mass.      Tenderness: There is no abdominal tenderness.   Genitourinary:     Penis: Normal.       Testes: Normal.   Musculoskeletal:         General: No swelling or tenderness.   Lymphadenopathy:      Cervical: No cervical adenopathy.   Skin:     Coloration: Skin is not jaundiced or pale.   Neurological:      General: No focal deficit present.      Mental Status: He is alert and oriented to person, place, and time.      Cranial Nerves: No cranial nerve deficit.       2/21/22 24hr urine   Component Ref Range & Units 3 mo ago    Urine Volume mL 2900    Urine Collection Duration Hr 24    Protein, Urine 0 - 15 mg/dL <7    Urine Protein, Timed 0 - 100 mg/Spec Unable to calculate           3/3/22 DEXA scan    Impression:     *Bone density is below the expected range for age.      3/7/22 ECHO     The left ventricle is mildly enlarged with mild eccentric  hypertrophy and normal systolic function.  The estimated ejection fraction is 60%.  The quantitatively derived ejection fraction is 60%.  Normal left ventricular diastolic function.  Mild right ventricular enlargement with normal right ventricular systolic function.  The estimated PA systolic pressure is 30 mmHg.  Intermediate central venous pressure (8 mmHg).     Component      Latest Ref Rng & Units 12/7/2022   WBC      3.90 - 12.70 K/uL 7.22   RBC      4.60 - 6.20 M/uL 2.50 (L)   HEMOGLOBIN      14.0 - 18.0 g/dL 8.1 (L)   HEMATOCRIT      40.0 - 54.0 % 23.0 (L)   MCV      82 - 98 fL 92   MCH      27.0 - 31.0 pg 32.4 (H)   MCHC      32.0 - 36.0 g/dL 35.2   RDW      11.5 - 14.5 % 18.6 (H)   Platelets      150 - 450 K/uL 294   MPV      9.2 - 12.9 fL 10.3   Immature Granulocytes      0.0 - 0.5 % 0.4   Gran # (ANC)      1.8 - 7.7 K/uL 3.2   Immature Grans (Abs)      0.00 - 0.04 K/uL 0.03   Lymph #      1.0 - 4.8 K/uL 2.5   Mono #      0.3 - 1.0 K/uL 1.1 (H)   Eos #      0.0 - 0.5 K/uL 0.2   Baso #      0.00 - 0.20 K/uL 0.11   nRBC      0 /100 WBC 1 (A)   Gran %      38.0 - 73.0 % 44.9   Lymph %      18.0 - 48.0 % 35.2   Mono %      4.0 - 15.0 % 14.7   Eosinophil %      0.0 - 8.0 % 3.3   Basophil %      0.0 - 1.9 % 1.5   Differential Method       Automated   Retic      0.4 - 2.0 % 13.2 (H)         Assessment:    1. Hemoglobin SS disease    -He has had 3-4 pain crises in 2022  -Discussed starting voxletor   -In the phase 3 clinical trial, a  total of 274 participants were randomly assigned in a 1:1:1 ratio to receive a once-daily oral dose of 1500 mg of voxelotor, 900 mg of voxelotor, or placebo.   -Most participants had sickle cell anemia (homozygous hemoglobin S or hemoglobin S?0-thalassemia), and approximately two thirds were receiving hydroxyurea at baseline.   -In the intention-to-treat analysis, a significantly higher percentage of participants had a hemoglobin response in the 1500-mg voxelotor group (51%; 95%  confidence interval [CI], 41 to 61) than in the placebo group (7%; 95% CI, 1 to 12).   -Anemia worsened between baseline and week 24 in fewer participants in each voxelotor dose group than in those receiving placebo.  - At week 24, the 1500-mg voxelotor group had significantly greater reductions from baseline in the indirect bilirubin level and percentage of reticulocytes than the placebo group.   -The percentage of participants with an adverse event that occurred or worsened during the treatment period was similar across the trial groups. Adverse events of at least grade 3 occurred in 26% of the participants in the 1500-mg voxelotor group, 23% in the 900-mg voxelotor group, and 26% in the placebo group.   -discussed the potential adverse effects  -he will use imodium for diarrhea  -he will use acetaminophen for sickle cell pain; if pain persists, he will use hydrocodone-APAP    Adverse effects of voxeletor:      >10%:  Dermatologic: Skin rash (15%)  Gastrointestinal: Abdominal pain (23%), diarrhea (23%), nausea (19%)  Nervous system: Headache (32%; severe headache: 1%)  Miscellaneous: Fever (15%)  1% to 10%:  Cardiovascular: Pulmonary embolism (1%)  Hypersensitivity: Hypersensitivity reaction (<10%; severe hypersensitivity reaction: 1%)    Sickle cell checklist:    1. Annual ophtho exam:   Last done: referral placed on 2/17/22  Findings:      2. Bone health: Ca++d recommended, q3 yr dexa recommended. DEXA done on 3/3/22 showed bone density below the expected range for age.  Vit D: Needs periodic monitoring    3. Screening echo for pHTN:     Last done: 3/7/22; estimated PA systolic pressure is 30 mmHg.    4. Annual UA + MicroAlb/Cr Ratio:     Last done: 24hr urine protein excretion was negligible , on 2/21/22    5. Folic acid: currently taking 1mg daily    6. Hydrea: currently taking 500 mg daily    7. Transfusions / iron : Cumulative transfusions: 2  Baseline h/h:   Ferritin: 51 ng/ml on 2/10/22    6. Pain regimen:  no regular regimen, PRN Acetaminophen, NSAID  -no frequent admissions with pain crises  -Pain contract: none    7. Osteonecrosis screening: yes    8. Stem cell transplant? No    9. H/o cva? No    10. Priapism: Yes, once since last visit ( April 2022)  --education provided    10. HCV screening? Negative in 2016    11. Immunizations:     Pneumococcal. 12 then 22. 23 q5 yrs: current  COVID 19 : uptodate  H influenzae:  Annual flu: uptodate  Meningococcal:        BMT Chart Routing      Follow up with physician 3 months.   Follow up with [facility]    Provider visit type    Infusion scheduling note    Injection scheduling note    Labs CBC, CMP and reticulocytes   Lab interval:  cbc, cmp, retic count every 4 weekly   Imaging    Pharmacy appointment    Other referrals

## 2023-01-20 ENCOUNTER — LAB VISIT (OUTPATIENT)
Dept: LAB | Facility: HOSPITAL | Age: 28
End: 2023-01-20
Payer: COMMERCIAL

## 2023-01-20 DIAGNOSIS — D57.1 SICKLE CELL DISEASE WITHOUT CRISIS: ICD-10-CM

## 2023-01-20 LAB
ALBUMIN SERPL BCP-MCNC: 4 G/DL (ref 3.5–5.2)
ALP SERPL-CCNC: 96 U/L (ref 55–135)
ALT SERPL W/O P-5'-P-CCNC: 25 U/L (ref 10–44)
ANION GAP SERPL CALC-SCNC: 7 MMOL/L (ref 8–16)
ANISOCYTOSIS BLD QL SMEAR: ABNORMAL
AST SERPL-CCNC: 45 U/L (ref 10–40)
BASOPHILS # BLD AUTO: 0.1 K/UL (ref 0–0.2)
BASOPHILS NFR BLD: 1.4 % (ref 0–1.9)
BILIRUB SERPL-MCNC: 7.4 MG/DL (ref 0.1–1)
BUN SERPL-MCNC: 7 MG/DL (ref 6–20)
CALCIUM SERPL-MCNC: 9 MG/DL (ref 8.7–10.5)
CHLORIDE SERPL-SCNC: 109 MMOL/L (ref 95–110)
CO2 SERPL-SCNC: 22 MMOL/L (ref 23–29)
CREAT SERPL-MCNC: 0.7 MG/DL (ref 0.5–1.4)
DIFFERENTIAL METHOD: ABNORMAL
EOSINOPHIL # BLD AUTO: 0.2 K/UL (ref 0–0.5)
EOSINOPHIL NFR BLD: 3.2 % (ref 0–8)
ERYTHROCYTE [DISTWIDTH] IN BLOOD BY AUTOMATED COUNT: 19 % (ref 11.5–14.5)
EST. GFR  (NO RACE VARIABLE): >60 ML/MIN/1.73 M^2
GLUCOSE SERPL-MCNC: 88 MG/DL (ref 70–110)
HCT VFR BLD AUTO: 22.3 % (ref 40–54)
HGB BLD-MCNC: 7.8 G/DL (ref 14–18)
HYPOCHROMIA BLD QL SMEAR: ABNORMAL
IMM GRANULOCYTES # BLD AUTO: 0.02 K/UL (ref 0–0.04)
IMM GRANULOCYTES NFR BLD AUTO: 0.3 % (ref 0–0.5)
LYMPHOCYTES # BLD AUTO: 2.3 K/UL (ref 1–4.8)
LYMPHOCYTES NFR BLD: 32.2 % (ref 18–48)
MCH RBC QN AUTO: 32 PG (ref 27–31)
MCHC RBC AUTO-ENTMCNC: 35 G/DL (ref 32–36)
MCV RBC AUTO: 91 FL (ref 82–98)
MONOCYTES # BLD AUTO: 0.8 K/UL (ref 0.3–1)
MONOCYTES NFR BLD: 11.6 % (ref 4–15)
NEUTROPHILS # BLD AUTO: 3.7 K/UL (ref 1.8–7.7)
NEUTROPHILS NFR BLD: 51.3 % (ref 38–73)
NRBC BLD-RTO: 1 /100 WBC
OVALOCYTES BLD QL SMEAR: ABNORMAL
PLATELET # BLD AUTO: 295 K/UL (ref 150–450)
PMV BLD AUTO: 10.6 FL (ref 9.2–12.9)
POIKILOCYTOSIS BLD QL SMEAR: ABNORMAL
POLYCHROMASIA BLD QL SMEAR: ABNORMAL
POTASSIUM SERPL-SCNC: 3.7 MMOL/L (ref 3.5–5.1)
PROT SERPL-MCNC: 8 G/DL (ref 6–8.4)
RBC # BLD AUTO: 2.44 M/UL (ref 4.6–6.2)
RETICS/RBC NFR AUTO: 13.9 % (ref 0.4–2)
SICKLE CELLS BLD QL SMEAR: ABNORMAL
SODIUM SERPL-SCNC: 138 MMOL/L (ref 136–145)
TARGETS BLD QL SMEAR: ABNORMAL
WBC # BLD AUTO: 7.23 K/UL (ref 3.9–12.7)

## 2023-01-20 PROCEDURE — 85045 AUTOMATED RETICULOCYTE COUNT: CPT | Performed by: INTERNAL MEDICINE

## 2023-01-20 PROCEDURE — 36415 COLL VENOUS BLD VENIPUNCTURE: CPT | Performed by: INTERNAL MEDICINE

## 2023-01-20 PROCEDURE — 80053 COMPREHEN METABOLIC PANEL: CPT | Performed by: INTERNAL MEDICINE

## 2023-01-20 PROCEDURE — 85025 COMPLETE CBC W/AUTO DIFF WBC: CPT | Performed by: INTERNAL MEDICINE

## 2023-02-08 RX ORDER — FLUOXETINE 10 MG/1
10 CAPSULE ORAL DAILY
Qty: 90 CAPSULE | Refills: 3 | Status: SHIPPED | OUTPATIENT
Start: 2023-02-08

## 2023-02-20 ENCOUNTER — LAB VISIT (OUTPATIENT)
Dept: LAB | Facility: HOSPITAL | Age: 28
End: 2023-02-20
Payer: COMMERCIAL

## 2023-02-20 DIAGNOSIS — D57.1 SICKLE CELL DISEASE WITHOUT CRISIS: ICD-10-CM

## 2023-02-20 LAB
ALBUMIN SERPL BCP-MCNC: 4 G/DL (ref 3.5–5.2)
ALP SERPL-CCNC: 108 U/L (ref 55–135)
ALT SERPL W/O P-5'-P-CCNC: 28 U/L (ref 10–44)
ANION GAP SERPL CALC-SCNC: 9 MMOL/L (ref 8–16)
ANISOCYTOSIS BLD QL SMEAR: ABNORMAL
AST SERPL-CCNC: 43 U/L (ref 10–40)
BASOPHILS # BLD AUTO: 0.08 K/UL (ref 0–0.2)
BASOPHILS NFR BLD: 1.1 % (ref 0–1.9)
BILIRUB SERPL-MCNC: 7.5 MG/DL (ref 0.1–1)
BUN SERPL-MCNC: 6 MG/DL (ref 6–20)
CALCIUM SERPL-MCNC: 10.5 MG/DL (ref 8.7–10.5)
CHLORIDE SERPL-SCNC: 109 MMOL/L (ref 95–110)
CO2 SERPL-SCNC: 22 MMOL/L (ref 23–29)
CREAT SERPL-MCNC: 0.7 MG/DL (ref 0.5–1.4)
DIFFERENTIAL METHOD: ABNORMAL
EOSINOPHIL # BLD AUTO: 0.2 K/UL (ref 0–0.5)
EOSINOPHIL NFR BLD: 2.7 % (ref 0–8)
ERYTHROCYTE [DISTWIDTH] IN BLOOD BY AUTOMATED COUNT: 20.9 % (ref 11.5–14.5)
EST. GFR  (NO RACE VARIABLE): >60 ML/MIN/1.73 M^2
GLUCOSE SERPL-MCNC: 97 MG/DL (ref 70–110)
HCT VFR BLD AUTO: 23 % (ref 40–54)
HGB BLD-MCNC: 8.1 G/DL (ref 14–18)
HOWELL-JOLLY BOD BLD QL SMEAR: ABNORMAL
HYPOCHROMIA BLD QL SMEAR: ABNORMAL
IMM GRANULOCYTES # BLD AUTO: 0.04 K/UL (ref 0–0.04)
IMM GRANULOCYTES NFR BLD AUTO: 0.5 % (ref 0–0.5)
LYMPHOCYTES # BLD AUTO: 2.8 K/UL (ref 1–4.8)
LYMPHOCYTES NFR BLD: 37.3 % (ref 18–48)
MCH RBC QN AUTO: 31.9 PG (ref 27–31)
MCHC RBC AUTO-ENTMCNC: 35.2 G/DL (ref 32–36)
MCV RBC AUTO: 91 FL (ref 82–98)
MONOCYTES # BLD AUTO: 1 K/UL (ref 0.3–1)
MONOCYTES NFR BLD: 14 % (ref 4–15)
NEUTROPHILS # BLD AUTO: 3.3 K/UL (ref 1.8–7.7)
NEUTROPHILS NFR BLD: 44.4 % (ref 38–73)
NRBC BLD-RTO: 1 /100 WBC
OVALOCYTES BLD QL SMEAR: ABNORMAL
PLATELET # BLD AUTO: 392 K/UL (ref 150–450)
PMV BLD AUTO: 10.5 FL (ref 9.2–12.9)
POIKILOCYTOSIS BLD QL SMEAR: ABNORMAL
POLYCHROMASIA BLD QL SMEAR: ABNORMAL
POTASSIUM SERPL-SCNC: 4 MMOL/L (ref 3.5–5.1)
PROT SERPL-MCNC: 7.9 G/DL (ref 6–8.4)
RBC # BLD AUTO: 2.54 M/UL (ref 4.6–6.2)
RETICS/RBC NFR AUTO: 18.3 % (ref 0.4–2)
SICKLE CELLS BLD QL SMEAR: ABNORMAL
SODIUM SERPL-SCNC: 140 MMOL/L (ref 136–145)
SPHEROCYTES BLD QL SMEAR: ABNORMAL
TARGETS BLD QL SMEAR: ABNORMAL
WBC # BLD AUTO: 7.38 K/UL (ref 3.9–12.7)

## 2023-02-20 PROCEDURE — 36415 COLL VENOUS BLD VENIPUNCTURE: CPT | Performed by: INTERNAL MEDICINE

## 2023-02-20 PROCEDURE — 85045 AUTOMATED RETICULOCYTE COUNT: CPT | Performed by: INTERNAL MEDICINE

## 2023-02-20 PROCEDURE — 85025 COMPLETE CBC W/AUTO DIFF WBC: CPT | Performed by: INTERNAL MEDICINE

## 2023-02-20 PROCEDURE — 80053 COMPREHEN METABOLIC PANEL: CPT | Performed by: INTERNAL MEDICINE

## 2023-03-31 ENCOUNTER — OFFICE VISIT (OUTPATIENT)
Dept: HEMATOLOGY/ONCOLOGY | Facility: CLINIC | Age: 28
End: 2023-03-31
Payer: COMMERCIAL

## 2023-03-31 ENCOUNTER — LAB VISIT (OUTPATIENT)
Dept: LAB | Facility: HOSPITAL | Age: 28
End: 2023-03-31
Payer: COMMERCIAL

## 2023-03-31 ENCOUNTER — SPECIALTY PHARMACY (OUTPATIENT)
Dept: PHARMACY | Facility: CLINIC | Age: 28
End: 2023-03-31
Payer: COMMERCIAL

## 2023-03-31 VITALS
HEART RATE: 79 BPM | WEIGHT: 194.13 LBS | HEIGHT: 72 IN | TEMPERATURE: 98 F | SYSTOLIC BLOOD PRESSURE: 120 MMHG | OXYGEN SATURATION: 93 % | DIASTOLIC BLOOD PRESSURE: 58 MMHG | RESPIRATION RATE: 17 BRPM | BODY MASS INDEX: 26.3 KG/M2

## 2023-03-31 DIAGNOSIS — D57.09 PRIAPISM DUE TO SICKLE CELL DISEASE: Primary | ICD-10-CM

## 2023-03-31 DIAGNOSIS — D57.00 SICKLE-CELL DISEASE WITH VASO-OCCLUSIVE PAIN: ICD-10-CM

## 2023-03-31 DIAGNOSIS — D57.1 SICKLE CELL DISEASE WITHOUT CRISIS: ICD-10-CM

## 2023-03-31 DIAGNOSIS — R17 SCLERAL ICTERUS: ICD-10-CM

## 2023-03-31 DIAGNOSIS — N48.32 PRIAPISM DUE TO SICKLE CELL DISEASE: Primary | ICD-10-CM

## 2023-03-31 DIAGNOSIS — Z91.89 AT RISK FOR OSTEOPOROSIS: ICD-10-CM

## 2023-03-31 DIAGNOSIS — D64.9 SYMPTOMATIC ANEMIA: ICD-10-CM

## 2023-03-31 LAB
ALBUMIN SERPL BCP-MCNC: 4.1 G/DL (ref 3.5–5.2)
ALP SERPL-CCNC: 134 U/L (ref 55–135)
ALT SERPL W/O P-5'-P-CCNC: 41 U/L (ref 10–44)
ANION GAP SERPL CALC-SCNC: 9 MMOL/L (ref 8–16)
AST SERPL-CCNC: 62 U/L (ref 10–40)
BASOPHILS # BLD AUTO: 0.09 K/UL (ref 0–0.2)
BASOPHILS NFR BLD: 1.2 % (ref 0–1.9)
BILIRUB SERPL-MCNC: 7.8 MG/DL (ref 0.1–1)
BUN SERPL-MCNC: 7 MG/DL (ref 6–20)
CALCIUM SERPL-MCNC: 9.1 MG/DL (ref 8.7–10.5)
CHLORIDE SERPL-SCNC: 109 MMOL/L (ref 95–110)
CO2 SERPL-SCNC: 22 MMOL/L (ref 23–29)
CREAT SERPL-MCNC: 0.7 MG/DL (ref 0.5–1.4)
DIFFERENTIAL METHOD: ABNORMAL
EOSINOPHIL # BLD AUTO: 0.5 K/UL (ref 0–0.5)
EOSINOPHIL NFR BLD: 6.1 % (ref 0–8)
ERYTHROCYTE [DISTWIDTH] IN BLOOD BY AUTOMATED COUNT: 22.5 % (ref 11.5–14.5)
EST. GFR  (NO RACE VARIABLE): >60 ML/MIN/1.73 M^2
GLUCOSE SERPL-MCNC: 103 MG/DL (ref 70–110)
HCT VFR BLD AUTO: 20.5 % (ref 40–54)
HGB BLD-MCNC: 7.5 G/DL (ref 14–18)
IMM GRANULOCYTES # BLD AUTO: 0.01 K/UL (ref 0–0.04)
IMM GRANULOCYTES NFR BLD AUTO: 0.1 % (ref 0–0.5)
LYMPHOCYTES # BLD AUTO: 2.6 K/UL (ref 1–4.8)
LYMPHOCYTES NFR BLD: 35.5 % (ref 18–48)
MCH RBC QN AUTO: 33 PG (ref 27–31)
MCHC RBC AUTO-ENTMCNC: 36.6 G/DL (ref 32–36)
MCV RBC AUTO: 90 FL (ref 82–98)
MONOCYTES # BLD AUTO: 1 K/UL (ref 0.3–1)
MONOCYTES NFR BLD: 13.8 % (ref 4–15)
NEUTROPHILS # BLD AUTO: 3.2 K/UL (ref 1.8–7.7)
NEUTROPHILS NFR BLD: 43.3 % (ref 38–73)
NRBC BLD-RTO: 2 /100 WBC
PLATELET # BLD AUTO: 259 K/UL (ref 150–450)
PMV BLD AUTO: 10.2 FL (ref 9.2–12.9)
POTASSIUM SERPL-SCNC: 3.6 MMOL/L (ref 3.5–5.1)
PROT SERPL-MCNC: 8.2 G/DL (ref 6–8.4)
RBC # BLD AUTO: 2.27 M/UL (ref 4.6–6.2)
RETICS/RBC NFR AUTO: 20 % (ref 0.4–2)
SODIUM SERPL-SCNC: 140 MMOL/L (ref 136–145)
WBC # BLD AUTO: 7.4 K/UL (ref 3.9–12.7)

## 2023-03-31 PROCEDURE — 85045 AUTOMATED RETICULOCYTE COUNT: CPT | Performed by: INTERNAL MEDICINE

## 2023-03-31 PROCEDURE — 99215 PR OFFICE/OUTPT VISIT, EST, LEVL V, 40-54 MIN: ICD-10-PCS | Mod: S$GLB,,, | Performed by: INTERNAL MEDICINE

## 2023-03-31 PROCEDURE — 85025 COMPLETE CBC W/AUTO DIFF WBC: CPT | Performed by: INTERNAL MEDICINE

## 2023-03-31 PROCEDURE — 3074F PR MOST RECENT SYSTOLIC BLOOD PRESSURE < 130 MM HG: ICD-10-PCS | Mod: CPTII,S$GLB,, | Performed by: INTERNAL MEDICINE

## 2023-03-31 PROCEDURE — 80053 COMPREHEN METABOLIC PANEL: CPT | Performed by: INTERNAL MEDICINE

## 2023-03-31 PROCEDURE — 99999 PR PBB SHADOW E&M-EST. PATIENT-LVL III: ICD-10-PCS | Mod: PBBFAC,,, | Performed by: INTERNAL MEDICINE

## 2023-03-31 PROCEDURE — 3074F SYST BP LT 130 MM HG: CPT | Mod: CPTII,S$GLB,, | Performed by: INTERNAL MEDICINE

## 2023-03-31 PROCEDURE — 3008F BODY MASS INDEX DOCD: CPT | Mod: CPTII,S$GLB,, | Performed by: INTERNAL MEDICINE

## 2023-03-31 PROCEDURE — 3078F DIAST BP <80 MM HG: CPT | Mod: CPTII,S$GLB,, | Performed by: INTERNAL MEDICINE

## 2023-03-31 PROCEDURE — 99215 OFFICE O/P EST HI 40 MIN: CPT | Mod: S$GLB,,, | Performed by: INTERNAL MEDICINE

## 2023-03-31 PROCEDURE — 3078F PR MOST RECENT DIASTOLIC BLOOD PRESSURE < 80 MM HG: ICD-10-PCS | Mod: CPTII,S$GLB,, | Performed by: INTERNAL MEDICINE

## 2023-03-31 PROCEDURE — 36415 COLL VENOUS BLD VENIPUNCTURE: CPT | Performed by: INTERNAL MEDICINE

## 2023-03-31 PROCEDURE — 3008F PR BODY MASS INDEX (BMI) DOCUMENTED: ICD-10-PCS | Mod: CPTII,S$GLB,, | Performed by: INTERNAL MEDICINE

## 2023-03-31 PROCEDURE — 99999 PR PBB SHADOW E&M-EST. PATIENT-LVL III: CPT | Mod: PBBFAC,,, | Performed by: INTERNAL MEDICINE

## 2023-03-31 NOTE — PROGRESS NOTES
CC: Sickle cell anemia, hematology follow up    HPI: , 27, is here for hematology follow up for sickle cell anemia. He was being followed by pediatrics until age 21. Last pain crisis was in 2016.   He has been experiencing back pain of late. Pain is intermittent, no clear precipitating or alleviating factors.   He had priapism several years ago.      He had one ER visit in October 2022 and again hospitalization for sickle pain nikolay in the same month. No clear triggers were identified for these episodes.    Interval History: He feels tired today. Denies pain. No hospitalizations or ER visits since his last visit here in Nov 2022. He has not received oxbryta yet.       Review of patient's allergies indicates:  No Known Allergies          Current Outpatient Medications   Medication Sig    acetaminophen (TYLENOL) 500 MG tablet Take 500 mg by mouth every 8 (eight) hours as needed for Pain.    calcium carbonate (TUMS) 200 mg calcium (500 mg) chewable tablet Take 2-3 tablets by mouth 2 (two) times daily as needed for Heartburn.    FLUoxetine 10 MG capsule Take 1 capsule (10 mg total) by mouth once daily.    folic acid (FOLVITE) 1 MG tablet TAKE 1 TABLET BY MOUTH EVERY DAY    HYDROcodone-acetaminophen (NORCO) 5-325 mg per tablet Take 1 tablet by mouth every 6 (six) hours as needed for Pain.    hydroxyurea (HYDREA) 500 mg Cap Take 1 capsule (500 mg total) by mouth once daily.    hydroxyurea (HYDREA) 500 mg Cap TAKE 1 CAPSULE (500 MG TOTAL) BY MOUTH ONCE DAILY.    phenyleph-min oil-petrolatum 0.25-14-74.9 % Oint Place 1 applicator rectally 4 (four) times daily as needed (Hemorrhoid discomfort).    voxelotor 500 mg Tab Take 3 tablets (1,500 mg total) by mouth Daily.     No current facility-administered medications for this visit.         Review of Systems   Constitutional:  Positive for malaise/fatigue. Negative for weight loss.   HENT:  Negative for ear discharge and ear pain.    Eyes:  Negative for  photophobia, pain and discharge.   Respiratory:  Negative for hemoptysis, sputum production and shortness of breath.    Cardiovascular:  Negative for chest pain, palpitations, orthopnea and claudication.   Gastrointestinal:  Negative for blood in stool, constipation, diarrhea, heartburn, melena, nausea and vomiting.   Genitourinary:  Negative for dysuria, frequency, hematuria and urgency.   Musculoskeletal:  Negative for back pain, myalgias and neck pain.   Neurological:  Negative for dizziness and tremors.   Endo/Heme/Allergies:  Does not bruise/bleed easily.   Psychiatric/Behavioral:  Negative for substance abuse.          Vitals:    03/31/23 0926   BP: (!) 120/58   Pulse: 79   Resp: 17   Temp: 97.9 °F (36.6 °C)       Physical Exam  Constitutional:       Appearance: Normal appearance.   HENT:      Head: Normocephalic.   Eyes:      General: Scleral icterus present.   Cardiovascular:      Rate and Rhythm: Normal rate.      Heart sounds: Normal heart sounds. No murmur heard.  Pulmonary:      Effort: Pulmonary effort is normal. No respiratory distress.      Breath sounds: No stridor.   Abdominal:      General: There is no distension.      Palpations: There is no mass.      Tenderness: There is no abdominal tenderness.   Genitourinary:     Penis: Normal.       Testes: Normal.   Musculoskeletal:         General: No swelling or tenderness.   Lymphadenopathy:      Cervical: No cervical adenopathy.   Skin:     Coloration: Skin is not jaundiced or pale.   Neurological:      General: No focal deficit present.      Mental Status: He is alert and oriented to person, place, and time.      Cranial Nerves: No cranial nerve deficit.       2/21/22 24hr urine   Component Ref Range & Units 3 mo ago    Urine Volume mL 2900    Urine Collection Duration Hr 24    Protein, Urine 0 - 15 mg/dL <7    Urine Protein, Timed 0 - 100 mg/Spec Unable to calculate           3/3/22 DEXA scan    Impression:     *Bone density is below the expected  range for age.      3/7/22 ECHO     The left ventricle is mildly enlarged with mild eccentric hypertrophy and normal systolic function.  The estimated ejection fraction is 60%.  The quantitatively derived ejection fraction is 60%.  Normal left ventricular diastolic function.  Mild right ventricular enlargement with normal right ventricular systolic function.  The estimated PA systolic pressure is 30 mmHg.  Intermediate central venous pressure (8 mmHg).     Component      Latest Ref Rng & Units 3/31/2023 12/7/2022   WBC      3.90 - 12.70 K/uL 7.40    RBC      4.60 - 6.20 M/uL 2.27 (L)    Hemoglobin      14.0 - 18.0 g/dL 7.5 (L)    Hematocrit      40.0 - 54.0 % 20.5 (L)    MCV      82 - 98 fL 90    MCH      27.0 - 31.0 pg 33.0 (H)    MCHC      32.0 - 36.0 g/dL 36.6 (H)    RDW      11.5 - 14.5 % 22.5 (H)    Platelets      150 - 450 K/uL 259    MPV      9.2 - 12.9 fL 10.2    Immature Granulocytes      0.0 - 0.5 % 0.1    Gran # (ANC)      1.8 - 7.7 K/uL 3.2    Immature Grans (Abs)      0.00 - 0.04 K/uL 0.01    Lymph #      1.0 - 4.8 K/uL 2.6    Mono #      0.3 - 1.0 K/uL 1.0    Eos #      0.0 - 0.5 K/uL 0.5    Baso #      0.00 - 0.20 K/uL 0.09    nRBC      0 /100 WBC 2 (A)    Gran %      38.0 - 73.0 % 43.3    Lymph %      18.0 - 48.0 % 35.5    Mono %      4.0 - 15.0 % 13.8    Eosinophil %      0.0 - 8.0 % 6.1    Basophil %      0.0 - 1.9 % 1.2    Differential Method       Automated    Sodium      136 - 145 mmol/L 140    Potassium      3.5 - 5.1 mmol/L 3.6    Chloride      95 - 110 mmol/L 109    CO2      23 - 29 mmol/L 22 (L)    Glucose      70 - 110 mg/dL 103    BUN      6 - 20 mg/dL 7    Creatinine      0.5 - 1.4 mg/dL 0.7    Calcium      8.7 - 10.5 mg/dL 9.1    PROTEIN TOTAL      6.0 - 8.4 g/dL 8.2    Albumin      3.5 - 5.2 g/dL 4.1    BILIRUBIN TOTAL      0.1 - 1.0 mg/dL 7.8 (H)    Alkaline Phosphatase      55 - 135 U/L 134    AST      10 - 40 U/L 62 (H)    ALT      10 - 44 U/L 41    Anion Gap      8 - 16 mmol/L 9     eGFR      >60 mL/min/1.73 m:2 >60.0    Iron      45 - 160 ug/dL  44 (L)   Transferrin      200 - 375 mg/dL  264   TIBC      250 - 450 ug/dL  391   Saturated Iron      20 - 50 %  11 (L)   Ferritin      20.0 - 300.0 ng/mL  72   Retic      0.4 - 2.0 % 20.0 (H)        Assessment:    1. Hemoglobin SS disease    -He has had 3-4 pain crises in 2022  -Discussed starting voxletor at last visit here in Nov 2022; he has stil lnot received the medication.  -In the phase 3 clinical trial, a  total of 274 participants were randomly assigned in a 1:1:1 ratio to receive a once-daily oral dose of 1500 mg of voxelotor, 900 mg of voxelotor, or placebo.   -Most participants had sickle cell anemia (homozygous hemoglobin S or hemoglobin S?0-thalassemia), and approximately two thirds were receiving hydroxyurea at baseline.   -In the intention-to-treat analysis, a significantly higher percentage of participants had a hemoglobin response in the 1500-mg voxelotor group (51%; 95% confidence interval [CI], 41 to 61) than in the placebo group (7%; 95% CI, 1 to 12).   -Anemia worsened between baseline and week 24 in fewer participants in each voxelotor dose group than in those receiving placebo.  - At week 24, the 1500-mg voxelotor group had significantly greater reductions from baseline in the indirect bilirubin level and percentage of reticulocytes than the placebo group.   -The percentage of participants with an adverse event that occurred or worsened during the treatment period was similar across the trial groups. Adverse events of at least grade 3 occurred in 26% of the participants in the 1500-mg voxelotor group, 23% in the 900-mg voxelotor group, and 26% in the placebo group.   -discussed the potential adverse effects  -he will use imodium for diarrhea  -he will use acetaminophen for sickle cell pain; if pain persists, he will use hydrocodone-APAP    Adverse effects of voxeletor:      >10%:  Dermatologic: Skin rash  (15%)  Gastrointestinal: Abdominal pain (23%), diarrhea (23%), nausea (19%)  Nervous system: Headache (32%; severe headache: 1%)  Miscellaneous: Fever (15%)  1% to 10%:  Cardiovascular: Pulmonary embolism (1%)  Hypersensitivity: Hypersensitivity reaction (<10%; severe hypersensitivity reaction: 1%)    Sickle cell checklist:    1. Annual ophtho exam:   Last done: 6/29/22  Findings:      2. Bone health: Ca++d recommended, q3 yr dexa recommended. DEXA done on 3/3/22 showed bone density below the expected range for age.  Vit D: Needs periodic monitoring    3. Screening echo for pHTN:     Last done: 3/7/22; estimated PA systolic pressure is 30 mmHg.    4. Annual UA + MicroAlb/Cr Ratio:     Last done: 24hr urine protein excretion was negligible , on 2/21/22    5. Folic acid: currently taking 1mg daily    6. Hydrea: currently taking 500 mg daily    7. Transfusions / iron : Cumulative transfusions: 2  Baseline h/h:   Ferritin: 72  ng/ml on 12/7/22    6. Pain regimen: no regular regimen, PRN Acetaminophen, NSAID  -no frequent admissions with pain crises  -Pain contract: none    7. Osteonecrosis screening: yes    8. Stem cell transplant? No    9. H/o cva? No    10. Priapism: Yes, once since last visit ( April 2022)  --education provided    10. HCV screening? Negative in 2016    11. Immunizations:     Pneumococcal. 13 then 23. 23 q5 yrs: current  COVID 19 : uptodate  H influenzae:  Annual flu: uptodate  Meningococcal:        BMT Chart Routing      Follow up with physician 3 months.   Follow up with [facility]    Provider visit type    Infusion scheduling note    Injection scheduling note    Labs CBC, CMP, LDH, reticulocytes, ferritin and free light chains   Scheduling:  Preferred lab:  Lab interval:     Imaging    Pharmacy appointment    Other referrals

## 2023-03-31 NOTE — TELEPHONE ENCOUNTER
Rich, this is Yesenia Quan with [facility].  We are working on your prescription that your doctor has sent us. We will be working with your insurance to get this approved for you. We will be calling you along the way with updates on your medication.  If you have any questions, you can reach us at [phone].    Welcome call outcome: Patient/caregiver reached    New prescription for Oxbryta 500 mg Sig Take 3 tablets (1,500 mg total) by mouth Daily.    Pa required Key: [id number]

## 2023-04-03 NOTE — TELEPHONE ENCOUNTER
PADMINI obtained from [employer].  Authorization: [id number]  Approved April 3,2023- April 2,2024   Test claim $361.55 Will forward to .

## 2023-04-04 NOTE — TELEPHONE ENCOUNTER
Benefits investigation  (Commercial)    Spoke with Jennifer at [employer].     Annual Deductible Amount: $250 (all will have been met after this script is filled)    Annual Out of Packet (OOP) Maximum: $8550 ($154.74 has been met)    Estimated Copay: $361.55    Network Status: In network    PA and PADMINI acquired from earlier.    Spoke with patient on the phone, who says he is interested in FA. He may qualify for a copay card, as his plan is commerical insurance. Will forward to FA for further assistance.

## 2023-04-06 DIAGNOSIS — D57.1 SICKLE CELL DISEASE WITHOUT CRISIS: Primary | ICD-10-CM

## 2023-04-11 NOTE — TELEPHONE ENCOUNTER
[employer] available for commercially insured patients. Applied for flavio via online portal and patient has been selected for Diagnosis Verification.     Sent a staff message to [employer] regarding diagnosis verification form for [employer] and faxed form to [phone] for provider review and signature. Form must be submitted to [employer] 60 days from the application date of 4/11/23.  Will continue to follow up.

## 2023-04-18 NOTE — TELEPHONE ENCOUNTER
Provider DVF received and has been submitted to [facility] for diagnosis verification. Will continue to follow up until final determination is made.

## 2023-04-21 NOTE — TELEPHONE ENCOUNTER
[facility] flavio approved.     FOR DOCUMENTATION ONLY:  Financial Assistance for Oxbryta approved from 3/12/23 to 3/11/24  Source [facility]  BIN: [id number]  PCN: [id number]  Id: [id number]  GRP: [id number]  Amount: $10,000    Patient will pay $0 with flavio. Forwarding for initial consult.

## 2023-04-24 ENCOUNTER — SPECIALTY PHARMACY (OUTPATIENT)
Dept: PHARMACY | Facility: CLINIC | Age: 28
End: 2023-04-24
Payer: COMMERCIAL

## 2023-04-24 NOTE — TELEPHONE ENCOUNTER
Specialty Pharmacy - Initial Clinical Assessment    Specialty Medication Orders Linked to Encounter      Flowsheet Row Most Recent Value   Medication #1 voxelotor 500 mg Tab (Order#[id number], Rx#[id number])          Patient Diagnosis   D57.1 - Sickle cell disease  D57.1 - Sickle cell anemia  D57.00 - Sickle-cell disease with vaso-occlusive pain    Subjective    Parrish Davis is a 27 y.o. male, who is followed by the specialty pharmacy service for management and education.    Recent Encounters       Date Type Provider Description    04/24/2023 Specialty Pharmacy Bel Allen, Abelardo Initial Clinical Assessment    03/31/2023 Specialty Pharmacy Yesenia Quan, Abelardo Referral Authorization            Current Outpatient Medications   Medication Sig Note    ibuprofen (ADVIL,MOTRIN) 100 MG tablet Take 100 mg by mouth every 6 (six) hours as needed.     acetaminophen (TYLENOL) 500 MG tablet Take 500 mg by mouth every 8 (eight) hours as needed for Pain.     calcium carbonate (TUMS) 200 mg calcium (500 mg) chewable tablet Take 2-3 tablets by mouth 2 (two) times daily as needed for Heartburn.     FLUoxetine 10 MG capsule Take 1 capsule (10 mg total) by mouth once daily.     folic acid (FOLVITE) 1 MG tablet TAKE 1 TABLET BY MOUTH EVERY DAY     HYDROcodone-acetaminophen (NORCO) 5-325 mg per tablet Take 1 tablet by mouth every 6 (six) hours as needed for Pain. 4/24/2023: Dont take often    hydroxyurea (HYDREA) 500 mg Cap Take 1 capsule (500 mg total) by mouth once daily.     hydroxyurea (HYDREA) 500 mg Cap TAKE 1 CAPSULE (500 MG TOTAL) BY MOUTH ONCE DAILY.     phenyleph-min oil-petrolatum 0.25-14-74.9 % Oint Place 1 applicator rectally 4 (four) times daily as needed (Hemorrhoid discomfort).     voxelotor 500 mg Tab Take 3 tablets (1,500 mg total) by mouth Daily.    Last reviewed on 4/24/2023  4:13 PM by Bel Garnica, PharmD    Review of patient's allergies indicates:  No Known AllergiesLast reviewed on  4/24/2023  4:09 PM by Bel Garnica    Drug Interactions    Drug interactions evaluated: yes  Clinically relevant drug interactions identified: no  Provided the patient with educational material regarding drug interactions: not applicable         Adverse Effects    *All other systems reviewed and are negative       Assessment Questions - Documented Responses      Flowsheet Row Most Recent Value   Assessment    Medication Reconciliation completed for patient Yes   During the past 4 weeks, has patient missed any activities due to condition or medication? No   During the past 4 weeks, did patient have any of the following urgent care visits? None   Goals of Therapy Status Discussed (new start)   Status of the patients ability to self-administer: Is Able   All education points have been covered with patient? Yes, supplemental printed education provided   Welcome packet contents reviewed and discussed with patient? Yes   Assesment completed? Yes   Plan Therapy being initiated   Do you need to open a clinical intervention (i-vent)? No   Do you want to schedule first shipment? Yes          Refill Questions - Documented Responses      Flowsheet Row Most Recent Value   Refill Screening Questions    When does the patient need to receive the medication? 04/25/23   Refill Delivery Questions    How will the patient receive the medication? Pickup   When does the patient need to receive the medication? 04/25/23   Shipping Address Home   Address in [facility] confirmed and updated if neccessary? Yes   Expected Copay ($) 0   Is the patient able to afford the medication copay? Yes   Payment Method zero copay   Days supply of Refill 30   Supplies needed? No supplies needed   Refill activity completed? Yes   Refill activity plan Refill scheduled   Shipment/Pickup Date: 04/25/23            Objective    He has a past medical history of Sickle cell anemia and Sickle cell disease.    Tried/failed medications: NA    BP Readings from Last 4  Encounters:   03/31/23 (!) 120/58   12/07/22 (!) 118/56   11/08/22 132/60   10/31/22 120/60     Ht Readings from Last 4 Encounters:   03/31/23 6' (1.829 m)   12/07/22 6' (1.829 m)   11/08/22 6' (1.829 m)   10/31/22 6' (1.829 m)     Wt Readings from Last 4 Encounters:   03/31/23 88 kg (194 lb 1.8 oz)   12/07/22 85.9 kg (189 lb 4.2 oz)   11/08/22 85.2 kg (187 lb 13.3 oz)   10/31/22 84.6 kg (186 lb 8.2 oz)     Recent Labs   Lab Result Units 03/31/23  0846 02/20/23  0901 02/20/23  0859   RBC M/uL 2.27 L  --  2.54 L   Hemoglobin g/dL 7.5 L  --  8.1 L   Hematocrit % 20.5 L  --  23.0 L   WBC K/uL 7.40  --  7.38   Gran # (ANC) K/uL 3.2  --  3.3   Gran % % 43.3  --  44.4   Platelets K/uL 259  --  392   Sodium mmol/L 140 140  --    Potassium mmol/L 3.6 4.0  --    Chloride mmol/L 109 109  --    Glucose mg/dL 103 97  --    BUN mg/dL 7 6  --    Creatinine mg/dL 0.7 0.7  --    Calcium mg/dL 9.1 10.5  --    Total Protein g/dL 8.2 7.9  --    Albumin g/dL 4.1 4.0  --    Total Bilirubin mg/dL 7.8 H 7.5 H  --    Alkaline Phosphatase U/L 134 108  --    AST U/L 62 H 43 H  --    ALT U/L 41 28  --      The goals of cancer treatment include:  Achieving remission of cancer, if possible  Reducing tumor size and spread of cancer, if remission is not possible  Minimizing pain and symptoms of the cancer  Preventing infection and other complications of treatment  Promoting adequate nutrition  Encouraging proper hydration  Improving or maintaining quality of life  Maintaining optimal therapy adherence  Minimizing and managing side effects    Goals of Therapy Status: Discussed (new start)    Assessment/Plan  Patient plans to start therapy on 04/25/23      Indication, dosage, appropriateness, effectiveness, safety and convenience of his specialty medication(s) were reviewed today.     Patient Education   Patient received education on the following:   Expectations and possible outcomes of therapy  Proper use, timely administration, and missed dose  management  Duration of therapy  Side effects, including prevention, minimization, and management  Contraindications and safety precautions  New or changed medications, including prescribe and over the counter medications and supplements  Reviews recommended vaccinations, as appropriate  Storage, safe handling, and disposal    Tasks added this encounter   No tasks added.   Tasks due within next 3 months   4/26/2023 - Initial Clinical Assessment/Patient Education (180 Day Reassessment)  4/26/2023 - Initial Clinical Assessment/Patient Education (180 Day Reassessment)  4/24/2023 - Set up Initial Fill     Bel Allen, PharmD  [facility]  [address] [place] [id number]  Phone: [phone]  Fax: [phone]

## 2023-05-24 ENCOUNTER — SPECIALTY PHARMACY (OUTPATIENT)
Dept: PHARMACY | Facility: CLINIC | Age: 28
End: 2023-05-24
Payer: COMMERCIAL

## 2023-05-24 NOTE — TELEPHONE ENCOUNTER
Specialty Pharmacy - Refill Coordination    Specialty Medication Orders Linked to Encounter      Flowsheet Row Most Recent Value   Medication #1 voxelotor 500 mg Tab (Order#[id number], Rx#[id number])            Refill Questions - Documented Responses      Flowsheet Row Most Recent Value   Patient Availability and HIPAA Verification    Does patient want to proceed with activity? Yes   HIPAA/medical authority confirmed? Yes   Relationship to patient of person spoken to? Self   Refill Screening Questions    Changes to allergies? No   Changes to medications? No   New conditions since last clinic visit? No   Unplanned office visit, urgent care, ED, or hospital admission in the last 4 weeks? No   How does patient/caregiver feel medication is working? Too soon to tell  [Patient has not started]   Financial problems or insurance changes? No   How many doses of your specialty medications were missed in the last 4 weeks? 0   Would patient like to speak to a pharmacist? No   When does the patient need to receive the medication? 05/25/23   Refill Delivery Questions    How will the patient receive the medication? MEDRx   When does the patient need to receive the medication? 05/25/23   Shipping Address Home   Address in [facility] confirmed and updated if neccessary? Yes   Expected Copay ($) 0   Is the patient able to afford the medication copay? Yes   Payment Method zero copay   Days supply of Refill 30   Supplies needed? No supplies needed   Refill activity completed? Yes   Refill activity plan Refill scheduled   Shipment/Pickup Date: 05/24/23            Current Outpatient Medications   Medication Sig    acetaminophen (TYLENOL) 500 MG tablet Take 500 mg by mouth every 8 (eight) hours as needed for Pain.    calcium carbonate (TUMS) 200 mg calcium (500 mg) chewable tablet Take 2-3 tablets by mouth 2 (two) times daily as needed for Heartburn.    FLUoxetine 10 MG capsule Take 1 capsule (10 mg total) by mouth once daily.     folic acid (FOLVITE) 1 MG tablet TAKE 1 TABLET BY MOUTH EVERY DAY    HYDROcodone-acetaminophen (NORCO) 5-325 mg per tablet Take 1 tablet by mouth every 6 (six) hours as needed for Pain.    hydroxyurea (HYDREA) 500 mg Cap Take 1 capsule (500 mg total) by mouth once daily.    hydroxyurea (HYDREA) 500 mg Cap TAKE 1 CAPSULE (500 MG TOTAL) BY MOUTH ONCE DAILY.    ibuprofen (ADVIL,MOTRIN) 100 MG tablet Take 100 mg by mouth every 6 (six) hours as needed.    phenyleph-min oil-petrolatum 0.25-14-74.9 % Oint Place 1 applicator rectally 4 (four) times daily as needed (Hemorrhoid discomfort).    voxelotor 500 mg Tab Take 3 tablets (1,500 mg total) by mouth Daily.   Last reviewed on 4/24/2023  4:13 PM by Bel Garnica, Abelardo    Review of patient's allergies indicates:  No Known Allergies Last reviewed on  4/24/2023 4:09 PM by Bel Garnica      Tasks added this encounter   No tasks added.   Tasks due within next 3 months   5/24/2023 - Refill Coordination Outreach (1 time occurrence)     Yesenia Quan, PharmD  [facility]  [address] [place] [id number]  Phone: [phone]  Fax: [phone]

## 2023-05-24 NOTE — TELEPHONE ENCOUNTER
Outgoing call regarding Oxbryta refill, pt states he never start picked up medication. Pt inquired about financial assistance. Informed pt he has been approved for a flavio. Transferred called to assigned [facility] for consult.

## 2023-06-07 DIAGNOSIS — D57.1 SICKLE CELL DISEASE WITHOUT CRISIS: ICD-10-CM

## 2023-06-07 DIAGNOSIS — D64.9 SYMPTOMATIC ANEMIA: ICD-10-CM

## 2023-06-07 RX ORDER — HYDROXYUREA 500 MG/1
500 CAPSULE ORAL DAILY
Qty: 30 CAPSULE | Refills: 5 | Status: SHIPPED | OUTPATIENT
Start: 2023-06-07 | End: 2023-11-30

## 2023-06-16 ENCOUNTER — SPECIALTY PHARMACY (OUTPATIENT)
Dept: PHARMACY | Facility: CLINIC | Age: 28
End: 2023-06-16
Payer: COMMERCIAL

## 2023-06-16 ENCOUNTER — PATIENT MESSAGE (OUTPATIENT)
Dept: PHARMACY | Facility: CLINIC | Age: 28
End: 2023-06-16
Payer: COMMERCIAL

## 2023-06-16 NOTE — TELEPHONE ENCOUNTER
Specialty Pharmacy - Refill Coordination    Specialty Medication Orders Linked to Encounter      Flowsheet Row Most Recent Value   Medication #1 voxelotor 500 mg Tab (Order#[id number], Rx#[id number])            Refill Questions - Documented Responses      Flowsheet Row Most Recent Value   Patient Availability and HIPAA Verification    Does patient want to proceed with activity? Yes   HIPAA/medical authority confirmed? Yes   Relationship to patient of person spoken to? Self   Refill Screening Questions    Changes to allergies? No   Changes to medications? No   New conditions since last clinic visit? No   Unplanned office visit, urgent care, ED, or hospital admission in the last 4 weeks? No   How does patient/caregiver feel medication is working? Too soon to tell   Financial problems or insurance changes? No   How many doses of your specialty medications were missed in the last 4 weeks? 0   Would patient like to speak to a pharmacist? No   When does the patient need to receive the medication? 06/23/23   Refill Delivery Questions    How will the patient receive the medication? MEDRx   When does the patient need to receive the medication? 06/23/23   Shipping Address Home   Address in [facility] confirmed and updated if neccessary? Yes   Expected Copay ($) 0   Is the patient able to afford the medication copay? Yes   Payment Method zero copay   Days supply of Refill 30   Supplies needed? No supplies needed   Refill activity completed? Yes   Refill activity plan Refill scheduled   Shipment/Pickup Date: 06/21/23            Current Outpatient Medications   Medication Sig    acetaminophen (TYLENOL) 500 MG tablet Take 500 mg by mouth every 8 (eight) hours as needed for Pain.    calcium carbonate (TUMS) 200 mg calcium (500 mg) chewable tablet Take 2-3 tablets by mouth 2 (two) times daily as needed for Heartburn.    FLUoxetine 10 MG capsule Take 1 capsule (10 mg total) by mouth once daily.    folic acid (FOLVITE) 1 MG  tablet TAKE 1 TABLET BY MOUTH EVERY DAY    HYDROcodone-acetaminophen (NORCO) 5-325 mg per tablet Take 1 tablet by mouth every 6 (six) hours as needed for Pain.    hydroxyurea (HYDREA) 500 mg Cap Take 1 capsule (500 mg total) by mouth once daily.    hydroxyurea (HYDREA) 500 mg Cap TAKE 1 CAPSULE (500 MG TOTAL) BY MOUTH ONCE DAILY.    ibuprofen (ADVIL,MOTRIN) 100 MG tablet Take 100 mg by mouth every 6 (six) hours as needed.    phenyleph-min oil-petrolatum 0.25-14-74.9 % Oint Place 1 applicator rectally 4 (four) times daily as needed (Hemorrhoid discomfort).    voxelotor 500 mg Tab Take 3 tablets (1,500 mg total) by mouth Daily.   Last reviewed on 4/24/2023  4:13 PM by Bel Garnica, PharmD    Review of patient's allergies indicates:  No Known Allergies Last reviewed on  4/24/2023 4:09 PM by Bel Garnica      Tasks added this encounter   No tasks added.   Tasks due within next 3 months   No tasks due.     Elvia Mata, PharmD  [facility]  [address] [place] [id number]  Phone: [phone]  Fax: [phone]

## 2023-06-30 ENCOUNTER — LAB VISIT (OUTPATIENT)
Dept: LAB | Facility: HOSPITAL | Age: 28
End: 2023-06-30
Payer: COMMERCIAL

## 2023-06-30 ENCOUNTER — OFFICE VISIT (OUTPATIENT)
Dept: HEMATOLOGY/ONCOLOGY | Facility: CLINIC | Age: 28
End: 2023-06-30
Payer: COMMERCIAL

## 2023-06-30 VITALS
WEIGHT: 197.56 LBS | RESPIRATION RATE: 16 BRPM | SYSTOLIC BLOOD PRESSURE: 120 MMHG | HEART RATE: 63 BPM | DIASTOLIC BLOOD PRESSURE: 57 MMHG | TEMPERATURE: 99 F | OXYGEN SATURATION: 99 % | BODY MASS INDEX: 26.76 KG/M2 | HEIGHT: 72 IN

## 2023-06-30 DIAGNOSIS — D57.00 SICKLE-CELL DISEASE WITH VASO-OCCLUSIVE PAIN: Primary | ICD-10-CM

## 2023-06-30 DIAGNOSIS — D64.9 SYMPTOMATIC ANEMIA: ICD-10-CM

## 2023-06-30 DIAGNOSIS — D57.1 SICKLE CELL DISEASE WITHOUT CRISIS: ICD-10-CM

## 2023-06-30 DIAGNOSIS — R17 SCLERAL ICTERUS: ICD-10-CM

## 2023-06-30 DIAGNOSIS — Z91.89 AT RISK FOR OSTEOPOROSIS: ICD-10-CM

## 2023-06-30 LAB
ALBUMIN SERPL BCP-MCNC: 4.1 G/DL (ref 3.5–5.2)
ALP SERPL-CCNC: 107 U/L (ref 55–135)
ALT SERPL W/O P-5'-P-CCNC: 44 U/L (ref 10–44)
ANION GAP SERPL CALC-SCNC: 8 MMOL/L (ref 8–16)
ANISOCYTOSIS BLD QL SMEAR: SLIGHT
AST SERPL-CCNC: 54 U/L (ref 10–40)
BASOPHILS # BLD AUTO: 0.13 K/UL (ref 0–0.2)
BASOPHILS NFR BLD: 2.7 % (ref 0–1.9)
BILIRUB SERPL-MCNC: 2.5 MG/DL (ref 0.1–1)
BUN SERPL-MCNC: 4 MG/DL (ref 6–20)
CALCIUM SERPL-MCNC: 9 MG/DL (ref 8.7–10.5)
CHLORIDE SERPL-SCNC: 105 MMOL/L (ref 95–110)
CO2 SERPL-SCNC: 22 MMOL/L (ref 23–29)
CREAT SERPL-MCNC: 0.7 MG/DL (ref 0.5–1.4)
DIFFERENTIAL METHOD: ABNORMAL
EOSINOPHIL # BLD AUTO: 0.5 K/UL (ref 0–0.5)
EOSINOPHIL NFR BLD: 9.2 % (ref 0–8)
ERYTHROCYTE [DISTWIDTH] IN BLOOD BY AUTOMATED COUNT: 18.4 % (ref 11.5–14.5)
EST. GFR  (NO RACE VARIABLE): >60 ML/MIN/1.73 M^2
FERRITIN SERPL-MCNC: 32 NG/ML (ref 20–300)
GLUCOSE SERPL-MCNC: 107 MG/DL (ref 70–110)
HCT VFR BLD AUTO: 30.4 % (ref 40–54)
HGB BLD-MCNC: 10.4 G/DL (ref 14–18)
HYPOCHROMIA BLD QL SMEAR: ABNORMAL
IMM GRANULOCYTES # BLD AUTO: 0 K/UL (ref 0–0.04)
IMM GRANULOCYTES NFR BLD AUTO: 0 % (ref 0–0.5)
LDH SERPL L TO P-CCNC: 239 U/L (ref 110–260)
LYMPHOCYTES # BLD AUTO: 2 K/UL (ref 1–4.8)
LYMPHOCYTES NFR BLD: 41.8 % (ref 18–48)
MCH RBC QN AUTO: 26.4 PG (ref 27–31)
MCHC RBC AUTO-ENTMCNC: 34.2 G/DL (ref 32–36)
MCV RBC AUTO: 77 FL (ref 82–98)
MONOCYTES # BLD AUTO: 0.6 K/UL (ref 0.3–1)
MONOCYTES NFR BLD: 12.5 % (ref 4–15)
NEUTROPHILS # BLD AUTO: 1.7 K/UL (ref 1.8–7.7)
NEUTROPHILS NFR BLD: 33.8 % (ref 38–73)
NRBC BLD-RTO: 0 /100 WBC
PLATELET # BLD AUTO: 327 K/UL (ref 150–450)
PLATELET BLD QL SMEAR: ABNORMAL
PMV BLD AUTO: 10 FL (ref 9.2–12.9)
POIKILOCYTOSIS BLD QL SMEAR: SLIGHT
POLYCHROMASIA BLD QL SMEAR: ABNORMAL
POTASSIUM SERPL-SCNC: 3.9 MMOL/L (ref 3.5–5.1)
PROT SERPL-MCNC: 8.4 G/DL (ref 6–8.4)
RBC # BLD AUTO: 3.94 M/UL (ref 4.6–6.2)
RETICS/RBC NFR AUTO: 3.5 % (ref 0.4–2)
SICKLE CELLS BLD QL SMEAR: ABNORMAL
SODIUM SERPL-SCNC: 135 MMOL/L (ref 136–145)
TARGETS BLD QL SMEAR: ABNORMAL
WBC # BLD AUTO: 4.88 K/UL (ref 3.9–12.7)

## 2023-06-30 PROCEDURE — 99999 PR PBB SHADOW E&M-EST. PATIENT-LVL III: CPT | Mod: PBBFAC,,, | Performed by: INTERNAL MEDICINE

## 2023-06-30 PROCEDURE — 3078F PR MOST RECENT DIASTOLIC BLOOD PRESSURE < 80 MM HG: ICD-10-PCS | Mod: CPTII,S$GLB,, | Performed by: INTERNAL MEDICINE

## 2023-06-30 PROCEDURE — 99999 PR PBB SHADOW E&M-EST. PATIENT-LVL III: ICD-10-PCS | Mod: PBBFAC,,, | Performed by: INTERNAL MEDICINE

## 2023-06-30 PROCEDURE — 80053 COMPREHEN METABOLIC PANEL: CPT | Performed by: INTERNAL MEDICINE

## 2023-06-30 PROCEDURE — 85045 AUTOMATED RETICULOCYTE COUNT: CPT | Performed by: INTERNAL MEDICINE

## 2023-06-30 PROCEDURE — 36415 COLL VENOUS BLD VENIPUNCTURE: CPT | Performed by: INTERNAL MEDICINE

## 2023-06-30 PROCEDURE — 99214 PR OFFICE/OUTPT VISIT, EST, LEVL IV, 30-39 MIN: ICD-10-PCS | Mod: S$GLB,,, | Performed by: INTERNAL MEDICINE

## 2023-06-30 PROCEDURE — 3078F DIAST BP <80 MM HG: CPT | Mod: CPTII,S$GLB,, | Performed by: INTERNAL MEDICINE

## 2023-06-30 PROCEDURE — 85025 COMPLETE CBC W/AUTO DIFF WBC: CPT | Performed by: INTERNAL MEDICINE

## 2023-06-30 PROCEDURE — 83615 LACTATE (LD) (LDH) ENZYME: CPT | Performed by: INTERNAL MEDICINE

## 2023-06-30 PROCEDURE — 83521 IG LIGHT CHAINS FREE EACH: CPT | Mod: 59 | Performed by: INTERNAL MEDICINE

## 2023-06-30 PROCEDURE — 82728 ASSAY OF FERRITIN: CPT | Performed by: INTERNAL MEDICINE

## 2023-06-30 PROCEDURE — 3074F PR MOST RECENT SYSTOLIC BLOOD PRESSURE < 130 MM HG: ICD-10-PCS | Mod: CPTII,S$GLB,, | Performed by: INTERNAL MEDICINE

## 2023-06-30 PROCEDURE — 3008F BODY MASS INDEX DOCD: CPT | Mod: CPTII,S$GLB,, | Performed by: INTERNAL MEDICINE

## 2023-06-30 PROCEDURE — 3008F PR BODY MASS INDEX (BMI) DOCUMENTED: ICD-10-PCS | Mod: CPTII,S$GLB,, | Performed by: INTERNAL MEDICINE

## 2023-06-30 PROCEDURE — 99214 OFFICE O/P EST MOD 30 MIN: CPT | Mod: S$GLB,,, | Performed by: INTERNAL MEDICINE

## 2023-06-30 PROCEDURE — 3074F SYST BP LT 130 MM HG: CPT | Mod: CPTII,S$GLB,, | Performed by: INTERNAL MEDICINE

## 2023-06-30 NOTE — PROGRESS NOTES
CC: Sickle cell anemia, hematology follow up    HPI: , 27, is here for hematology follow up for sickle cell anemia. He was being followed by pediatrics until age 21. Last pain crisis was in 2016.   He has been experiencing back pain of late. Pain is intermittent, no clear precipitating or alleviating factors.   He had priapism several years ago.      He had one ER visit in October 2022 and again hospitalization for sickle pain nikolay in the same month. No clear triggers were identified for these episodes.    Interval History: He feels tired today. Denies pain. No hospitalizations or ER visits since his last visit here . He has started oxbryta in May 2023. He is tolerating it well .     Review of patient's allergies indicates:  No Known Allergies          Current Outpatient Medications   Medication Sig    acetaminophen (TYLENOL) 500 MG tablet Take 500 mg by mouth every 8 (eight) hours as needed for Pain.    calcium carbonate (TUMS) 200 mg calcium (500 mg) chewable tablet Take 2-3 tablets by mouth 2 (two) times daily as needed for Heartburn.    FLUoxetine 10 MG capsule Take 1 capsule (10 mg total) by mouth once daily.    folic acid (FOLVITE) 1 MG tablet TAKE 1 TABLET BY MOUTH EVERY DAY    HYDROcodone-acetaminophen (NORCO) 5-325 mg per tablet Take 1 tablet by mouth every 6 (six) hours as needed for Pain.    hydroxyurea (HYDREA) 500 mg Cap Take 1 capsule (500 mg total) by mouth once daily.    hydroxyurea (HYDREA) 500 mg Cap TAKE 1 CAPSULE (500 MG TOTAL) BY MOUTH ONCE DAILY.    ibuprofen (ADVIL,MOTRIN) 100 MG tablet Take 100 mg by mouth every 6 (six) hours as needed.    phenyleph-min oil-petrolatum 0.25-14-74.9 % Oint Place 1 applicator rectally 4 (four) times daily as needed (Hemorrhoid discomfort).    voxelotor 500 mg Tab Take 3 tablets (1,500 mg total) by mouth Daily.     No current facility-administered medications for this visit.         Review of Systems   Constitutional:  Positive for  malaise/fatigue. Negative for weight loss.   HENT:  Negative for ear discharge and ear pain.    Eyes:  Negative for photophobia, pain and discharge.   Respiratory:  Negative for hemoptysis, sputum production and shortness of breath.    Cardiovascular:  Negative for chest pain, palpitations, orthopnea and claudication.   Gastrointestinal:  Negative for blood in stool, constipation, diarrhea, heartburn, melena, nausea and vomiting.   Genitourinary:  Negative for dysuria, frequency, hematuria and urgency.   Musculoskeletal:  Negative for back pain, myalgias and neck pain.   Neurological:  Negative for dizziness and tremors.   Endo/Heme/Allergies:  Does not bruise/bleed easily.   Psychiatric/Behavioral:  Negative for substance abuse.      Vitals:    06/30/23 1301   BP: (!) 120/57   Pulse: 63   Resp: 16   Temp: 98.5 °F (36.9 °C)          Physical Exam  Constitutional:       Appearance: Normal appearance.   HENT:      Head: Normocephalic.   Eyes:      General: Scleral icterus present.   Cardiovascular:      Rate and Rhythm: Normal rate.      Heart sounds: Normal heart sounds. No murmur heard.  Pulmonary:      Effort: Pulmonary effort is normal. No respiratory distress.      Breath sounds: No stridor.   Abdominal:      General: There is no distension.      Palpations: There is no mass.      Tenderness: There is no abdominal tenderness.   Genitourinary:     Penis: Normal.       Testes: Normal.   Musculoskeletal:         General: No swelling or tenderness.   Lymphadenopathy:      Cervical: No cervical adenopathy.   Skin:     Coloration: Skin is not jaundiced or pale.   Neurological:      General: No focal deficit present.      Mental Status: He is alert and oriented to person, place, and time.      Cranial Nerves: No cranial nerve deficit.       2/21/22 24hr urine   Component Ref Range & Units 3 mo ago    Urine Volume mL 2900    Urine Collection Duration Hr 24    Protein, Urine 0 - 15 mg/dL <7    Urine Protein, Timed 0 - 100  mg/Spec Unable to calculate           3/3/22 DEXA scan    Impression:     *Bone density is below the expected range for age.      3/7/22 ECHO     The left ventricle is mildly enlarged with mild eccentric hypertrophy and normal systolic function.  The estimated ejection fraction is 60%.  The quantitatively derived ejection fraction is 60%.  Normal left ventricular diastolic function.  Mild right ventricular enlargement with normal right ventricular systolic function.  The estimated PA systolic pressure is 30 mmHg.  Intermediate central venous pressure (8 mmHg).     Component      Latest Ref Rng & Units 6/30/2023   Sodium      136 - 145 mmol/L 135 (L)   Potassium      3.5 - 5.1 mmol/L 3.9   Chloride      95 - 110 mmol/L 105   CO2      23 - 29 mmol/L 22 (L)   Glucose      70 - 110 mg/dL 107   BUN      6 - 20 mg/dL 4 (L)   Creatinine      0.5 - 1.4 mg/dL 0.7   Calcium      8.7 - 10.5 mg/dL 9.0   PROTEIN TOTAL      6.0 - 8.4 g/dL 8.4   Albumin      3.5 - 5.2 g/dL 4.1   BILIRUBIN TOTAL      0.1 - 1.0 mg/dL 2.5 (H)   Alkaline Phosphatase      55 - 135 U/L 107   AST      10 - 40 U/L 54 (H)   ALT      10 - 44 U/L 44   eGFR      >60 mL/min/1.73 m:2 >60.0   Anion Gap      8 - 16 mmol/L 8   WBC      3.90 - 12.70 K/uL 4.88   RBC      4.60 - 6.20 M/uL 3.94 (L)   Hemoglobin      14.0 - 18.0 g/dL 10.4 (L)   Hematocrit      40.0 - 54.0 % 30.4 (L)   MCV      82 - 98 fL 77 (L)   MCH      27.0 - 31.0 pg 26.4 (L)   MCHC      32.0 - 36.0 g/dL 34.2   RDW      11.5 - 14.5 % 18.4 (H)   Platelets      150 - 450 K/uL 327   MPV      9.2 - 12.9 fL 10.0   LD      110 - 260 U/L 239   Ferritin      20.0 - 300.0 ng/mL 32   Retic      0.4 - 2.0 % 3.5 (H)       Assessment:    1. Hemoglobin SS disease    -He has had 3-4 pain crises in 2022  -on voxeletor, tolerating it well so far     -Sickle cell checklist:    1. Annual ophtho exam:   Last done: 6/29/22  Findings:      2. Bone health: Ca++d recommended, q3 yr dexa recommended. DEXA done on 3/3/22  showed bone density below the expected range for age.  Vit D: Needs periodic monitoring    3. Screening echo for pHTN:     Last done: 3/7/22; estimated PA systolic pressure is 30 mmHg.    4. Annual UA + MicroAlb/Cr Ratio:     Last done: 24hr urine protein excretion was negligible , on 2/21/22    5. Folic acid: currently taking 1mg daily    6. Hydrea: currently taking 500 mg daily    7. Transfusions / iron : Cumulative transfusions: 2  Baseline h/h:   Ferritin: 72  ng/ml on 12/7/22    6. Pain regimen: no regular regimen, PRN Acetaminophen, NSAID  -no frequent admissions with pain crises  -Pain contract: none    7. Osteonecrosis screening: yes    8. Stem cell transplant? No    9. H/o cva? No    10. Priapism: Yes, once since last visit ( April 2022)  --education provided    10. HCV screening? Negative in 2016    11. Immunizations:     Pneumococcal. 13 then 23. 23 q5 yrs: current  COVID 19 : uptodate  H influenzae:  Annual flu: uptodate  Meningococcal:            BMT Chart Routing      Follow up with physician 3 months.   Follow up with [facility]    Provider visit type    Infusion scheduling note    Injection scheduling note    Labs CBC, CMP, reticulocytes, ferritin and iron and TIBC   Scheduling:  Preferred lab:  Lab interval:  cbc, cmp every 4 wks; cbc, cmp, retic count, iron, tibc, ferritin in 3 months   Imaging    Pharmacy appointment    Other referrals

## 2023-07-03 LAB
KAPPA LC SER QL IA: 3.44 MG/DL (ref 0.33–1.94)
KAPPA LC/LAMBDA SER IA: 1.56 (ref 0.26–1.65)
LAMBDA LC SER QL IA: 2.2 MG/DL (ref 0.57–2.63)

## 2023-07-14 ENCOUNTER — SPECIALTY PHARMACY (OUTPATIENT)
Dept: PHARMACY | Facility: CLINIC | Age: 28
End: 2023-07-14
Payer: COMMERCIAL

## 2023-07-14 NOTE — TELEPHONE ENCOUNTER
Specialty Pharmacy - Refill Coordination    Specialty Medication Orders Linked to Encounter      Flowsheet Row Most Recent Value   Medication #1 voxelotor 500 mg Tab (Order#[id number], Rx#[id number])            Refill Questions - Documented Responses      Flowsheet Row Most Recent Value   Patient Availability and HIPAA Verification    Does patient want to proceed with activity? Yes   HIPAA/medical authority confirmed? Yes   Relationship to patient of person spoken to? Self   Refill Screening Questions    Changes to allergies? No   Changes to medications? No   New conditions since last clinic visit? No   Unplanned office visit, urgent care, ED, or hospital admission in the last 4 weeks? No   How does patient/caregiver feel medication is working? Fair   Financial problems or insurance changes? No   How many doses of your specialty medications were missed in the last 4 weeks? 0   Would patient like to speak to a pharmacist? No   When does the patient need to receive the medication? 07/23/23   Refill Delivery Questions    How will the patient receive the medication? MEDRx   When does the patient need to receive the medication? 07/23/23   Shipping Address Home   Address in [facility] confirmed and updated if neccessary? Yes   Expected Copay ($) 0   Is the patient able to afford the medication copay? Yes   Payment Method zero copay   Days supply of Refill 30   Supplies needed? No supplies needed   Refill activity completed? Yes   Refill activity plan Refill scheduled   Shipment/Pickup Date: 07/19/23            Current Outpatient Medications   Medication Sig    acetaminophen (TYLENOL) 500 MG tablet Take 500 mg by mouth every 8 (eight) hours as needed for Pain.    calcium carbonate (TUMS) 200 mg calcium (500 mg) chewable tablet Take 2-3 tablets by mouth 2 (two) times daily as needed for Heartburn.    FLUoxetine 10 MG capsule Take 1 capsule (10 mg total) by mouth once daily.    folic acid (FOLVITE) 1 MG tablet TAKE 1  TABLET BY MOUTH EVERY DAY    HYDROcodone-acetaminophen (NORCO) 5-325 mg per tablet Take 1 tablet by mouth every 6 (six) hours as needed for Pain.    hydroxyurea (HYDREA) 500 mg Cap Take 1 capsule (500 mg total) by mouth once daily.    hydroxyurea (HYDREA) 500 mg Cap TAKE 1 CAPSULE (500 MG TOTAL) BY MOUTH ONCE DAILY.    ibuprofen (ADVIL,MOTRIN) 100 MG tablet Take 100 mg by mouth every 6 (six) hours as needed.    phenyleph-min oil-petrolatum 0.25-14-74.9 % Oint Place 1 applicator rectally 4 (four) times daily as needed (Hemorrhoid discomfort).    voxelotor 500 mg Tab Take 3 tablets (1,500 mg total) by mouth Daily.   Last reviewed on 4/24/2023  4:13 PM by Bel Garnica, PharmD    Review of patient's allergies indicates:  No Known Allergies Last reviewed on  6/30/2023 1:11 PM by Bartolome Minor      Tasks added this encounter   No tasks added.   Tasks due within next 3 months   10/13/2023 - Clinical Assessment (6 month recurrence)  10/13/2023 - Clinical Assessment (6 month recurrence)  7/14/2023 - Refill Coordination Outreach (1 time occurrence)     [facility]  [address] [place] [id number]  Phone: [phone]  Fax: [phone]

## 2023-08-11 ENCOUNTER — SPECIALTY PHARMACY (OUTPATIENT)
Dept: PHARMACY | Facility: CLINIC | Age: 28
End: 2023-08-11
Payer: COMMERCIAL

## 2023-08-11 NOTE — TELEPHONE ENCOUNTER
Specialty Pharmacy - Refill Coordination    Specialty Medication Orders Linked to Encounter      Flowsheet Row Most Recent Value   Medication #1 voxelotor 500 mg Tab (Order#[id number], Rx#[id number])            Refill Questions - Documented Responses      Flowsheet Row Most Recent Value   Patient Availability and HIPAA Verification    Does patient want to proceed with activity? Yes   HIPAA/medical authority confirmed? Yes   Relationship to patient of person spoken to? Self   Refill Screening Questions    Changes to allergies? No   Changes to medications? No   New conditions since last clinic visit? No   Unplanned office visit, urgent care, ED, or hospital admission in the last 4 weeks? No   How does patient/caregiver feel medication is working? Good   Financial problems or insurance changes? No   How many doses of your specialty medications were missed in the last 4 weeks? 0   Would patient like to speak to a pharmacist? No   When does the patient need to receive the medication? 08/19/23   Refill Delivery Questions    How will the patient receive the medication? MEDRx   When does the patient need to receive the medication? 08/19/23   Shipping Address Home   Address in [facility] confirmed and updated if neccessary? Yes   Expected Copay ($) 0   Is the patient able to afford the medication copay? Yes   Payment Method zero copay   Days supply of Refill 30   Supplies needed? No supplies needed   Refill activity completed? Yes   Refill activity plan Refill scheduled   Shipment/Pickup Date: 08/17/23            Current Outpatient Medications   Medication Sig    acetaminophen (TYLENOL) 500 MG tablet Take 500 mg by mouth every 8 (eight) hours as needed for Pain.    calcium carbonate (TUMS) 200 mg calcium (500 mg) chewable tablet Take 2-3 tablets by mouth 2 (two) times daily as needed for Heartburn.    FLUoxetine 10 MG capsule Take 1 capsule (10 mg total) by mouth once daily.    folic acid (FOLVITE) 1 MG tablet TAKE 1  TABLET BY MOUTH EVERY DAY    HYDROcodone-acetaminophen (NORCO) 5-325 mg per tablet Take 1 tablet by mouth every 6 (six) hours as needed for Pain.    hydroxyurea (HYDREA) 500 mg Cap Take 1 capsule (500 mg total) by mouth once daily.    hydroxyurea (HYDREA) 500 mg Cap TAKE 1 CAPSULE (500 MG TOTAL) BY MOUTH ONCE DAILY.    ibuprofen (ADVIL,MOTRIN) 100 MG tablet Take 100 mg by mouth every 6 (six) hours as needed.    phenyleph-min oil-petrolatum 0.25-14-74.9 % Oint Place 1 applicator rectally 4 (four) times daily as needed (Hemorrhoid discomfort).    voxelotor 500 mg Tab Take 3 tablets (1,500 mg total) by mouth Daily.   Last reviewed on 4/24/2023  4:13 PM by Bel Garnica, PharmD    Review of patient's allergies indicates:  No Known Allergies Last reviewed on  6/30/2023 1:11 PM by Bartolome Minor      Tasks added this encounter   No tasks added.   Tasks due within next 3 months   10/13/2023 - Clinical Assessment (6 month recurrence)  10/13/2023 - Clinical Assessment (6 month recurrence)     [facility]  [address]  [facility] [address]  Phone: [phone]  Fax: [phone]

## 2023-08-24 ENCOUNTER — HOSPITAL ENCOUNTER (INPATIENT)
Facility: HOSPITAL | Age: 28
LOS: 3 days | Discharge: HOME | DRG: 811 | End: 2023-08-27
Attending: EMERGENCY MEDICINE | Admitting: INTERNAL MEDICINE
Payer: COMMERCIAL

## 2023-08-24 DIAGNOSIS — R07.9 CHEST PAIN: ICD-10-CM

## 2023-08-24 DIAGNOSIS — R17 SCLERAL ICTERUS: ICD-10-CM

## 2023-08-24 DIAGNOSIS — R74.01 ELEVATED TRANSAMINASE LEVEL: ICD-10-CM

## 2023-08-24 DIAGNOSIS — R16.0 HEPATOMEGALY: ICD-10-CM

## 2023-08-24 DIAGNOSIS — R00.0 SINUS TACHYCARDIA: ICD-10-CM

## 2023-08-24 DIAGNOSIS — D57.00 SICKLE CELL PAIN CRISIS: ICD-10-CM

## 2023-08-24 DIAGNOSIS — D57.01 ACUTE CHEST SYNDROME: Primary | ICD-10-CM

## 2023-08-24 DIAGNOSIS — E80.6 DIRECT HYPERBILIRUBINEMIA: ICD-10-CM

## 2023-08-24 DIAGNOSIS — R50.9 FEVER: ICD-10-CM

## 2023-08-24 DIAGNOSIS — D57.00 SICKLE-CELL DISEASE WITH VASO-OCCLUSIVE PAIN: ICD-10-CM

## 2023-08-24 PROBLEM — D72.829 LEUKOCYTOSIS: Status: ACTIVE | Noted: 2023-08-24

## 2023-08-24 LAB
ABO + RH BLD: NORMAL
ALBUMIN SERPL BCP-MCNC: 3.2 G/DL (ref 3.5–5.2)
ALP SERPL-CCNC: 267 U/L (ref 55–135)
ALT SERPL W/O P-5'-P-CCNC: 54 U/L (ref 10–44)
ANION GAP SERPL CALC-SCNC: 11 MMOL/L (ref 8–16)
ANISOCYTOSIS BLD QL SMEAR: ABNORMAL
AST SERPL-CCNC: 112 U/L (ref 10–40)
BASOPHILS # BLD AUTO: 0.1 K/UL (ref 0–0.2)
BASOPHILS NFR BLD: 0.8 % (ref 0–1.9)
BILIRUB SERPL-MCNC: 13.8 MG/DL (ref 0.1–1)
BILIRUB UR QL STRIP: ABNORMAL
BLD GP AB SCN CELLS X3 SERPL QL: NORMAL
BUN SERPL-MCNC: 6 MG/DL (ref 6–20)
CALCIUM SERPL-MCNC: 9.2 MG/DL (ref 8.7–10.5)
CHLORIDE SERPL-SCNC: 100 MMOL/L (ref 95–110)
CLARITY UR REFRACT.AUTO: CLEAR
CO2 SERPL-SCNC: 25 MMOL/L (ref 23–29)
COLOR UR AUTO: ABNORMAL
CREAT SERPL-MCNC: 0.6 MG/DL (ref 0.5–1.4)
DIFFERENTIAL METHOD: ABNORMAL
EOSINOPHIL # BLD AUTO: 0.2 K/UL (ref 0–0.5)
EOSINOPHIL NFR BLD: 1.9 % (ref 0–8)
ERYTHROCYTE [DISTWIDTH] IN BLOOD BY AUTOMATED COUNT: 30.3 % (ref 11.5–14.5)
EST. GFR  (NO RACE VARIABLE): >60 ML/MIN/1.73 M^2
GLUCOSE SERPL-MCNC: 96 MG/DL (ref 70–110)
GLUCOSE UR QL STRIP: NEGATIVE
HCT VFR BLD AUTO: 20.9 % (ref 40–54)
HGB BLD-MCNC: 7.3 G/DL (ref 14–18)
HGB UR QL STRIP: ABNORMAL
HOWELL-JOLLY BOD BLD QL SMEAR: ABNORMAL
HYALINE CASTS UR QL AUTO: 1 /LPF
IMM GRANULOCYTES # BLD AUTO: 0.19 K/UL (ref 0–0.04)
IMM GRANULOCYTES NFR BLD AUTO: 1.5 % (ref 0–0.5)
INFLUENZA A, MOLECULAR: NEGATIVE
INFLUENZA B, MOLECULAR: NEGATIVE
KETONES UR QL STRIP: NEGATIVE
LACTATE SERPL-SCNC: 0.7 MMOL/L (ref 0.5–2.2)
LEUKOCYTE ESTERASE UR QL STRIP: NEGATIVE
LYMPHOCYTES # BLD AUTO: 1.7 K/UL (ref 1–4.8)
LYMPHOCYTES NFR BLD: 13.2 % (ref 18–48)
MCH RBC QN AUTO: 33.5 PG (ref 27–31)
MCHC RBC AUTO-ENTMCNC: 34.9 G/DL (ref 32–36)
MCV RBC AUTO: 96 FL (ref 82–98)
MICROSCOPIC COMMENT: NORMAL
MONOCYTES # BLD AUTO: 1.8 K/UL (ref 0.3–1)
MONOCYTES NFR BLD: 13.7 % (ref 4–15)
NEUTROPHILS # BLD AUTO: 8.9 K/UL (ref 1.8–7.7)
NEUTROPHILS NFR BLD: 68.9 % (ref 38–73)
NITRITE UR QL STRIP: NEGATIVE
NRBC BLD-RTO: 15 /100 WBC
OVALOCYTES BLD QL SMEAR: ABNORMAL
PH UR STRIP: 6 [PH] (ref 5–8)
PLATELET # BLD AUTO: 247 K/UL (ref 150–450)
PMV BLD AUTO: 10.8 FL (ref 9.2–12.9)
POIKILOCYTOSIS BLD QL SMEAR: ABNORMAL
POLYCHROMASIA BLD QL SMEAR: ABNORMAL
POTASSIUM SERPL-SCNC: 4.7 MMOL/L (ref 3.5–5.1)
PROT SERPL-MCNC: 8.8 G/DL (ref 6–8.4)
PROT UR QL STRIP: NEGATIVE
RBC # BLD AUTO: 2.18 M/UL (ref 4.6–6.2)
RBC #/AREA URNS AUTO: 1 /HPF (ref 0–4)
RETICS/RBC NFR AUTO: >23 % (ref 0.4–2)
SARS-COV-2 RDRP RESP QL NAA+PROBE: NEGATIVE
SCHISTOCYTES BLD QL SMEAR: ABNORMAL
SICKLE CELLS BLD QL SMEAR: ABNORMAL
SODIUM SERPL-SCNC: 136 MMOL/L (ref 136–145)
SP GR UR STRIP: 1.01 (ref 1–1.03)
SPECIMEN OUTDATE: NORMAL
SPECIMEN SOURCE: NORMAL
SPHEROCYTES BLD QL SMEAR: ABNORMAL
SQUAMOUS #/AREA URNS AUTO: 0 /HPF
TARGETS BLD QL SMEAR: ABNORMAL
URN SPEC COLLECT METH UR: ABNORMAL
WBC # BLD AUTO: 12.93 K/UL (ref 3.9–12.7)
WBC #/AREA URNS AUTO: 1 /HPF (ref 0–5)

## 2023-08-24 PROCEDURE — 93005 ELECTROCARDIOGRAM TRACING: CPT

## 2023-08-24 PROCEDURE — 99223 1ST HOSP IP/OBS HIGH 75: CPT | Mod: ,,, | Performed by: INTERNAL MEDICINE

## 2023-08-24 PROCEDURE — 85025 COMPLETE CBC W/AUTO DIFF WBC: CPT

## 2023-08-24 PROCEDURE — 25000003 PHARM REV CODE 250: Performed by: INTERNAL MEDICINE

## 2023-08-24 PROCEDURE — 86920 COMPATIBILITY TEST SPIN: CPT

## 2023-08-24 PROCEDURE — 63600175 PHARM REV CODE 636 W HCPCS

## 2023-08-24 PROCEDURE — 96361 HYDRATE IV INFUSION ADD-ON: CPT | Mod: 59

## 2023-08-24 PROCEDURE — 93010 EKG 12-LEAD: ICD-10-PCS | Mod: ,,, | Performed by: INTERNAL MEDICINE

## 2023-08-24 PROCEDURE — 63600175 PHARM REV CODE 636 W HCPCS: Performed by: INTERNAL MEDICINE

## 2023-08-24 PROCEDURE — 85045 AUTOMATED RETICULOCYTE COUNT: CPT

## 2023-08-24 PROCEDURE — 25000003 PHARM REV CODE 250

## 2023-08-24 PROCEDURE — U0002 COVID-19 LAB TEST NON-CDC: HCPCS

## 2023-08-24 PROCEDURE — 25000003 PHARM REV CODE 250: Performed by: EMERGENCY MEDICINE

## 2023-08-24 PROCEDURE — 94799 UNLISTED PULMONARY SVC/PX: CPT

## 2023-08-24 PROCEDURE — 99222 PR INITIAL HOSPITAL CARE,LEVL II: ICD-10-PCS | Mod: ,,, | Performed by: INTERNAL MEDICINE

## 2023-08-24 PROCEDURE — 99285 EMERGENCY DEPT VISIT HI MDM: CPT | Mod: 25

## 2023-08-24 PROCEDURE — 99223 PR INITIAL HOSPITAL CARE,LEVL III: ICD-10-PCS | Mod: ,,, | Performed by: INTERNAL MEDICINE

## 2023-08-24 PROCEDURE — 63600175 PHARM REV CODE 636 W HCPCS: Performed by: EMERGENCY MEDICINE

## 2023-08-24 PROCEDURE — 99222 1ST HOSP IP/OBS MODERATE 55: CPT | Mod: ,,, | Performed by: INTERNAL MEDICINE

## 2023-08-24 PROCEDURE — 99900035 HC TECH TIME PER 15 MIN (STAT)

## 2023-08-24 PROCEDURE — 20600001 HC STEP DOWN PRIVATE ROOM

## 2023-08-24 PROCEDURE — 80053 COMPREHEN METABOLIC PANEL: CPT

## 2023-08-24 PROCEDURE — 25500020 PHARM REV CODE 255: Performed by: EMERGENCY MEDICINE

## 2023-08-24 PROCEDURE — 87040 BLOOD CULTURE FOR BACTERIA: CPT | Mod: 59

## 2023-08-24 PROCEDURE — 86900 BLOOD TYPING SEROLOGIC ABO: CPT

## 2023-08-24 PROCEDURE — 94761 N-INVAS EAR/PLS OXIMETRY MLT: CPT

## 2023-08-24 PROCEDURE — 87502 INFLUENZA DNA AMP PROBE: CPT

## 2023-08-24 PROCEDURE — 93010 ELECTROCARDIOGRAM REPORT: CPT | Mod: ,,, | Performed by: INTERNAL MEDICINE

## 2023-08-24 PROCEDURE — 81001 URINALYSIS AUTO W/SCOPE: CPT

## 2023-08-24 PROCEDURE — 83605 ASSAY OF LACTIC ACID: CPT | Performed by: EMERGENCY MEDICINE

## 2023-08-24 PROCEDURE — 96365 THER/PROPH/DIAG IV INF INIT: CPT | Mod: 59

## 2023-08-24 PROCEDURE — 96375 TX/PRO/DX INJ NEW DRUG ADDON: CPT

## 2023-08-24 PROCEDURE — 83020 HEMOGLOBIN ELECTROPHORESIS: CPT

## 2023-08-24 RX ORDER — ENOXAPARIN SODIUM 100 MG/ML
40 INJECTION SUBCUTANEOUS EVERY 24 HOURS
Status: DISCONTINUED | OUTPATIENT
Start: 2023-08-24 | End: 2023-08-27 | Stop reason: HOSPADM

## 2023-08-24 RX ORDER — FOLIC ACID 1 MG/1
1000 TABLET ORAL DAILY
Status: DISCONTINUED | OUTPATIENT
Start: 2023-08-25 | End: 2023-08-27 | Stop reason: HOSPADM

## 2023-08-24 RX ORDER — SENNOSIDES 8.6 MG/1
8.6 TABLET ORAL DAILY
Status: DISCONTINUED | OUTPATIENT
Start: 2023-08-24 | End: 2023-08-27 | Stop reason: HOSPADM

## 2023-08-24 RX ORDER — OXYCODONE HYDROCHLORIDE 5 MG/1
5 TABLET ORAL EVERY 4 HOURS PRN
Status: DISCONTINUED | OUTPATIENT
Start: 2023-08-24 | End: 2023-08-27 | Stop reason: HOSPADM

## 2023-08-24 RX ORDER — MORPHINE SULFATE 2 MG/ML
6 INJECTION, SOLUTION INTRAMUSCULAR; INTRAVENOUS
Status: COMPLETED | OUTPATIENT
Start: 2023-08-24 | End: 2023-08-24

## 2023-08-24 RX ORDER — ONDANSETRON 8 MG/1
8 TABLET, ORALLY DISINTEGRATING ORAL EVERY 8 HOURS PRN
Status: DISCONTINUED | OUTPATIENT
Start: 2023-08-24 | End: 2023-08-27 | Stop reason: HOSPADM

## 2023-08-24 RX ORDER — OXYCODONE HYDROCHLORIDE 5 MG/1
5 TABLET ORAL EVERY 6 HOURS PRN
Status: ON HOLD | COMMUNITY
Start: 2023-08-22 | End: 2023-08-27 | Stop reason: SDUPTHER

## 2023-08-24 RX ORDER — ACETAMINOPHEN 325 MG/1
650 TABLET ORAL EVERY 8 HOURS
Status: DISCONTINUED | OUTPATIENT
Start: 2023-08-24 | End: 2023-08-24

## 2023-08-24 RX ORDER — SODIUM CHLORIDE 450 MG/100ML
INJECTION, SOLUTION INTRAVENOUS CONTINUOUS
Status: DISCONTINUED | OUTPATIENT
Start: 2023-08-24 | End: 2023-08-25

## 2023-08-24 RX ORDER — AMOXICILLIN 250 MG
1 CAPSULE ORAL 2 TIMES DAILY
Status: DISCONTINUED | OUTPATIENT
Start: 2023-08-24 | End: 2023-08-24

## 2023-08-24 RX ORDER — NALOXONE HCL 0.4 MG/ML
0.02 VIAL (ML) INJECTION
Status: CANCELLED | OUTPATIENT
Start: 2023-08-24

## 2023-08-24 RX ORDER — HYDROMORPHONE HYDROCHLORIDE 1 MG/ML
1 INJECTION, SOLUTION INTRAMUSCULAR; INTRAVENOUS; SUBCUTANEOUS
Status: DISCONTINUED | OUTPATIENT
Start: 2023-08-24 | End: 2023-08-25

## 2023-08-24 RX ORDER — ACETAMINOPHEN 325 MG/1
650 TABLET ORAL EVERY 8 HOURS PRN
Status: DISCONTINUED | OUTPATIENT
Start: 2023-08-24 | End: 2023-08-25

## 2023-08-24 RX ORDER — OXYCODONE HYDROCHLORIDE 10 MG/1
10 TABLET ORAL EVERY 6 HOURS PRN
Status: DISCONTINUED | OUTPATIENT
Start: 2023-08-24 | End: 2023-08-24

## 2023-08-24 RX ORDER — HYDROXYUREA 500 MG/1
500 CAPSULE ORAL DAILY
Status: DISCONTINUED | OUTPATIENT
Start: 2023-08-24 | End: 2023-08-27 | Stop reason: HOSPADM

## 2023-08-24 RX ORDER — SODIUM CHLORIDE 0.9 % (FLUSH) 0.9 %
10 SYRINGE (ML) INJECTION
Status: DISCONTINUED | OUTPATIENT
Start: 2023-08-24 | End: 2023-08-27 | Stop reason: HOSPADM

## 2023-08-24 RX ORDER — OXYCODONE HYDROCHLORIDE 10 MG/1
10 TABLET ORAL EVERY 4 HOURS PRN
Status: DISCONTINUED | OUTPATIENT
Start: 2023-08-24 | End: 2023-08-27 | Stop reason: HOSPADM

## 2023-08-24 RX ORDER — FLUOXETINE 10 MG/1
10 CAPSULE ORAL DAILY
Status: DISCONTINUED | OUTPATIENT
Start: 2023-08-24 | End: 2023-08-27 | Stop reason: HOSPADM

## 2023-08-24 RX ORDER — DEXTROSE MONOHYDRATE AND SODIUM CHLORIDE 5; .45 G/100ML; G/100ML
INJECTION, SOLUTION INTRAVENOUS CONTINUOUS
Status: DISCONTINUED | OUTPATIENT
Start: 2023-08-24 | End: 2023-08-24

## 2023-08-24 RX ADMIN — SODIUM CHLORIDE: 4.5 INJECTION, SOLUTION INTRAVENOUS at 02:08

## 2023-08-24 RX ADMIN — PIPERACILLIN SODIUM AND TAZOBACTAM SODIUM 4.5 G: 4; .5 INJECTION, POWDER, FOR SOLUTION INTRAVENOUS at 10:08

## 2023-08-24 RX ADMIN — HYDROMORPHONE HYDROCHLORIDE 1 MG: 1 INJECTION, SOLUTION INTRAMUSCULAR; INTRAVENOUS; SUBCUTANEOUS at 03:08

## 2023-08-24 RX ADMIN — FLUOXETINE 10 MG: 10 CAPSULE ORAL at 03:08

## 2023-08-24 RX ADMIN — SODIUM CHLORIDE: 4.5 INJECTION, SOLUTION INTRAVENOUS at 09:08

## 2023-08-24 RX ADMIN — SODIUM CHLORIDE, POTASSIUM CHLORIDE, SODIUM LACTATE AND CALCIUM CHLORIDE 1000 ML: 600; 310; 30; 20 INJECTION, SOLUTION INTRAVENOUS at 12:08

## 2023-08-24 RX ADMIN — VANCOMYCIN HYDROCHLORIDE 1000 MG: 1 INJECTION, POWDER, LYOPHILIZED, FOR SOLUTION INTRAVENOUS at 11:08

## 2023-08-24 RX ADMIN — ACETAMINOPHEN 650 MG: 325 TABLET ORAL at 03:08

## 2023-08-24 RX ADMIN — MORPHINE SULFATE 6 MG: 2 INJECTION, SOLUTION INTRAMUSCULAR; INTRAVENOUS at 08:08

## 2023-08-24 RX ADMIN — IOHEXOL 100 ML: 350 INJECTION, SOLUTION INTRAVENOUS at 11:08

## 2023-08-24 RX ADMIN — CEFEPIME 2 G: 2 INJECTION, POWDER, FOR SOLUTION INTRAVENOUS at 12:08

## 2023-08-24 RX ADMIN — SODIUM CHLORIDE 500 ML: 9 INJECTION, SOLUTION INTRAVENOUS at 08:08

## 2023-08-24 RX ADMIN — CEFEPIME 2 G: 2 INJECTION, POWDER, FOR SOLUTION INTRAVENOUS at 08:08

## 2023-08-24 RX ADMIN — SENNOSIDES 8.6 MG: 8.6 TABLET, FILM COATED ORAL at 01:08

## 2023-08-24 RX ADMIN — ENOXAPARIN SODIUM 40 MG: 40 INJECTION SUBCUTANEOUS at 05:08

## 2023-08-24 RX ADMIN — VANCOMYCIN HYDROCHLORIDE 1250 MG: 1.25 INJECTION, POWDER, LYOPHILIZED, FOR SOLUTION INTRAVENOUS at 09:08

## 2023-08-24 RX ADMIN — OXYCODONE HYDROCHLORIDE 10 MG: 5 TABLET ORAL at 01:08

## 2023-08-24 RX ADMIN — HYDROMORPHONE HYDROCHLORIDE 1 MG: 1 INJECTION, SOLUTION INTRAMUSCULAR; INTRAVENOUS; SUBCUTANEOUS at 11:08

## 2023-08-24 RX ADMIN — VANCOMYCIN HYDROCHLORIDE 750 MG: 750 INJECTION, POWDER, LYOPHILIZED, FOR SOLUTION INTRAVENOUS at 05:08

## 2023-08-24 RX ADMIN — HYDROXYUREA 500 MG: 500 CAPSULE ORAL at 01:08

## 2023-08-24 NOTE — SUBJECTIVE & OBJECTIVE
Oncology Treatment Plan:   [No matching plan found]    Medications:  Continuous Infusions:   sodium chloride 0.45%       Scheduled Meds:   ceFEPime (MAXIPIME) IVPB  2 g Intravenous Q8H    enoxparin  40 mg Subcutaneous Daily    FLUoxetine  10 mg Oral Daily    [START ON 8/25/2023] folic acid  1,000 mcg Oral Daily    hydroxyurea  500 mg Oral Daily    lactated ringers  1,000 mL Intravenous Once    senna  8.6 mg Oral Daily    vancomycin (VANCOCIN) IV (PEDS and ADULTS)  1,250 mg Intravenous Q8H    vancomycin (VANCOCIN) IV (PEDS and ADULTS)  750 mg Intravenous Once     PRN Meds:acetaminophen, HYDROmorphone, ondansetron, oxyCODONE, sodium chloride 0.9%, Pharmacy to dose Vancomycin consult **AND** vancomycin - pharmacy to dose     Review of patient's allergies indicates:  No Known Allergies     Past Medical History:   Diagnosis Date    Sickle cell anemia     Sickle cell disease      Past Surgical History:   Procedure Laterality Date    chemoport      removed    CHOLECYSTECTOMY  2002    UMBILICAL HERNIA REPAIR  1995     Family History    None       Tobacco Use    Smoking status: Never    Smokeless tobacco: Never   Substance and Sexual Activity    Alcohol use: Yes     Comment: Social drinker on weekends    Drug use: No    Sexual activity: Not Currently       Review of Systems   Constitutional:  Positive for fatigue and fever. Negative for unexpected weight change.   HENT:  Negative for congestion and dental problem.    Eyes:  Negative for photophobia and visual disturbance.   Respiratory:  Negative for cough and shortness of breath.    Cardiovascular:  Negative for chest pain and leg swelling.   Gastrointestinal:  Negative for abdominal pain and nausea.   Genitourinary:  Negative for difficulty urinating and dysuria.   Musculoskeletal:  Positive for arthralgias. Negative for back pain.   Skin:  Negative for rash and wound.   Neurological:  Negative for dizziness and headaches.   Psychiatric/Behavioral:  Negative for agitation  and behavioral problems.      Objective:     Vital Signs (Most Recent):  Temp: 99.9 °F (37.7 °C) (08/24/23 0808)  Pulse: 64 (08/24/23 1338)  Resp: (!) 25 (08/24/23 1338)  BP: 136/74 (08/24/23 1131)  SpO2: (!) 93 % (08/24/23 1338) Vital Signs (24h Range):  Temp:  [99.9 °F (37.7 °C)] 99.9 °F (37.7 °C)  Pulse:  [] 64  Resp:  [15-25] 25  SpO2:  [92 %-97 %] 93 %  BP: (133-146)/(71-80) 136/74     Weight: 88.5 kg (195 lb)  Body mass index is 26.45 kg/m².  Body surface area is 2.12 meters squared.    No intake or output data in the 24 hours ending 08/24/23 1345     Physical Exam  Vitals reviewed.   Constitutional:       General: He is not in acute distress.  HENT:      Head: Normocephalic and atraumatic.      Nose: Nose normal. No congestion.      Mouth/Throat:      Mouth: Mucous membranes are moist.      Pharynx: No oropharyngeal exudate.   Eyes:      General: Scleral icterus present.      Extraocular Movements: Extraocular movements intact.   Cardiovascular:      Rate and Rhythm: Normal rate and regular rhythm.   Pulmonary:      Effort: Pulmonary effort is normal. No respiratory distress.   Abdominal:      Palpations: Abdomen is soft.      Tenderness: There is no abdominal tenderness.   Musculoskeletal:         General: No swelling. Normal range of motion.      Cervical back: Normal range of motion and neck supple.   Skin:     General: Skin is warm and dry.   Neurological:      General: No focal deficit present.      Mental Status: He is alert and oriented to person, place, and time.   Psychiatric:         Mood and Affect: Mood normal.         Behavior: Behavior normal.          Significant Labs:   CBC:   Recent Labs   Lab 08/24/23  0846   WBC 12.93*   HGB 7.3*   HCT 20.9*       and CMP:   Recent Labs   Lab 08/24/23  0846      K 4.7      CO2 25   GLU 96   BUN 6   CREATININE 0.6   CALCIUM 9.2   PROT 8.8*   ALBUMIN 3.2*   BILITOT 13.8*   ALKPHOS 267*   *   ALT 54*   ANIONGAP 11        Diagnostic Results:  I have reviewed all pertinent imaging results/findings within the past 24 hours.

## 2023-08-24 NOTE — ASSESSMENT & PLAN NOTE
Patient is a 27 y/o male with known sickle cell anemia who presented to ED for evaluation offever, scleral icterus, and worsening pain. WBC of 12.93, Hgb of 7.3. Reticulocyte >23.0. CMP Total Bili of 13.8, alk phos 267, , ALT 54. Cr 0.6. Blood ctx x2 drawn and pending. UA + bilirubin, occult blood. Lactate 0.7. CXR shows patchy ill defined bibasilar subsegmental lung opacities, greater on the right side. CT Ab Pelvis showed bilateral patchy parenchymal consolidative opacities consistent with pneumonic infiltrates or pulmonary infarcts. Heme Consulted and following. Blood ctx pending. Covid and influenza negative.     Plan  -Following Heme Recs, no Massive Transfusion at this time  -Fluids 1/2NS 125 cc/hr, 1L LR bolus  -Pain control with Dilaudid 1mg q3hrs prn, Shantal 10mg q6hrs prn.   -Vanc and Cefepime for Abx coverage. Cefepime d/t recent hospitalization.   -Tylenol PRN for fever. <3gram total daily given liver injury.   -O2 via NC 2L.   -Continue Hydroxurea and Folic Acid  - Will cross and type and transfuse if needed (Hgb<7.0)  -Unable to continue Voxelotor as not on formulary. Will discuss about patient bringing in home medications

## 2023-08-24 NOTE — ED PROVIDER NOTES
Encounter Date: 8/24/2023       History     Chief Complaint   Patient presents with    Sickle Cell Pain Crisis    Fever     jaundice     28-year-old male presenting to the emergency department for evaluation of right hand pain, fever and generalized fatigue.  Patient reports history of sickle cell on hydroxyurea, folic acid, voxelator.  Patient reports that he was on vacation in [place] in which he required hospitalization for acute pain crisis and was discharged in Tuesday and return back home Tuesday night.  Patient reports that he began having fevers yesterday Wednesday evening and worsening pain in which he took pain medication for but reports minimal relief with oral medication.  Patient does note some dysuria, urinary frequency.  He denies any congestion, chest pain, shortness of breath, nausea, vomiting, diarrhea.  Patient reports that he has jaundice at baseline and is aware of his yellow eyes.    The history is provided by the patient.      Review of patient's allergies indicates:  No Known Allergies  Past Medical History:   Diagnosis Date    Sickle cell anemia     Sickle cell disease      Past Surgical History:   Procedure Laterality Date    chemoport      removed    CHOLECYSTECTOMY  2002    UMBILICAL HERNIA REPAIR  1995     History reviewed. No pertinent family history.  Social History     Tobacco Use    Smoking status: Never    Smokeless tobacco: Never   Substance Use Topics    Alcohol use: Yes     Comment: Social drinker on weekends    Drug use: No       Physical Exam     Initial Vitals [08/24/23 0808]   BP Pulse Resp Temp SpO2   134/80 102 20 99.9 °F (37.7 °C) (!) 92 %      MAP       --         Physical Exam    Nursing note and vitals reviewed.  Constitutional: He appears well-developed and well-nourished.   HENT:   Head: Normocephalic and atraumatic.   Eyes: EOM are normal. Pupils are equal, round, and reactive to light. Scleral icterus is present.   Neck:   Normal range of motion.  Cardiovascular:   Normal rate and regular rhythm.           Pulmonary/Chest: Breath sounds normal. He has no wheezes.   Abdominal: He exhibits no distension. There is no abdominal tenderness.   Musculoskeletal:         General: No tenderness or edema. Normal range of motion.      Cervical back: Normal range of motion.     Neurological: He is alert and oriented to person, place, and time.   Skin: Skin is warm and dry. Capillary refill takes less than 2 seconds.         ED Course   Procedures  Labs Reviewed   CBC W/ AUTO DIFFERENTIAL - Abnormal; Notable for the following components:       Result Value    WBC 12.93 (*)     RBC 2.18 (*)     Hemoglobin 7.3 (*)     Hematocrit 20.9 (*)     MCH 33.5 (*)     RDW 30.3 (*)     Immature Granulocytes 1.5 (*)     Gran # (ANC) 8.9 (*)     Immature Grans (Abs) 0.19 (*)     Mono # 1.8 (*)     nRBC 15 (*)     Lymph % 13.2 (*)     Sickle Cells Moderate (*)     All other components within normal limits   COMPREHENSIVE METABOLIC PANEL - Abnormal; Notable for the following components:    Total Protein 8.8 (*)     Albumin 3.2 (*)     Total Bilirubin 13.8 (*)     Alkaline Phosphatase 267 (*)      (*)     ALT 54 (*)     All other components within normal limits   RETICULOCYTES - Abnormal; Notable for the following components:    Retic >23.0 (*)     All other components within normal limits   URINALYSIS, REFLEX TO URINE CULTURE - Abnormal; Notable for the following components:    Bilirubin (UA) 1+ (*)     Occult Blood UA 1+ (*)     All other components within normal limits    Narrative:     Specimen Source->Urine   INFLUENZA A & B BY MOLECULAR   CULTURE, BLOOD   CULTURE, BLOOD   SARS-COV-2 RNA AMPLIFICATION, QUAL   LACTIC ACID, PLASMA   URINALYSIS MICROSCOPIC    Narrative:     Specimen Source->Urine   HEMOGLOBIN S QUANTITATION BY ELECTROPHORESIS   TYPE & SCREEN          Imaging Results              CT Abdomen Pelvis With Contrast (Final result)  Result time 08/24/23 11:34:56      Final result by Chandler  Oc DE JESUS Jr., MD (08/24/23 11:34:56)                   Impression:      Bibasilar parenchymal consolidative opacities.  Developing pneumonic infiltrates or perhaps pulmonary infarcts should be considered.    Hepatomegaly.    Additional findings above.      Electronically signed by: Oc Arteaga MD  Date:    08/24/2023  Time:    11:34               Narrative:    EXAMINATION:  CT ABDOMEN PELVIS WITH CONTRAST    CLINICAL HISTORY:  Abdominal abscess/infection suspected;    TECHNIQUE:  Low dose axial images, sagittal and coronal reformations were obtained from the lung bases to the pubic symphysis following the IV administration of 100 mL of Omnipaque 350 .  Oral contrast was not given.    COMPARISON:  CTA chest October 25, 2022    FINDINGS:  In the chest, no significant pleural or pericardial fluid.  There are bilateral patchy parenchymal consolidative opacities new compared to prior in the lower lobes.    In the abdomen, liver is enlarged 19.2 cm.  No focal hepatic mass.  Gallbladder is been removed.  Portal vein is patent.  Pancreas normal in size and contour.  Spleen is somewhat small.  No convincing hypoperfusion in the visualized spleen.  No adrenal masses.  Kidneys are normal in size and contour.  Hypodensity left lower pole kidney consistent with a cyst.  No hydronephrosis.    Aorta tapers normally.  No para-aortic adenopathy.    In the pelvis, no pelvic adenopathy.  Bladder not distended likely accounting for the wall thickening.  Prostate not enlarged.    Evaluation of the bowel demonstrates scattered stool and bowel gas.  No focal dilatation.  Scattered feces in the colon.  Appendix not confidently identified though no inflammatory changes seen.  No convincing mesenteric adenopathy.    Air subcutaneous fat on the right likely post injection.  Bones as visualized are intact.                                        X-Ray Chest AP Portable (Final result)  Result time 08/24/23 10:57:29      Final result by  Sylvester Hobbs DO (08/24/23 10:57:29)                   Impression:      Please see above      Electronically signed by: Sylvester Hobbs DO  Date:    08/24/2023  Time:    10:57               Narrative:    EXAMINATION:  XR CHEST AP PORTABLE    CLINICAL HISTORY:  Fever, unspecified    TECHNIQUE:  Single frontal view of the chest was performed.    COMPARISON:  10/25/2022    FINDINGS:  patchy ill-defined bibasilar subsegmental lung opacities slightly greater on the right while nonspecific early airspace filling process not excluded with COVID pneumonia to be included in differential.  No large pleural effusion or pneumothorax.  Clinical correlation and follow-up advised    This report was flagged in Epic as abnormal.                                       Medications   vancomycin (VANCOCIN) 1,000 mg in dextrose 5 % (D5W) 250 mL IVPB (Vial-Mate) (1,000 mg Intravenous New Bag 8/24/23 1120)   folic acid tablet 1,000 mcg (has no administration in time range)   hydroxyurea capsule 500 mg (has no administration in time range)   sodium chloride 0.9% flush 10 mL (has no administration in time range)   dextrose 5 % and 0.45 % NaCl infusion (has no administration in time range)   senna-docusate 8.6-50 mg per tablet 1 tablet (has no administration in time range)   0.45% NaCl infusion (has no administration in time range)   HYDROmorphone injection 1 mg (has no administration in time range)   acetaminophen tablet 650 mg (has no administration in time range)   ondansetron disintegrating tablet 8 mg (has no administration in time range)   acetaminophen tablet 650 mg (has no administration in time range)   vancomycin - pharmacy to dose (has no administration in time range)   ceFEPIme (MAXIPIME) 2 g in dextrose 5 % in water (D5W) 100 mL IVPB (MB+) (has no administration in time range)   sodium chloride 0.9% bolus 500 mL 500 mL (0 mLs Intravenous Stopped 8/24/23 0925)   morphine injection 6 mg (6 mg Intravenous Given 8/24/23 5852)    piperacillin-tazobactam (ZOSYN) 4.5 g in dextrose 5 % in water (D5W) 100 mL IVPB (MB+) (0 g Intravenous Stopped 8/24/23 1119)   iohexoL (OMNIPAQUE 350) injection 100 mL (100 mLs Intravenous Given 8/24/23 1107)     Medical Decision Making  28-year-old male presenting to the emergency department for evaluation of fever, fatigue and right hand pain.  Patient feels that he is having a pain crisis.  Upon arrival patient initially 91-94% on room air improved with no supplemental oxygen after pain control in which he received morphine 6 mg.  Patient's primary concern is fever concern for possible pneumonia versus URI versus UTI.  CBC, CMP obtained was notable for leukocytosis and hyperbilirubinemia.  Reviewed previous hospitalizations and outpatient visits in which he had hyperbilirubinemia primarily indirect due to sickle cell.  Patient has had cholecystitis a cholecystectomy.  Fever elevated bilirubin concern for possible acute intra-abdominal processes CT abdomen pelvis ordered.  Chest x-ray obtained with bilateral basilar opacities concerning for pneumonia versus acute chest.  Patient started on broad-spectrum antibiotics (vanc and Zosyn).  CT with no signs of acute intra-abdominal processes however notable were bilateral opacities with findings suggestive of pulmonary infarcts.  Discussed with hematology oncology who felt that patient does not require exchange transfusion at this time.  Discussed case with Hospital Medicine who will admit patient for continued care.    Amount and/or Complexity of Data Reviewed  External Data Reviewed: labs and notes.  Labs: ordered.  Radiology: ordered.    Risk  Prescription drug management.  Decision regarding hospitalization.                               Clinical Impression:   Final diagnoses:  [R50.9] Fever  [D57.01] Acute chest syndrome (Primary)  [D57.00] Sickle cell pain crisis        ED Disposition Condition    Admit Stable                Tamara Mccabe MD  Resident  08/24/23  120

## 2023-08-24 NOTE — SUBJECTIVE & OBJECTIVE
Past Medical History:   Diagnosis Date    Sickle cell anemia     Sickle cell disease        Past Surgical History:   Procedure Laterality Date    chemoport      removed    CHOLECYSTECTOMY  2002    UMBILICAL HERNIA REPAIR  1995       Review of patient's allergies indicates:  No Known Allergies    No current facility-administered medications on file prior to encounter.     Current Outpatient Medications on File Prior to Encounter   Medication Sig    acetaminophen (TYLENOL) 500 MG tablet Take 500 mg by mouth every 8 (eight) hours as needed for Pain.    calcium carbonate (TUMS) 200 mg calcium (500 mg) chewable tablet Take 2-3 tablets by mouth 2 (two) times daily as needed for Heartburn.    FLUoxetine 10 MG capsule Take 1 capsule (10 mg total) by mouth once daily.    folic acid (FOLVITE) 1 MG tablet TAKE 1 TABLET BY MOUTH EVERY DAY    HYDROcodone-acetaminophen (NORCO) 5-325 mg per tablet Take 1 tablet by mouth every 6 (six) hours as needed for Pain.    hydroxyurea (HYDREA) 500 mg Cap Take 1 capsule (500 mg total) by mouth once daily.    hydroxyurea (HYDREA) 500 mg Cap TAKE 1 CAPSULE (500 MG TOTAL) BY MOUTH ONCE DAILY.    ibuprofen (ADVIL,MOTRIN) 100 MG tablet Take 100 mg by mouth every 6 (six) hours as needed.    phenyleph-min oil-petrolatum 0.25-14-74.9 % Oint Place 1 applicator rectally 4 (four) times daily as needed (Hemorrhoid discomfort).    voxelotor 500 mg Tab Take 3 tablets (1,500 mg total) by mouth Daily.     Family History    None       Tobacco Use    Smoking status: Never    Smokeless tobacco: Never   Substance and Sexual Activity    Alcohol use: Yes     Comment: Social drinker on weekends    Drug use: No    Sexual activity: Not Currently     Review of Systems   Constitutional:  Positive for activity change, chills, fatigue and fever.   HENT: Negative.     Eyes:         Eye discoloration (Jaundice)   Respiratory:  Negative for chest tightness and shortness of breath.    Cardiovascular:  Negative for chest pain  and leg swelling.   Gastrointestinal: Negative.    Endocrine: Negative.    Genitourinary:  Positive for frequency.        Dark urine   Musculoskeletal:         Upper extremity Pain   Skin: Negative.    Allergic/Immunologic: Negative.    Neurological: Negative.    Hematological: Negative.    Psychiatric/Behavioral: Negative.       Objective:     Vital Signs (Most Recent):  Temp: 99.9 °F (37.7 °C) (08/24/23 0808)  Pulse: 78 (08/24/23 1208)  Resp: 18 (08/24/23 1303)  BP: 136/74 (08/24/23 1131)  SpO2: 95 % (08/24/23 1208) Vital Signs (24h Range):  Temp:  [99.9 °F (37.7 °C)] 99.9 °F (37.7 °C)  Pulse:  [] 78  Resp:  [15-25] 18  SpO2:  [92 %-97 %] 95 %  BP: (133-146)/(71-80) 136/74     Weight: 88.5 kg (195 lb)  Body mass index is 26.45 kg/m².     Physical Exam  Vitals and nursing note reviewed. Exam conducted with a chaperone present.   Constitutional:       General: He is not in acute distress.     Appearance: Normal appearance. He is not ill-appearing, toxic-appearing or diaphoretic.   HENT:      Head: Normocephalic.      Right Ear: Tympanic membrane normal.      Left Ear: Tympanic membrane normal.      Nose: Nose normal.      Mouth/Throat:      Mouth: Mucous membranes are moist.   Eyes:      General: Scleral icterus present.      Pupils: Pupils are equal, round, and reactive to light.   Cardiovascular:      Rate and Rhythm: Normal rate and regular rhythm.      Pulses: Normal pulses.   Pulmonary:      Effort: Pulmonary effort is normal.      Breath sounds: Normal breath sounds.   Abdominal:      General: Abdomen is flat.      Palpations: Abdomen is soft.   Musculoskeletal:         General: Tenderness present.      Cervical back: Normal range of motion.      Comments: Left bicep   Skin:     General: Skin is warm.      Capillary Refill: Capillary refill takes less than 2 seconds.   Neurological:      Mental Status: He is alert and oriented to person, place, and time. Mental status is at baseline.   Psychiatric:          Mood and Affect: Mood normal.         Behavior: Behavior normal.         Thought Content: Thought content normal.         Judgment: Judgment normal.             Significant Labs: All pertinent labs within the past 24 hours have been reviewed.    Significant Imaging: I have reviewed all pertinent imaging results/findings within the past 24 hours.

## 2023-08-24 NOTE — ED TRIAGE NOTES
Pt c/o fever x few days-tmax 101.4.  Pt also reports discolored eyes, dark urine and right arm pain.  Pt recently discharged from another facility for sickle cell crisis.   Pt states he is taking oxycodone which doesn't help pain.

## 2023-08-24 NOTE — LETTER
August 27, 2023               [facility]  [address]  [place]  [address]  Phone: [phone]  Fax: [phone] August 27, 2023     Patient: Parrish Davis   YOB: 1995       To Whom It May Concern:    Parrish Davis was admitted to the hospital on 8/24/2023  8:26 AM and discharged on 8/27/2023.  He may return to work on 8/28/2023 .  If you have any questions or concerns, please don't hesitate to call Dr. Vizcaino Nell office at [phone].      Sincerely,          Chandan Chambers,   Department of Hospital Medicine

## 2023-08-24 NOTE — CONSULTS
[facility] - Telemetry Stepdown  Hematology/Oncology  Consult Note    Patient Name: Parrish Davis  MRN: [id number]  Admission Date: 8/24/2023  Hospital Length of Stay: 0 days  Code Status: Full Code   Attending Provider: Charis Camejo MD  Consulting Provider: Isabel Langston MD  Primary Care Physician: Jovanny Douglas MD  Principal Problem:Acute chest syndrome    Inpatient consult to Hematology/Oncology  Consult performed by: Isabel Langston MD  Consult ordered by: Tamara Mccabe MD        Subjective:     HPI:  Mr. Parrish Davis is a 28 year old man with Hg SS disease (follows with Dr. Hi) who presents with fevers and bilateral arm pain. He says that symptoms started about 2 days ago. His highest fever was 101.4F. He denies SOB, chest pain, abdominal pain, nausea, diarrhea, dysuria, headaches, vision changes. He reports that he has baseline scleral icterus that has been worsening in the past few days. He does not know his sickle cell pain crises triggers. In the ED, imaging was notable for bibasilar consolidative opacities. He was initially on room air and then placed on 2L NC. Hematology was consulted for concerns of acute chest syndrome.       Oncology Treatment Plan:   [No matching plan found]    Medications:  Continuous Infusions:   sodium chloride 0.45%       Scheduled Meds:   ceFEPime (MAXIPIME) IVPB  2 g Intravenous Q8H    enoxparin  40 mg Subcutaneous Daily    FLUoxetine  10 mg Oral Daily    [START ON 8/25/2023] folic acid  1,000 mcg Oral Daily    hydroxyurea  500 mg Oral Daily    lactated ringers  1,000 mL Intravenous Once    senna  8.6 mg Oral Daily    vancomycin (VANCOCIN) IV (PEDS and ADULTS)  1,250 mg Intravenous Q8H    vancomycin (VANCOCIN) IV (PEDS and ADULTS)  750 mg Intravenous Once     PRN Meds:acetaminophen, HYDROmorphone, ondansetron, oxyCODONE, sodium chloride 0.9%, Pharmacy to dose Vancomycin consult **AND** vancomycin - pharmacy to dose     Review of patient's allergies  indicates:  No Known Allergies     Past Medical History:   Diagnosis Date    Sickle cell anemia     Sickle cell disease      Past Surgical History:   Procedure Laterality Date    chemoport      removed    CHOLECYSTECTOMY  2002    UMBILICAL HERNIA REPAIR  1995     Family History    None       Tobacco Use    Smoking status: Never    Smokeless tobacco: Never   Substance and Sexual Activity    Alcohol use: Yes     Comment: Social drinker on weekends    Drug use: No    Sexual activity: Not Currently       Review of Systems   Constitutional:  Positive for fatigue and fever. Negative for unexpected weight change.   HENT:  Negative for congestion and dental problem.    Eyes:  Negative for photophobia and visual disturbance.   Respiratory:  Negative for cough and shortness of breath.    Cardiovascular:  Negative for chest pain and leg swelling.   Gastrointestinal:  Negative for abdominal pain and nausea.   Genitourinary:  Negative for difficulty urinating and dysuria.   Musculoskeletal:  Positive for arthralgias. Negative for back pain.   Skin:  Negative for rash and wound.   Neurological:  Negative for dizziness and headaches.   Psychiatric/Behavioral:  Negative for agitation and behavioral problems.      Objective:     Vital Signs (Most Recent):  Temp: 99.9 °F (37.7 °C) (08/24/23 0808)  Pulse: 64 (08/24/23 1338)  Resp: (!) 25 (08/24/23 1338)  BP: 136/74 (08/24/23 1131)  SpO2: (!) 93 % (08/24/23 1338) Vital Signs (24h Range):  Temp:  [99.9 °F (37.7 °C)] 99.9 °F (37.7 °C)  Pulse:  [] 64  Resp:  [15-25] 25  SpO2:  [92 %-97 %] 93 %  BP: (133-146)/(71-80) 136/74     Weight: 88.5 kg (195 lb)  Body mass index is 26.45 kg/m².  Body surface area is 2.12 meters squared.    No intake or output data in the 24 hours ending 08/24/23 1345     Physical Exam  Vitals reviewed.   Constitutional:       General: He is not in acute distress.  HENT:      Head: Normocephalic and atraumatic.      Nose: Nose normal. No congestion.       Mouth/Throat:      Mouth: Mucous membranes are moist.      Pharynx: No oropharyngeal exudate.   Eyes:      General: Scleral icterus present.      Extraocular Movements: Extraocular movements intact.   Cardiovascular:      Rate and Rhythm: Normal rate and regular rhythm.   Pulmonary:      Effort: Pulmonary effort is normal. No respiratory distress.   Abdominal:      Palpations: Abdomen is soft.      Tenderness: There is no abdominal tenderness.   Musculoskeletal:         General: No swelling. Normal range of motion.      Cervical back: Normal range of motion and neck supple.   Skin:     General: Skin is warm and dry.   Neurological:      General: No focal deficit present.      Mental Status: He is alert and oriented to person, place, and time.   Psychiatric:         Mood and Affect: Mood normal.         Behavior: Behavior normal.          Significant Labs:   CBC:   Recent Labs   Lab 08/24/23  0846   WBC 12.93*   HGB 7.3*   HCT 20.9*       and CMP:   Recent Labs   Lab 08/24/23  0846      K 4.7      CO2 25   GLU 96   BUN 6   CREATININE 0.6   CALCIUM 9.2   PROT 8.8*   ALBUMIN 3.2*   BILITOT 13.8*   ALKPHOS 267*   *   ALT 54*   ANIONGAP 11       Diagnostic Results:  I have reviewed all pertinent imaging results/findings within the past 24 hours.    Assessment/Plan:     * Acute chest syndrome  Patient is a 28 year old man with Hgb SS disease who presents with fevers and bilateral upper arm pain likely due to pneumonia, triggering a sickle cell pain crises. Patient meets criteria for acute chest syndrome with new opacity seen on CXR with fever and slight increased O2 requirements, however, symptoms are likely due to viral pneumonia.     Recommendations:  - No acute indication for exchange transfusion as patient is stable on minimal oxygen and symptoms likely due to viral pneumonia. Please notify Hematology if patient develops worsening SOB, chest pain and/or oxygen requirements.  - Recommend  obtaining CT chest w/o contrast to further evaluate lungs and RIP (respiratory infection panel).  - Continue supportive care with IVF, incentive spirometer, pain control.  - Continue home meds: folic acid and hydrea. Can continue home voxeletor if patient has with him.  - Continue treatment for pneumonia per primary team.    Patient seen and discussed with attending, Dr. Cottrell.    Thank you for your consult. I will follow-up with patient. Please contact us if you have any additional questions.    Isabel Langston MD  Hematology/Oncology  [facility] - Telemetry Stepdown

## 2023-08-24 NOTE — HPI
Patient is a patient is a 28-year-old male with sickle cell disease who presented to [facility] ED d/t fever, fatigue, and extremity pain. Patient was recently traveling to [place], [place] and was hospitalized last Friday for sickle cell pain crisis; he was admitted for a couple of days and discharged with oxycodone 5mg, which did not significantly help relieve his lingering pain in his arm. Patient states that since his flight back from [place] on Tuesday he has noticed fevers, chills, night sweats, dark urine, increased urinary frequency, eye discoloration, and worsening pain in his arms in his knee. Patient denies any cough at this time.     Patient was HDS upon presentation to the [facility] ED, Pulse of 102, bp of 134/80, 02% 91-94% on room air, Resp of 20.   CBC, showed WBC of 12.93, Hgb of 7.3. Reticulocyte >23.0. CMP total bili of 13.8, alk phos 267, , ALT 54. Cr 0.6. Blood ctx x2 drawn and pending. UA + bilirubin, occult blood. Lactate 0.7. CXR shows patchy ill defined bibasilar subsegmental lung opacities, greater on the right side. CT Ab Pelvis showed bilateral patchy parenchymal consolidative opacities consistent with pneumonic infiltrates or pulmonary infarcts. Enlarged liver,19.2cm, surgical removal of gallbladder, patent portal vein, small spleen without hypoperfusion.     Patient was started on broad spectrum abx and pain relief. Hematology has been consulted and recommending admission to hospital medicine and no exchange transfusion at this time.

## 2023-08-24 NOTE — PHARMACY MED REC
"Admission Medication History     The home medication history was taken by Neli Crawford.    You may go to "Admission" then "Reconcile Home Medications" tabs to review and/or act upon these items.     The home medication list has been updated by the Pharmacy department.   Please read ALL comments highlighted in yellow.   Please address this information as you see fit.    Feel free to contact us if you have any questions or require assistance.      The medications listed below were removed from the home medication list. Please reorder if appropriate:  Patient reports no longer taking the following medication(s):  HYDROCODONE-ACETAMINOPHEN 5-325 MG    Medications listed below were obtained from: Patient/family  Current Outpatient Medications on File Prior to Encounter   Medication Sig    acetaminophen (TYLENOL) 500 MG tablet Take 500 mg by mouth every 8 (eight) hours as needed for Pain.    calcium carbonate (TUMS) 200 mg calcium (500 mg) chewable tablet Take 2-3 tablets by mouth 2 (two) times daily as needed for Heartburn.    FLUoxetine 10 MG capsule Take 1 capsule (10 mg total) by mouth once daily.    folic acid (FOLVITE) 1 MG tablet TAKE 1 TABLET BY MOUTH EVERY DAY    hydroxyurea (HYDREA) 500 mg Cap TAKE 1 CAPSULE (500 MG TOTAL) BY MOUTH ONCE DAILY.    ibuprofen (ADVIL,MOTRIN) 100 MG tablet Take 100 mg by mouth every 6 (six) hours as needed.    oxyCODONE (ROXICODONE) 5 MG immediate release tablet Take 5 mg by mouth every 6 (six) hours as needed for Pain.    voxelotor 500 mg Tab Take 3 tablets (1,500 mg total) by mouth Daily.    phenyleph-min oil-petrolatum 0.25-14-74.9 % Oint Place 1 applicator rectally 4 (four) times daily as needed (Hemorrhoid discomfort).             Neli Crawford  EXT [id number]                  .          "

## 2023-08-24 NOTE — ASSESSMENT & PLAN NOTE
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 Patient meets criteria for acute chest syndrome with new opacity seen on CXR with fever and slight increased O2 requirements, however, symptoms are likely due to viral pneumonia.     Recommendations:  - No acute indication for exchange transfusion as patient is stable on minimal oxygen and symptoms likely due to viral pneumonia. Please notify Hematology if patient develops worsening SOB, chest pain and/or oxygen requirements.  - Recommend obtaining CT chest w/o contrast to further evaluate lungs and RIP (respiratory infection panel).  - Continue supportive care with IVF, incentive spirometer, pain control.  - Continue home meds: folic acid and hydrea. Can continue home voxeletor if patient has with him.  - Continue treatment for pneumonia per primary team.

## 2023-08-24 NOTE — SUBJECTIVE & OBJECTIVE
Interval History: No Acute Overnight Events. Patient is afebrile and hemodynamically stable. Increased pain, increasing opiates as needed.       Review of Systems   Constitutional:  Negative for activity change, chills, fatigue and fever.   HENT: Negative.     Eyes:         Eye discoloration (Jaundice)   Respiratory:  Positive for chest tightness and shortness of breath.    Cardiovascular:  Positive for chest pain. Negative for leg swelling.   Gastrointestinal: Negative.    Endocrine: Negative.    Genitourinary:         Dark urine   Musculoskeletal:         Upper extremity Pain   Skin: Negative.    Allergic/Immunologic: Negative.    Neurological: Negative.    Hematological: Negative.    Psychiatric/Behavioral: Negative.       Objective:     Vital Signs (Most Recent):  Temp: 99.9 °F (37.7 °C) (08/24/23 0808)  Pulse: 78 (08/24/23 1208)  Resp: 18 (08/24/23 1303)  BP: 136/74 (08/24/23 1131)  SpO2: 95 % (08/24/23 1208) Vital Signs (24h Range):  Temp:  [99.9 °F (37.7 °C)] 99.9 °F (37.7 °C)  Pulse:  [] 78  Resp:  [15-25] 18  SpO2:  [92 %-97 %] 95 %  BP: (133-146)/(71-80) 136/74     Weight: 88.5 kg (195 lb)  Body mass index is 26.45 kg/m².  No intake or output data in the 24 hours ending 08/24/23 1337      Physical Exam  Vitals and nursing note reviewed. Exam conducted with a chaperone present.   Constitutional:       General: He is not in acute distress.     Appearance: Normal appearance. He is not ill-appearing, toxic-appearing or diaphoretic.   HENT:      Head: Normocephalic.      Right Ear: Tympanic membrane normal.      Left Ear: Tympanic membrane normal.      Nose: Nose normal.      Mouth/Throat:      Mouth: Mucous membranes are moist.   Eyes:      General: Scleral icterus present.      Pupils: Pupils are equal, round, and reactive to light.   Cardiovascular:      Rate and Rhythm: Normal rate and regular rhythm.      Pulses: Normal pulses.   Pulmonary:      Effort: Pulmonary effort is normal.      Breath sounds:  Normal breath sounds.   Abdominal:      General: Abdomen is flat.      Palpations: Abdomen is soft.   Musculoskeletal:         General: Tenderness present.      Cervical back: Normal range of motion.      Comments: Left bicep   Skin:     General: Skin is warm.      Capillary Refill: Capillary refill takes less than 2 seconds.   Neurological:      Mental Status: He is alert and oriented to person, place, and time. Mental status is at baseline.   Psychiatric:         Mood and Affect: Mood normal.         Behavior: Behavior normal.         Thought Content: Thought content normal.         Judgment: Judgment normal.             Significant Labs: All pertinent labs within the past 24 hours have been reviewed.    Significant Imaging: I have reviewed all pertinent imaging results/findings within the past 24 hours.

## 2023-08-24 NOTE — ED NOTES
Patient identifiers verified and correct for Parrish Davis  LOC: The patient is awake, alert and aware of environment with an appropriate affect, the patient is oriented x 3 and speaking appropriately.   APPEARANCE: Patient appears comfortable and in no acute distress, patient is clean and well groomed.  Jaundice eyes.   SKIN: The skin is warm and dry, color consistent with ethnicity, patient has normal skin turgor and moist mucus membranes, skin intact, no breakdown or bruising noted.   MUSCULOSKELETAL: Patient moving all extremities spontaneously, no swelling noted.  RESPIRATORY: Airway is open and patent, respirations are spontaneous, patient has a normal effort and rate, no accessory muscle.  CARDIAC: Pt placed on cardiac monitor. Patient has a normal rate and regular rhythm, no edema noted, capillary refill < 3 seconds.   GASTRO: Soft and non tender to palpation, no distention noted.  : Pt denies any pain or frequency with urination.  NEURO: Pt opens eyes spontaneously, behavior appropriate to situation, follows commands, facial expression symmetrical, bilateral hand grasp equal and even, purposeful motor response noted, normal sensation in all extremities when touched with a finger.

## 2023-08-24 NOTE — NURSING
Nurses Note -- 4 Eyes      8/24/2023   6:44 PM      Skin assessed during: Admit      [x] No Altered Skin Integrity Present    []Prevention Measures Documented      [] Yes- Altered Skin Integrity Present or Discovered   [] LDA Added if Not in Epic (Describe Wound)   [] New Altered Skin Integrity was Present on Admit and Documented in LDA   [] Wound Image Taken    Wound Care Consulted? No    Attending Nurse:  Clarita HDZ    Second RN/Staff Member:   Sherrill CONTRERAS

## 2023-08-24 NOTE — PROGRESS NOTES
"Pharmacokinetic Initial Assessment: IV Vancomycin    Assessment/Plan:    Initiate intravenous vancomycin with loading dose of 1000 mg once in EDfollowed by a maintenance dose of vancomycin 1000 mg IV every 8 hours  Desired empiric serum trough concentration is 15 to 20 mcg/mL  Draw vancomycin trough level 60 min prior to fourth dose on 8/25/23 at approximately 1030  Pharmacy will continue to follow and monitor vancomycin.      Please contact pharmacy at extension [phone] with any questions regarding this assessment.     Thank you for the consult,   Caty Pantoja, PharmD       Patient brief summary:  Parrish Davis is a 28 y.o. male initiated on antimicrobial therapy with IV Vancomycin for treatment of suspected bacteremia    Drug Allergies:   Review of patient's allergies indicates:  No Known Allergies    Actual Body Weight:   88.5 kg    Renal Function:   Estimated Creatinine Clearance: 199.4 mL/min (based on SCr of 0.6 mg/dL).,     Dialysis Method (if applicable):  N/A    CBC (last 72 hours):  Recent Labs   Lab Result Units 08/24/23  0846   WBC K/uL 12.93*   Hemoglobin g/dL 7.3*   Hematocrit % 20.9*   Platelets K/uL 247   Gran % % 68.9   Lymph % % 13.2*   Mono % % 13.7   Eosinophil % % 1.9   Basophil % % 0.8   Differential Method  Automated       Metabolic Panel (last 72 hours):  Recent Labs   Lab Result Units 08/24/23  0846 08/24/23  1041   Sodium mmol/L 136  --    Potassium mmol/L 4.7  --    Chloride mmol/L 100  --    CO2 mmol/L 25  --    Glucose mg/dL 96  --    Glucose, UA   --  Negative   BUN mg/dL 6  --    Creatinine mg/dL 0.6  --    Albumin g/dL 3.2*  --    Total Bilirubin mg/dL 13.8*  --    Alkaline Phosphatase U/L 267*  --    AST U/L 112*  --    ALT U/L 54*  --        Drug levels (last 3 results):  No results for input(s): "VANCOMYCINRA", "VANCORANDOM", "VANCOMYCINPE", "VANCOPEAK", "VANCOMYCINTR", "VANCOTROUGH" in the last 72 hours.    Microbiologic Results:  Microbiology Results (last 7 days)       " Procedure Component Value Units Date/Time    Influenza A & B by Molecular [[id number]] Collected: 08/24/23 0844    Order Status: Completed Specimen: Nasopharyngeal Swab Updated: 08/24/23 1006     Influenza A, Molecular Negative     Influenza B, Molecular Negative     Flu A & B Source NP    Blood Culture #2 **CANNOT BE ORDERED STAT** [[id number]] Collected: 08/24/23 0851    Order Status: Sent Specimen: Blood from Peripheral, Upper Arm, Right Updated: 08/24/23 0932    Blood Culture #1 **CANNOT BE ORDERED STAT** [[id number]] Collected: 08/24/23 0846    Order Status: Sent Specimen: Blood from Peripheral, Antecubital, Left Updated: 08/24/23 0932

## 2023-08-24 NOTE — HPI
Mr. Parrish Davis is a 28 year old man with Hg SS disease (follows with Dr. Hi) who presents with fevers and bilateral arm pain. He says that symptoms started about 2 days ago. His highest fever was 101.4F. He denies SOB, chest pain, abdominal pain, nausea, diarrhea, dysuria, headaches, vision changes. He reports that he has baseline scleral icterus that has been worsening in the past few days. He does not know his sickle cell pain crises triggers. In the ED, imaging was notable for bibasilar consolidative opacities. He was initially on room air and then placed on 2L NC. Hematology was consulted for concerns of acute chest syndrome.

## 2023-08-24 NOTE — PROGRESS NOTES
[facility] - Emergency Dept  Hospital Medicine  Progress Note    Patient Name: Parrish Davis  MRN: [id number]  Patient Class: IP- Inpatient   Admission Date: 8/24/2023  Length of Stay: 0 days  Attending Physician: Charis Camejo MD  Primary Care Provider: Jovanny Douglas MD        Subjective:     Principal Problem:Acute chest syndrome        HPI:  Patient is a patient is a 28-year-old male with sickle cell disease who presented to [facility] ED d/t fever, fatigue, and extremity pain. Patient was recently traveling to [place], [place] and was hospitalized last Friday for sickle cell pain crisis; he was admitted for a couple of days and discharged with oxycodone 5mg, which did not significantly help relieve his lingering pain in his arm. Patient states that since his flight back from [place] on Tuesday he has noticed fevers, chills, night sweats, dark urine, increased urinary frequency, eye discoloration, and worsening pain in his arms in his knee. Patient denies any cough at this time.     Patient was HDS upon presentation to the [facility] ED, Pulse of 102, bp of 134/80, 02% 91-94% on room air, Resp of 20.   CBC, showed WBC of 12.93, Hgb of 7.3. Reticulocyte >23.0. CMP total bili of 13.8, alk phos 267, , ALT 54. Cr 0.6. Blood ctx x2 drawn and pending. UA + bilirubin, occult blood. Lactate 0.7. CXR shows patchy ill defined bibasilar subsegmental lung opacities, greater on the right side. CT Ab Pelvis showed bilateral patchy parenchymal consolidative opacities consistent with pneumonic infiltrates or pulmonary infarcts. Enlarged liver,19.2cm, surgical removal of gallbladder, patent portal vein, small spleen without hypoperfusion.     Patient was started on broad spectrum abx and pain relief. Hematology has been consulted and recommending admission to hospital medicine and no exchange transfusion at this time.         Overview/Hospital Course:  No notes on file    Past Medical History:   Diagnosis Date    Sickle cell anemia      Sickle cell disease        Past Surgical History:   Procedure Laterality Date    chemoport      removed    CHOLECYSTECTOMY  2002    UMBILICAL HERNIA REPAIR  1995       Review of patient's allergies indicates:  No Known Allergies    No current facility-administered medications on file prior to encounter.     Current Outpatient Medications on File Prior to Encounter   Medication Sig    acetaminophen (TYLENOL) 500 MG tablet Take 500 mg by mouth every 8 (eight) hours as needed for Pain.    calcium carbonate (TUMS) 200 mg calcium (500 mg) chewable tablet Take 2-3 tablets by mouth 2 (two) times daily as needed for Heartburn.    FLUoxetine 10 MG capsule Take 1 capsule (10 mg total) by mouth once daily.    folic acid (FOLVITE) 1 MG tablet TAKE 1 TABLET BY MOUTH EVERY DAY    HYDROcodone-acetaminophen (NORCO) 5-325 mg per tablet Take 1 tablet by mouth every 6 (six) hours as needed for Pain.    hydroxyurea (HYDREA) 500 mg Cap Take 1 capsule (500 mg total) by mouth once daily.    hydroxyurea (HYDREA) 500 mg Cap TAKE 1 CAPSULE (500 MG TOTAL) BY MOUTH ONCE DAILY.    ibuprofen (ADVIL,MOTRIN) 100 MG tablet Take 100 mg by mouth every 6 (six) hours as needed.    phenyleph-min oil-petrolatum 0.25-14-74.9 % Oint Place 1 applicator rectally 4 (four) times daily as needed (Hemorrhoid discomfort).    voxelotor 500 mg Tab Take 3 tablets (1,500 mg total) by mouth Daily.     Family History    None       Tobacco Use    Smoking status: Never    Smokeless tobacco: Never   Substance and Sexual Activity    Alcohol use: Yes     Comment: Social drinker on weekends    Drug use: No    Sexual activity: Not Currently     Review of Systems   Constitutional:  Positive for activity change, chills, fatigue and fever.   HENT: Negative.     Eyes:         Eye discoloration (Jaundice)   Respiratory:  Negative for chest tightness and shortness of breath.    Cardiovascular:  Negative for chest pain and leg swelling.   Gastrointestinal:  Negative.    Endocrine: Negative.    Genitourinary:  Positive for frequency.        Dark urine   Musculoskeletal:         Upper extremity Pain   Skin: Negative.    Allergic/Immunologic: Negative.    Neurological: Negative.    Hematological: Negative.    Psychiatric/Behavioral: Negative.       Objective:     Vital Signs (Most Recent):  Temp: 99.9 °F (37.7 °C) (08/24/23 0808)  Pulse: 78 (08/24/23 1208)  Resp: 18 (08/24/23 1303)  BP: 136/74 (08/24/23 1131)  SpO2: 95 % (08/24/23 1208) Vital Signs (24h Range):  Temp:  [99.9 °F (37.7 °C)] 99.9 °F (37.7 °C)  Pulse:  [] 78  Resp:  [15-25] 18  SpO2:  [92 %-97 %] 95 %  BP: (133-146)/(71-80) 136/74     Weight: 88.5 kg (195 lb)  Body mass index is 26.45 kg/m².     Physical Exam  Vitals and nursing note reviewed. Exam conducted with a chaperone present.   Constitutional:       General: He is not in acute distress.     Appearance: Normal appearance. He is not ill-appearing, toxic-appearing or diaphoretic.   HENT:      Head: Normocephalic.      Right Ear: Tympanic membrane normal.      Left Ear: Tympanic membrane normal.      Nose: Nose normal.      Mouth/Throat:      Mouth: Mucous membranes are moist.   Eyes:      General: Scleral icterus present.      Pupils: Pupils are equal, round, and reactive to light.   Cardiovascular:      Rate and Rhythm: Normal rate and regular rhythm.      Pulses: Normal pulses.   Pulmonary:      Effort: Pulmonary effort is normal.      Breath sounds: Normal breath sounds.   Abdominal:      General: Abdomen is flat.      Palpations: Abdomen is soft.   Musculoskeletal:         General: Tenderness present.      Cervical back: Normal range of motion.      Comments: Left bicep   Skin:     General: Skin is warm.      Capillary Refill: Capillary refill takes less than 2 seconds.   Neurological:      Mental Status: He is alert and oriented to person, place, and time. Mental status is at baseline.   Psychiatric:         Mood and Affect: Mood normal.          Behavior: Behavior normal.         Thought Content: Thought content normal.         Judgment: Judgment normal.             Significant Labs: All pertinent labs within the past 24 hours have been reviewed.    Significant Imaging: I have reviewed all pertinent imaging results/findings within the past 24 hours.      Assessment/Plan:      * Acute chest syndrome  Patient is a 29 y/o male with known sickle cell anemia who presented to ED for evaluation offever, scleral icterus, and worsening pain. WBC of 12.93, Hgb of 7.3. Reticulocyte >23.0. CMP Total Bili of 13.8, alk phos 267, , ALT 54. Cr 0.6. Blood ctx x2 drawn and pending. UA + bilirubin, occult blood. Lactate 0.7. CXR shows patchy ill defined bibasilar subsegmental lung opacities, greater on the right side. CT Ab Pelvis showed bilateral patchy parenchymal consolidative opacities consistent with pneumonic infiltrates or pulmonary infarcts. Heme Consulted and following. Blood ctx pending. Covid and influenza negative.     Plan  -Following Heme Recs, no Massive Transfusion at this time  -Fluids 1/2NS 125 cc/hr, 1L LR bolus  -Pain control with Dilaudid 1mg q3hrs prn, Shantal 10mg q6hrs prn.   -Vanc and Cefepime for Abx coverage. Cefepime d/t recent hospitalization.   -Tylenol PRN for fever. <3gram total daily given liver injury.   -O2 via NC 2L.   -Continue Hydroxurea and Folic Acid  - Will cross and type and transfuse if needed (Hgb<7.0)  -Unable to continue Voxelotor as not on formulary. Will discuss about patient bringing in home medications      Hyperbilirubinemia  Direct Bili pending, will ctm      Sickle-cell disease with vaso-occlusive pain  See Acute Chest Syndrome      Current moderate episode of major depressive disorder without prior episode  Continue Home medication      Sickle cell anemia  See Acute Chest Syndrome        VTE Risk Mitigation (From admission, onward)         Ordered     enoxaparin injection 40 mg  Daily         08/24/23 1235     IP VTE  LOW RISK PATIENT  Once         08/24/23 1235     Place sequential compression device  Until discontinued         08/24/23 1203                Discharge Planning   [place]:      Code Status: Full Code   Is the patient medically ready for discharge?:     Reason for patient still in hospital (select all that apply): Treatment                     Chandan Chambers DO  Department of Hospital Medicine   [facility] - Emergency Dept

## 2023-08-24 NOTE — MEDICAL/APP STUDENT
" Hospital Medicine Student   History and Physical  [facility]  08/24/2023  1:34 PM    SUBJECTIVE:     Chief Complaint:  Fevers and arm pain    History of Present Illness:  Parrish Davis is a 28 y.o. male with a relevant medical history of sickle cell disease, asthma, and depression who presents with fevers, hematuria, and arm pain who was recently hospitalized for sickle cell pain crisis.    Patient is a pleasant young man who presents to the ED with a two day history of fevers, night sweats, dark urine, bilateral arm pain, and scleral icterus. Mom is at bedside. In ED, he is in no acute distress, states he has bilateral arm pain from hands to shoulders at about a 3/10, became a 4/10 during interview with pain felt in back and chest. States blood pressure cuff makes pain worse. Does not identify any triggering events. Symptoms began on Tuesday, he flew home from [place], [place] and started to feel feverish. His home thermometer read consistently over 100 (Tmax 101.4). Endorses night sweats in this time. Over the last two days he has noticed an increased in urinary urgency, with no dysuria or increased frequency. Notes darkening of his eyes. States he has slight eye jaundice at baseline but this is the "darkest they have been". States he feels generally fatigued but no acute weakness. Denies shortness of breath, chest pain, changes in vision, dizziness, abdominal pain, nausea, vomiting, or leg pain.     Of note, patient was recently hospitalized for sickle cell pain crisis 5 days ago from 8/18-8/22 at an outside hospital in [place]. He flew to [place] for vacation and felt 10/10 pain in legs over the course of the day. Patient was admitted and given IV dilaudid for pain management during stay, discharged with oxy 5mg. States oxy 5mg was not adequate for managing pain at home. States IV dilaudid has worked best for him in the past. Prior to this episode, his last hospitalization for sickle cell pain crisis was in " October 2022.  Patient identifies no known triggers for the pain crises. Had one episode of priapism several years ago. Is on hydroxyurea 500 mg, folic acid 1 mg, and was recently started on voxelotor 500 mg by Dr. Hi who he sees outpatient. States his pain is typically well managed outpatient by tylenol and ibuprofen.     Patient has a history of childhood asthma, does not use any medications currently. States he has not needed a rescue inhaler in many years. Mom endorses childhood hospital stays for pneumonia. Additionally, patient states he started taking fluoxetine last year for depression, reports the medication is helpful. States he has a dry rash on his forehead that is non-pruritic, does not recall when it started.       Review of Systems   Constitutional:  Positive for chills, fever and malaise/fatigue. Negative for diaphoresis and weight loss.   HENT:  Negative for congestion, ear pain, nosebleeds, sinus pain and sore throat.    Eyes:  Negative for blurred vision, double vision and photophobia.   Respiratory:  Negative for cough, hemoptysis, sputum production, shortness of breath and wheezing.    Cardiovascular:  Negative for chest pain, palpitations, orthopnea and leg swelling.   Gastrointestinal:  Positive for blood in stool (history of hemorroids) and constipation. Negative for abdominal pain, diarrhea, nausea and vomiting.   Genitourinary:  Positive for hematuria and urgency. Negative for dysuria, flank pain and frequency.   Musculoskeletal:  Positive for myalgias. Negative for joint pain.   Skin:  Positive for rash (Non pruritic rash on forehead.). Negative for itching.   Neurological:  Negative for dizziness, tremors, loss of consciousness, weakness and headaches.   Psychiatric/Behavioral:  Positive for depression. Negative for substance abuse and suicidal ideas.        HISTORY:     Past Medical History:   Diagnosis Date    Sickle cell anemia     Sickle cell disease        Past Surgical History:    Procedure Laterality Date    chemoport      removed    CHOLECYSTECTOMY      UMBILICAL HERNIA REPAIR         Family History:  Mother: T2DM, HTN  Maternal Grandmother: : COPD, T2DM, HTN, CLL    Social History     Socioeconomic History    Marital status: Single   Tobacco Use    Smoking status: Never    Smokeless tobacco: Never   Substance and Sexual Activity    Alcohol use: Yes     Comment: Social drinker on weekends    Drug use: No    Sexual activity: Not Currently     Patient lives with two roommates and two cats. Mother lives in [place], has family support close by. Patient works at a non-profit in the city. Hobbies include video games and eating out. Patient has smoked cigarettes and marijuana, with no regularity. Most recently smoked marijuana last Thursday. States he drinks socially. Has a remote history of depression on fluoxetine.    MEDICATIONS & ALLERGIES:     No current facility-administered medications on file prior to encounter.     Current Outpatient Medications on File Prior to Encounter   Medication Sig Dispense Refill    acetaminophen (TYLENOL) 500 MG tablet Take 500 mg by mouth every 8 (eight) hours as needed for Pain.      calcium carbonate (TUMS) 200 mg calcium (500 mg) chewable tablet Take 2-3 tablets by mouth 2 (two) times daily as needed for Heartburn.      FLUoxetine 10 MG capsule Take 1 capsule (10 mg total) by mouth once daily. 90 capsule 3    folic acid (FOLVITE) 1 MG tablet TAKE 1 TABLET BY MOUTH EVERY DAY 90 tablet 3    HYDROcodone-acetaminophen (NORCO) 5-325 mg per tablet Take 1 tablet by mouth every 6 (six) hours as needed for Pain. 60 tablet 0    hydroxyurea (HYDREA) 500 mg Cap Take 1 capsule (500 mg total) by mouth once daily. 90 each 3    hydroxyurea (HYDREA) 500 mg Cap TAKE 1 CAPSULE (500 MG TOTAL) BY MOUTH ONCE DAILY. 30 capsule 5    ibuprofen (ADVIL,MOTRIN) 100 MG tablet Take 100 mg by mouth every 6 (six) hours as needed.      phenyleph-min oil-petrolatum  0.25-14-74.9 % Oint Place 1 applicator rectally 4 (four) times daily as needed (Hemorrhoid discomfort). 56 g 1    voxelotor 500 mg Tab Take 3 tablets (1,500 mg total) by mouth Daily. 90 tablet 3     Review of patient's allergies indicates:  No Known Allergies    OBJECTIVE:     Vital Signs Recent:  Temp: 99.9 °F (37.7 °C) (08/24/23 0808)  Pulse: 78 (08/24/23 1208)  Resp: 18 (08/24/23 1303)  BP: 136/74 (08/24/23 1131)  SpO2: 95 % (08/24/23 1208)  Oxygen Documentation:                Device (Oxygen Therapy): room air         Vital Signs Range (Last 24H):  Temp:  [99.9 °F (37.7 °C)]   Pulse:  []   Resp:  [15-25]   BP: (133-146)/(71-80)   SpO2:  [92 %-97 %]        Weight:  Body mass index is 26.45 kg/m².  Wt Readings from Last 3 Encounters:   08/24/23 88.5 kg (195 lb)   06/30/23 89.6 kg (197 lb 8.5 oz)   03/31/23 88 kg (194 lb 1.8 oz)        Physical Exam  Vitals and nursing note reviewed.   Constitutional:       Appearance: He is normal weight. He is not ill-appearing.   HENT:      Mouth/Throat:      Mouth: Mucous membranes are moist.   Eyes:      General: Scleral icterus present.      Pupils: Pupils are equal, round, and reactive to light.   Cardiovascular:      Rate and Rhythm: Regular rhythm. Tachycardia present.      Pulses:           Radial pulses are 2+ on the right side and 2+ on the left side.        Dorsalis pedis pulses are 2+ on the right side and 2+ on the left side.      Heart sounds: Normal heart sounds. No murmur heard.  Pulmonary:      Effort: Pulmonary effort is normal. No respiratory distress.      Breath sounds: Normal breath sounds. No wheezing.   Abdominal:      General: Abdomen is flat.      Palpations: Abdomen is soft.      Tenderness: There is abdominal tenderness (Slight tenderness in LUQ and suprapubic area). There is no guarding or rebound.   Musculoskeletal:      Right upper arm: Tenderness present. No swelling.      Left upper arm: Tenderness present. No swelling.      Right lower leg:  No edema.      Left lower leg: No edema.   Skin:     General: Skin is warm and dry.      Capillary Refill: Capillary refill takes less than 2 seconds.      Findings: Rash (Dry rash on forehead. No erythema noted) present.   Neurological:      Mental Status: He is alert and oriented to person, place, and time.   Psychiatric:         Mood and Affect: Mood normal.         Behavior: Behavior normal.       Sodium (mmol/L)   Date Value   08/24/2023 136   06/30/2023 135 (L)   03/31/2023 140     Potassium (mmol/L)   Date Value   08/24/2023 4.7   06/30/2023 3.9   03/31/2023 3.6     Chloride (mmol/L)   Date Value   08/24/2023 100   06/30/2023 105   03/31/2023 109     CO2 (mmol/L)   Date Value   08/24/2023 25   06/30/2023 22 (L)   03/31/2023 22 (L)     BUN (mg/dL)   Date Value   08/24/2023 6   06/30/2023 4 (L)   03/31/2023 7     Creatinine (mg/dL)   Date Value   08/24/2023 0.6   06/30/2023 0.7   03/31/2023 0.7     Glucose (mg/dL)   Date Value   08/24/2023 96   06/30/2023 107   03/31/2023 103     Calcium (mg/dL)   Date Value   08/24/2023 9.2   06/30/2023 9.0   03/31/2023 9.1     Magnesium (mg/dL)   Date Value   10/23/2022 1.7   10/22/2022 1.8   10/21/2022 1.8     Phosphorus (mg/dL)   Date Value   10/27/2022 3.8   10/26/2022 3.7   10/25/2022 4.5     Alkaline Phosphatase (U/L)   Date Value   08/24/2023 267 (H)   06/30/2023 107   03/31/2023 134     ALT (U/L)   Date Value   08/24/2023 54 (H)   06/30/2023 44   03/31/2023 41     AST (U/L)   Date Value   08/24/2023 112 (H)   06/30/2023 54 (H)   03/31/2023 62 (H)     Albumin (g/dL)   Date Value   08/24/2023 3.2 (L)   06/30/2023 4.1   03/31/2023 4.1     Total Protein (g/dL)   Date Value   08/24/2023 8.8 (H)   06/30/2023 8.4   03/31/2023 8.2     Total Bilirubin (mg/dL)   Date Value   08/24/2023 13.8 (H)   06/30/2023 2.5 (H)   03/31/2023 7.8 (H)     INR (no units)   Date Value   10/20/2022 1.3 (H)       WBC (K/uL)   Date Value   08/24/2023 12.93 (H)   06/30/2023 4.88   03/31/2023 7.40      Hemoglobin (g/dL)   Date Value   08/24/2023 7.3 (L)   06/30/2023 10.4 (L)   03/31/2023 7.5 (L)     Platelets (K/uL)   Date Value   08/24/2023 247   06/30/2023 327   03/31/2023 259       Diagnostic Results:  CXR:  Patchy bibasilar lung opacities greater on right than left.  CT Abdo pelvis:   Lungs: Bilateral patchy parenchymal consolidative opacities in lower lobes.  Abdomen: Enlarged liver 19.2 cm. Spleen small. Cyst in left lower kidney  Pelvis: no acute findings    ASSESSMENT -- PLAN:   Parrish Davis is a 28 y.o. male with a relevant medical history of sickle cell disease, asthma, and depression who presents with fevers, hematuria, and arm pain for Acute chest syndrome.    Active Hospital Problems    Diagnosis  POA    *Acute chest syndrome [D57.01]  Unknown    Sickle-cell disease with vaso-occlusive pain [D57.00]  Yes    Current moderate episode of major depressive disorder without prior episode [F32.1]  Yes    Sickle cell anemia [D57.1]  Yes      Resolved Hospital Problems    Diagnosis Date Resolved POA    Sickle cell disease [D57.1] 08/24/2023 Yes      Acute chest syndrome   Patent with known sickle cell anemia and recent history of sickle cell pain crisis prior to ED presentation. In ED patient has 3/10 arm pain, scleral icterus, and fevers. Leukocytosis of 12.93, HGB of 7.3, Reticulocyte >23, Total bilirubin of 13.8, alk phos 267, , ALT 54 consistent with sickle cell crisis. Patient 88% on RA, 94% on 2L oxygen. CXR shows patchy ill-defined lung opacities. CT of abdo/pelvis shows a bilateral patchy parenchymal consolidative opacities in lung bases. Possible acute chest syndrome vs. Viral pneumonia vs. Bacterial pneumonia. Likely acute chest syndrome 2/2 to viral pneumonia. COVID and flu negative. Less likely bacterial, no cough, no sputum, no consolidations.     PLAN:  - CT chest to investigate pneumonia  - Broad viral panel   - BC to rule out bacteremia  - Cefepime and vanc for broad spectrum  coverage  - Continue home hydroxyurea, folic acid  - Benign heme following, appreciating recs  - Plasma exchange not indicated yet per heme  - Home Voxelotor not available, okay to hold per heme  - On 2 L oxygen, goal sats 94%, titrate as needed  - Dilaudid 1mg q3h prn for pain  - oxycodone 10 mg q6h for pain  - Cross and match, transfuse if hemoglobin is <7.0      Depression   Patient with history of depression. Denies any thoughts of self-harm.  PLAN:  -Continue home fluoxetine 10mg  -Consider psych consult for support with chronic pain     DVT Prophylaxis   Plan:  -Enoxaparin injection    Discharge planning:   [place]: 8/27  Disposition: Home  Code status: Full

## 2023-08-24 NOTE — CLINICAL REVIEW
IP Sepsis Screen (most recent)       Sepsis Screen (IP) - 08/24/23 1786       Is the patient's history or complaint suggestive of a possible infection? Yes  -DD    Are there at least two of the following signs and symptoms present? Yes  -DD    Sepsis signs/symptoms - Tachypnea Tachypnea     >20  -DD    Sepsis signs/symptoms - WBC WBC < 4,000 or WBC > 12,000  -DD    Are any of the following organ dysfunction criteria present and not considered to be due to a chronic condition? Yes  -DD    Organ Dysfunction Criteria Total Bilirubin > 2.0 Platelet count < 100,000  -DD    Initiate Sepsis Protocol No  -DD    Reason sepsis not considered Pt. receiving appropriate management  -DD              User Key  (r) = Recorded By, (t) = Taken By, (c) = Cosigned By      Initials Name    Ministerio Todd RN

## 2023-08-25 LAB
ALBUMIN SERPL BCP-MCNC: 2.8 G/DL (ref 3.5–5.2)
ALP SERPL-CCNC: 281 U/L (ref 55–135)
ALT SERPL W/O P-5'-P-CCNC: 48 U/L (ref 10–44)
ANION GAP SERPL CALC-SCNC: 6 MMOL/L (ref 8–16)
AST SERPL-CCNC: 66 U/L (ref 10–40)
BASOPHILS # BLD AUTO: 0.1 K/UL (ref 0–0.2)
BASOPHILS NFR BLD: 0.8 % (ref 0–1.9)
BILIRUB DIRECT SERPL-MCNC: 5.7 MG/DL (ref 0.1–0.3)
BILIRUB SERPL-MCNC: 8.7 MG/DL (ref 0.1–1)
BUN SERPL-MCNC: 6 MG/DL (ref 6–20)
CALCIUM SERPL-MCNC: 8.7 MG/DL (ref 8.7–10.5)
CHLORIDE SERPL-SCNC: 99 MMOL/L (ref 95–110)
CO2 SERPL-SCNC: 26 MMOL/L (ref 23–29)
CREAT SERPL-MCNC: 0.6 MG/DL (ref 0.5–1.4)
DIFFERENTIAL METHOD: ABNORMAL
EOSINOPHIL # BLD AUTO: 0.3 K/UL (ref 0–0.5)
EOSINOPHIL NFR BLD: 2.6 % (ref 0–8)
ERYTHROCYTE [DISTWIDTH] IN BLOOD BY AUTOMATED COUNT: 27.7 % (ref 11.5–14.5)
EST. GFR  (NO RACE VARIABLE): >60 ML/MIN/1.73 M^2
GLUCOSE SERPL-MCNC: 114 MG/DL (ref 70–110)
HCT VFR BLD AUTO: 19.1 % (ref 40–54)
HGB BLD-MCNC: 6.6 G/DL (ref 14–18)
IMM GRANULOCYTES # BLD AUTO: 0.17 K/UL (ref 0–0.04)
IMM GRANULOCYTES NFR BLD AUTO: 1.4 % (ref 0–0.5)
LYMPHOCYTES # BLD AUTO: 1.7 K/UL (ref 1–4.8)
LYMPHOCYTES NFR BLD: 13.6 % (ref 18–48)
MAGNESIUM SERPL-MCNC: 1.7 MG/DL (ref 1.6–2.6)
MCH RBC QN AUTO: 33.5 PG (ref 27–31)
MCHC RBC AUTO-ENTMCNC: 34.6 G/DL (ref 32–36)
MCV RBC AUTO: 97 FL (ref 82–98)
MONOCYTES # BLD AUTO: 1.4 K/UL (ref 0.3–1)
MONOCYTES NFR BLD: 11 % (ref 4–15)
NEUTROPHILS # BLD AUTO: 8.9 K/UL (ref 1.8–7.7)
NEUTROPHILS NFR BLD: 70.6 % (ref 38–73)
NRBC BLD-RTO: 6 /100 WBC
PHOSPHATE SERPL-MCNC: 2.9 MG/DL (ref 2.7–4.5)
PLATELET # BLD AUTO: 267 K/UL (ref 150–450)
PMV BLD AUTO: 10.8 FL (ref 9.2–12.9)
POTASSIUM SERPL-SCNC: 3.1 MMOL/L (ref 3.5–5.1)
PROT SERPL-MCNC: 7.3 G/DL (ref 6–8.4)
RBC # BLD AUTO: 1.97 M/UL (ref 4.6–6.2)
SODIUM SERPL-SCNC: 131 MMOL/L (ref 136–145)
VANCOMYCIN TROUGH SERPL-MCNC: 6.7 UG/ML (ref 10–22)
WBC # BLD AUTO: 12.58 K/UL (ref 3.9–12.7)

## 2023-08-25 PROCEDURE — 25000003 PHARM REV CODE 250

## 2023-08-25 PROCEDURE — 83735 ASSAY OF MAGNESIUM: CPT

## 2023-08-25 PROCEDURE — 99233 SBSQ HOSP IP/OBS HIGH 50: CPT | Mod: ,,, | Performed by: INTERNAL MEDICINE

## 2023-08-25 PROCEDURE — 85025 COMPLETE CBC W/AUTO DIFF WBC: CPT

## 2023-08-25 PROCEDURE — 84100 ASSAY OF PHOSPHORUS: CPT

## 2023-08-25 PROCEDURE — 20600001 HC STEP DOWN PRIVATE ROOM

## 2023-08-25 PROCEDURE — 93010 EKG 12-LEAD: ICD-10-PCS | Mod: ,,, | Performed by: INTERNAL MEDICINE

## 2023-08-25 PROCEDURE — 93005 ELECTROCARDIOGRAM TRACING: CPT

## 2023-08-25 PROCEDURE — 80053 COMPREHEN METABOLIC PANEL: CPT

## 2023-08-25 PROCEDURE — 80202 ASSAY OF VANCOMYCIN: CPT | Performed by: INTERNAL MEDICINE

## 2023-08-25 PROCEDURE — 63600175 PHARM REV CODE 636 W HCPCS: Performed by: INTERNAL MEDICINE

## 2023-08-25 PROCEDURE — 36415 COLL VENOUS BLD VENIPUNCTURE: CPT | Performed by: EMERGENCY MEDICINE

## 2023-08-25 PROCEDURE — 25000003 PHARM REV CODE 250: Performed by: INTERNAL MEDICINE

## 2023-08-25 PROCEDURE — 99233 PR SUBSEQUENT HOSPITAL CARE,LEVL III: ICD-10-PCS | Mod: ,,, | Performed by: INTERNAL MEDICINE

## 2023-08-25 PROCEDURE — 93010 ELECTROCARDIOGRAM REPORT: CPT | Mod: ,,, | Performed by: INTERNAL MEDICINE

## 2023-08-25 PROCEDURE — 82248 BILIRUBIN DIRECT: CPT | Performed by: EMERGENCY MEDICINE

## 2023-08-25 PROCEDURE — 36415 COLL VENOUS BLD VENIPUNCTURE: CPT | Performed by: INTERNAL MEDICINE

## 2023-08-25 PROCEDURE — 63600175 PHARM REV CODE 636 W HCPCS

## 2023-08-25 RX ORDER — ACETAMINOPHEN 325 MG/1
650 TABLET ORAL 3 TIMES DAILY
Status: DISCONTINUED | OUTPATIENT
Start: 2023-08-25 | End: 2023-08-26

## 2023-08-25 RX ORDER — HYDROCODONE BITARTRATE AND ACETAMINOPHEN 500; 5 MG/1; MG/1
TABLET ORAL
Status: DISCONTINUED | OUTPATIENT
Start: 2023-08-25 | End: 2023-08-25

## 2023-08-25 RX ORDER — IBUPROFEN 400 MG/1
400 TABLET ORAL EVERY 6 HOURS PRN
Status: DISCONTINUED | OUTPATIENT
Start: 2023-08-25 | End: 2023-08-26

## 2023-08-25 RX ORDER — SODIUM CHLORIDE 9 MG/ML
INJECTION, SOLUTION INTRAVENOUS CONTINUOUS
Status: ACTIVE | OUTPATIENT
Start: 2023-08-25 | End: 2023-08-26

## 2023-08-25 RX ORDER — HYDROMORPHONE HYDROCHLORIDE 2 MG/ML
2 INJECTION, SOLUTION INTRAMUSCULAR; INTRAVENOUS; SUBCUTANEOUS
Status: DISCONTINUED | OUTPATIENT
Start: 2023-08-25 | End: 2023-08-25

## 2023-08-25 RX ORDER — HYDROMORPHONE HYDROCHLORIDE 2 MG/ML
2 INJECTION, SOLUTION INTRAMUSCULAR; INTRAVENOUS; SUBCUTANEOUS
Status: ACTIVE | OUTPATIENT
Start: 2023-08-25 | End: 2023-08-25

## 2023-08-25 RX ADMIN — CEFEPIME 2 G: 2 INJECTION, POWDER, FOR SOLUTION INTRAVENOUS at 09:08

## 2023-08-25 RX ADMIN — CEFEPIME 2 G: 2 INJECTION, POWDER, FOR SOLUTION INTRAVENOUS at 01:08

## 2023-08-25 RX ADMIN — CEFEPIME 2 G: 2 INJECTION, POWDER, FOR SOLUTION INTRAVENOUS at 06:08

## 2023-08-25 RX ADMIN — SODIUM CHLORIDE 500 ML: 9 INJECTION, SOLUTION INTRAVENOUS at 04:08

## 2023-08-25 RX ADMIN — ONDANSETRON 8 MG: 8 TABLET, ORALLY DISINTEGRATING ORAL at 09:08

## 2023-08-25 RX ADMIN — SENNOSIDES 8.6 MG: 8.6 TABLET, FILM COATED ORAL at 09:08

## 2023-08-25 RX ADMIN — VANCOMYCIN HYDROCHLORIDE 500 MG: 500 INJECTION, POWDER, LYOPHILIZED, FOR SOLUTION INTRAVENOUS at 05:08

## 2023-08-25 RX ADMIN — ACETAMINOPHEN 650 MG: 325 TABLET ORAL at 09:08

## 2023-08-25 RX ADMIN — POTASSIUM BICARBONATE 40 MEQ: 391 TABLET, EFFERVESCENT ORAL at 10:08

## 2023-08-25 RX ADMIN — VANCOMYCIN HYDROCHLORIDE 1250 MG: 1.25 INJECTION, POWDER, LYOPHILIZED, FOR SOLUTION INTRAVENOUS at 01:08

## 2023-08-25 RX ADMIN — OXYCODONE HYDROCHLORIDE 5 MG: 5 TABLET ORAL at 09:08

## 2023-08-25 RX ADMIN — ACETAMINOPHEN 650 MG: 325 TABLET ORAL at 01:08

## 2023-08-25 RX ADMIN — OXYCODONE HYDROCHLORIDE 10 MG: 10 TABLET ORAL at 10:08

## 2023-08-25 RX ADMIN — SODIUM CHLORIDE: 4.5 INJECTION, SOLUTION INTRAVENOUS at 06:08

## 2023-08-25 RX ADMIN — OXYCODONE HYDROCHLORIDE 10 MG: 10 TABLET ORAL at 01:08

## 2023-08-25 RX ADMIN — SODIUM CHLORIDE: 9 INJECTION, SOLUTION INTRAVENOUS at 10:08

## 2023-08-25 RX ADMIN — OXYCODONE HYDROCHLORIDE 10 MG: 10 TABLET ORAL at 06:08

## 2023-08-25 RX ADMIN — VANCOMYCIN HYDROCHLORIDE 1250 MG: 1.25 INJECTION, POWDER, LYOPHILIZED, FOR SOLUTION INTRAVENOUS at 04:08

## 2023-08-25 RX ADMIN — FLUOXETINE 10 MG: 10 CAPSULE ORAL at 09:08

## 2023-08-25 RX ADMIN — FOLIC ACID 1000 MCG: 1 TABLET ORAL at 09:08

## 2023-08-25 RX ADMIN — VANCOMYCIN HYDROCHLORIDE 1750 MG: 500 INJECTION, POWDER, LYOPHILIZED, FOR SOLUTION INTRAVENOUS at 09:08

## 2023-08-25 RX ADMIN — HYDROMORPHONE HYDROCHLORIDE 1 MG: 1 INJECTION, SOLUTION INTRAMUSCULAR; INTRAVENOUS; SUBCUTANEOUS at 04:08

## 2023-08-25 RX ADMIN — HYDROXYUREA 500 MG: 500 CAPSULE ORAL at 09:08

## 2023-08-25 RX ADMIN — ENOXAPARIN SODIUM 40 MG: 40 INJECTION SUBCUTANEOUS at 05:08

## 2023-08-25 NOTE — CARE UPDATE
I have seen Parrish Davis, reviewed the Resident Physician's history and physical, assessment and plan. I have personally interviewed and examined the patient at bedside. Supportive mother at the bedside on my rounds.     H&P by resident physician mis-filed, attestation to accompany resident H&P once it is filed.     Date of service 8/24/23    Briefly, this is a 28 y.o. man with sickle cell anemia HbSS, prior childhood hospitalizations with simple transfusions, recent hospitalization for 4 days in [place], [place] for vaso-occlusive crisis while on vacation, here with vaso-occlusive crisis and pneumonia. Retic >23, WBC elevated, LFTs elevated, bilirubin 13 consisted with sickle cell crisis. Jaundiced on exam. Lower likelihood for active acute chest syndrome per Hematology consultant, agree with CT chest non-contrast for formal evaluation. Continuing home hydroxyurea, aggressive supportive care, pain management. Achieving good pain control with current regimen. Discussed IS use, ambulation. All questions answered.    I have personally queried medical records from regional hospitals in [place], but none have [employer] EMR. Will request records via case management.         Complexity: high    Disposition: home    Patient needs to remain hospitalized for 4-5 days due to treatment of acute vaso occlusive crisis in sickle cell disease    Estimated Discharge Date: 8/29/23      Charis Camejo MD, MPH, FACP  Senior Physician, Department of Hospital Medicine  [facility]  [address], [place], [place]

## 2023-08-25 NOTE — ASSESSMENT & PLAN NOTE
Patient is a 29 y/o male with known sickle cell anemia who presented to ED for evaluation offever, scleral icterus, and worsening pain. WBC of 12.93, Hgb of 7.3. Reticulocyte >23.0. CMP Total Bili of 13.8, alk phos 267, , ALT 54. Cr 0.6. Blood ctx x2 drawn and pending. UA + bilirubin, occult blood. Lactate 0.7. CXR shows patchy ill defined bibasilar subsegmental lung opacities, greater on the right side. CT Ab Pelvis showed bilateral patchy parenchymal consolidative opacities consistent with pneumonic infiltrates or pulmonary infarcts. Heme Consulted and following. Blood ctx pending. Covid and influenza negative.     Plan  -Following Heme Recs, no Massive Transfusion at this time  -Fluids  cc/hr d/t mild hyponatremia likely from 1/2 NS IVMF yesterday   -Pain control with Dilaudid, increasing to 2mg q3hrs prn, Shantal 10mg q6hrs prn, Tylenol 650 TID.  -EKG for complains of chest pain and pressure. NSR, no ischemic changes   -Vanc and Cefepime for Abx coverage. Cefepime d/t recent hospitalization.    -Ibuprofen PRN for fever  -O2 via NC 2L.   -Continue Hydroxurea and Folic Acid  -Unable to continue Voxelotor as not on formulary. Will discuss about patient bringing in home medications

## 2023-08-25 NOTE — PROGRESS NOTES
Pharmacokinetic Assessment Follow Up: IV Vancomycin    Vancomycin serum concentration assessment(s):    -Vancomycin trough 6.7 below goal of 15 - 20 for PNA with acute chest syndrome  -Will increase vancomycin to 1750 mg IVPB Q8H (20 mg/kg, 88 kg)  -Next trough 08@6 @ 1300 prior to 4th dose of new regimen  -Scr stable at 0.6    Drug levels (last 3 results):  Recent Labs   Lab Result Units 08/25/23  1203   Vancomycin-Trough ug/mL 6.7*       Pharmacy will continue to follow and monitor vancomycin.    Please contact pharmacy at extension [phone] for questions regarding this assessment.    Thank you for the consult,   Liang Hoskins       Patient brief summary:  Parrish Davis is a 28 y.o. male initiated on antimicrobial therapy with IV Vancomycin for treatment of lower respiratory infection    The patient's current regimen is vancomycin 1250 mg IVPB Q8H    Drug Allergies:   Review of patient's allergies indicates:  No Known Allergies    Actual Body Weight:   88 kg    Renal Function:   Estimated Creatinine Clearance: 199.4 mL/min (based on SCr of 0.6 mg/dL).,     Dialysis Method (if applicable):  N/A    CBC (last 72 hours):  Recent Labs   Lab Result Units 08/24/23  0846 08/25/23  0542   WBC K/uL 12.93* 12.58   Hemoglobin g/dL 7.3* 6.6*   Hematocrit % 20.9* 19.1*   Platelets K/uL 247 267   Gran % % 68.9 70.6   Lymph % % 13.2* 13.6*   Mono % % 13.7 11.0   Eosinophil % % 1.9 2.6   Basophil % % 0.8 0.8   Differential Method  Automated Automated       Metabolic Panel (last 72 hours):  Recent Labs   Lab Result Units 08/24/23  0846 08/24/23  1041 08/25/23  0542   Sodium mmol/L 136  --  131*   Potassium mmol/L 4.7  --  3.1*   Chloride mmol/L 100  --  99   CO2 mmol/L 25  --  26   Glucose mg/dL 96  --  114*   Glucose, UA   --  Negative  --    BUN mg/dL 6  --  6   Creatinine mg/dL 0.6  --  0.6   Albumin g/dL 3.2*  --  2.8*   Total Bilirubin mg/dL 13.8*  --  8.7*   Alkaline Phosphatase U/L 267*  --  281*   AST U/L 112*  --  66*    ALT U/L 54*  --  48*   Magnesium mg/dL  --   --  1.7   Phosphorus mg/dL  --   --  2.9       Vancomycin Administrations:  vancomycin given in the last 96 hours                     vancomycin 1,250 mg in dextrose 5 % (D5W) 250 mL IVPB (Vial-Mate) (mg) 1,250 mg New Bag 08/25/23 1353     1,250 mg New Bag  0428     1,250 mg New Bag 08/24/23 2125    vancomycin 750 mg in dextrose 5 % (D5W) 250 mL IVPB (Vial-Mate) (mg) 750 mg New Bag 08/24/23 1711    vancomycin (VANCOCIN) 1,000 mg in dextrose 5 % (D5W) 250 mL IVPB (Vial-Mate) (mg) 1,000 mg New Bag 08/24/23 1120                    Microbiologic Results:  Microbiology Results (last 7 days)       Procedure Component Value Units Date/Time    Blood Culture #1 **CANNOT BE ORDERED STAT** [[id number]] Collected: 08/24/23 0846    Order Status: Completed Specimen: Blood from Peripheral, Antecubital, Left Updated: 08/25/23 1022     Blood Culture, Routine No Growth to date      No Growth to date    Blood Culture #2 **CANNOT BE ORDERED STAT** [[id number]] Collected: 08/24/23 0851    Order Status: Completed Specimen: Blood from Peripheral, Upper Arm, Right Updated: 08/25/23 1022     Blood Culture, Routine No Growth to date      No Growth to date    Respiratory Infection Panel (PCR), Nasopharyngeal [[id number]]     Order Status: No result Specimen: Nasopharyngeal Swab     Influenza A & B by Molecular [[id number]] Collected: 08/24/23 0844    Order Status: Completed Specimen: Nasopharyngeal Swab Updated: 08/24/23 1006     Influenza A, Molecular Negative     Influenza B, Molecular Negative     Flu A & B Source NP

## 2023-08-25 NOTE — PROGRESS NOTES
[facility] - Telemetry [facility] Medicine  Progress Note    Patient Name: Parrish Daivs  MRN: [id number]  Patient Class: IP- Inpatient   Admission Date: 8/24/2023  Length of Stay: 1 days  Attending Physician: Charis Camejo MD  Primary Care Provider: Jovanny Douglas MD        Subjective:     Principal Problem:Acute chest syndrome        HPI:  Patient is a patient is a 28-year-old male with sickle cell disease who presented to [facility] ED d/t fever, fatigue, and extremity pain. Patient was recently traveling to [place], [place] and was hospitalized last Friday for sickle cell pain crisis; he was admitted for a couple of days and discharged with oxycodone 5mg, which did not significantly help relieve his lingering pain in his arm. Patient states that since his flight back from [place] on Tuesday he has noticed fevers, chills, night sweats, dark urine, increased urinary frequency, eye discoloration, and worsening pain in his arms in his knee. Patient denies any cough at this time.     Patient was HDS upon presentation to the [facility] ED, Pulse of 102, bp of 134/80, 02% 91-94% on room air, Resp of 20.   CBC, showed WBC of 12.93, Hgb of 7.3. Reticulocyte >23.0. CMP total bili of 13.8, alk phos 267, , ALT 54. Cr 0.6. Blood ctx x2 drawn and pending. UA + bilirubin, occult blood. Lactate 0.7. CXR shows patchy ill defined bibasilar subsegmental lung opacities, greater on the right side. CT Ab Pelvis showed bilateral patchy parenchymal consolidative opacities consistent with pneumonic infiltrates or pulmonary infarcts. Enlarged liver,19.2cm, surgical removal of gallbladder, patent portal vein, small spleen without hypoperfusion.     Patient was started on broad spectrum abx and pain relief. Hematology has been consulted and recommending admission to hospital medicine and no exchange transfusion at this time.         Overview/Hospital Course:  Patient admitted to medicine for management of his sickle cell pain crisis and  acute chest syndrome. Hematology was consulted.  Patient was started on multimodal pain regimen now consists of Tylenol, p.o. opiates, IV opiates.  Patient has also complained of pain in his extremities, and heating pads were used.  Will titrate accordingly. Patient complained of chest pain today.  EKG showed normal sinus rhythm, no ST abnormalities.  Will continue monitor.  Patient's hemoglobin 6.6, down from 7.3 mat admission, likely dilutional anemia in nature.  Patient's other labs have continued to improve. Switching from 1/2 NS to normal NS d/t mild hyponatremia. Will continue fluids, O2, pain management. Continuing IV Abx at this time. RIP panel pending. COVID/Flu negative.      Interval History: No Acute Overnight Events. Patient is afebrile and hemodynamically stable. Increased pain, increasing opiates as needed.       Review of Systems   Constitutional:  Negative for activity change, chills, fatigue and fever.   HENT: Negative.     Eyes:         Eye discoloration (Jaundice)   Respiratory:  Positive for chest tightness and shortness of breath.    Cardiovascular:  Positive for chest pain. Negative for leg swelling.   Gastrointestinal: Negative.    Endocrine: Negative.    Genitourinary:         Dark urine   Musculoskeletal:         Upper extremity Pain   Skin: Negative.    Allergic/Immunologic: Negative.    Neurological: Negative.    Hematological: Negative.    Psychiatric/Behavioral: Negative.       Objective:     Vital Signs (Most Recent):  Temp: 99.9 °F (37.7 °C) (08/24/23 0808)  Pulse: 78 (08/24/23 1208)  Resp: 18 (08/24/23 1303)  BP: 136/74 (08/24/23 1131)  SpO2: 95 % (08/24/23 1208) Vital Signs (24h Range):  Temp:  [99.9 °F (37.7 °C)] 99.9 °F (37.7 °C)  Pulse:  [] 78  Resp:  [15-25] 18  SpO2:  [92 %-97 %] 95 %  BP: (133-146)/(71-80) 136/74     Weight: 88.5 kg (195 lb)  Body mass index is 26.45 kg/m².  No intake or output data in the 24 hours ending 08/24/23 1337      Physical Exam  Vitals and  nursing note reviewed. Exam conducted with a chaperone present.   Constitutional:       General: He is not in acute distress.     Appearance: Normal appearance. He is not ill-appearing, toxic-appearing or diaphoretic.   HENT:      Head: Normocephalic.      Right Ear: Tympanic membrane normal.      Left Ear: Tympanic membrane normal.      Nose: Nose normal.      Mouth/Throat:      Mouth: Mucous membranes are moist.   Eyes:      General: Scleral icterus present.      Pupils: Pupils are equal, round, and reactive to light.   Cardiovascular:      Rate and Rhythm: Normal rate and regular rhythm.      Pulses: Normal pulses.   Pulmonary:      Effort: Pulmonary effort is normal.      Breath sounds: Normal breath sounds.   Abdominal:      General: Abdomen is flat.      Palpations: Abdomen is soft.   Musculoskeletal:         General: Tenderness present.      Cervical back: Normal range of motion.      Comments: Left bicep   Skin:     General: Skin is warm.      Capillary Refill: Capillary refill takes less than 2 seconds.   Neurological:      Mental Status: He is alert and oriented to person, place, and time. Mental status is at baseline.   Psychiatric:         Mood and Affect: Mood normal.         Behavior: Behavior normal.         Thought Content: Thought content normal.         Judgment: Judgment normal.             Significant Labs: All pertinent labs within the past 24 hours have been reviewed.    Significant Imaging: I have reviewed all pertinent imaging results/findings within the past 24 hours.      Assessment/Plan:      * Acute chest syndrome  Patient is a 27 y/o male with known sickle cell anemia who presented to ED for evaluation offever, scleral icterus, and worsening pain. WBC of 12.93, Hgb of 7.3. Reticulocyte >23.0. CMP Total Bili of 13.8, alk phos 267, , ALT 54. Cr 0.6. Blood ctx x2 drawn and pending. UA + bilirubin, occult blood. Lactate 0.7. CXR shows patchy ill defined bibasilar subsegmental lung  opacities, greater on the right side. CT Ab Pelvis showed bilateral patchy parenchymal consolidative opacities consistent with pneumonic infiltrates or pulmonary infarcts. Heme Consulted and following. Blood ctx pending. Covid and influenza negative.     Plan  -Following Heme Recs, no Massive Transfusion at this time  -Fluids  cc/hr d/t mild hyponatremia likely from 1/2 NS IVMF yesterday   -Pain control with Dilaudid, increasing to 2mg q3hrs prn, Shantal 10mg q6hrs prn, Tylenol 650 TID.  -EKG for complains of chest pain and pressure. NSR, no ischemic changes   -Vanc and Cefepime for Abx coverage. Cefepime d/t recent hospitalization.    -Ibuprofen PRN for fever  -O2 via NC 2L.   -Continue Hydroxurea and Folic Acid  -Unable to continue Voxelotor as not on formulary. Will discuss about patient bringing in home medications      Elevated transaminase level  Trending down from admission will CTM      Leukocytosis  Trending down from admission, will CTM      Direct hyperbilirubinemia  Bilirubin trending down 8.7 today (13.8 on admission) , Direct Bilirubin 5.7. Alk Phos 281 (267 on admission). AST/ALT trending down from admission.     Plan  GGT ordered for tomorrow AM       Sickle-cell disease with vaso-occlusive pain  See Acute Chest Syndrome      Current moderate episode of major depressive disorder without prior episode  Continue Home medication      Sickle cell anemia  See Acute Chest Syndrome        VTE Risk Mitigation (From admission, onward)         Ordered     enoxaparin injection 40 mg  Daily         08/24/23 1235     IP VTE LOW RISK PATIENT  Once         08/24/23 1235     Place sequential compression device  Until discontinued         08/24/23 1203                Discharge Planning   [place]: 8/29/2023     Code Status: Full Code   Is the patient medically ready for discharge?: No    Reason for patient still in hospital (select all that apply): Treatment  Discharge Plan A: Home                  Chandan Chambers  DO  Department of Hospital Medicine   [facility] - Telemetry Stepdown

## 2023-08-25 NOTE — HOSPITAL COURSE
Patient admitted to medicine for management of his sickle cell pain crisis and acute chest syndrome. Hematology was consulted.  Patient was started on multimodal pain regimen consisting of Tylenol 650 TID, p.o. opiates, IV opiates, and heating pads were used. We are titrating pain medications accordingly. Patient required as high as 2mg Dilaudid q3 PRN for 1-2 days, titrated down, and has been without IV pain medications for 24 hours prior to admission. Given Shantal 5mg PRN 7 day script at [place].    Patient complained of chest pain the other day but workup was negative.  EKG showed normal sinus rhythm, no ST abnormalities. Fever of 101 documented. Patient denies symptoms or knowledge of fever. Blood ctx x2, CXR and UA reordered. CXR showed improvement compared to admission. No focal abcesses. UA +1 bili, otherwise unremarkable, No GTD on initial blood ctx. RIP drawn yesterday negative. De-escalating Vanc. Continued Cefepime. Transitioned to PO Abx for continued CAP coverage outpatient.    Transaminitis has elevated but stable since admission. Bilirubin has improved since admission but also still elevated at discharge. Direct bilirubin obtained and shows direct hyperbilinrubinemia. GGT ordered and elevated. Liver ultrasound ordered and shows hepatomegaly, 18.9cm. No focal lesions, otherwise unremarkable study. Outpatient hepatology follow up at discharge.    Social work will attempt to reschedule patient's Hematology appointment (8/25, missed d/t hospitalization) and schedule a PCP appointment the Monday after discharge.

## 2023-08-25 NOTE — ASSESSMENT & PLAN NOTE
Bilirubin trending down 8.7 today (13.8 on admission) , Direct Bilirubin 5.7. Alk Phos 281 (267 on admission). AST/ALT trending down from admission.     Plan  GGT ordered for tomorrow AM

## 2023-08-25 NOTE — PLAN OF CARE
Pt AAOx4, c/o of pain on his chest, Dilaudid and Oxycodone given PRN per pt request with + outcome. Pt is on 2L NC. 0.45 NS infusing @125ml/hr. VSS, no acute distress noted. Cont to monitor.    Problem: Adult Inpatient Plan of Care  Goal: Plan of Care Review  Outcome: Ongoing, Progressing  Goal: Absence of Hospital-Acquired Illness or Injury  Outcome: Ongoing, Progressing  Goal: Optimal Comfort and Wellbeing  Outcome: Ongoing, Progressing  Goal: Readiness for Transition of Care  Outcome: Ongoing, Progressing

## 2023-08-25 NOTE — PLAN OF CARE
[facility] - Telemetry Stepdown  Initial Discharge Assessment       Primary Care Provider: Jovanny Douglas MD    Admission Diagnosis: Sinus tachycardia [R00.0]  Fever [R50.9]  Sickle cell pain crisis [D57.00]  Acute chest syndrome [D57.01]    Admission Date: 8/24/2023  Expected Discharge Date: 8/29/2023    Transition of Care Barriers: None    Payor: BLUE CROSS BLUE SHIELD / Plan: BLUE CONNECT / Product Type: HMO /     Extended Emergency Contact Information  Primary Emergency Contact: Roz Davis   [facility]  Home Phone: [phone]  Relation: Mother    Discharge Plan A: Home  Discharge Plan B: Home with family      [id number] - [place], [place] - [address]  [address]  [place] [address]  Phone: [phone] Fax: [phone]      Initial Assessment (most recent)       Adult Discharge Assessment - 08/25/23 1153          Discharge Assessment    Assessment Type Discharge Planning Assessment     Confirmed/corrected address, phone number and insurance Yes     Confirmed Demographics Correct on Facesheet     Source of Information patient     Communicated [place] with patient/caregiver Yes     People in Home --   2 roommates    Do you expect to return to your current living situation? Yes     Current cognitive status: Alert/Oriented     Walking or Climbing Stairs --   Independent at baseline    Dressing/Bathing --   Independent at baseline    Equipment Currently Used at Home none     Readmission within 30 days? No     Patient currently being followed by outpatient case management? No     Do you currently have service(s) that help you manage your care at home? No     Do you take prescription medications? Yes     Do you have prescription coverage? Yes     Do you have any problems affording any of your prescribed medications? No     Is the patient taking medications as prescribed? yes     Who is going to help you get home at discharge? Friend or family     How do you get to  doctors appointments? car, drives self     Are you on dialysis? No     Do you take coumadin? No     DME Needed Upon Discharge  none     Discharge Plan discussed with: Patient     Transition of Care Barriers None     Discharge Plan A Home     Discharge Plan B Home with family        Financial Resource Strain    How hard is it for you to pay for the very basics like food, housing, medical care, and heating? Not very hard        Housing Stability    In the last 12 months, was there a time when you were not able to pay the mortgage or rent on time? No     In the last 12 months, was there a time when you did not have a steady place to sleep or slept in a shelter (including now)? No        Transportation Needs    In the past 12 months, has lack of transportation kept you from medical appointments or from getting medications? No     In the past 12 months, has lack of transportation kept you from meetings, work, or from getting things needed for daily living? No        Food Insecurity    Within the past 12 months, you worried that your food would run out before you got the money to buy more. Never true     Within the past 12 months, the food you bought just didn't last and you didn't have money to get more. Never true        Stress    Do you feel stress - tense, restless, nervous, or anxious, or unable to sleep at night because your mind is troubled all the time - these days? Only a little        Social Connections    In a typical week, how many times do you talk on the phone with family, friends, or neighbors? Once a week     How often do you get together with friends or relatives? Once a week     How often do you attend Denominational or Latter day services? Never     Do you belong to any clubs or organizations such as Denominational groups, unions, fraternal or athletic groups, or school groups? No     How often do you attend meetings of the clubs or organizations you belong to? Never     Are you , , , ,  never , or living with a partner? Never         Alcohol Use    Q1: How often do you have a drink containing alcohol? 2-4 times a month     Q2: How many drinks containing alcohol do you have on a typical day when you are drinking? 3 or 4     Q3: How often do you have six or more drinks on one occasion? Less than monthly                 Shena Rdz LCSW  [facility]  Ext. [phone]

## 2023-08-26 PROBLEM — R16.0 HEPATOMEGALY: Status: ACTIVE | Noted: 2023-08-26

## 2023-08-26 PROBLEM — J18.9 PNEUMONIA OF BOTH LOWER LOBES DUE TO INFECTIOUS ORGANISM: Status: ACTIVE | Noted: 2023-08-24

## 2023-08-26 LAB
ADENOVIRUS: NOT DETECTED
ALBUMIN SERPL BCP-MCNC: 2.7 G/DL (ref 3.5–5.2)
ALP SERPL-CCNC: 291 U/L (ref 55–135)
ALT SERPL W/O P-5'-P-CCNC: 58 U/L (ref 10–44)
ANION GAP SERPL CALC-SCNC: 8 MMOL/L (ref 8–16)
AST SERPL-CCNC: 77 U/L (ref 10–40)
BASOPHILS # BLD AUTO: 0.11 K/UL (ref 0–0.2)
BASOPHILS NFR BLD: 1.1 % (ref 0–1.9)
BILIRUB SERPL-MCNC: 8 MG/DL (ref 0.1–1)
BILIRUB UR QL STRIP: ABNORMAL
BORDETELLA PARAPERTUSSIS (IS1001): NOT DETECTED
BORDETELLA PERTUSSIS (PTXP): NOT DETECTED
BUN SERPL-MCNC: 5 MG/DL (ref 6–20)
CALCIUM SERPL-MCNC: 8.8 MG/DL (ref 8.7–10.5)
CHLAMYDIA PNEUMONIAE: NOT DETECTED
CHLORIDE SERPL-SCNC: 101 MMOL/L (ref 95–110)
CLARITY UR REFRACT.AUTO: CLEAR
CO2 SERPL-SCNC: 26 MMOL/L (ref 23–29)
COLOR UR AUTO: ABNORMAL
CORONAVIRUS 229E, COMMON COLD VIRUS: NOT DETECTED
CORONAVIRUS HKU1, COMMON COLD VIRUS: NOT DETECTED
CORONAVIRUS NL63, COMMON COLD VIRUS: NOT DETECTED
CORONAVIRUS OC43, COMMON COLD VIRUS: NOT DETECTED
CREAT SERPL-MCNC: 0.6 MG/DL (ref 0.5–1.4)
DIFFERENTIAL METHOD: ABNORMAL
EOSINOPHIL # BLD AUTO: 0.3 K/UL (ref 0–0.5)
EOSINOPHIL NFR BLD: 3.3 % (ref 0–8)
ERYTHROCYTE [DISTWIDTH] IN BLOOD BY AUTOMATED COUNT: 26.3 % (ref 11.5–14.5)
EST. GFR  (NO RACE VARIABLE): >60 ML/MIN/1.73 M^2
FLUBV RNA NPH QL NAA+NON-PROBE: NOT DETECTED
GGT SERPL-CCNC: 345 U/L (ref 8–55)
GLUCOSE SERPL-MCNC: 100 MG/DL (ref 70–110)
GLUCOSE UR QL STRIP: NEGATIVE
HCT VFR BLD AUTO: 19 % (ref 40–54)
HGB BLD-MCNC: 6.6 G/DL (ref 14–18)
HGB UR QL STRIP: NEGATIVE
HPIV1 RNA NPH QL NAA+NON-PROBE: NOT DETECTED
HPIV2 RNA NPH QL NAA+NON-PROBE: NOT DETECTED
HPIV3 RNA NPH QL NAA+NON-PROBE: NOT DETECTED
HPIV4 RNA NPH QL NAA+NON-PROBE: NOT DETECTED
HUMAN METAPNEUMOVIRUS: NOT DETECTED
IMM GRANULOCYTES # BLD AUTO: 0.09 K/UL (ref 0–0.04)
IMM GRANULOCYTES NFR BLD AUTO: 0.9 % (ref 0–0.5)
INFLUENZA A (SUBTYPES H1,H1-2009,H3): NOT DETECTED
KETONES UR QL STRIP: NEGATIVE
LEUKOCYTE ESTERASE UR QL STRIP: NEGATIVE
LYMPHOCYTES # BLD AUTO: 1.8 K/UL (ref 1–4.8)
LYMPHOCYTES NFR BLD: 17.4 % (ref 18–48)
MAGNESIUM SERPL-MCNC: 1.8 MG/DL (ref 1.6–2.6)
MCH RBC QN AUTO: 33.8 PG (ref 27–31)
MCHC RBC AUTO-ENTMCNC: 34.7 G/DL (ref 32–36)
MCV RBC AUTO: 97 FL (ref 82–98)
MONOCYTES # BLD AUTO: 1.4 K/UL (ref 0.3–1)
MONOCYTES NFR BLD: 13.3 % (ref 4–15)
MYCOPLASMA PNEUMONIAE: NOT DETECTED
NEUTROPHILS # BLD AUTO: 6.7 K/UL (ref 1.8–7.7)
NEUTROPHILS NFR BLD: 64 % (ref 38–73)
NITRITE UR QL STRIP: NEGATIVE
NRBC BLD-RTO: 3 /100 WBC
PH UR STRIP: 7 [PH] (ref 5–8)
PHOSPHATE SERPL-MCNC: 3.5 MG/DL (ref 2.7–4.5)
PLATELET # BLD AUTO: 278 K/UL (ref 150–450)
PMV BLD AUTO: 10.5 FL (ref 9.2–12.9)
POTASSIUM SERPL-SCNC: 3.5 MMOL/L (ref 3.5–5.1)
PROT SERPL-MCNC: 7.1 G/DL (ref 6–8.4)
PROT UR QL STRIP: NEGATIVE
RBC # BLD AUTO: 1.95 M/UL (ref 4.6–6.2)
RESPIRATORY INFECTION PANEL SOURCE: NORMAL
RSV RNA NPH QL NAA+NON-PROBE: NOT DETECTED
RV+EV RNA NPH QL NAA+NON-PROBE: NOT DETECTED
SARS-COV-2 RNA RESP QL NAA+PROBE: NOT DETECTED
SODIUM SERPL-SCNC: 135 MMOL/L (ref 136–145)
SP GR UR STRIP: 1.01 (ref 1–1.03)
URN SPEC COLLECT METH UR: ABNORMAL
WBC # BLD AUTO: 10.42 K/UL (ref 3.9–12.7)

## 2023-08-26 PROCEDURE — 87798 DETECT AGENT NOS DNA AMP: CPT

## 2023-08-26 PROCEDURE — 25000003 PHARM REV CODE 250

## 2023-08-26 PROCEDURE — 87040 BLOOD CULTURE FOR BACTERIA: CPT | Mod: 59

## 2023-08-26 PROCEDURE — 20600001 HC STEP DOWN PRIVATE ROOM

## 2023-08-26 PROCEDURE — 36415 COLL VENOUS BLD VENIPUNCTURE: CPT

## 2023-08-26 PROCEDURE — 81003 URINALYSIS AUTO W/O SCOPE: CPT

## 2023-08-26 PROCEDURE — 83735 ASSAY OF MAGNESIUM: CPT

## 2023-08-26 PROCEDURE — 99233 SBSQ HOSP IP/OBS HIGH 50: CPT | Mod: ,,, | Performed by: INTERNAL MEDICINE

## 2023-08-26 PROCEDURE — 63600175 PHARM REV CODE 636 W HCPCS

## 2023-08-26 PROCEDURE — 84100 ASSAY OF PHOSPHORUS: CPT

## 2023-08-26 PROCEDURE — 82977 ASSAY OF GGT: CPT

## 2023-08-26 PROCEDURE — 80053 COMPREHEN METABOLIC PANEL: CPT

## 2023-08-26 PROCEDURE — 63600175 PHARM REV CODE 636 W HCPCS: Performed by: INTERNAL MEDICINE

## 2023-08-26 PROCEDURE — 99233 PR SUBSEQUENT HOSPITAL CARE,LEVL III: ICD-10-PCS | Mod: ,,, | Performed by: INTERNAL MEDICINE

## 2023-08-26 PROCEDURE — 25000003 PHARM REV CODE 250: Performed by: INTERNAL MEDICINE

## 2023-08-26 PROCEDURE — 85025 COMPLETE CBC W/AUTO DIFF WBC: CPT

## 2023-08-26 RX ORDER — ACETAMINOPHEN 325 MG/1
650 TABLET ORAL EVERY 6 HOURS PRN
Status: DISCONTINUED | OUTPATIENT
Start: 2023-08-26 | End: 2023-08-27 | Stop reason: HOSPADM

## 2023-08-26 RX ORDER — HYDROMORPHONE HYDROCHLORIDE 1 MG/ML
1 INJECTION, SOLUTION INTRAMUSCULAR; INTRAVENOUS; SUBCUTANEOUS
Status: DISCONTINUED | OUTPATIENT
Start: 2023-08-26 | End: 2023-08-27 | Stop reason: HOSPADM

## 2023-08-26 RX ORDER — SODIUM CHLORIDE 9 MG/ML
INJECTION, SOLUTION INTRAVENOUS CONTINUOUS
Status: ACTIVE | OUTPATIENT
Start: 2023-08-26 | End: 2023-08-27

## 2023-08-26 RX ADMIN — CEFEPIME 2 G: 2 INJECTION, POWDER, FOR SOLUTION INTRAVENOUS at 05:08

## 2023-08-26 RX ADMIN — SODIUM CHLORIDE: 9 INJECTION, SOLUTION INTRAVENOUS at 09:08

## 2023-08-26 RX ADMIN — SODIUM CHLORIDE: 9 INJECTION, SOLUTION INTRAVENOUS at 02:08

## 2023-08-26 RX ADMIN — OXYCODONE HYDROCHLORIDE 10 MG: 10 TABLET ORAL at 10:08

## 2023-08-26 RX ADMIN — HYDROXYUREA 500 MG: 500 CAPSULE ORAL at 08:08

## 2023-08-26 RX ADMIN — OXYCODONE HYDROCHLORIDE 10 MG: 10 TABLET ORAL at 02:08

## 2023-08-26 RX ADMIN — ACETAMINOPHEN 650 MG: 325 TABLET ORAL at 08:08

## 2023-08-26 RX ADMIN — VANCOMYCIN HYDROCHLORIDE 1750 MG: 500 INJECTION, POWDER, LYOPHILIZED, FOR SOLUTION INTRAVENOUS at 06:08

## 2023-08-26 RX ADMIN — FOLIC ACID 1000 MCG: 1 TABLET ORAL at 08:08

## 2023-08-26 RX ADMIN — SODIUM CHLORIDE: 9 INJECTION, SOLUTION INTRAVENOUS at 08:08

## 2023-08-26 RX ADMIN — CEFEPIME 2 G: 2 INJECTION, POWDER, FOR SOLUTION INTRAVENOUS at 01:08

## 2023-08-26 RX ADMIN — ENOXAPARIN SODIUM 40 MG: 40 INJECTION SUBCUTANEOUS at 04:08

## 2023-08-26 RX ADMIN — SENNOSIDES 8.6 MG: 8.6 TABLET, FILM COATED ORAL at 08:08

## 2023-08-26 RX ADMIN — CEFEPIME 2 G: 2 INJECTION, POWDER, FOR SOLUTION INTRAVENOUS at 09:08

## 2023-08-26 RX ADMIN — FLUOXETINE 10 MG: 10 CAPSULE ORAL at 08:08

## 2023-08-26 NOTE — PLAN OF CARE
Problem: Adult Inpatient Plan of Care  Goal: Plan of Care Review  Outcome: Ongoing, Progressing  Goal: Patient-Specific Goal (Individualized)  Outcome: Ongoing, Progressing  Goal: Absence of Hospital-Acquired Illness or Injury  Outcome: Ongoing, Progressing  Goal: Optimal Comfort and Wellbeing  Outcome: Ongoing, Progressing  Goal: Readiness for Transition of Care  Outcome: Ongoing, Progressing   Pt laying in bed resting quietly resp even and unlabored no distress noted at this time pt arouses easily to voice stimuli safety measures maintained.

## 2023-08-26 NOTE — PROGRESS NOTES
[facility] - Telemetry [facility] Medicine  Progress Note    Patient Name: Parrish Davis  MRN: [id number]  Patient Class: IP- Inpatient   Admission Date: 8/24/2023  Length of Stay: 2 days  Attending Physician: Charis Camejo MD  Primary Care Provider: Jovanny Douglas MD        Subjective:     Principal Problem:Sickle-cell disease with vaso-occlusive pain        HPI:  Patient is a patient is a 28-year-old male with sickle cell disease who [place] [facility] [place]/t fever, fatigue, and extremity pain. Patient was recently traveling to [place], [place] and was hospitalized last Friday for sickle cell pain crisis; he was admitted for a couple of days and discharged with oxycodone 5mg, which did not significantly help relieve his lingering pain in his arm. Patient states that since his flight back from [place] on Tuesday he has noticed fevers, chills, night sweats, dark urine, increased urinary frequency, eye discoloration, and worsening pain in his arms in his knee. Patient denies any cough at this time.     Patient was HDS upon [place] [facility] [place], Pulse of 102, bp of 134/80, 02% 91-94% on room air, Resp of 20.   CBC, showed WBC of 12.93, Hgb of 7.3. Reticulocyte >23.0. CMP total bili of 13.8, alk phos 267, , ALT 54. Cr 0.6. Blood ctx x2 drawn and pending. UA + bilirubin, occult blood. Lactate 0.7. CXR shows patchy ill defined bibasilar subsegmental lung opacities, greater on the right side. CT Ab Pelvis showed bilateral patchy parenchymal consolidative opacities consistent with pneumonic infiltrates or pulmonary infarcts. Enlarged liver,19.2cm, surgical removal of gallbladder, patent portal vein, small spleen without hypoperfusion.     Patient was started on broad spectrum abx and pain relief. Hematology has been consulted and recommending admission to hospital medicine and no exchange transfusion at this time.         Overview/Hospital Course:  Patient admitted to medicine for management of his  sickle cell pain crisis and acute chest syndrome. Hematology was consulted.  Patient was started on multimodal pain regimen consisting of Tylenol 650 TID, p.o. opiates, IV opiates, and heating pads were used. We are titrating pain medications accordingly and today patient states his pain is well controlled and we can come down on the 2mg Dilauid to 1mg; changing tylenol to prn fevers instead of scheduled. Patient complained of chest pain the other day but workup was negative.  EKG showed normal sinus rhythm, no ST abnormalities.  Will continue monitor.      Fever of 101 documented yesterday. Patient denies symptoms or knowledge of fever. Blood ctx x2, CXR and UA reordered. CXR showed improvement compared to admission. No focal abcesses. UA +1 bili, otherwise unremarkable, No GTD on initial blood ctx. RIP drawn yesterday negative. De-escalating Vanc. Continuing Cefepime d/t fever yesterday.    Patient's hemoglobin 6.6, down from 7.3 at admission, but holding steady compared to yesterday. Likely dilutional anemia in nature.  Patient's other labs have continued to improve. Switching from 1/2 NS to normal NS d/t mild hyponatremia.     Transaminitis has improved since admission, but is still elevated at this time. Bilirubin has improved since admission but also still elevated at this time. Direct bilirubin obtained and shows direct hyperbilinrubinemia. GGT ordered and elevated. Liver ultrasound ordered and shows hepatomegaly, 18.9cm. No focal lesions, otherwise unremarkable study. Will consider hepatology follow up at discharge if liver labs still elevated at discharge.     Will reschedule patient's Hematology appointment (8/25, missed d/t hospitalization) at discharge.      Interval History: No Acute Overnight Events. Patient is afebrile and hemodynamically stable. Documented 101F temp yesterday but patient denies symptoms. Reordered septic workup, which was negative. Patient reports pain has improved significantly,  de-escalating pain control. RUQ US ordered for elevated LFTs, Hepatomegaly, otherwise unremarkable study.       Review of Systems   Constitutional:  Negative for activity change, chills, fatigue and fever.   HENT: Negative.     Eyes:         Eye discoloration (Jaundice)   Respiratory:  Negative for chest tightness and shortness of breath.    Cardiovascular:  Negative for chest pain and leg swelling.   Gastrointestinal: Negative.    Endocrine: Negative.    Genitourinary:  Positive for frequency.        Dark urine   Musculoskeletal:         No longer tender to palpation in upper extremities.    Skin: Negative.    Allergic/Immunologic: Negative.    Neurological: Negative.    Hematological: Negative.    Psychiatric/Behavioral: Negative.       Objective:     Vital Signs (Most Recent):  Temp: 99 °F (37.2 °C) (08/26/23 1200)  Pulse: 84 (08/26/23 1200)  Resp: 18 (08/26/23 1200)  BP: 126/62 (08/26/23 1200)  SpO2: (!) 93 % (08/26/23 1200) Vital Signs (24h Range):  Temp:  [99 °F (37.2 °C)-101.1 °F (38.4 °C)] 99 °F (37.2 °C)  Pulse:  [68-84] 84  Resp:  [16-20] 18  SpO2:  [91 %-99 %] 93 %  BP: (126-176)/(62-78) 126/62     Weight: 88.5 kg (195 lb)  Body mass index is 26.45 kg/m².  No intake or output data in the 24 hours ending 08/26/23 1415      Physical Exam  Vitals and nursing note reviewed. Exam conducted with a chaperone present.   Constitutional:       General: He is not in acute distress.     Appearance: Normal appearance. He is not ill-appearing, toxic-appearing or diaphoretic.   HENT:      Head: Normocephalic.      Right Ear: Tympanic membrane normal.      Left Ear: Tympanic membrane normal.      Nose: Nose normal.      Mouth/Throat:      Mouth: Mucous membranes are moist.   Eyes:      General: Scleral icterus present.      Pupils: Pupils are equal, round, and reactive to light.   Cardiovascular:      Rate and Rhythm: Normal rate and regular rhythm.      Pulses: Normal pulses.   Pulmonary:      Effort: Pulmonary effort  is normal.      Breath sounds: Normal breath sounds.   Abdominal:      General: Abdomen is flat.      Palpations: Abdomen is soft.   Musculoskeletal:         General: Normal range of motion.      Cervical back: Normal range of motion.   Skin:     General: Skin is warm.      Capillary Refill: Capillary refill takes less than 2 seconds.   Neurological:      Mental Status: He is alert and oriented to person, place, and time. Mental status is at baseline.   Psychiatric:         Mood and Affect: Mood normal.         Behavior: Behavior normal.         Thought Content: Thought content normal.         Judgment: Judgment normal.             Significant Labs: All pertinent labs within the past 24 hours have been reviewed.    Significant Imaging: I have reviewed all pertinent imaging results/findings within the past 24 hours.      Assessment/Plan:      * Sickle-cell disease with vaso-occlusive pain  Patient is a 27 y/o male with known sickle cell anemia who presented to ED for evaluation offever, scleral icterus, and worsening pain. WBC of 12.93, Hgb of 7.3. Reticulocyte >23.0. CMP Total Bili of 13.8, alk phos 267, , ALT 54. Cr 0.6. Blood ctx x2 drawn and pending. UA + bilirubin, occult blood. Lactate 0.7. CXR shows patchy ill defined bibasilar subsegmental lung opacities, greater on the right side. CT Ab Pelvis showed bilateral patchy parenchymal consolidative opacities consistent with pneumonic infiltrates or pulmonary infarcts. Heme Consulted and following. Blood ctx from admission remain NGTD. Covid and influenza negative. RIP negative. Fever of 101f Yesterday, asymptomatic, septic workup reordered (CXR, Blood ctx x2, UA) and grossly unmarkable.      Plan  -Following Heme Recs, no Massive Transfusion at this time  -Fluids  cc/hr   -Pain control with Dilaudid, decreasing from 2mg to 1mg q3hrs prn, Shantal 10mg q6hrs prn. Tylenol PRN for fevers >101F   -Continue Hydroxurea and Folic Acid  -Unable to continue  "Voxelotor as not on formulary. Will discuss about patient bringing in home medications  -Will reschedule Heme outpatient appointment at discharge.   -Will provide patient with work note at discharge as per patient request.    Elevated transaminase level  Trending down from admission will CTM      Leukocytosis  Trending down from admission, will CTM      Direct hyperbilirubinemia  Bilirubin remains elevated, but overall trending down compared to 13.8 on admission, Direct Bilirubin 5.7. Alk Phos 281 (267 on admission). AST/ALT trending down from admission but still elevated. GGT of 350. Abdominal ultrasound ordered and shows Hepatomegaly 18.9cm. Unremarkable study otherwise. Will CTM    Plan  Changing Tylenol to PRN  Will Consider Hepatology F/U at discharge      Pneumonia of both lower lobes due to infectious organism  RIP/Covid/Flu Negative. NGTD on blood ctx x2 from admission. CXR shows interval improvement compared to admission.     Plan  -Treating w/ Cefepime for Abx coverage.  - D/C'd Vanc d/t low clinical suspicion of MRSA   -NGTD on blood ctx x2   -No Abscess on CXR.     - Rest of plan as per"Sickle Cell Disease w/ Vaso-Occlusive Pain"    Current moderate episode of major depressive disorder without prior episode  Continue Home medication      Sickle cell anemia  See Sickle Cell Disease w/ Vaso Occlusive Pain      VTE Risk Mitigation (From admission, onward)         Ordered     enoxaparin injection 40 mg  Daily         08/24/23 1235     IP VTE LOW RISK PATIENT  Once         08/24/23 1235     Place sequential compression device  Until discontinued         08/24/23 1203                Discharge Planning   [place]: 8/29/2023     Code Status: Full Code   Is the patient medically ready for discharge?: No    Reason for patient still in hospital (select all that apply): Treatment  Discharge Plan A: Home                  Chandan Chambers DO  Department of Hospital Medicine   [facility] - Telemetry Stepdown    "

## 2023-08-26 NOTE — SUBJECTIVE & OBJECTIVE
Interval History: No Acute Overnight Events. Patient is afebrile and hemodynamically stable. Documented 101F temp yesterday but patient denies symptoms. Reordered septic workup, which was negative. Patient reports pain has improved significantly, de-escalating pain control. RUQ US ordered for elevated LFTs, Hepatomegaly, otherwise unremarkable study.       Review of Systems   Constitutional:  Negative for activity change, chills, fatigue and fever.   HENT: Negative.     Eyes:         Eye discoloration (Jaundice)   Respiratory:  Negative for chest tightness and shortness of breath.    Cardiovascular:  Negative for chest pain and leg swelling.   Gastrointestinal: Negative.    Endocrine: Negative.    Genitourinary:  Positive for frequency.        Dark urine   Musculoskeletal:         No longer tender to palpation in upper extremities.    Skin: Negative.    Allergic/Immunologic: Negative.    Neurological: Negative.    Hematological: Negative.    Psychiatric/Behavioral: Negative.       Objective:     Vital Signs (Most Recent):  Temp: 99 °F (37.2 °C) (08/26/23 1200)  Pulse: 84 (08/26/23 1200)  Resp: 18 (08/26/23 1200)  BP: 126/62 (08/26/23 1200)  SpO2: (!) 93 % (08/26/23 1200) Vital Signs (24h Range):  Temp:  [99 °F (37.2 °C)-101.1 °F (38.4 °C)] 99 °F (37.2 °C)  Pulse:  [68-84] 84  Resp:  [16-20] 18  SpO2:  [91 %-99 %] 93 %  BP: (126-176)/(62-78) 126/62     Weight: 88.5 kg (195 lb)  Body mass index is 26.45 kg/m².  No intake or output data in the 24 hours ending 08/26/23 1415      Physical Exam  Vitals and nursing note reviewed. Exam conducted with a chaperone present.   Constitutional:       General: He is not in acute distress.     Appearance: Normal appearance. He is not ill-appearing, toxic-appearing or diaphoretic.   HENT:      Head: Normocephalic.      Right Ear: Tympanic membrane normal.      Left Ear: Tympanic membrane normal.      Nose: Nose normal.      Mouth/Throat:      Mouth: Mucous membranes are moist.    Eyes:      General: Scleral icterus present.      Pupils: Pupils are equal, round, and reactive to light.   Cardiovascular:      Rate and Rhythm: Normal rate and regular rhythm.      Pulses: Normal pulses.   Pulmonary:      Effort: Pulmonary effort is normal.      Breath sounds: Normal breath sounds.   Abdominal:      General: Abdomen is flat.      Palpations: Abdomen is soft.   Musculoskeletal:         General: Normal range of motion.      Cervical back: Normal range of motion.   Skin:     General: Skin is warm.      Capillary Refill: Capillary refill takes less than 2 seconds.   Neurological:      Mental Status: He is alert and oriented to person, place, and time. Mental status is at baseline.   Psychiatric:         Mood and Affect: Mood normal.         Behavior: Behavior normal.         Thought Content: Thought content normal.         Judgment: Judgment normal.             Significant Labs: All pertinent labs within the past 24 hours have been reviewed.    Significant Imaging: I have reviewed all pertinent imaging results/findings within the past 24 hours.

## 2023-08-26 NOTE — H&P
[facility] - Emergency Dept  Hospital Medicine  History and Physical     Patient Name: Parrish Davis  MRN: [id number]  Patient Class: IP- Inpatient     Admission Date: 8/24/2023  Length of Stay: 0 days  Attending Physician: Charis Camejo MD  Primary Care Provider: Jovanny Douglas MD           Subjective:      Principal Problem:Acute chest syndrome           HPI:  Patient is a patient is a 28-year-old male with sickle cell disease who presented to [facility] ED d/t fever, fatigue, and extremity pain. Patient was recently traveling to [place], [place] and was hospitalized last Friday for sickle cell pain crisis; he was admitted for a couple of days and discharged with oxycodone 5mg, which did not significantly help relieve his lingering pain in his arm. Patient states that since his flight back from [place] on Tuesday he has noticed fevers, chills, night sweats, dark urine, increased urinary frequency, eye discoloration, and worsening pain in his arms in his knee. Patient denies any cough at this time.      Patient was HDS upon presentation to the [facility] ED, Pulse of 102, bp of 134/80, 02% 91-94% on room air, Resp of 20.   CBC, showed WBC of 12.93, Hgb of 7.3. Reticulocyte >23.0. CMP total bili of 13.8, alk phos 267, , ALT 54. Cr 0.6. Blood ctx x2 drawn and pending. UA + bilirubin, occult blood. Lactate 0.7. CXR shows patchy ill defined bibasilar subsegmental lung opacities, greater on the right side. CT Ab Pelvis showed bilateral patchy parenchymal consolidative opacities consistent with pneumonic infiltrates or pulmonary infarcts. Enlarged liver,19.2cm, surgical removal of gallbladder, patent portal vein, small spleen without hypoperfusion.      Patient was started on broad spectrum abx and pain relief. Hematology has been consulted and recommending admission to hospital medicine and no exchange transfusion at this time.                Past Medical History:   Diagnosis Date    Sickle cell anemia      Sickle cell  disease                 Past Surgical History:   Procedure Laterality Date    chemoport         removed    CHOLECYSTECTOMY   2002    UMBILICAL HERNIA REPAIR   1995         Review of patient's allergies indicates:  No Known Allergies     No current facility-administered medications on file prior to encounter.           Current Outpatient Medications on File Prior to Encounter   Medication Sig    acetaminophen (TYLENOL) 500 MG tablet Take 500 mg by mouth every 8 (eight) hours as needed for Pain.    calcium carbonate (TUMS) 200 mg calcium (500 mg) chewable tablet Take 2-3 tablets by mouth 2 (two) times daily as needed for Heartburn.    FLUoxetine 10 MG capsule Take 1 capsule (10 mg total) by mouth once daily.    folic acid (FOLVITE) 1 MG tablet TAKE 1 TABLET BY MOUTH EVERY DAY    HYDROcodone-acetaminophen (NORCO) 5-325 mg per tablet Take 1 tablet by mouth every 6 (six) hours as needed for Pain.    hydroxyurea (HYDREA) 500 mg Cap Take 1 capsule (500 mg total) by mouth once daily.    hydroxyurea (HYDREA) 500 mg Cap TAKE 1 CAPSULE (500 MG TOTAL) BY MOUTH ONCE DAILY.    ibuprofen (ADVIL,MOTRIN) 100 MG tablet Take 100 mg by mouth every 6 (six) hours as needed.    phenyleph-min oil-petrolatum 0.25-14-74.9 % Oint Place 1 applicator rectally 4 (four) times daily as needed (Hemorrhoid discomfort).    voxelotor 500 mg Tab Take 3 tablets (1,500 mg total) by mouth Daily.      Family History    None               Tobacco Use    Smoking status: Never    Smokeless tobacco: Never   Substance and Sexual Activity    Alcohol use: Yes       Comment: Social drinker on weekends    Drug use: No    Sexual activity: Not Currently      Review of Systems   Constitutional:  Positive for activity change, chills, fatigue and fever.   HENT: Negative.     Eyes:         Eye discoloration (Jaundice)   Respiratory:  Negative for chest tightness and shortness of breath.    Cardiovascular:  Negative for chest pain and leg swelling.   Gastrointestinal:  Negative.    Endocrine: Negative.    Genitourinary:  Positive for frequency.        Dark urine   Musculoskeletal:         Upper extremity Pain   Skin: Negative.    Allergic/Immunologic: Negative.    Neurological: Negative.    Hematological: Negative.    Psychiatric/Behavioral: Negative.        Objective:      Vital Signs (Most Recent):  Temp: 99.9 °F (37.7 °C) (08/24/23 0808)  Pulse: 78 (08/24/23 1208)  Resp: 18 (08/24/23 1303)  BP: 136/74 (08/24/23 1131)  SpO2: 95 % (08/24/23 1208) Vital Signs (24h Range):  Temp:  [99.9 °F (37.7 °C)] 99.9 °F (37.7 °C)  Pulse:  [] 78  Resp:  [15-25] 18  SpO2:  [92 %-97 %] 95 %  BP: (133-146)/(71-80) 136/74      Weight: 88.5 kg (195 lb)  Body mass index is 26.45 kg/m².     Physical Exam  Vitals and nursing note reviewed. Exam conducted with a chaperone present.   Constitutional:       General: He is not in acute distress.     Appearance: Normal appearance. He is not ill-appearing, toxic-appearing or diaphoretic.   HENT:      Head: Normocephalic.      Right Ear: Tympanic membrane normal.      Left Ear: Tympanic membrane normal.      Nose: Nose normal.      Mouth/Throat:      Mouth: Mucous membranes are moist.   Eyes:      General: Scleral icterus present.      Pupils: Pupils are equal, round, and reactive to light.   Cardiovascular:      Rate and Rhythm: Normal rate and regular rhythm.      Pulses: Normal pulses.   Pulmonary:      Effort: Pulmonary effort is normal.      Breath sounds: Normal breath sounds.   Abdominal:      General: Abdomen is flat.      Palpations: Abdomen is soft.   Musculoskeletal:         General: Tenderness present.      Cervical back: Normal range of motion.      Comments: Left bicep   Skin:     General: Skin is warm.      Capillary Refill: Capillary refill takes less than 2 seconds.   Neurological:      Mental Status: He is alert and oriented to person, place, and time. Mental status is at baseline.   Psychiatric:         Mood and Affect: Mood normal.          Behavior: Behavior normal.         Thought Content: Thought content normal.         Judgment: Judgment normal.               Significant Labs: All pertinent labs within the past 24 hours have been reviewed.     Significant Imaging: I have reviewed all pertinent imaging results/findings within the past 24 hours.        Assessment/Plan:      * Acute chest syndrome  Patient is a 29 y/o male with known sickle cell anemia who presented to ED for evaluation offever, scleral icterus, and worsening pain. WBC of 12.93, Hgb of 7.3. Reticulocyte >23.0. CMP Total Bili of 13.8, alk phos 267, , ALT 54. Cr 0.6. Blood ctx x2 drawn and pending. UA + bilirubin, occult blood. Lactate 0.7. CXR shows patchy ill defined bibasilar subsegmental lung opacities, greater on the right side. CT Ab Pelvis showed bilateral patchy parenchymal consolidative opacities consistent with pneumonic infiltrates or pulmonary infarcts. Heme Consulted and following. Blood ctx pending. Covid and influenza negative.      Plan  -Following Heme Recs, no Massive Transfusion at this time  -Fluids 1/2NS 125 cc/hr, 1L LR bolus  -Pain control with Dilaudid 1mg q3hrs prn, Shantal 10mg q6hrs prn.   -Vanc and Cefepime for Abx coverage. Cefepime d/t recent hospitalization.   -Tylenol PRN for fever. <3gram total daily given liver injury.   -O2 via NC 2L.   -Continue Hydroxurea and Folic Acid  - Will cross and type and transfuse if needed (Hgb<7.0)  -Unable to continue Voxelotor as not on formulary. Will discuss about patient bringing in home medications        Hyperbilirubinemia  Direct Bili pending, will ctm        Sickle-cell disease with vaso-occlusive pain  See Acute Chest Syndrome        Current moderate episode of major depressive disorder without prior episode  Continue Home medication        Sickle cell anemia  See Acute Chest Syndrome               VTE Risk Mitigation (From admission, onward)           Ordered       enoxaparin injection 40 mg  Daily          08/24/23 1235       IP VTE LOW RISK PATIENT  Once         08/24/23 1235       Place sequential compression device  Until discontinued         08/24/23 1203                    Discharge Planning   [place]:      Code Status: Full Code   Is the patient medically ready for discharge?:     Reason for patient still in hospital (select all that apply): Treatment                        Chandan Chambers DO  Department of Hospital Medicine   [facility] - Emergency Dept

## 2023-08-26 NOTE — ASSESSMENT & PLAN NOTE
"RIP/Covid/Flu Negative. NGTD on blood ctx x2 from admission. CXR shows interval improvement compared to admission.     Plan  -Treating w/ Cefepime for Abx coverage.  - D/C'd Vanc d/t low clinical suspicion of MRSA   -NGTD on blood ctx x2   -No Abscess on CXR.     - Rest of plan as per"Sickle Cell Disease w/ Vaso-Occlusive Pain"  "

## 2023-08-26 NOTE — ASSESSMENT & PLAN NOTE
Patient is a 27 y/o male with known sickle cell anemia who presented to ED for evaluation offever, scleral icterus, and worsening pain. WBC of 12.93, Hgb of 7.3. Reticulocyte >23.0. CMP Total Bili of 13.8, alk phos 267, , ALT 54. Cr 0.6. Blood ctx x2 drawn and pending. UA + bilirubin, occult blood. Lactate 0.7. CXR shows patchy ill defined bibasilar subsegmental lung opacities, greater on the right side. CT Ab Pelvis showed bilateral patchy parenchymal consolidative opacities consistent with pneumonic infiltrates or pulmonary infarcts. Heme Consulted and following. Blood ctx from admission remain NGTD. Covid and influenza negative. RIP negative. Fever of 101f Yesterday, asymptomatic, septic workup reordered (CXR, Blood ctx x2, UA) and grossly unmarkable.      Plan  -Following Heme Recs, no Massive Transfusion at this time  -Fluids  cc/hr   -Pain control with Dilaudid, decreasing from 2mg to 1mg q3hrs prn, Shantal 10mg q6hrs prn. Tylenol PRN for fevers >101F   -Continue Hydroxurea and Folic Acid  -Unable to continue Voxelotor as not on formulary. Will discuss about patient bringing in home medications  -Will reschedule Heme outpatient appointment at discharge.   -Will provide patient with work note at discharge as per patient request.

## 2023-08-26 NOTE — PROGRESS NOTES
Therapy with vancomycin complete and/or consult discontinued by provider. Pharmacy will sign off, please re-consult as needed.    Saida Tse, PharmD, BCPS  [id number]

## 2023-08-26 NOTE — ASSESSMENT & PLAN NOTE
Bilirubin remains elevated, but overall trending down compared to 13.8 on admission, Direct Bilirubin 5.7. Alk Phos 281 (267 on admission). AST/ALT trending down from admission but still elevated. GGT of 350. Abdominal ultrasound ordered and shows Hepatomegaly 18.9cm. Unremarkable study otherwise. Will CTM    Plan  Changing Tylenol to PRN  Will Consider Hepatology F/U at discharge

## 2023-08-26 NOTE — PLAN OF CARE
Pt AAOx4, c/o of pain on his chest, PRN Oxycodone given PRN per pt request with + outcome. NS@100ml/hr infusing. VSS, no acute distress noted. Cont to monitor    Problem: Adult Inpatient Plan of Care  Goal: Plan of Care Review  Outcome: Ongoing, Progressing  Goal: Absence of Hospital-Acquired Illness or Injury  Outcome: Ongoing, Progressing  Goal: Optimal Comfort and Wellbeing  Outcome: Ongoing, Progressing  Goal: Readiness for Transition of Care  Outcome: Ongoing, Progressing

## 2023-08-27 VITALS
HEIGHT: 72 IN | OXYGEN SATURATION: 99 % | BODY MASS INDEX: 26.41 KG/M2 | TEMPERATURE: 99 F | WEIGHT: 195 LBS | SYSTOLIC BLOOD PRESSURE: 127 MMHG | HEART RATE: 91 BPM | RESPIRATION RATE: 18 BRPM | DIASTOLIC BLOOD PRESSURE: 68 MMHG

## 2023-08-27 LAB
ALBUMIN SERPL BCP-MCNC: 2.7 G/DL (ref 3.5–5.2)
ALP SERPL-CCNC: 345 U/L (ref 55–135)
ALT SERPL W/O P-5'-P-CCNC: 78 U/L (ref 10–44)
ANION GAP SERPL CALC-SCNC: 10 MMOL/L (ref 8–16)
ANISOCYTOSIS BLD QL SMEAR: ABNORMAL
AST SERPL-CCNC: 107 U/L (ref 10–40)
BASOPHILS # BLD AUTO: 0.13 K/UL (ref 0–0.2)
BASOPHILS NFR BLD: 1.1 % (ref 0–1.9)
BILIRUB SERPL-MCNC: 6.8 MG/DL (ref 0.1–1)
BUN SERPL-MCNC: 4 MG/DL (ref 6–20)
CALCIUM SERPL-MCNC: 9 MG/DL (ref 8.7–10.5)
CHLORIDE SERPL-SCNC: 101 MMOL/L (ref 95–110)
CO2 SERPL-SCNC: 24 MMOL/L (ref 23–29)
CREAT SERPL-MCNC: 0.6 MG/DL (ref 0.5–1.4)
DIFFERENTIAL METHOD: ABNORMAL
EOSINOPHIL # BLD AUTO: 0.3 K/UL (ref 0–0.5)
EOSINOPHIL NFR BLD: 2.4 % (ref 0–8)
ERYTHROCYTE [DISTWIDTH] IN BLOOD BY AUTOMATED COUNT: 24.9 % (ref 11.5–14.5)
EST. GFR  (NO RACE VARIABLE): >60 ML/MIN/1.73 M^2
GLUCOSE SERPL-MCNC: 95 MG/DL (ref 70–110)
HAV IGM SERPL QL IA: NORMAL
HBV CORE IGM SERPL QL IA: NORMAL
HBV SURFACE AG SERPL QL IA: NORMAL
HCT VFR BLD AUTO: 19.2 % (ref 40–54)
HCV AB SERPL QL IA: NORMAL
HGB BLD-MCNC: 7 G/DL (ref 14–18)
HIV 1+2 AB+HIV1 P24 AG SERPL QL IA: NORMAL
HYPOCHROMIA BLD QL SMEAR: ABNORMAL
IMM GRANULOCYTES # BLD AUTO: 0.19 K/UL (ref 0–0.04)
IMM GRANULOCYTES NFR BLD AUTO: 1.6 % (ref 0–0.5)
LYMPHOCYTES # BLD AUTO: 2 K/UL (ref 1–4.8)
LYMPHOCYTES NFR BLD: 17 % (ref 18–48)
MAGNESIUM SERPL-MCNC: 1.8 MG/DL (ref 1.6–2.6)
MCH RBC QN AUTO: 34.5 PG (ref 27–31)
MCHC RBC AUTO-ENTMCNC: 36.5 G/DL (ref 32–36)
MCV RBC AUTO: 95 FL (ref 82–98)
MONOCYTES # BLD AUTO: 1.5 K/UL (ref 0.3–1)
MONOCYTES NFR BLD: 12.7 % (ref 4–15)
NEUTROPHILS # BLD AUTO: 7.6 K/UL (ref 1.8–7.7)
NEUTROPHILS NFR BLD: 65.2 % (ref 38–73)
NRBC BLD-RTO: 2 /100 WBC
OVALOCYTES BLD QL SMEAR: ABNORMAL
PHOSPHATE SERPL-MCNC: 3.4 MG/DL (ref 2.7–4.5)
PLATELET # BLD AUTO: 338 K/UL (ref 150–450)
PLATELET BLD QL SMEAR: ABNORMAL
PMV BLD AUTO: 10.2 FL (ref 9.2–12.9)
POIKILOCYTOSIS BLD QL SMEAR: ABNORMAL
POLYCHROMASIA BLD QL SMEAR: ABNORMAL
POTASSIUM SERPL-SCNC: 3.5 MMOL/L (ref 3.5–5.1)
PROT SERPL-MCNC: 7.3 G/DL (ref 6–8.4)
RBC # BLD AUTO: 2.03 M/UL (ref 4.6–6.2)
RETICS/RBC NFR AUTO: 22.7 % (ref 0.4–2)
SICKLE CELLS BLD QL SMEAR: ABNORMAL
SODIUM SERPL-SCNC: 135 MMOL/L (ref 136–145)
SPHEROCYTES BLD QL SMEAR: ABNORMAL
TARGETS BLD QL SMEAR: ABNORMAL
WBC # BLD AUTO: 11.68 K/UL (ref 3.9–12.7)

## 2023-08-27 PROCEDURE — 63600175 PHARM REV CODE 636 W HCPCS

## 2023-08-27 PROCEDURE — 36415 COLL VENOUS BLD VENIPUNCTURE: CPT

## 2023-08-27 PROCEDURE — 87389 HIV-1 AG W/HIV-1&-2 AB AG IA: CPT

## 2023-08-27 PROCEDURE — 99239 PR HOSPITAL DISCHARGE DAY,>30 MIN: ICD-10-PCS | Mod: ,,, | Performed by: INTERNAL MEDICINE

## 2023-08-27 PROCEDURE — 80074 ACUTE HEPATITIS PANEL: CPT

## 2023-08-27 PROCEDURE — 83735 ASSAY OF MAGNESIUM: CPT

## 2023-08-27 PROCEDURE — 80053 COMPREHEN METABOLIC PANEL: CPT

## 2023-08-27 PROCEDURE — 85045 AUTOMATED RETICULOCYTE COUNT: CPT

## 2023-08-27 PROCEDURE — 25000003 PHARM REV CODE 250

## 2023-08-27 PROCEDURE — 99239 HOSP IP/OBS DSCHRG MGMT >30: CPT | Mod: ,,, | Performed by: INTERNAL MEDICINE

## 2023-08-27 PROCEDURE — 85025 COMPLETE CBC W/AUTO DIFF WBC: CPT

## 2023-08-27 PROCEDURE — 84100 ASSAY OF PHOSPHORUS: CPT

## 2023-08-27 RX ORDER — LEVOFLOXACIN 500 MG/1
500 TABLET, FILM COATED ORAL DAILY
Qty: 4 TABLET | Refills: 0 | Status: SHIPPED | OUTPATIENT
Start: 2023-08-27 | End: 2023-08-31

## 2023-08-27 RX ORDER — OXYCODONE HYDROCHLORIDE 5 MG/1
5 TABLET ORAL EVERY 6 HOURS PRN
Qty: 28 TABLET | Refills: 0 | Status: SHIPPED | OUTPATIENT
Start: 2023-08-27 | End: 2023-09-03

## 2023-08-27 RX ADMIN — HYDROXYUREA 500 MG: 500 CAPSULE ORAL at 08:08

## 2023-08-27 RX ADMIN — CEFEPIME 2 G: 2 INJECTION, POWDER, FOR SOLUTION INTRAVENOUS at 06:08

## 2023-08-27 RX ADMIN — FLUOXETINE 10 MG: 10 CAPSULE ORAL at 08:08

## 2023-08-27 RX ADMIN — CEFEPIME 2 G: 2 INJECTION, POWDER, FOR SOLUTION INTRAVENOUS at 12:08

## 2023-08-27 RX ADMIN — FOLIC ACID 1000 MCG: 1 TABLET ORAL at 08:08

## 2023-08-27 RX ADMIN — SENNOSIDES 8.6 MG: 8.6 TABLET, FILM COATED ORAL at 08:08

## 2023-08-27 NOTE — PLAN OF CARE
08/27/23 1155   Final Note   Assessment Type Final Discharge Note   Anticipated Discharge Disposition Home   Hospital Resources/Appts/Education Provided Provided patient/caregiver with written discharge plan information  (per bedside nurse)   Post-Acute Status   Discharge Delays None known at this time     Pt is discharging home today with no needs. His ride will be here by 1pm.    Erika Bettencourt, RN    [facility]  [phone]

## 2023-08-27 NOTE — PLAN OF CARE
Problem: Adult Inpatient Plan of Care  Goal: Plan of Care Review  Outcome: Met  Goal: Patient-Specific Goal (Individualized)  Outcome: Met  Goal: Absence of Hospital-Acquired Illness or Injury  Outcome: Met  Goal: Optimal Comfort and Wellbeing  Outcome: Met  Goal: Readiness for Transition of Care  Outcome: Met     Problem: Fluid Imbalance (Pneumonia)  Goal: Fluid Balance  Outcome: Met     Problem: Fluid Imbalance (Pneumonia)  Goal: Fluid Balance  Outcome: Met     Problem: Infection (Pneumonia)  Goal: Resolution of Infection Signs and Symptoms  Outcome: Met     Pt poc goal met pt is d/c home today he was given all d/c instructions summary as well as f/u appointments and information about the medication he will be taking he left the hospital with his mom ambulated off the unit independently. All ldas and tele removed prior to d/c no questions or concerns voiced.

## 2023-08-27 NOTE — NURSING
pt is d/c home today he was given all d/c instructions summary as well as f/u appointments and information about the medication he will be taking he left the hospital with his mom ambulated off the unit independently. All ldas and tele removed prior to d/c no questions or concerns voiced.

## 2023-08-28 ENCOUNTER — PATIENT OUTREACH (OUTPATIENT)
Dept: ADMINISTRATIVE | Facility: CLINIC | Age: 28
End: 2023-08-28
Payer: COMMERCIAL

## 2023-08-28 LAB
BLD PROD TYP BPU: NORMAL
BLOOD UNIT EXPIRATION DATE: NORMAL
BLOOD UNIT TYPE CODE: 5100
BLOOD UNIT TYPE: NORMAL
CODING SYSTEM: NORMAL
CROSSMATCH INTERPRETATION: NORMAL
DISPENSE STATUS: NORMAL
NUM UNITS TRANS PACKED RBC: NORMAL

## 2023-08-28 NOTE — PROGRESS NOTES
C3 nurse spoke with Parrish Davis for a TCC post hospital discharge follow up call. Nurse offered to scheduled [facility] hospital follow-up appointment. The patient declined appointment at this time stating he could call on his own volition due to his schedule.

## 2023-08-28 NOTE — DISCHARGE SUMMARY
[facility] - Telemetry [facility] Medicine  Discharge Summary      Patient Name: Parrish Davis  MRN: [id number]  RENE: [id number]  Patient Class: IP- Inpatient  Admission Date: 8/24/2023  Hospital Length of Stay: 3 days  Discharge Date and Time:  08/27/2023 9:33 PM  Attending Physician: Cristina att. providers found   Discharging Provider: Chandan Chambers DO  Primary Care Provider: Jovanny Douglas MD  Hospital Medicine Team: [facility] HOSP MED 2 Chandan Chambers DO  Primary Care Team: [facility] MED 2    HPI:   Patient is a patient is a 28-year-old male with sickle cell disease who presented to [facility] ED d/t fever, fatigue, and extremity pain. Patient was recently traveling to [place], [place] and was hospitalized last Friday for sickle cell pain crisis; he was admitted for a couple of days and discharged with oxycodone 5mg, which did not significantly help relieve his lingering pain in his arm. Patient states that since his flight back from [place] on Tuesday he has noticed fevers, chills, night sweats, dark urine, increased urinary frequency, eye discoloration, and worsening pain in his arms in his knee. Patient denies any cough at this time.     Patient was HDS upon presentation to the [facility] ED, Pulse of 102, bp of 134/80, 02% 91-94% on room air, Resp of 20.   CBC, showed WBC of 12.93, Hgb of 7.3. Reticulocyte >23.0. CMP total bili of 13.8, alk phos 267, , ALT 54. Cr 0.6. Blood ctx x2 drawn and pending. UA + bilirubin, occult blood. Lactate 0.7. CXR shows patchy ill defined bibasilar subsegmental lung opacities, greater on the right side. CT Ab Pelvis showed bilateral patchy parenchymal consolidative opacities consistent with pneumonic infiltrates or pulmonary infarcts. Enlarged liver,19.2cm, surgical removal of gallbladder, patent portal vein, small spleen without hypoperfusion.     Patient was started on broad spectrum abx and pain relief. Hematology has been consulted and recommending admission to hospital medicine and  no exchange transfusion at this time.         * No surgery found *      Hospital Course:   Patient admitted to medicine for management of his sickle cell pain crisis and acute chest syndrome. Hematology was consulted.  Patient was started on multimodal pain regimen consisting of Tylenol 650 TID, p.o. opiates, IV opiates, and heating pads were used. We are titrating pain medications accordingly. Patient required as high as 2mg Dilaudid q3 PRN for 1-2 days, titrated down, and has been without IV pain medications for 24 hours prior to admission. Given Shantal 5mg PRN 7 day script at [place].    Patient complained of chest pain the other day but workup was negative.  EKG showed normal sinus rhythm, no ST abnormalities. Fever of 101 documented. Patient denies symptoms or knowledge of fever. Blood ctx x2, CXR and UA reordered. CXR showed improvement compared to admission. No focal abcesses. UA +1 bili, otherwise unremarkable, No GTD on initial blood ctx. RIP drawn yesterday negative. De-escalating Vanc. Continued Cefepime. Transitioned to PO Abx for continued CAP coverage outpatient.    Transaminitis has elevated but stable since admission. Bilirubin has improved since admission but also still elevated at discharge. Direct bilirubin obtained and shows direct hyperbilinrubinemia. GGT ordered and elevated. Liver ultrasound ordered and shows hepatomegaly, 18.9cm. No focal lesions, otherwise unremarkable study. Outpatient hepatology follow up at discharge.    Social work will attempt to reschedule patient's Hematology appointment (8/25, missed d/t hospitalization) and schedule a PCP appointment the Monday after discharge.       Goals of Care Treatment Preferences:  Code Status: Full Code    Interval History: No Acute Overnight Events. Patient is afebrile and hemodynamically stable.       Review of Systems   Constitutional:  Negative for activity change, chills, fatigue and fever.   HENT: Negative.     Eyes:         Eye discoloration  (Jaundice)   Respiratory:  Negative for chest tightness and shortness of breath.    Cardiovascular:  Negative for chest pain and leg swelling.   Gastrointestinal: Negative.    Endocrine: Negative.    Genitourinary:  Positive for frequency.        Dark urine   Musculoskeletal:         No longer tender to palpation in upper extremities.    Skin: Negative.    Allergic/Immunologic: Negative.    Neurological: Negative.    Hematological: Negative.    Psychiatric/Behavioral: Negative.        Objective:      Physical Exam  Vitals and nursing note reviewed. Exam conducted with a chaperone present.   Constitutional:       General: He is not in acute distress.     Appearance: Normal appearance. He is not ill-appearing, toxic-appearing or diaphoretic.   HENT:      Head: Normocephalic.      Right Ear: Tympanic membrane normal.      Left Ear: Tympanic membrane normal.      Nose: Nose normal.      Mouth/Throat:      Mouth: Mucous membranes are moist.   Eyes:      General: Scleral icterus present.      Pupils: Pupils are equal, round, and reactive to light.   Cardiovascular:      Rate and Rhythm: Normal rate and regular rhythm.      Pulses: Normal pulses.   Pulmonary:      Effort: Pulmonary effort is normal.      Breath sounds: Normal breath sounds.   Abdominal:      General: Abdomen is flat.      Palpations: Abdomen is soft.   Musculoskeletal:         General: Normal range of motion.      Cervical back: Normal range of motion.   Skin:     General: Skin is warm.      Capillary Refill: Capillary refill takes less than 2 seconds.   Neurological:      Mental Status: He is alert and oriented to person, place, and time. Mental status is at baseline.   Psychiatric:         Mood and Affect: Mood normal.         Behavior: Behavior normal.         Thought Content: Thought content normal.         Judgment: Judgment normal.                Significant Labs: All pertinent labs within the past 24 hours have been reviewed.     Significant  "Imaging: I have reviewed all pertinent imaging results/findings within the past 24 hours.        Consults:   Consults (From admission, onward)        Status Ordering Provider     Inpatient consult to Hematology/Oncology  Once        Provider:  (Not yet assigned)    Completed KINGSLEY, NOOR          Pulmonary  Pneumonia of both lower lobes due to infectious organism  RIP/Covid/Flu Negative. NGTD on blood ctx x2 from admission. CXR shows interval improvement compared to admission.     Plan  -Transition to outpatient PO ABX  - D/C'd Vanc d/t low clinical suspicion of MRSA   -NGTD on blood ctx x2   -No Abscess on CXR.     - Rest of plan as per"Sickle Cell Disease w/ Vaso-Occlusive Pain"    GI  Hepatomegaly   outpatient Hepatology referral.      Elevated transaminase level  Outpatient Hep ref    Direct hyperbilirubinemia  Bilirubin remains elevated, but overall trending down compared to 13.8 on admission, Direct Bilirubin 5.7. Alk Phos 281 (267 on admission). AST/ALT trending down from admission but still elevated. GGT of 350. Abdominal ultrasound ordered and shows Hepatomegaly 18.9cm. Unremarkable study otherwise. Will CTM    Plan  Tylenol to PRN  Outpatient Hep ref        Final Active Diagnoses:    Diagnosis Date Noted POA    PRINCIPAL PROBLEM:  Sickle-cell disease with vaso-occlusive pain [D57.00] 10/19/2022 Yes    Hepatomegaly [R16.0] 08/26/2023 Yes    Pneumonia of both lower lobes due to infectious organism [J18.9] 08/24/2023 Yes    Direct hyperbilirubinemia [E80.6] 08/24/2023 Yes    Leukocytosis [D72.829] 08/24/2023 Yes    Elevated transaminase level [R74.01] 08/24/2023 Yes    Current moderate episode of major depressive disorder without prior episode [F32.1] 10/05/2022 Yes    Sickle cell anemia [D57.1] 06/22/2015 Yes      Problems Resolved During this Admission:    Diagnosis Date Noted Date Resolved POA    Sickle cell disease [D57.1] 06/22/2015 08/24/2023 Yes       Discharged Condition: fair    Disposition: " Home or Self Care    Follow Up:   Follow-up Information     Jovanny Douglas MD. Schedule an appointment as soon as possible for a visit in 1 week(s).    Specialty: Family Medicine  Contact information:  [address]  [facility] [address]  [phone]             [facility] HEMATOLOGY/ONCOLOGY .    Specialty: Hematology and Oncology  Contact information:  [address]  [facility] [address]  [phone]           [facility] HEPATOLOGY .    Specialty: Hepatology  Contact information:  [address]  [facility] [address]  [phone]                     Patient Instructions:      RETICULOCYTES   Standing Status: Future Standing Exp. Date: 10/25/24     CBC W/ AUTO DIFFERENTIAL   Standing Status: Future Standing Exp. Date: 10/25/24     Comprehensive Metabolic Panel   Standing Status: Future Standing Exp. Date: 08/26/24     Ambulatory referral/consult to Hepatology   Standing Status: Future   Referral Priority: Routine Referral Type: Consultation   Referral Reason: Specialty Services Required   Requested Specialty: Hepatology   Number of Visits Requested: 1       Significant Diagnostic Studies: Labs: All labs within the past 24 hours have been reviewed    Pending Diagnostic Studies:     Procedure Component Value Units Date/Time    Hemoglobin S Quantitation by Electrophoresis [[id number]] Collected: 08/24/23 1220    Order Status: Sent Lab Status: In process Updated: 08/24/23 1241    Specimen: Blood          Medications:  Reconciled Home Medications:      Medication List      START taking these medications    levoFLOXacin 500 MG tablet  Commonly known as: LEVAQUIN  Take 1 tablet (500 mg total) by mouth once daily. for 4 days        CONTINUE taking these medications    acetaminophen 500 MG tablet  Commonly known as: TYLENOL  Take 500 mg by mouth every 8 (eight) hours as needed for Pain.     calcium carbonate 200 mg calcium (500 mg) chewable tablet  Commonly known as: TUMS  Take 2-3 tablets by  mouth 2 (two) times daily as needed for Heartburn.     FLUoxetine 10 MG capsule  Take 1 capsule (10 mg total) by mouth once daily.     folic acid 1 MG tablet  Commonly known as: FOLVITE  TAKE 1 TABLET BY MOUTH EVERY DAY     hydroxyurea 500 mg Cap  Commonly known as: HYDREA  TAKE 1 CAPSULE (500 MG TOTAL) BY MOUTH ONCE DAILY.     ibuprofen 100 MG tablet  Commonly known as: ADVIL,MOTRIN  Take 100 mg by mouth every 6 (six) hours as needed.     OXBRYTA 500 mg Tab  Generic drug: voxelotor  Take 3 tablets (1,500 mg total) by mouth Daily.     oxyCODONE 5 MG immediate release tablet  Commonly known as: ROXICODONE  Take 1 tablet (5 mg total) by mouth every 6 (six) hours as needed for Pain.     phenyleph-min oil-petrolatum 0.25-14-74.9 % Oint  Place 1 applicator rectally 4 (four) times daily as needed (Hemorrhoid discomfort).            Indwelling Lines/Drains at time of discharge:   Lines/Drains/Airways     None                 Time spent on the discharge of patient: 40 minutes         Chandan Chambers DO  Department of Hospital Medicine  [facility] - Telemetry Stepdown

## 2023-08-28 NOTE — ASSESSMENT & PLAN NOTE
Bilirubin remains elevated, but overall trending down compared to 13.8 on admission, Direct Bilirubin 5.7. Alk Phos 281 (267 on admission). AST/ALT trending down from admission but still elevated. GGT of 350. Abdominal ultrasound ordered and shows Hepatomegaly 18.9cm. Unremarkable study otherwise. Will CTM    Plan  Tylenol to PRN  Outpatient Hep ref

## 2023-08-28 NOTE — PROGRESS NOTES
C3 nurse attempted to contact Parrish Davis for a TCC post hospital discharge follow up call. No answer. Left voicemail with callback information. The patient does not have a scheduled HOSFU appointment. Message sent to PCP staff for assistance with scheduling visit with patient.

## 2023-08-28 NOTE — ASSESSMENT & PLAN NOTE
"RIP/Covid/Flu Negative. NGTD on blood ctx x2 from admission. CXR shows interval improvement compared to admission.     Plan  -Transition to outpatient PO ABX  - D/C'd Vanc d/t low clinical suspicion of MRSA   -NGTD on blood ctx x2   -No Abscess on CXR.     - Rest of plan as per"Sickle Cell Disease w/ Vaso-Occlusive Pain"  "

## 2023-08-29 ENCOUNTER — PATIENT MESSAGE (OUTPATIENT)
Dept: INTERNAL MEDICINE | Facility: CLINIC | Age: 28
End: 2023-08-29
Payer: COMMERCIAL

## 2023-08-29 LAB
BACTERIA BLD CULT: NORMAL
BACTERIA BLD CULT: NORMAL
HGB FRACT BLD ELPH-IMP: NORMAL
HGB S MFR BLD ELPH: 92.3 %

## 2023-08-31 ENCOUNTER — LAB VISIT (OUTPATIENT)
Dept: LAB | Facility: HOSPITAL | Age: 28
End: 2023-08-31
Attending: INTERNAL MEDICINE
Payer: COMMERCIAL

## 2023-08-31 ENCOUNTER — OFFICE VISIT (OUTPATIENT)
Dept: HEPATOLOGY | Facility: CLINIC | Age: 28
End: 2023-08-31
Payer: COMMERCIAL

## 2023-08-31 VITALS
RESPIRATION RATE: 20 BRPM | SYSTOLIC BLOOD PRESSURE: 108 MMHG | WEIGHT: 182.44 LBS | HEART RATE: 76 BPM | OXYGEN SATURATION: 98 % | BODY MASS INDEX: 24.71 KG/M2 | TEMPERATURE: 99 F | DIASTOLIC BLOOD PRESSURE: 57 MMHG | HEIGHT: 72 IN

## 2023-08-31 DIAGNOSIS — R17 SCLERAL ICTERUS: Primary | ICD-10-CM

## 2023-08-31 DIAGNOSIS — R16.0 HEPATOMEGALY: ICD-10-CM

## 2023-08-31 DIAGNOSIS — R74.01 ELEVATED TRANSAMINASE LEVEL: ICD-10-CM

## 2023-08-31 PROBLEM — R60.0 LEG EDEMA, RIGHT: Status: RESOLVED | Noted: 2022-10-22 | Resolved: 2023-08-31

## 2023-08-31 LAB
A1AT SERPL-MCNC: 253 MG/DL (ref 100–190)
ALBUMIN SERPL BCP-MCNC: 3.2 G/DL (ref 3.5–5.2)
ALBUMIN SERPL BCP-MCNC: 3.2 G/DL (ref 3.5–5.2)
ALP SERPL-CCNC: 354 U/L (ref 55–135)
ALP SERPL-CCNC: 354 U/L (ref 55–135)
ALT SERPL W/O P-5'-P-CCNC: 108 U/L (ref 10–44)
ALT SERPL W/O P-5'-P-CCNC: 108 U/L (ref 10–44)
ANION GAP SERPL CALC-SCNC: 11 MMOL/L (ref 8–16)
ANION GAP SERPL CALC-SCNC: 11 MMOL/L (ref 8–16)
AST SERPL-CCNC: 90 U/L (ref 10–40)
AST SERPL-CCNC: 90 U/L (ref 10–40)
BACTERIA BLD CULT: NORMAL
BACTERIA BLD CULT: NORMAL
BASOPHILS # BLD AUTO: 0.11 K/UL (ref 0–0.2)
BASOPHILS # BLD AUTO: 0.11 K/UL (ref 0–0.2)
BASOPHILS NFR BLD: 1.4 % (ref 0–1.9)
BASOPHILS NFR BLD: 1.4 % (ref 0–1.9)
BILIRUB DIRECT SERPL-MCNC: 3.3 MG/DL (ref 0.1–0.3)
BILIRUB DIRECT SERPL-MCNC: 3.3 MG/DL (ref 0.1–0.3)
BILIRUB SERPL-MCNC: 6 MG/DL (ref 0.1–1)
BILIRUB SERPL-MCNC: 6 MG/DL (ref 0.1–1)
BUN SERPL-MCNC: 4 MG/DL (ref 6–20)
BUN SERPL-MCNC: 4 MG/DL (ref 6–20)
CALCIUM SERPL-MCNC: 9.2 MG/DL (ref 8.7–10.5)
CALCIUM SERPL-MCNC: 9.2 MG/DL (ref 8.7–10.5)
CERULOPLASMIN SERPL-MCNC: 44 MG/DL (ref 15–45)
CHLORIDE SERPL-SCNC: 104 MMOL/L (ref 95–110)
CHLORIDE SERPL-SCNC: 104 MMOL/L (ref 95–110)
CO2 SERPL-SCNC: 22 MMOL/L (ref 23–29)
CO2 SERPL-SCNC: 22 MMOL/L (ref 23–29)
CREAT SERPL-MCNC: 0.6 MG/DL (ref 0.5–1.4)
CREAT SERPL-MCNC: 0.6 MG/DL (ref 0.5–1.4)
DIFFERENTIAL METHOD: ABNORMAL
DIFFERENTIAL METHOD: ABNORMAL
EOSINOPHIL # BLD AUTO: 0.1 K/UL (ref 0–0.5)
EOSINOPHIL # BLD AUTO: 0.1 K/UL (ref 0–0.5)
EOSINOPHIL NFR BLD: 1.3 % (ref 0–8)
EOSINOPHIL NFR BLD: 1.3 % (ref 0–8)
ERYTHROCYTE [DISTWIDTH] IN BLOOD BY AUTOMATED COUNT: 21.9 % (ref 11.5–14.5)
ERYTHROCYTE [DISTWIDTH] IN BLOOD BY AUTOMATED COUNT: 21.9 % (ref 11.5–14.5)
EST. GFR  (NO RACE VARIABLE): >60 ML/MIN/1.73 M^2
EST. GFR  (NO RACE VARIABLE): >60 ML/MIN/1.73 M^2
FERRITIN SERPL-MCNC: 485 NG/ML (ref 20–300)
GLUCOSE SERPL-MCNC: 78 MG/DL (ref 70–110)
GLUCOSE SERPL-MCNC: 78 MG/DL (ref 70–110)
HAV IGG SER QL IA: NORMAL
HBV CORE AB SERPL QL IA: NORMAL
HBV SURFACE AB SER-ACNC: <3 MIU/ML
HBV SURFACE AB SER-ACNC: NORMAL M[IU]/ML
HCT VFR BLD AUTO: 22.8 % (ref 40–54)
HCT VFR BLD AUTO: 22.8 % (ref 40–54)
HGB BLD-MCNC: 7.9 G/DL (ref 14–18)
HGB BLD-MCNC: 7.9 G/DL (ref 14–18)
IGA SERPL-MCNC: 471 MG/DL (ref 40–350)
IGG SERPL-MCNC: 2310 MG/DL (ref 650–1600)
IMM GRANULOCYTES # BLD AUTO: 0.06 K/UL (ref 0–0.04)
IMM GRANULOCYTES # BLD AUTO: 0.06 K/UL (ref 0–0.04)
IMM GRANULOCYTES NFR BLD AUTO: 0.8 % (ref 0–0.5)
IMM GRANULOCYTES NFR BLD AUTO: 0.8 % (ref 0–0.5)
INR PPP: 1.2 (ref 0.8–1.2)
INR PPP: 1.2 (ref 0.8–1.2)
IRON SERPL-MCNC: 64 UG/DL (ref 45–160)
LYMPHOCYTES # BLD AUTO: 1.9 K/UL (ref 1–4.8)
LYMPHOCYTES # BLD AUTO: 1.9 K/UL (ref 1–4.8)
LYMPHOCYTES NFR BLD: 23.7 % (ref 18–48)
LYMPHOCYTES NFR BLD: 23.7 % (ref 18–48)
MCH RBC QN AUTO: 33.5 PG (ref 27–31)
MCH RBC QN AUTO: 33.5 PG (ref 27–31)
MCHC RBC AUTO-ENTMCNC: 34.6 G/DL (ref 32–36)
MCHC RBC AUTO-ENTMCNC: 34.6 G/DL (ref 32–36)
MCV RBC AUTO: 97 FL (ref 82–98)
MCV RBC AUTO: 97 FL (ref 82–98)
MONOCYTES # BLD AUTO: 1.1 K/UL (ref 0.3–1)
MONOCYTES # BLD AUTO: 1.1 K/UL (ref 0.3–1)
MONOCYTES NFR BLD: 13.8 % (ref 4–15)
MONOCYTES NFR BLD: 13.8 % (ref 4–15)
NEUTROPHILS # BLD AUTO: 4.7 K/UL (ref 1.8–7.7)
NEUTROPHILS # BLD AUTO: 4.7 K/UL (ref 1.8–7.7)
NEUTROPHILS NFR BLD: 59 % (ref 38–73)
NEUTROPHILS NFR BLD: 59 % (ref 38–73)
NRBC BLD-RTO: 1 /100 WBC
NRBC BLD-RTO: 1 /100 WBC
PLATELET # BLD AUTO: 658 K/UL (ref 150–450)
PLATELET # BLD AUTO: 658 K/UL (ref 150–450)
PMV BLD AUTO: 10.4 FL (ref 9.2–12.9)
PMV BLD AUTO: 10.4 FL (ref 9.2–12.9)
POTASSIUM SERPL-SCNC: 3.9 MMOL/L (ref 3.5–5.1)
POTASSIUM SERPL-SCNC: 3.9 MMOL/L (ref 3.5–5.1)
PROT SERPL-MCNC: 8.1 G/DL (ref 6–8.4)
PROT SERPL-MCNC: 8.1 G/DL (ref 6–8.4)
PROTHROMBIN TIME: 12.4 SEC (ref 9–12.5)
PROTHROMBIN TIME: 12.4 SEC (ref 9–12.5)
RBC # BLD AUTO: 2.36 M/UL (ref 4.6–6.2)
RBC # BLD AUTO: 2.36 M/UL (ref 4.6–6.2)
SATURATED IRON: 20 % (ref 20–50)
SODIUM SERPL-SCNC: 137 MMOL/L (ref 136–145)
SODIUM SERPL-SCNC: 137 MMOL/L (ref 136–145)
TOTAL IRON BINDING CAPACITY: 327 UG/DL (ref 250–450)
TRANSFERRIN SERPL-MCNC: 221 MG/DL (ref 200–375)
WBC # BLD AUTO: 7.9 K/UL (ref 3.9–12.7)
WBC # BLD AUTO: 7.9 K/UL (ref 3.9–12.7)

## 2023-08-31 PROCEDURE — 3078F PR MOST RECENT DIASTOLIC BLOOD PRESSURE < 80 MM HG: ICD-10-PCS | Mod: CPTII,S$GLB,, | Performed by: INTERNAL MEDICINE

## 2023-08-31 PROCEDURE — 1159F MED LIST DOCD IN RCRD: CPT | Mod: CPTII,S$GLB,, | Performed by: INTERNAL MEDICINE

## 2023-08-31 PROCEDURE — 3078F DIAST BP <80 MM HG: CPT | Mod: CPTII,S$GLB,, | Performed by: INTERNAL MEDICINE

## 2023-08-31 PROCEDURE — 3074F SYST BP LT 130 MM HG: CPT | Mod: CPTII,S$GLB,, | Performed by: INTERNAL MEDICINE

## 2023-08-31 PROCEDURE — 1160F PR REVIEW ALL MEDS BY PRESCRIBER/CLIN PHARMACIST DOCUMENTED: ICD-10-PCS | Mod: CPTII,S$GLB,, | Performed by: INTERNAL MEDICINE

## 2023-08-31 PROCEDURE — 82784 ASSAY IGA/IGD/IGG/IGM EACH: CPT | Performed by: INTERNAL MEDICINE

## 2023-08-31 PROCEDURE — 1160F RVW MEDS BY RX/DR IN RCRD: CPT | Mod: CPTII,S$GLB,, | Performed by: INTERNAL MEDICINE

## 2023-08-31 PROCEDURE — 82390 ASSAY OF CERULOPLASMIN: CPT | Performed by: INTERNAL MEDICINE

## 2023-08-31 PROCEDURE — 3008F BODY MASS INDEX DOCD: CPT | Mod: CPTII,S$GLB,, | Performed by: INTERNAL MEDICINE

## 2023-08-31 PROCEDURE — 1111F DSCHRG MED/CURRENT MED MERGE: CPT | Mod: CPTII,S$GLB,, | Performed by: INTERNAL MEDICINE

## 2023-08-31 PROCEDURE — 84466 ASSAY OF TRANSFERRIN: CPT | Performed by: INTERNAL MEDICINE

## 2023-08-31 PROCEDURE — 86038 ANTINUCLEAR ANTIBODIES: CPT | Performed by: INTERNAL MEDICINE

## 2023-08-31 PROCEDURE — 99999 PR PBB SHADOW E&M-EST. PATIENT-LVL V: ICD-10-PCS | Mod: PBBFAC,,, | Performed by: INTERNAL MEDICINE

## 2023-08-31 PROCEDURE — 3008F PR BODY MASS INDEX (BMI) DOCUMENTED: ICD-10-PCS | Mod: CPTII,S$GLB,, | Performed by: INTERNAL MEDICINE

## 2023-08-31 PROCEDURE — 82103 ALPHA-1-ANTITRYPSIN TOTAL: CPT | Mod: 91 | Performed by: INTERNAL MEDICINE

## 2023-08-31 PROCEDURE — 82103 ALPHA-1-ANTITRYPSIN TOTAL: CPT | Performed by: INTERNAL MEDICINE

## 2023-08-31 PROCEDURE — 86381 MITOCHONDRIAL ANTIBODY EACH: CPT | Performed by: INTERNAL MEDICINE

## 2023-08-31 PROCEDURE — 85025 COMPLETE CBC W/AUTO DIFF WBC: CPT | Performed by: INTERNAL MEDICINE

## 2023-08-31 PROCEDURE — 36415 COLL VENOUS BLD VENIPUNCTURE: CPT | Mod: PN | Performed by: INTERNAL MEDICINE

## 2023-08-31 PROCEDURE — 86364 TISS TRNSGLTMNASE EA IG CLAS: CPT | Performed by: INTERNAL MEDICINE

## 2023-08-31 PROCEDURE — 82784 ASSAY IGA/IGD/IGG/IGM EACH: CPT | Mod: 59 | Performed by: INTERNAL MEDICINE

## 2023-08-31 PROCEDURE — 1111F PR DISCHARGE MEDS RECONCILED W/ CURRENT OUTPATIENT MED LIST: ICD-10-PCS | Mod: CPTII,S$GLB,, | Performed by: INTERNAL MEDICINE

## 2023-08-31 PROCEDURE — 3074F PR MOST RECENT SYSTOLIC BLOOD PRESSURE < 130 MM HG: ICD-10-PCS | Mod: CPTII,S$GLB,, | Performed by: INTERNAL MEDICINE

## 2023-08-31 PROCEDURE — 86790 VIRUS ANTIBODY NOS: CPT | Performed by: INTERNAL MEDICINE

## 2023-08-31 PROCEDURE — 80321 ALCOHOLS BIOMARKERS 1OR 2: CPT | Performed by: INTERNAL MEDICINE

## 2023-08-31 PROCEDURE — 82104 ALPHA-1-ANTITRYPSIN PHENO: CPT | Performed by: INTERNAL MEDICINE

## 2023-08-31 PROCEDURE — 99204 PR OFFICE/OUTPT VISIT, NEW, LEVL IV, 45-59 MIN: ICD-10-PCS | Mod: S$GLB,,, | Performed by: INTERNAL MEDICINE

## 2023-08-31 PROCEDURE — 86015 ACTIN ANTIBODY EACH: CPT | Performed by: INTERNAL MEDICINE

## 2023-08-31 PROCEDURE — 82248 BILIRUBIN DIRECT: CPT | Performed by: INTERNAL MEDICINE

## 2023-08-31 PROCEDURE — 85610 PROTHROMBIN TIME: CPT | Performed by: INTERNAL MEDICINE

## 2023-08-31 PROCEDURE — 86704 HEP B CORE ANTIBODY TOTAL: CPT | Performed by: INTERNAL MEDICINE

## 2023-08-31 PROCEDURE — 86706 HEP B SURFACE ANTIBODY: CPT | Mod: 91 | Performed by: INTERNAL MEDICINE

## 2023-08-31 PROCEDURE — 82728 ASSAY OF FERRITIN: CPT | Performed by: INTERNAL MEDICINE

## 2023-08-31 PROCEDURE — 99999 PR PBB SHADOW E&M-EST. PATIENT-LVL V: CPT | Mod: PBBFAC,,, | Performed by: INTERNAL MEDICINE

## 2023-08-31 PROCEDURE — 1159F PR MEDICATION LIST DOCUMENTED IN MEDICAL RECORD: ICD-10-PCS | Mod: CPTII,S$GLB,, | Performed by: INTERNAL MEDICINE

## 2023-08-31 PROCEDURE — 80307 DRUG TEST PRSMV CHEM ANLYZR: CPT | Performed by: INTERNAL MEDICINE

## 2023-08-31 PROCEDURE — 80053 COMPREHEN METABOLIC PANEL: CPT | Performed by: INTERNAL MEDICINE

## 2023-08-31 PROCEDURE — 99204 OFFICE O/P NEW MOD 45 MIN: CPT | Mod: S$GLB,,, | Performed by: INTERNAL MEDICINE

## 2023-08-31 NOTE — PROGRESS NOTES
HEPATOLOGY CONSULTATION    Referring Physician: Chandan Chambers DO  Current Corresponding Physician: Chandan Chambers DO, Jovanny Douglas MD, Nidhi Terry MD    Reason for Consultation: Consultation for evaluation of Elevated Hepatic Enzymes    History of Present Illness: Parrish Davis is a 28 y.o. male with a hx of sickle cell anemia (hemoglobin SS disease) who was recently admitted with a sickle cell crisis (8/24/23-8/27/23) and was noted to have elevated liver enzymes (elevated since 6/1/2016), hyperbilirubinemia and hepatomegaly.    During crisis 08/23 and 10/23 and 6/2016  Labs 8/27/23: ALT 78, , ALKP 345, Tbil 6.8  8/25/23: ALT 48, AST 66, ALKP 281, Tbil 8.7, direct bilirubin 5.7  HCV Ab-, HBsAg-, HBcIgM-, HAV IgG-    10/27/22: ALT 37, aST 56, ALKP 170    6/1/16: ALT 59, , Tbil 8.4, direct 1.6    Inbetween crises  3/31/23: ALT 41, AST 62, ALKP 134, Tbil 7.8  6/30/23: ALT 44, AST 54, [id number], Tbil 2.5  12/7/22: ALT 20, AST 37, ALKP 85, Tbil 8.7  5/24/22: ALT 20, asT 34, ALKP 76, Tbil 5.2      Abdo US 8/26/23: Liver: Enlarged, measuring 18.9 cm. Homogeneous echotexture. No focal hepatic lesions.Liver: Enlarged, measuring 18.9 cm. Homogeneous echotexture. No focal hepatic lesions.  CT abdo pelvis w contrast 8/24/23: In the abdomen, liver is enlarged 19.2 cm.  No focal hepatic mass.  Gallbladder is been removed.     The patient denies any symptoms of decompensated cirrhosis, including no ascites or edema, cognitive problems that would suggest hepatic encephalopathy, or GI bleeding from varices.     Sickle cell hx:  --dx as infant  --many crises as a child-even had a port placed around the age of 7 for blood transfusions  --more recently fewer crises: 2016 then none until 10/22 and 8/23.  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)  --prozac x at least one year  --hx gallstones-s/p cholecystectomy  --during cirsis takes tylenol ES per bottle  directions and NSAIDS/narcotics      Chief Complaint   Patient presents with    Elevated Hepatic Enzymes       Past Medical History:   Diagnosis Date    Sickle cell anemia     Sickle cell disease      Outpatient Encounter Medications as of 8/31/2023   Medication Sig Dispense Refill    acetaminophen (TYLENOL) 500 MG tablet Take 500 mg by mouth every 8 (eight) hours as needed for Pain.      calcium carbonate (TUMS) 200 mg calcium (500 mg) chewable tablet Take 2-3 tablets by mouth 2 (two) times daily as needed for Heartburn.      FLUoxetine 10 MG capsule Take 1 capsule (10 mg total) by mouth once daily. 90 capsule 3    folic acid (FOLVITE) 1 MG tablet TAKE 1 TABLET BY MOUTH EVERY DAY 90 tablet 3    hydroxyurea (HYDREA) 500 mg Cap TAKE 1 CAPSULE (500 MG TOTAL) BY MOUTH ONCE DAILY. 30 capsule 5    ibuprofen (ADVIL,MOTRIN) 100 MG tablet Take 100 mg by mouth every 6 (six) hours as needed.      levoFLOXacin (LEVAQUIN) 500 MG tablet Take 1 tablet (500 mg total) by mouth once daily. for 4 days 4 tablet 0    oxyCODONE (ROXICODONE) 5 MG immediate release tablet Take 1 tablet (5 mg total) by mouth every 6 (six) hours as needed for Pain. 28 tablet 0    phenyleph-min oil-petrolatum 0.25-14-74.9 % Oint Place 1 applicator rectally 4 (four) times daily as needed (Hemorrhoid discomfort). 56 g 1    voxelotor 500 mg Tab Take 3 tablets (1,500 mg total) by mouth Daily. 90 tablet 3     No facility-administered encounter medications on file as of 8/31/2023.     Review of patient's allergies indicates:  No Known Allergies  No family history on file.    Social History     Socioeconomic History    Marital status: Single   Tobacco Use    Smoking status: Never    Smokeless tobacco: Never   Substance and Sexual Activity    Alcohol use: Yes     Comment: Social drinker on weekends    Drug use: No    Sexual activity: Not Currently     Social Determinants of Health     Financial Resource Strain: Low Risk  (8/25/2023)    Overall Financial Resource  Strain (CARDIA)     Difficulty of Paying Living Expenses: Not very hard   Food Insecurity: No Food Insecurity (8/25/2023)    Hunger Vital Sign     Worried About Running Out of Food in the Last Year: Never true     Ran Out of Food in the Last Year: Never true   Transportation Needs: No Transportation Needs (8/25/2023)    PRAPARE - Transportation     Lack of Transportation (Medical): No     Lack of Transportation (Non-Medical): No   Stress: No Stress Concern Present (8/25/2023)    [employer] [place] [employer] - Occupational Stress Questionnaire     Feeling of Stress : Only a little   Social Connections: Socially Isolated (8/25/2023)    Social Connection and Isolation Panel [NHANES]     Frequency of Communication with Friends and Family: Once a week     Frequency of Social Gatherings with Friends and Family: Once a week     Attends Worship Services: Never     Active Member of Clubs or Organizations: No     Attends Club or Organization Meetings: Never     Marital Status: Never    Housing Stability: Unknown (8/25/2023)    Housing Stability Vital Sign     Unable to Pay for Housing in the Last Year: No     Unstable Housing in the Last Year: No     Review of Systems   Constitutional: Negative.    HENT: Negative.     Eyes: Negative.    Respiratory: Negative.     Cardiovascular: Negative.    Gastrointestinal: Negative.    Genitourinary: Negative.    Musculoskeletal: Negative.    Skin: Negative.    Neurological: Negative.    Psychiatric/Behavioral: Negative.       Vitals:    08/31/23 0853   BP: (!) 108/57   Pulse: 76   Resp: 20   Temp: 99 °F (37.2 °C)        Physical Exam  Vitals reviewed.   Constitutional:       Appearance: He is well-developed.   HENT:      Head: Normocephalic and atraumatic.   Eyes:      General: No scleral icterus.     Conjunctiva/sclera: Conjunctivae normal.      Pupils: Pupils are equal, round, and reactive to light.   Neck:      Thyroid: No thyromegaly.   Cardiovascular:      Rate  and Rhythm: Normal rate and regular rhythm.      Heart sounds: Normal heart sounds.   Pulmonary:      Effort: Pulmonary effort is normal.      Breath sounds: Normal breath sounds. No rales.   Abdominal:      General: Bowel sounds are normal. There is no distension.      Palpations: Abdomen is soft. There is no mass.      Tenderness: There is no abdominal tenderness.   Musculoskeletal:         General: Normal range of motion.      Cervical back: Normal range of motion and neck supple.   Skin:     General: Skin is warm and dry.      Findings: No rash.   Neurological:      Mental Status: He is alert and oriented to person, place, and time.         MELD 3.0: 17 at 10/22/2022  4:06 AM  MELD-Na: 17 at 10/22/2022  4:06 AM  Calculated from:  Serum Creatinine: 0.7 mg/dL (Using min of 1 mg/dL) at 10/22/2022  4:06 AM  Serum Sodium: 138 mmol/L (Using max of 137 mmol/L) at 10/22/2022  4:06 AM  Total Bilirubin: 6.6 mg/dL at 10/22/2022  4:06 AM  Serum Albumin: 3.4 g/dL at 10/22/2022  4:06 AM  INR(ratio): 1.3 at 10/20/2022  3:13 AM  Age at listing (hypothetical): 27 years  Sex: Male at 10/22/2022  4:06 AM      Lab Results   Component Value Date    GLU 95 08/27/2023    BUN 4 (L) 08/27/2023    CREATININE 0.6 08/27/2023    CALCIUM 9.0 08/27/2023     (L) 08/27/2023    K 3.5 08/27/2023     08/27/2023    PROT 7.3 08/27/2023    CO2 24 08/27/2023    ANIONGAP 10 08/27/2023    WBC 11.68 08/27/2023    RBC 2.03 (L) 08/27/2023    HGB 7.0 (L) 08/27/2023    HCT 19.2 (LL) 08/27/2023    MCV 95 08/27/2023    MCH 34.5 (H) 08/27/2023    MCHC 36.5 (H) 08/27/2023     Lab Results   Component Value Date    RDW 24.9 (H) 08/27/2023     08/27/2023    MPV 10.2 08/27/2023    GRAN 7.6 08/27/2023    GRAN 65.2 08/27/2023    LYMPH 2.0 08/27/2023    LYMPH 17.0 (L) 08/27/2023    MONO 1.5 (H) 08/27/2023    MONO 12.7 08/27/2023    EOSINOPHIL 2.4 08/27/2023    BASOPHIL 1.1 08/27/2023    EOS 0.3 08/27/2023    BASO 0.13 08/27/2023    APTT 27.7  10/20/2022    GROUPTRH O POS 08/24/2023    CHOL 101 (L) 11/29/2021    TRIG 103 11/29/2021    HDL 35 (L) 11/29/2021    CHOLHDL 34.7 11/29/2021    TOTALCHOLEST 2.9 11/29/2021    ALBUMIN 2.7 (L) 08/27/2023    BILIDIR 5.7 (H) 08/25/2023     (H) 08/27/2023    ALT 78 (H) 08/27/2023    ALKPHOS 345 (H) 08/27/2023    MG 1.8 08/27/2023    LABPROT 12.9 (H) 10/20/2022    INR 1.3 (H) 10/20/2022       Assessment and Plan:  Parrish Davis is a 28 y.o. male with hx Sickle Cell Anemia and hepatomegaly, elevated liver enzymes and now hyperbilirubinemia which is mostly direct (used to be indirect presumably from hemolysis). Viral hepatitis (HCV and HBV) have been ruled out with recent serologies. Given the hx of multiple transfusions, chronic liver disease and iron overload must be considered. Will recheck iron studies and will proceed with MR elastography to quantify iron. Also recommend MRCP to r/o choledocholithiasis. I do note that abdo US and ct abdo not suggestive, however he did have a cholecystectomy in the past for gallstone disease. Intrahepatic cholestasis ass with sickle cell is also on the differential although he dose not have renal impairment often associated with this.     I did review voxelotor in livertox. It does not appear to have significant hepatotoxicity and thus I am not recommending it be stopped. To be complete will order a full serologic w/u for CLD (chronic liver disease). His plts are normal and imaging does not show obvious cirrhosis or portal hypertension. MRE (if he does not have significant iron overload) will screen for fibrosis. With iron overload fibrosis often can not be estimated.     Return after labs and MRE. At this point I am deferring a liver biopsy.

## 2023-09-01 LAB
ANA SER QL IF: NORMAL
MITOCHONDRIA AB TITR SER IF: NORMAL {TITER}

## 2023-09-05 LAB
A1AT PHENOTYP SERPL-IMP: ABNORMAL BANDS
A1AT SERPL NEPH-MCNC: 286 MG/DL (ref 100–190)
AMPHETAMINES SERPL QL: NEGATIVE
BARBITURATES SERPL QL SCN: NEGATIVE
BENZODIAZ SERPL QL SCN: NEGATIVE
BZE SERPL QL: NEGATIVE
CARBOXYTHC SERPL QL SCN: NEGATIVE
ETHANOL SERPL QL SCN: NEGATIVE
METHADONE SERPL QL SCN: NEGATIVE
OPIATES SERPL QL SCN: NEGATIVE
PCP SERPL QL SCN: NEGATIVE
PROPOXYPH SERPL QL: NEGATIVE

## 2023-09-06 LAB
CLINICAL BIOCHEMIST REVIEW: NORMAL
PLPETH BLD-MCNC: <10 NG/ML
POPETH BLD-MCNC: <20 NG/ML
SMOOTH MUSCLE AB TITR SER IF: ABNORMAL {TITER}
TTG IGA SER-ACNC: 1.2 U/ML

## 2023-09-11 DIAGNOSIS — D57.1 SICKLE CELL DISEASE WITHOUT CRISIS: ICD-10-CM

## 2023-09-12 RX ORDER — VOXELOTOR 500 MG/1
1500 TABLET, FILM COATED ORAL DAILY
Qty: 90 TABLET | Refills: 3 | Status: ACTIVE | OUTPATIENT
Start: 2023-09-12 | End: 2024-02-05 | Stop reason: SDUPTHER

## 2023-09-14 ENCOUNTER — LAB VISIT (OUTPATIENT)
Dept: LAB | Facility: HOSPITAL | Age: 28
End: 2023-09-14
Attending: INTERNAL MEDICINE
Payer: COMMERCIAL

## 2023-09-14 ENCOUNTER — OFFICE VISIT (OUTPATIENT)
Dept: HEMATOLOGY/ONCOLOGY | Facility: CLINIC | Age: 28
End: 2023-09-14
Payer: COMMERCIAL

## 2023-09-14 VITALS
SYSTOLIC BLOOD PRESSURE: 118 MMHG | HEART RATE: 60 BPM | RESPIRATION RATE: 16 BRPM | BODY MASS INDEX: 24.67 KG/M2 | WEIGHT: 182.13 LBS | HEIGHT: 72 IN | DIASTOLIC BLOOD PRESSURE: 62 MMHG | TEMPERATURE: 99 F | OXYGEN SATURATION: 100 %

## 2023-09-14 DIAGNOSIS — D57.00 SICKLE-CELL DISEASE WITH VASO-OCCLUSIVE PAIN: ICD-10-CM

## 2023-09-14 DIAGNOSIS — D57.1 SICKLE CELL DISEASE WITHOUT CRISIS: Primary | ICD-10-CM

## 2023-09-14 DIAGNOSIS — Z91.89 AT RISK FOR OSTEOPOROSIS: ICD-10-CM

## 2023-09-14 DIAGNOSIS — R17 SCLERAL ICTERUS: ICD-10-CM

## 2023-09-14 LAB
ALBUMIN SERPL BCP-MCNC: 3.6 G/DL (ref 3.5–5.2)
ALP SERPL-CCNC: 135 U/L (ref 55–135)
ALT SERPL W/O P-5'-P-CCNC: 33 U/L (ref 10–44)
ANION GAP SERPL CALC-SCNC: 10 MMOL/L (ref 8–16)
ANISOCYTOSIS BLD QL SMEAR: ABNORMAL
AST SERPL-CCNC: 43 U/L (ref 10–40)
BASOPHILS # BLD AUTO: 0.05 K/UL (ref 0–0.2)
BASOPHILS NFR BLD: 0.8 % (ref 0–1.9)
BILIRUB SERPL-MCNC: 3 MG/DL (ref 0.1–1)
BUN SERPL-MCNC: 5 MG/DL (ref 6–20)
CALCIUM SERPL-MCNC: 8.8 MG/DL (ref 8.7–10.5)
CHLORIDE SERPL-SCNC: 106 MMOL/L (ref 95–110)
CO2 SERPL-SCNC: 21 MMOL/L (ref 23–29)
CREAT SERPL-MCNC: 0.6 MG/DL (ref 0.5–1.4)
DIFFERENTIAL METHOD: ABNORMAL
EOSINOPHIL # BLD AUTO: 0.1 K/UL (ref 0–0.5)
EOSINOPHIL NFR BLD: 2 % (ref 0–8)
ERYTHROCYTE [DISTWIDTH] IN BLOOD BY AUTOMATED COUNT: 18.5 % (ref 11.5–14.5)
EST. GFR  (NO RACE VARIABLE): >60 ML/MIN/1.73 M^2
FERRITIN SERPL-MCNC: 129 NG/ML (ref 20–300)
GLUCOSE SERPL-MCNC: 84 MG/DL (ref 70–110)
HCT VFR BLD AUTO: 26.7 % (ref 40–54)
HGB BLD-MCNC: 9.9 G/DL (ref 14–18)
HYPOCHROMIA BLD QL SMEAR: ABNORMAL
IMM GRANULOCYTES # BLD AUTO: 0.01 K/UL (ref 0–0.04)
IMM GRANULOCYTES NFR BLD AUTO: 0.2 % (ref 0–0.5)
IRON SERPL-MCNC: 37 UG/DL (ref 45–160)
LYMPHOCYTES # BLD AUTO: 2.3 K/UL (ref 1–4.8)
LYMPHOCYTES NFR BLD: 34.5 % (ref 18–48)
MCH RBC QN AUTO: 31.6 PG (ref 27–31)
MCHC RBC AUTO-ENTMCNC: 37.1 G/DL (ref 32–36)
MCV RBC AUTO: 85 FL (ref 82–98)
MONOCYTES # BLD AUTO: 0.7 K/UL (ref 0.3–1)
MONOCYTES NFR BLD: 11 % (ref 4–15)
NEUTROPHILS # BLD AUTO: 3.4 K/UL (ref 1.8–7.7)
NEUTROPHILS NFR BLD: 51.5 % (ref 38–73)
NRBC BLD-RTO: 1 /100 WBC
OVALOCYTES BLD QL SMEAR: ABNORMAL
PLATELET # BLD AUTO: 619 K/UL (ref 150–450)
PLATELET BLD QL SMEAR: ABNORMAL
PMV BLD AUTO: 10 FL (ref 9.2–12.9)
POIKILOCYTOSIS BLD QL SMEAR: SLIGHT
POLYCHROMASIA BLD QL SMEAR: ABNORMAL
POTASSIUM SERPL-SCNC: 3.7 MMOL/L (ref 3.5–5.1)
PROT SERPL-MCNC: 8 G/DL (ref 6–8.4)
RBC # BLD AUTO: 3.13 M/UL (ref 4.6–6.2)
RETICS/RBC NFR AUTO: 5.2 % (ref 0.4–2)
SATURATED IRON: 10 % (ref 20–50)
SICKLE CELLS BLD QL SMEAR: ABNORMAL
SODIUM SERPL-SCNC: 137 MMOL/L (ref 136–145)
SPHEROCYTES BLD QL SMEAR: ABNORMAL
TARGETS BLD QL SMEAR: ABNORMAL
TOTAL IRON BINDING CAPACITY: 358 UG/DL (ref 250–450)
TRANSFERRIN SERPL-MCNC: 242 MG/DL (ref 200–375)
WBC # BLD AUTO: 6.52 K/UL (ref 3.9–12.7)

## 2023-09-14 PROCEDURE — 85045 AUTOMATED RETICULOCYTE COUNT: CPT | Performed by: INTERNAL MEDICINE

## 2023-09-14 PROCEDURE — 84466 ASSAY OF TRANSFERRIN: CPT | Performed by: INTERNAL MEDICINE

## 2023-09-14 PROCEDURE — 80053 COMPREHEN METABOLIC PANEL: CPT | Performed by: INTERNAL MEDICINE

## 2023-09-14 PROCEDURE — 3078F DIAST BP <80 MM HG: CPT | Mod: CPTII,S$GLB,, | Performed by: INTERNAL MEDICINE

## 2023-09-14 PROCEDURE — 3008F PR BODY MASS INDEX (BMI) DOCUMENTED: ICD-10-PCS | Mod: CPTII,S$GLB,, | Performed by: INTERNAL MEDICINE

## 2023-09-14 PROCEDURE — 82728 ASSAY OF FERRITIN: CPT | Performed by: INTERNAL MEDICINE

## 2023-09-14 PROCEDURE — 3078F PR MOST RECENT DIASTOLIC BLOOD PRESSURE < 80 MM HG: ICD-10-PCS | Mod: CPTII,S$GLB,, | Performed by: INTERNAL MEDICINE

## 2023-09-14 PROCEDURE — 83540 ASSAY OF IRON: CPT | Performed by: INTERNAL MEDICINE

## 2023-09-14 PROCEDURE — 1111F PR DISCHARGE MEDS RECONCILED W/ CURRENT OUTPATIENT MED LIST: ICD-10-PCS | Mod: CPTII,S$GLB,, | Performed by: INTERNAL MEDICINE

## 2023-09-14 PROCEDURE — 3074F PR MOST RECENT SYSTOLIC BLOOD PRESSURE < 130 MM HG: ICD-10-PCS | Mod: CPTII,S$GLB,, | Performed by: INTERNAL MEDICINE

## 2023-09-14 PROCEDURE — 99214 OFFICE O/P EST MOD 30 MIN: CPT | Mod: S$GLB,,, | Performed by: INTERNAL MEDICINE

## 2023-09-14 PROCEDURE — 99999 PR PBB SHADOW E&M-EST. PATIENT-LVL III: CPT | Mod: PBBFAC,,, | Performed by: INTERNAL MEDICINE

## 2023-09-14 PROCEDURE — 3074F SYST BP LT 130 MM HG: CPT | Mod: CPTII,S$GLB,, | Performed by: INTERNAL MEDICINE

## 2023-09-14 PROCEDURE — 36415 COLL VENOUS BLD VENIPUNCTURE: CPT | Performed by: INTERNAL MEDICINE

## 2023-09-14 PROCEDURE — 99214 PR OFFICE/OUTPT VISIT, EST, LEVL IV, 30-39 MIN: ICD-10-PCS | Mod: S$GLB,,, | Performed by: INTERNAL MEDICINE

## 2023-09-14 PROCEDURE — 1111F DSCHRG MED/CURRENT MED MERGE: CPT | Mod: CPTII,S$GLB,, | Performed by: INTERNAL MEDICINE

## 2023-09-14 PROCEDURE — 99999 PR PBB SHADOW E&M-EST. PATIENT-LVL III: ICD-10-PCS | Mod: PBBFAC,,, | Performed by: INTERNAL MEDICINE

## 2023-09-14 PROCEDURE — 3008F BODY MASS INDEX DOCD: CPT | Mod: CPTII,S$GLB,, | Performed by: INTERNAL MEDICINE

## 2023-09-14 PROCEDURE — 85025 COMPLETE CBC W/AUTO DIFF WBC: CPT | Performed by: INTERNAL MEDICINE

## 2023-09-14 NOTE — PROGRESS NOTES
CC: Sickle cell anemia, hematology follow up    HPI: , 28, is here for hematology follow up for sickle cell anemia. He was being followed by pediatrics until age 21. Last pain crisis was in 2016.   He has been experiencing back pain of late. Pain is intermittent, no clear precipitating or alleviating factors.   He had priapism several years ago.      He had one ER visit in October 2022 and again hospitalization for sickle pain nikolay in the same month. No clear triggers were identified for these episodes.    Interval History: He feels tired today.  He started oxbryta in May 2023. He is tolerating it well . He was hospitalized in Aug 08142 for sickle pain crisis and acute chest syndrome. He was started on multimodal pain regimen consisting of Tylenol 650 TID, p.o. opiates, IV opiates, and heating pads . EKG showed normal sinus rhythm, no ST abnormalities. Fever of 101 documented. CXR showed improvement compared to admission. No focal abcesses. UA +1 bili, otherwise unremarkable.He was initially treated with iv vancomycin, cefepime. Transitioned to PO Abx for continued CAP coverage outpatient.      Review of patient's allergies indicates:  No Known Allergies      Current Outpatient Medications   Medication Sig    acetaminophen (TYLENOL) 500 MG tablet Take 500 mg by mouth every 8 (eight) hours as needed for Pain.    calcium carbonate (TUMS) 200 mg calcium (500 mg) chewable tablet Take 2-3 tablets by mouth 2 (two) times daily as needed for Heartburn.    FLUoxetine 10 MG capsule Take 1 capsule (10 mg total) by mouth once daily.    folic acid (FOLVITE) 1 MG tablet TAKE 1 TABLET BY MOUTH EVERY DAY    hydroxyurea (HYDREA) 500 mg Cap TAKE 1 CAPSULE (500 MG TOTAL) BY MOUTH ONCE DAILY.    ibuprofen (ADVIL,MOTRIN) 100 MG tablet Take 100 mg by mouth every 6 (six) hours as needed.    phenyleph-min oil-petrolatum 0.25-14-74.9 % Oint Place 1 applicator rectally 4 (four) times daily as needed (Hemorrhoid  discomfort).    voxelotor (OXBRYTA) 500 mg Tab Take 3 tablets (1,500 mg total) by mouth Daily.     No current facility-administered medications for this visit.          Review of Systems   Constitutional:  Positive for malaise/fatigue. Negative for weight loss.   HENT:  Negative for ear discharge and ear pain.    Eyes:  Negative for photophobia, pain and discharge.   Respiratory:  Negative for hemoptysis, sputum production and shortness of breath.    Cardiovascular:  Negative for chest pain, palpitations, orthopnea and claudication.   Gastrointestinal:  Negative for blood in stool, constipation, diarrhea, heartburn, melena, nausea and vomiting.   Genitourinary:  Negative for dysuria, frequency, hematuria and urgency.   Musculoskeletal:  Negative for back pain, myalgias and neck pain.   Neurological:  Negative for dizziness and tremors.   Endo/Heme/Allergies:  Does not bruise/bleed easily.   Psychiatric/Behavioral:  Negative for substance abuse.          Vitals:    09/14/23 1346   BP: 118/62   Pulse: 60   Resp: 16   Temp: 98.6 °F (37 °C)       Physical Exam  Constitutional:       Appearance: Normal appearance.   HENT:      Head: Normocephalic.   Eyes:      General: Scleral icterus present.   Cardiovascular:      Rate and Rhythm: Normal rate.      Heart sounds: Normal heart sounds. No murmur heard.  Pulmonary:      Effort: Pulmonary effort is normal. No respiratory distress.      Breath sounds: No stridor.   Abdominal:      General: There is no distension.      Palpations: There is no mass.      Tenderness: There is no abdominal tenderness.   Genitourinary:     Penis: Normal.       Testes: Normal.   Musculoskeletal:         General: No swelling or tenderness.   Lymphadenopathy:      Cervical: No cervical adenopathy.   Skin:     Coloration: Skin is not jaundiced or pale.   Neurological:      General: No focal deficit present.      Mental Status: He is alert and oriented to person, place, and time.      Cranial Nerves:  No cranial nerve deficit.         2/21/22 24hr urine   Component Ref Range & Units 3 mo ago    Urine Volume mL 2900    Urine Collection Duration Hr 24    Protein, Urine 0 - 15 mg/dL <7    Urine Protein, Timed 0 - 100 mg/Spec Unable to calculate           3/3/22 DEXA scan    Impression:     *Bone density is below the expected range for age.      3/7/22 ECHO     The left ventricle is mildly enlarged with mild eccentric hypertrophy and normal systolic function.  The estimated ejection fraction is 60%.  The quantitatively derived ejection fraction is 60%.  Normal left ventricular diastolic function.  Mild right ventricular enlargement with normal right ventricular systolic function.  The estimated PA systolic pressure is 30 mmHg.  Intermediate central venous pressure (8 mmHg).     Component      Latest Ref Rng 9/14/2023   Sodium      136 - 145 mmol/L 137    Potassium      3.5 - 5.1 mmol/L 3.7    Chloride      95 - 110 mmol/L 106    CO2      23 - 29 mmol/L 21 (L)    Glucose      70 - 110 mg/dL 84    BUN      6 - 20 mg/dL 5 (L)    Creatinine      0.5 - 1.4 mg/dL 0.6    Calcium      8.7 - 10.5 mg/dL 8.8    PROTEIN TOTAL      6.0 - 8.4 g/dL 8.0    Albumin      3.5 - 5.2 g/dL 3.6    BILIRUBIN TOTAL      0.1 - 1.0 mg/dL 3.0 (H)    Alkaline Phosphatase      55 - 135 U/L 135    AST      10 - 40 U/L 43 (H)    ALT      10 - 44 U/L 33    eGFR      >60 mL/min/1.73 m^2 >60.0    Anion Gap      8 - 16 mmol/L 10    WBC      3.90 - 12.70 K/uL 6.52    RBC      4.60 - 6.20 M/uL 3.13 (L)    Hemoglobin      14.0 - 18.0 g/dL 9.9 (L)    Hematocrit      40.0 - 54.0 % 26.7 (L)    MCV      82 - 98 fL 85    MCH      27.0 - 31.0 pg 31.6 (H)    MCHC      32.0 - 36.0 g/dL 37.1 (H)    RDW      11.5 - 14.5 % 18.5 (H)    Platelets      150 - 450 K/uL 619 (H)    MPV      9.2 - 12.9 fL 10.0    Iron      45 - 160 ug/dL 37 (L)    Transferrin      200 - 375 mg/dL 242    TIBC      250 - 450 ug/dL 358    Saturated Iron      20 - 50 % 10 (L)    Retic      0.4  - 2.0 % 5.2 (H)    Ferritin      20.0 - 300.0 ng/mL 129       Legend:  (L) Low  (H) High  Assessment:    1. Hemoglobin SS disease    -He has had 3-4 pain crises in 2022, 2 episodes so far in 2023 requiring hopsitalization  -on voxeletor, tolerating it well so far     -Sickle cell checklist:    1. Annual ophtho exam:   Last done: 6/29/22  Findings:      2. Bone health: Ca++d recommended, q3 yr dexa recommended. DEXA done on 3/3/22 showed bone density below the expected range for age.  Vit D: Needs periodic monitoring    3. Screening echo for pHTN:     Last done: 3/7/22; estimated PA systolic pressure is 30 mmHg.    4. Annual UA + MicroAlb/Cr Ratio:     Last done: 24hr urine protein excretion was negligible , on 2/21/22    5. Folic acid: currently taking 1mg daily    6. Hydrea: currently taking 500 mg daily    7. Transfusions / iron : Cumulative transfusions: 2  Baseline h/h:   Ferritin: 72  ng/ml on 12/7/22, 485ng/ml on 8/31/23, 129ng/ml on 9/14/23    6. Pain regimen: no regular regimen, PRN Acetaminophen, NSAID  -no frequent admissions with pain crises  -Pain contract: none    7. Osteonecrosis screening: yes    8. Stem cell transplant? No    9. H/o cva? No    10. Priapism: Yes, once since last visit ( April 2022)  --education provided    10. HCV screening? Negative in 2016    11. Immunizations:     Pneumococcal. 12 then 23. 23 q5 yrs: current  COVID 19 : uptodate  H influenzae:  Annual flu: uptodate  Meningococcal:          BMT Chart Routing      Follow up with physician 3 months.   Follow up with [facility]    Provider visit type    Infusion scheduling note    Injection scheduling note    Labs CBC, CMP, reticulocytes, ferritin and iron and TIBC   Scheduling:  Preferred lab:  Lab interval:     Imaging    Pharmacy appointment    Other referrals

## 2023-09-29 ENCOUNTER — HOSPITAL ENCOUNTER (OUTPATIENT)
Dept: RADIOLOGY | Facility: HOSPITAL | Age: 28
Discharge: HOME | End: 2023-09-29
Attending: INTERNAL MEDICINE
Payer: COMMERCIAL

## 2023-09-29 DIAGNOSIS — R74.01 ELEVATED TRANSAMINASE LEVEL: ICD-10-CM

## 2023-09-29 PROCEDURE — 76376 3D RENDER W/INTRP POSTPROCES: CPT | Mod: TC

## 2023-09-29 PROCEDURE — 74181 MRI ABDOMEN W/O CONTRAST: CPT | Mod: 26,,, | Performed by: RADIOLOGY

## 2023-09-29 PROCEDURE — 76376 3D RENDER W/INTRP POSTPROCES: CPT | Mod: 26,,, | Performed by: RADIOLOGY

## 2023-09-29 PROCEDURE — 74181 MRI ABDOMEN WITHOUT CONTRAST MRCP: ICD-10-PCS | Mod: 26,,, | Performed by: RADIOLOGY

## 2023-09-29 PROCEDURE — 76391 MR ELASTOGRAPHY: CPT | Mod: 26,,, | Performed by: RADIOLOGY

## 2023-09-29 PROCEDURE — 74181 MRI ABDOMEN W/O CONTRAST: CPT | Mod: TC

## 2023-09-29 PROCEDURE — 76391 MR ELASTOGRAPHY: ICD-10-PCS | Mod: 26,,, | Performed by: RADIOLOGY

## 2023-09-29 PROCEDURE — 76376 MRI ABDOMEN WITHOUT CONTRAST MRCP: ICD-10-PCS | Mod: 26,,, | Performed by: RADIOLOGY

## 2023-09-29 PROCEDURE — 76391 MR ELASTOGRAPHY: CPT | Mod: TC

## 2023-10-02 ENCOUNTER — OFFICE VISIT (OUTPATIENT)
Dept: TRANSPLANT | Facility: CLINIC | Age: 28
End: 2023-10-02
Payer: COMMERCIAL

## 2023-10-02 VITALS
HEIGHT: 72 IN | HEART RATE: 71 BPM | WEIGHT: 189.06 LBS | DIASTOLIC BLOOD PRESSURE: 68 MMHG | SYSTOLIC BLOOD PRESSURE: 129 MMHG | RESPIRATION RATE: 18 BRPM | TEMPERATURE: 99 F | BODY MASS INDEX: 25.61 KG/M2 | OXYGEN SATURATION: 99 %

## 2023-10-02 DIAGNOSIS — R79.89 ELEVATED LIVER FUNCTION TESTS: Primary | ICD-10-CM

## 2023-10-02 DIAGNOSIS — R16.0 HEPATOMEGALY: ICD-10-CM

## 2023-10-02 DIAGNOSIS — E80.6 DIRECT HYPERBILIRUBINEMIA: ICD-10-CM

## 2023-10-02 DIAGNOSIS — R74.01 ELEVATED TRANSAMINASE LEVEL: ICD-10-CM

## 2023-10-02 PROBLEM — R17 SCLERAL ICTERUS: Status: RESOLVED | Noted: 2021-11-22 | Resolved: 2023-10-02

## 2023-10-02 PROCEDURE — 99999 PR PBB SHADOW E&M-EST. PATIENT-LVL IV: ICD-10-PCS | Mod: PBBFAC,,, | Performed by: INTERNAL MEDICINE

## 2023-10-02 PROCEDURE — 3078F PR MOST RECENT DIASTOLIC BLOOD PRESSURE < 80 MM HG: ICD-10-PCS | Mod: CPTII,S$GLB,, | Performed by: INTERNAL MEDICINE

## 2023-10-02 PROCEDURE — 1160F RVW MEDS BY RX/DR IN RCRD: CPT | Mod: CPTII,S$GLB,, | Performed by: INTERNAL MEDICINE

## 2023-10-02 PROCEDURE — 3078F DIAST BP <80 MM HG: CPT | Mod: CPTII,S$GLB,, | Performed by: INTERNAL MEDICINE

## 2023-10-02 PROCEDURE — 3074F SYST BP LT 130 MM HG: CPT | Mod: CPTII,S$GLB,, | Performed by: INTERNAL MEDICINE

## 2023-10-02 PROCEDURE — 99999 PR PBB SHADOW E&M-EST. PATIENT-LVL IV: CPT | Mod: PBBFAC,,, | Performed by: INTERNAL MEDICINE

## 2023-10-02 PROCEDURE — 3074F PR MOST RECENT SYSTOLIC BLOOD PRESSURE < 130 MM HG: ICD-10-PCS | Mod: CPTII,S$GLB,, | Performed by: INTERNAL MEDICINE

## 2023-10-02 PROCEDURE — 3008F PR BODY MASS INDEX (BMI) DOCUMENTED: ICD-10-PCS | Mod: CPTII,S$GLB,, | Performed by: INTERNAL MEDICINE

## 2023-10-02 PROCEDURE — 99214 OFFICE O/P EST MOD 30 MIN: CPT | Mod: S$GLB,,, | Performed by: INTERNAL MEDICINE

## 2023-10-02 PROCEDURE — 3008F BODY MASS INDEX DOCD: CPT | Mod: CPTII,S$GLB,, | Performed by: INTERNAL MEDICINE

## 2023-10-02 PROCEDURE — 1160F PR REVIEW ALL MEDS BY PRESCRIBER/CLIN PHARMACIST DOCUMENTED: ICD-10-PCS | Mod: CPTII,S$GLB,, | Performed by: INTERNAL MEDICINE

## 2023-10-02 PROCEDURE — 1159F PR MEDICATION LIST DOCUMENTED IN MEDICAL RECORD: ICD-10-PCS | Mod: CPTII,S$GLB,, | Performed by: INTERNAL MEDICINE

## 2023-10-02 PROCEDURE — 99214 PR OFFICE/OUTPT VISIT, EST, LEVL IV, 30-39 MIN: ICD-10-PCS | Mod: S$GLB,,, | Performed by: INTERNAL MEDICINE

## 2023-10-02 PROCEDURE — 1159F MED LIST DOCD IN RCRD: CPT | Mod: CPTII,S$GLB,, | Performed by: INTERNAL MEDICINE

## 2023-10-02 NOTE — PROGRESS NOTES
HEPATOLOGY FOLLOW UP    Referring Physician: Chandan Chambers DO  Current Corresponding Physician: Chandan Chambers DO, Jovanny Douglas MD, Nidhi Terry MD    Parrish Davis is here for follow up of Elevated Hepatic Enzymes      HPI  Parrish Davis is a 28 y.o. male with a hx of sickle cell anemia (hemoglobin SS disease) who was recently admitted with a sickle cell crisis (8/24/23-8/27/23) and was noted to have elevated liver enzymes (elevated since 6/1/2016), hyperbilirubinemia and hepatomegaly. I saw him in consultation 8/31/23:     During crisis 08/23 and 10/23 and 6/2016  Labs 8/27/23: ALT 78, , ALKP 345, Tbil 6.8  8/25/23: ALT 48, AST 66, ALKP 281, Tbil 8.7, direct bilirubin 5.7  HCV Ab-, HBsAg-, HBcIgM-, HAV IgG-     10/27/22: ALT 37, AST 56, ALKP 170     6/1/16: ALT 59, , Tbil 8.4, direct 1.6     Inbetween crises  3/31/23: ALT 41, AST 62, ALKP 134, Tbil 7.8  6/30/23: ALT 44, AST 54, lEKV123, Tbil 2.5  12/7/22: ALT 20, AST 37, ALKP 85, Tbil 8.7  5/24/22: ALT 20, asT 34, ALKP 76, Tbil 5.2        Abdo US 8/26/23: Liver: Enlarged, measuring 18.9 cm. Homogeneous echotexture. No focal hepatic lesions.Liver: Enlarged, measuring 18.9 cm. Homogeneous echotexture. No focal hepatic lesions.  CT abdo pelvis w contrast 8/24/23: In the abdomen, liver is enlarged 19.2 cm.  No focal hepatic mass.  Gallbladder is been removed.      The patient denied any symptoms of decompensated cirrhosis, including no ascites or edema, cognitive problems that would suggest hepatic encephalopathy, or GI bleeding from varices.      Sickle cell hx:  --dx as infant  --many crises as a child-even had a port placed around the age of 7 for blood transfusions  --more recently fewer crises: 2016 then none until 10/22 and 8/23.  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)  --prozac x at least one year  --hx gallstones-s/p cholecystectomy  --during cirsis takes tylenol ES per  bottle directions and NSAIDS/narcotics    Interval History  Since Parrish Davis's last visit:    Labs 23: Tbil 3.0, ALT 33, aST 43, ALKP 135 (all markedly improved)  Serologic w/u: REYNALDO-, ASMA+ (titer only 1:40-essentially negative); AMA-, HCV ab-, HBsAg-, HbcAb-, HbsAb-, HAV IgG-, alpha 1 antitrypsin level normal,     --rechecked iron studies:ferritin 129, iron sat 10%  MRE 23- no iron overload and no signifiicant fibrosis  MRCP 23- no choledocholithiasis/dilated bile ducts    Voxelotor-still taking    --feeling well, no abdo pain, N/V or pruritus    Outpatient Encounter Medications as of 10/2/2023   Medication Sig Dispense Refill    acetaminophen (TYLENOL) 500 MG tablet Take 500 mg by mouth every 8 (eight) hours as needed for Pain.      calcium carbonate (TUMS) 200 mg calcium (500 mg) chewable tablet Take 2-3 tablets by mouth 2 (two) times daily as needed for Heartburn.      FLUoxetine 10 MG capsule Take 1 capsule (10 mg total) by mouth once daily. 90 capsule 3    folic acid (FOLVITE) 1 MG tablet TAKE 1 TABLET BY MOUTH EVERY DAY 90 tablet 3    hydroxyurea (HYDREA) 500 mg Cap TAKE 1 CAPSULE (500 MG TOTAL) BY MOUTH ONCE DAILY. 30 capsule 5    ibuprofen (ADVIL,MOTRIN) 100 MG tablet Take 100 mg by mouth every 6 (six) hours as needed.      phenyleph-min oil-petrolatum 0.25-14-74.9 % Oint Place 1 applicator rectally 4 (four) times daily as needed (Hemorrhoid discomfort). 56 g 1    voxelotor (OXBRYTA) 500 mg Tab Take 3 tablets (1,500 mg total) by mouth Daily. 90 tablet 3    [] oxyCODONE (ROXICODONE) 5 MG immediate release tablet Take 1 tablet (5 mg total) by mouth every 6 (six) hours as needed for Pain. 28 tablet 0    [DISCONTINUED] voxelotor 500 mg Tab Take 3 tablets (1,500 mg total) by mouth Daily. 90 tablet 3     No facility-administered encounter medications on file as of 10/2/2023.     Review of patient's allergies indicates:  No Known Allergies  Past Medical History:   Diagnosis Date     Sickle cell anemia     Sickle cell disease        Review of Systems   Constitutional: Negative.    HENT: Negative.     Eyes: Negative.    Respiratory: Negative.     Cardiovascular: Negative.    Gastrointestinal: Negative.    Genitourinary: Negative.    Musculoskeletal: Negative.    Skin: Negative.    Neurological: Negative.    Psychiatric/Behavioral: Negative.       Vitals:    10/02/23 1633   BP: 129/68   Pulse: 71   Resp: 18       Physical Exam  Vitals reviewed.   Constitutional:       Appearance: He is well-developed.   HENT:      Head: Normocephalic and atraumatic.   Eyes:      General: No scleral icterus.     Conjunctiva/sclera: Conjunctivae normal.      Pupils: Pupils are equal, round, and reactive to light.   Neck:      Thyroid: No thyromegaly.   Cardiovascular:      Rate and Rhythm: Normal rate and regular rhythm.      Heart sounds: Normal heart sounds.   Pulmonary:      Effort: Pulmonary effort is normal.      Breath sounds: Normal breath sounds. No rales.   Abdominal:      General: Bowel sounds are normal. There is no distension.      Palpations: Abdomen is soft. There is no mass.      Tenderness: There is no abdominal tenderness.   Musculoskeletal:         General: Normal range of motion.      Cervical back: Normal range of motion and neck supple.   Skin:     General: Skin is warm and dry.      Findings: No rash.   Neurological:      Mental Status: He is alert and oriented to person, place, and time.         MELD 3.0: 16 at 8/31/2023  9:52 AM  MELD-Na: 15 at 8/31/2023  9:52 AM  Calculated from:  Serum Creatinine: 0.6 mg/dL (Using min of 1 mg/dL) at 8/31/2023  9:52 AM  Serum Sodium: 137 mmol/L at 8/31/2023  9:52 AM  Total Bilirubin: 6.0 mg/dL at 8/31/2023  9:52 AM  Serum Albumin: 3.2 g/dL at 8/31/2023  9:52 AM  INR(ratio): 1.2 at 8/31/2023  9:52 AM  Age at listing (hypothetical): 28 years  Sex: Male at 8/31/2023  9:52 AM      Lab Results   Component Value Date    GLU 84 09/14/2023    BUN 5 (L) 09/14/2023     CREATININE 0.6 09/14/2023    CALCIUM 8.8 09/14/2023     09/14/2023    K 3.7 09/14/2023     09/14/2023    PROT 8.0 09/14/2023    CO2 21 (L) 09/14/2023    ANIONGAP 10 09/14/2023    WBC 6.52 09/14/2023    RBC 3.13 (L) 09/14/2023    HGB 9.9 (L) 09/14/2023    HCT 26.7 (L) 09/14/2023    MCV 85 09/14/2023    MCH 31.6 (H) 09/14/2023    MCHC 37.1 (H) 09/14/2023     Lab Results   Component Value Date    RDW 18.5 (H) 09/14/2023     (H) 09/14/2023    MPV 10.0 09/14/2023    GRAN 3.4 09/14/2023    GRAN 51.5 09/14/2023    LYMPH 2.3 09/14/2023    LYMPH 34.5 09/14/2023    MONO 0.7 09/14/2023    MONO 11.0 09/14/2023    EOSINOPHIL 2.0 09/14/2023    BASOPHIL 0.8 09/14/2023    EOS 0.1 09/14/2023    BASO 0.05 09/14/2023    APTT 27.7 10/20/2022    GROUPTRH O POS 08/24/2023    CHOL 101 (L) 11/29/2021    TRIG 103 11/29/2021    HDL 35 (L) 11/29/2021    CHOLHDL 34.7 11/29/2021    TOTALCHOLEST 2.9 11/29/2021    ALBUMIN 3.6 09/14/2023    BILIDIR 3.3 (H) 08/31/2023    BILIDIR 3.3 (H) 08/31/2023    AST 43 (H) 09/14/2023    ALT 33 09/14/2023    ALKPHOS 135 09/14/2023    MG 1.8 08/27/2023    LABPROT 12.4 08/31/2023    LABPROT 12.4 08/31/2023    INR 1.2 08/31/2023    INR 1.2 08/31/2023       Assessment and Plan:  Parrish Davis is a 28 y.o. male with Elevated Hepatic Enzymes  This occurred at the time of a crisis. He had an elevated Tbil that was mostly direct. Both the enyzmes and Bruno have improved and he is back down to baseline. The liver tests/direct bilirubin were likely elevated due to sickle cell hepatopathy which has improved. W/u also ruled out other causes of liver disease. Iron overload ruled out and choledocholithiasis ruled out. MRE suggests NO significant fibrosis. He does not have viral hepatitis A or B. I am recommending that liver biopsy be deferred. Will monitor liver tests monthly (hematologist ordering) and will see him back in 3 months. He should consider getting vaccinated for hepatitis A and B. His pcp  can do this.     Return 3 months

## 2023-10-02 NOTE — PATIENT INSTRUCTIONS
Consider vaccination for hep A and B  Labs for hematologist as scheduled- will add inr  Return 3 months

## 2023-10-15 ENCOUNTER — HOSPITAL ENCOUNTER (INPATIENT)
Facility: HOSPITAL | Age: 28
LOS: 11 days | Discharge: HOME | DRG: 811 | End: 2023-10-26
Attending: EMERGENCY MEDICINE | Admitting: EMERGENCY MEDICINE
Payer: COMMERCIAL

## 2023-10-15 DIAGNOSIS — D57.00 SICKLE-CELL DISEASE WITH VASO-OCCLUSIVE PAIN: ICD-10-CM

## 2023-10-15 DIAGNOSIS — D57.00 VASO-OCCLUSIVE SICKLE CELL CRISIS: Primary | ICD-10-CM

## 2023-10-15 DIAGNOSIS — I26.99 PULMONARY EMBOLISM: ICD-10-CM

## 2023-10-15 DIAGNOSIS — D72.829 LEUKOCYTOSIS: ICD-10-CM

## 2023-10-15 DIAGNOSIS — I26.99 OTHER ACUTE PULMONARY EMBOLISM WITHOUT ACUTE COR PULMONALE: ICD-10-CM

## 2023-10-15 DIAGNOSIS — E80.6 HYPERBILIRUBINEMIA: ICD-10-CM

## 2023-10-15 LAB
ALBUMIN SERPL BCP-MCNC: 4.1 G/DL (ref 3.5–5.2)
ALP SERPL-CCNC: 116 U/L (ref 55–135)
ALT SERPL W/O P-5'-P-CCNC: 54 U/L (ref 10–44)
ANION GAP SERPL CALC-SCNC: 12 MMOL/L (ref 8–16)
ANISOCYTOSIS BLD QL SMEAR: SLIGHT
AST SERPL-CCNC: 82 U/L (ref 10–40)
BASOPHILS # BLD AUTO: ABNORMAL K/UL (ref 0–0.2)
BASOPHILS NFR BLD: 0 % (ref 0–1.9)
BILIRUB SERPL-MCNC: 3.7 MG/DL (ref 0.1–1)
BILIRUB UR QL STRIP: NEGATIVE
BUN SERPL-MCNC: 8 MG/DL (ref 6–20)
CALCIUM SERPL-MCNC: 9.3 MG/DL (ref 8.7–10.5)
CHLORIDE SERPL-SCNC: 111 MMOL/L (ref 95–110)
CLARITY UR REFRACT.AUTO: CLEAR
CO2 SERPL-SCNC: 17 MMOL/L (ref 23–29)
COLOR UR AUTO: YELLOW
CREAT SERPL-MCNC: 0.7 MG/DL (ref 0.5–1.4)
DIFFERENTIAL METHOD: ABNORMAL
EOSINOPHIL # BLD AUTO: ABNORMAL K/UL (ref 0–0.5)
EOSINOPHIL NFR BLD: 1 % (ref 0–8)
ERYTHROCYTE [DISTWIDTH] IN BLOOD BY AUTOMATED COUNT: 20.5 % (ref 11.5–14.5)
EST. GFR  (NO RACE VARIABLE): >60 ML/MIN/1.73 M^2
GLUCOSE SERPL-MCNC: 114 MG/DL (ref 70–110)
GLUCOSE UR QL STRIP: NEGATIVE
HCT VFR BLD AUTO: 26.2 % (ref 40–54)
HGB BLD-MCNC: 9.8 G/DL (ref 14–18)
HGB UR QL STRIP: NEGATIVE
HYPOCHROMIA BLD QL SMEAR: ABNORMAL
IMM GRANULOCYTES # BLD AUTO: ABNORMAL K/UL (ref 0–0.04)
IMM GRANULOCYTES NFR BLD AUTO: ABNORMAL % (ref 0–0.5)
INFLUENZA A, MOLECULAR: NOT DETECTED
INFLUENZA B, MOLECULAR: NOT DETECTED
KETONES UR QL STRIP: NEGATIVE
LEUKOCYTE ESTERASE UR QL STRIP: NEGATIVE
LYMPHOCYTES # BLD AUTO: ABNORMAL K/UL (ref 1–4.8)
LYMPHOCYTES NFR BLD: 16 % (ref 18–48)
MCH RBC QN AUTO: 29.8 PG (ref 27–31)
MCHC RBC AUTO-ENTMCNC: 37.4 G/DL (ref 32–36)
MCV RBC AUTO: 80 FL (ref 82–98)
MONOCYTES # BLD AUTO: ABNORMAL K/UL (ref 0.3–1)
MONOCYTES NFR BLD: 9 % (ref 4–15)
NEUTROPHILS NFR BLD: 74 % (ref 38–73)
NITRITE UR QL STRIP: NEGATIVE
NRBC BLD-RTO: 1 /100 WBC
OVALOCYTES BLD QL SMEAR: ABNORMAL
PH UR STRIP: 7 [PH] (ref 5–8)
PLATELET # BLD AUTO: 416 K/UL (ref 150–450)
PLATELET BLD QL SMEAR: ABNORMAL
PMV BLD AUTO: 10.6 FL (ref 9.2–12.9)
POIKILOCYTOSIS BLD QL SMEAR: SLIGHT
POLYCHROMASIA BLD QL SMEAR: ABNORMAL
POTASSIUM SERPL-SCNC: 4.2 MMOL/L (ref 3.5–5.1)
PROT SERPL-MCNC: 8.6 G/DL (ref 6–8.4)
PROT UR QL STRIP: NEGATIVE
RBC # BLD AUTO: 3.29 M/UL (ref 4.6–6.2)
RETICS/RBC NFR AUTO: 10.4 % (ref 0.4–2)
RSV AG BY MOLECULAR METHOD: NOT DETECTED
SARS-COV-2 RNA RESP QL NAA+PROBE: NOT DETECTED
SICKLE CELLS BLD QL SMEAR: ABNORMAL
SODIUM SERPL-SCNC: 140 MMOL/L (ref 136–145)
SP GR UR STRIP: 1.01 (ref 1–1.03)
URN SPEC COLLECT METH UR: NORMAL
WBC # BLD AUTO: 12.93 K/UL (ref 3.9–12.7)

## 2023-10-15 PROCEDURE — 99222 PR INITIAL HOSPITAL CARE,LEVL II: ICD-10-PCS | Mod: ,,, | Performed by: HOSPITALIST

## 2023-10-15 PROCEDURE — 12000002 HC ACUTE/MED SURGE SEMI-PRIVATE ROOM

## 2023-10-15 PROCEDURE — 25000003 PHARM REV CODE 250: Performed by: EMERGENCY MEDICINE

## 2023-10-15 PROCEDURE — S5010 5% DEXTROSE AND 0.45% SALINE: HCPCS | Performed by: HOSPITALIST

## 2023-10-15 PROCEDURE — 0241U SARS-COV2 (COVID) WITH FLU/RSV BY PCR: CPT | Performed by: HOSPITALIST

## 2023-10-15 PROCEDURE — 80053 COMPREHEN METABOLIC PANEL: CPT | Performed by: EMERGENCY MEDICINE

## 2023-10-15 PROCEDURE — 25000003 PHARM REV CODE 250: Performed by: HOSPITALIST

## 2023-10-15 PROCEDURE — 63600175 PHARM REV CODE 636 W HCPCS: Performed by: HOSPITALIST

## 2023-10-15 PROCEDURE — 96376 TX/PRO/DX INJ SAME DRUG ADON: CPT

## 2023-10-15 PROCEDURE — 99222 1ST HOSP IP/OBS MODERATE 55: CPT | Mod: ,,, | Performed by: HOSPITALIST

## 2023-10-15 PROCEDURE — 85007 BL SMEAR W/DIFF WBC COUNT: CPT | Performed by: EMERGENCY MEDICINE

## 2023-10-15 PROCEDURE — 85027 COMPLETE CBC AUTOMATED: CPT | Performed by: EMERGENCY MEDICINE

## 2023-10-15 PROCEDURE — 96375 TX/PRO/DX INJ NEW DRUG ADDON: CPT

## 2023-10-15 PROCEDURE — 99285 EMERGENCY DEPT VISIT HI MDM: CPT | Mod: 25

## 2023-10-15 PROCEDURE — 21400001 HC TELEMETRY ROOM

## 2023-10-15 PROCEDURE — 96374 THER/PROPH/DIAG INJ IV PUSH: CPT

## 2023-10-15 PROCEDURE — 63600175 PHARM REV CODE 636 W HCPCS: Performed by: EMERGENCY MEDICINE

## 2023-10-15 PROCEDURE — 81003 URINALYSIS AUTO W/O SCOPE: CPT | Performed by: EMERGENCY MEDICINE

## 2023-10-15 PROCEDURE — 85045 AUTOMATED RETICULOCYTE COUNT: CPT | Performed by: EMERGENCY MEDICINE

## 2023-10-15 RX ORDER — HYDROXYUREA 500 MG/1
500 CAPSULE ORAL DAILY
Status: DISCONTINUED | OUTPATIENT
Start: 2023-10-16 | End: 2023-10-26 | Stop reason: HOSPADM

## 2023-10-15 RX ORDER — FOLIC ACID 1 MG/1
1000 TABLET ORAL DAILY
Status: DISCONTINUED | OUTPATIENT
Start: 2023-10-16 | End: 2023-10-15

## 2023-10-15 RX ORDER — DEXTROSE MONOHYDRATE AND SODIUM CHLORIDE 5; .45 G/100ML; G/100ML
INJECTION, SOLUTION INTRAVENOUS CONTINUOUS
Status: DISCONTINUED | OUTPATIENT
Start: 2023-10-15 | End: 2023-10-26 | Stop reason: HOSPADM

## 2023-10-15 RX ORDER — TALC
6 POWDER (GRAM) TOPICAL NIGHTLY PRN
Status: DISCONTINUED | OUTPATIENT
Start: 2023-10-15 | End: 2023-10-26 | Stop reason: HOSPADM

## 2023-10-15 RX ORDER — SODIUM CHLORIDE, SODIUM LACTATE, POTASSIUM CHLORIDE, CALCIUM CHLORIDE 600; 310; 30; 20 MG/100ML; MG/100ML; MG/100ML; MG/100ML
INJECTION, SOLUTION INTRAVENOUS CONTINUOUS
Status: DISCONTINUED | OUTPATIENT
Start: 2023-10-15 | End: 2023-10-15

## 2023-10-15 RX ORDER — NALOXONE HCL 0.4 MG/ML
0.02 VIAL (ML) INJECTION
Status: DISCONTINUED | OUTPATIENT
Start: 2023-10-15 | End: 2023-10-16

## 2023-10-15 RX ORDER — HYDROMORPHONE HCL IN 0.9% NACL 6 MG/30 ML
PATIENT CONTROLLED ANALGESIA SYRINGE INTRAVENOUS CONTINUOUS
Status: DISCONTINUED | OUTPATIENT
Start: 2023-10-15 | End: 2023-10-16

## 2023-10-15 RX ORDER — HYDROMORPHONE HYDROCHLORIDE 1 MG/ML
2 INJECTION, SOLUTION INTRAMUSCULAR; INTRAVENOUS; SUBCUTANEOUS ONCE
Status: COMPLETED | OUTPATIENT
Start: 2023-10-15 | End: 2023-10-15

## 2023-10-15 RX ORDER — HYDROMORPHONE HYDROCHLORIDE 1 MG/ML
2 INJECTION, SOLUTION INTRAMUSCULAR; INTRAVENOUS; SUBCUTANEOUS EVERY 30 MIN PRN
Status: DISCONTINUED | OUTPATIENT
Start: 2023-10-15 | End: 2023-10-15

## 2023-10-15 RX ORDER — ONDANSETRON 2 MG/ML
4 INJECTION INTRAMUSCULAR; INTRAVENOUS ONCE AS NEEDED
Status: COMPLETED | OUTPATIENT
Start: 2023-10-15 | End: 2023-10-15

## 2023-10-15 RX ORDER — NALOXONE HCL 0.4 MG/ML
0.02 VIAL (ML) INJECTION
Status: CANCELLED | OUTPATIENT
Start: 2023-10-15

## 2023-10-15 RX ORDER — AMOXICILLIN 250 MG
1 CAPSULE ORAL 2 TIMES DAILY
Status: DISCONTINUED | OUTPATIENT
Start: 2023-10-15 | End: 2023-10-26 | Stop reason: HOSPADM

## 2023-10-15 RX ORDER — HYDROXYUREA 500 MG/1
500 CAPSULE ORAL ONCE
Status: COMPLETED | OUTPATIENT
Start: 2023-10-15 | End: 2023-10-15

## 2023-10-15 RX ORDER — SODIUM CHLORIDE 0.9 % (FLUSH) 0.9 %
10 SYRINGE (ML) INJECTION
Status: DISCONTINUED | OUTPATIENT
Start: 2023-10-15 | End: 2023-10-26 | Stop reason: HOSPADM

## 2023-10-15 RX ORDER — FOLIC ACID 1 MG/1
1 TABLET ORAL DAILY
Status: DISCONTINUED | OUTPATIENT
Start: 2023-10-16 | End: 2023-10-26 | Stop reason: HOSPADM

## 2023-10-15 RX ORDER — ENOXAPARIN SODIUM 100 MG/ML
40 INJECTION SUBCUTANEOUS EVERY 24 HOURS
Status: DISCONTINUED | OUTPATIENT
Start: 2023-10-15 | End: 2023-10-18

## 2023-10-15 RX ORDER — FLUOXETINE 10 MG/1
10 CAPSULE ORAL DAILY
Status: DISCONTINUED | OUTPATIENT
Start: 2023-10-16 | End: 2023-10-26 | Stop reason: HOSPADM

## 2023-10-15 RX ORDER — DIPHENHYDRAMINE HYDROCHLORIDE 50 MG/ML
12.5 INJECTION INTRAMUSCULAR; INTRAVENOUS ONCE AS NEEDED
Status: DISCONTINUED | OUTPATIENT
Start: 2023-10-15 | End: 2023-10-26 | Stop reason: HOSPADM

## 2023-10-15 RX ORDER — HYDROMORPHONE HYDROCHLORIDE 1 MG/ML
2 INJECTION, SOLUTION INTRAMUSCULAR; INTRAVENOUS; SUBCUTANEOUS
Status: COMPLETED | OUTPATIENT
Start: 2023-10-15 | End: 2023-10-15

## 2023-10-15 RX ORDER — OXYCODONE AND ACETAMINOPHEN 10; 325 MG/1; MG/1
1 TABLET ORAL EVERY 4 HOURS PRN
Status: DISCONTINUED | OUTPATIENT
Start: 2023-10-15 | End: 2023-10-26 | Stop reason: HOSPADM

## 2023-10-15 RX ORDER — ONDANSETRON 2 MG/ML
4 INJECTION INTRAMUSCULAR; INTRAVENOUS EVERY 6 HOURS PRN
Status: DISCONTINUED | OUTPATIENT
Start: 2023-10-15 | End: 2023-10-26 | Stop reason: HOSPADM

## 2023-10-15 RX ORDER — HYDROMORPHONE HYDROCHLORIDE 1 MG/ML
1 INJECTION, SOLUTION INTRAMUSCULAR; INTRAVENOUS; SUBCUTANEOUS
Status: DISCONTINUED | OUTPATIENT
Start: 2023-10-15 | End: 2023-10-15

## 2023-10-15 RX ADMIN — HYDROXYUREA 500 MG: 500 CAPSULE ORAL at 04:10

## 2023-10-15 RX ADMIN — DEXTROSE AND SODIUM CHLORIDE: 5; 450 INJECTION, SOLUTION INTRAVENOUS at 09:10

## 2023-10-15 RX ADMIN — HYDROMORPHONE HYDROCHLORIDE 2 MG: 1 INJECTION, SOLUTION INTRAMUSCULAR; INTRAVENOUS; SUBCUTANEOUS at 06:10

## 2023-10-15 RX ADMIN — OXYCODONE AND ACETAMINOPHEN 1 TABLET: 10; 325 TABLET ORAL at 09:10

## 2023-10-15 RX ADMIN — HYDROMORPHONE HYDROCHLORIDE 1 MG: 1 INJECTION, SOLUTION INTRAMUSCULAR; INTRAVENOUS; SUBCUTANEOUS at 08:10

## 2023-10-15 RX ADMIN — HYDROMORPHONE HYDROCHLORIDE 2 MG: 1 INJECTION, SOLUTION INTRAMUSCULAR; INTRAVENOUS; SUBCUTANEOUS at 04:10

## 2023-10-15 RX ADMIN — HYDROMORPHONE HYDROCHLORIDE 2 MG: 1 INJECTION, SOLUTION INTRAMUSCULAR; INTRAVENOUS; SUBCUTANEOUS at 07:10

## 2023-10-15 RX ADMIN — ONDANSETRON 4 MG: 2 INJECTION INTRAMUSCULAR; INTRAVENOUS at 04:10

## 2023-10-15 RX ADMIN — Medication: at 11:10

## 2023-10-15 RX ADMIN — SODIUM CHLORIDE, POTASSIUM CHLORIDE, SODIUM LACTATE AND CALCIUM CHLORIDE: 600; 310; 30; 20 INJECTION, SOLUTION INTRAVENOUS at 07:10

## 2023-10-15 RX ADMIN — SENNOSIDES AND DOCUSATE SODIUM 1 TABLET: 50; 8.6 TABLET ORAL at 08:10

## 2023-10-15 RX ADMIN — HYDROMORPHONE HYDROCHLORIDE 2 MG: 1 INJECTION, SOLUTION INTRAMUSCULAR; INTRAVENOUS; SUBCUTANEOUS at 03:10

## 2023-10-15 NOTE — ED PROVIDER NOTES
Encounter Date: 10/15/2023       History     Chief Complaint   Patient presents with    Sickle Cell Pain Crisis     Denies fever chills.      HPI  27 Y/O M with Hx of SCD C/O atraumatic, nonradiating, bilateral low back pain with no associated numbness or weakness to bilateral lower extremities, urinary retention or perianal sensory deficits similar to previous vaso-occlusive pain crisis.  He denies any chest, abdominal or upper back pain.  No coughing, shortness of breath, fever or chills, nausea or vomiting reported.  He denies any black or bloody stool and denies diarrhea or constipation.  Flatulence at baseline. No urinary retention, urinary strength or urinary stream caliper changes. No reported perianal sensory deficits upon post-defecation wiping; no Hx of IVDU or perispinal surgeries; no Hx of trauma or bleeding disorders. He reports pain is aggravated with movement and alleviated with rest. No ROM deficits to B/L UE or LE and ambulating at baseline. Otherwise, no recent illness or other complaints.    Review of patient's allergies indicates:  No Known Allergies  Past Medical History:   Diagnosis Date    Sickle cell anemia     Sickle cell disease      Past Surgical History:   Procedure Laterality Date    chemoport      removed    CHOLECYSTECTOMY  2002    UMBILICAL HERNIA REPAIR  1995     No family history on file.  Social History     Tobacco Use    Smoking status: Never    Smokeless tobacco: Never   Substance Use Topics    Alcohol use: Yes     Comment: Social drinker on weekends    Drug use: No     Review of Systems    Physical Exam     Initial Vitals [10/15/23 1414]   BP Pulse Resp Temp SpO2   132/79 94 16 98.4 °F (36.9 °C) 98 %      MAP       --         Physical Exam  GENERAL: Well-appearing and Non-Toxic; Well-Nourished; Speaking Full sentences and in mild distress 2/2 pain.  HEENT: AT/NC.  Anicteric.  NECK: Supple, FROM, no accessory muscle use.  HEART: Regular rate and rhythm, no M/G/T.  LUNGS: No  Tachypnea, No Retractions, and CTA B/L with no W/R/R.  ABDOMEN: Soft, ND, NTTP.  BACK: Atraumatic, No midline TTP to C/T/LS spine with + TTP over B/L paravertebral muscle eliciting/reproducing described pain; No CVA tenderness B/L. SLRT NEG.  SKIN: No paresthesia to light tough, asymmetric warmth, rash to thorax/flank.  EXTREMITIES: FROM. No deformities, Soft compartments with 2+ pulses Prox/Dist Intact & Symm.  NEUROLOGIC: AAOx3, Answering Questions Appropriately, CN/PN Intact, Strength 5/5 to UE & LE Prox & Distally, No Saddle Anesthesia; Sensation Symmetrical to UE & LE, No Ataxia.    ED Course   Procedures  Labs Reviewed   CBC W/ AUTO DIFFERENTIAL - Abnormal; Notable for the following components:       Result Value    WBC 12.93 (*)     RBC 3.29 (*)     Hemoglobin 9.8 (*)     Hematocrit 26.2 (*)     MCV 80 (*)     MCHC 37.4 (*)     RDW 20.5 (*)     nRBC 1 (*)     Gran % 74.0 (*)     Lymph % 16.0 (*)     Sickle Cells Occasional (*)     All other components within normal limits   COMPREHENSIVE METABOLIC PANEL - Abnormal; Notable for the following components:    Chloride 111 (*)     CO2 17 (*)     Glucose 114 (*)     Total Protein 8.6 (*)     Total Bilirubin 3.7 (*)     AST 82 (*)     ALT 54 (*)     All other components within normal limits   RETICULOCYTES - Abnormal; Notable for the following components:    Retic 10.4 (*)     All other components within normal limits   URINALYSIS, REFLEX TO URINE CULTURE    Narrative:     Specimen Source->Urine   SARS-COV2 (COVID) WITH FLU/RSV BY PCR          Imaging Results              X-Ray Chest AP Portable (Final result)  Result time 10/15/23 20:28:11      Final result by Ronald Layton MD (10/15/23 20:28:11)                   Impression:      1. Interstitial findings are accentuated by habitus and shallow inspiratory effort.  Mild edema is a consideration.  Correlation is advised.      Electronically signed by: Ronald Layton MD  Date:    10/15/2023  Time:    20:28                Narrative:    EXAMINATION:  XR CHEST AP PORTABLE    CLINICAL HISTORY:  Elevated white blood cell count, unspecified    TECHNIQUE:  Single frontal view of the chest was performed.    COMPARISON:  08/26/2023    FINDINGS:  The cardiomediastinal silhouette is not enlarged, magnified by technique..  There is no pleural effusion.  The trachea is midline.  The lungs are symmetrically expanded bilaterally with coarse central hilar interstitial attenuation suggesting possible edema..  No large focal consolidation seen.  There is no pneumothorax.  The osseous structures are remarkable for dextroscoliotic curvature of the spine..                                       Medications   diphenhydrAMINE injection 12.5 mg (has no administration in time range)   folic acid tablet 1 mg (1 mg Oral Given 10/16/23 0811)   sodium chloride 0.9% flush 10 mL (has no administration in time range)   melatonin tablet 6 mg (has no administration in time range)   sodium chloride 0.9% flush 10 mL (has no administration in time range)   dextrose 5 % and 0.45 % NaCl infusion ( Intravenous New Bag 10/16/23 1223)   senna-docusate 8.6-50 mg per tablet 1 tablet (1 tablet Oral Given 10/16/23 0811)   enoxaparin injection 40 mg (40 mg Subcutaneous Given 10/16/23 0134)   oxyCODONE-acetaminophen  mg per tablet 1 tablet (1 tablet Oral Given 10/16/23 1052)   FLUoxetine capsule 10 mg (10 mg Oral Given 10/16/23 0811)   hydroxyurea capsule 500 mg (500 mg Oral Given 10/16/23 0811)   ondansetron injection 4 mg (has no administration in time range)   HYDROmorphone (PF) injection 2 mg (2 mg Intravenous Given 10/16/23 1223)   piperacillin-tazobactam (ZOSYN) 4.5 g in dextrose 5 % in water (D5W) 100 mL IVPB (MB+) (has no administration in time range)   HYDROmorphone injection 2 mg (2 mg Intravenous Given 10/15/23 1526)   ondansetron injection 4 mg (4 mg Intravenous Given 10/15/23 1650)   hydroxyurea capsule 500 mg (500 mg Oral Given 10/15/23 1622)    HYDROmorphone injection 2 mg (2 mg Intravenous Given 10/15/23 1907)   HYDROmorphone injection 1 mg (1 mg Intravenous Given 10/16/23 0129)     Medical Decision Making  Afebrile, atraumatic and hemodynamically stable male with history of sickle cell disease presenting with typical vaso-occlusive pain to low back with no reported perianal sensory deficits, history of IV drug use, motor or sensory deficits to lower extremity or urinary retention.  No red flags that would indicate life-threatening pathology in need of emergent MRI at this time.  Will provide analgesics and closely monitor while evaluating retained count and labs.  ____________________  Nirav De Anda MD, [facility]  Emergency Medicine Staff  3:08 PM 10/15/2023      Amount and/or Complexity of Data Reviewed  Labs: ordered.  Radiology: ordered.    Risk  OTC drugs.  Prescription drug management.  Decision regarding hospitalization.                               Clinical Impression:   Final diagnoses:  [D57.00] Vaso-occlusive sickle cell crisis (Primary)  [D72.829] Leukocytosis        ED Disposition Condition    Admit Stable                Suresh De Anda MD  10/16/23 1071

## 2023-10-15 NOTE — ED TRIAGE NOTES
Pt complains of pain to lower back since this morning rating pain a 10/10  Pt states he has hx of sickle cell

## 2023-10-15 NOTE — LETTER
October 26, 2023         [address]  [facility] [address]  Phone: [phone]  Fax: [phone]       Patient: Parrish Davis   YOB: 1995  Date of Visit: 10/26/2023    To Whom It May Concern:    Guerda Davis  was at [facility] on 10/26/2023. The patient may return to work/school on Monday 10/30/23 with no restrictions. If you have any questions or concerns, or if I can be of further assistance, please do not hesitate to contact me.    Sincerely,    Laurence Lowery MD

## 2023-10-15 NOTE — ED NOTES
Pt identifiers Parrish Davis were checked and are correct  LOC: The patient is awake, alert, aware of environment with an appropriate affect. Oriented x4, speaking appropriately  APPEARANCE: Pt rates lower back pain a 10/10 , in no acute distress, pt is clean and well groomed, clothing properly fastened  SKIN: Skin warm, dry and intact, normal skin turgor, moist mucus membranes  RESPIRATORY: Airway is open and patent, respirations are spontaneous, even and unlabored, normal effort and rate  CARDIAC: Normal rate and rhythm, no peripheral edema noted, capillary refill < 3 seconds, bilateral radial pulses 2+  ABDOMEN: Soft, nontender, nondistended. Bowel sounds present   NEUROLOGIC: PERRL, facial expression is symmetrical, patient moving all extremities spontaneously, normal sensation in all extremities when touched with a finger.  Follows all commands appropriately  MUSCULOSKELETAL: No obvious deformities.

## 2023-10-16 PROBLEM — D72.823 LEUKEMOID REACTION: Status: ACTIVE | Noted: 2023-08-24

## 2023-10-16 LAB
ALBUMIN SERPL BCP-MCNC: 4.1 G/DL (ref 3.5–5.2)
ALP SERPL-CCNC: 104 U/L (ref 55–135)
ALT SERPL W/O P-5'-P-CCNC: 51 U/L (ref 10–44)
ANION GAP SERPL CALC-SCNC: 13 MMOL/L (ref 8–16)
AST SERPL-CCNC: 76 U/L (ref 10–40)
BASOPHILS # BLD AUTO: 0.07 K/UL (ref 0–0.2)
BASOPHILS NFR BLD: 0.4 % (ref 0–1.9)
BILIRUB SERPL-MCNC: 3.6 MG/DL (ref 0.1–1)
BUN SERPL-MCNC: 7 MG/DL (ref 6–20)
CALCIUM SERPL-MCNC: 9.5 MG/DL (ref 8.7–10.5)
CHLORIDE SERPL-SCNC: 106 MMOL/L (ref 95–110)
CO2 SERPL-SCNC: 19 MMOL/L (ref 23–29)
CREAT SERPL-MCNC: 0.7 MG/DL (ref 0.5–1.4)
DIFFERENTIAL METHOD: ABNORMAL
EOSINOPHIL # BLD AUTO: 0 K/UL (ref 0–0.5)
EOSINOPHIL NFR BLD: 0 % (ref 0–8)
ERYTHROCYTE [DISTWIDTH] IN BLOOD BY AUTOMATED COUNT: 20.9 % (ref 11.5–14.5)
EST. GFR  (NO RACE VARIABLE): >60 ML/MIN/1.73 M^2
GLUCOSE SERPL-MCNC: 135 MG/DL (ref 70–110)
HCT VFR BLD AUTO: 27 % (ref 40–54)
HGB BLD-MCNC: 9.4 G/DL (ref 14–18)
IMM GRANULOCYTES # BLD AUTO: 0.28 K/UL (ref 0–0.04)
IMM GRANULOCYTES NFR BLD AUTO: 1.5 % (ref 0–0.5)
LYMPHOCYTES # BLD AUTO: 1.2 K/UL (ref 1–4.8)
LYMPHOCYTES NFR BLD: 6.2 % (ref 18–48)
MCH RBC QN AUTO: 28.8 PG (ref 27–31)
MCHC RBC AUTO-ENTMCNC: 34.8 G/DL (ref 32–36)
MCV RBC AUTO: 83 FL (ref 82–98)
MONOCYTES # BLD AUTO: 1.9 K/UL (ref 0.3–1)
MONOCYTES NFR BLD: 10.3 % (ref 4–15)
NEUTROPHILS # BLD AUTO: 15.3 K/UL (ref 1.8–7.7)
NEUTROPHILS NFR BLD: 81.6 % (ref 38–73)
NRBC BLD-RTO: 1 /100 WBC
PHOSPHATE SERPL-MCNC: 3.4 MG/DL (ref 2.7–4.5)
PLATELET # BLD AUTO: 375 K/UL (ref 150–450)
PMV BLD AUTO: 10.7 FL (ref 9.2–12.9)
POTASSIUM SERPL-SCNC: 4.1 MMOL/L (ref 3.5–5.1)
PROT SERPL-MCNC: 8.6 G/DL (ref 6–8.4)
RBC # BLD AUTO: 3.26 M/UL (ref 4.6–6.2)
SODIUM SERPL-SCNC: 138 MMOL/L (ref 136–145)
WBC # BLD AUTO: 18.71 K/UL (ref 3.9–12.7)

## 2023-10-16 PROCEDURE — 99233 PR SUBSEQUENT HOSPITAL CARE,LEVL III: ICD-10-PCS | Mod: ,,, | Performed by: HOSPITALIST

## 2023-10-16 PROCEDURE — 99233 SBSQ HOSP IP/OBS HIGH 50: CPT | Mod: ,,, | Performed by: HOSPITALIST

## 2023-10-16 PROCEDURE — 25000003 PHARM REV CODE 250: Performed by: EMERGENCY MEDICINE

## 2023-10-16 PROCEDURE — 80053 COMPREHEN METABOLIC PANEL: CPT | Performed by: HOSPITALIST

## 2023-10-16 PROCEDURE — 87040 BLOOD CULTURE FOR BACTERIA: CPT | Performed by: HOSPITALIST

## 2023-10-16 PROCEDURE — 63600175 PHARM REV CODE 636 W HCPCS: Performed by: HOSPITALIST

## 2023-10-16 PROCEDURE — 21400001 HC TELEMETRY ROOM

## 2023-10-16 PROCEDURE — 12000002 HC ACUTE/MED SURGE SEMI-PRIVATE ROOM

## 2023-10-16 PROCEDURE — S5010 5% DEXTROSE AND 0.45% SALINE: HCPCS | Performed by: HOSPITALIST

## 2023-10-16 PROCEDURE — 85025 COMPLETE CBC W/AUTO DIFF WBC: CPT | Performed by: HOSPITALIST

## 2023-10-16 PROCEDURE — 84100 ASSAY OF PHOSPHORUS: CPT | Performed by: HOSPITALIST

## 2023-10-16 PROCEDURE — 25000003 PHARM REV CODE 250: Performed by: HOSPITALIST

## 2023-10-16 PROCEDURE — 36415 COLL VENOUS BLD VENIPUNCTURE: CPT | Performed by: HOSPITALIST

## 2023-10-16 RX ORDER — HYDROMORPHONE HYDROCHLORIDE 1 MG/ML
1 INJECTION, SOLUTION INTRAMUSCULAR; INTRAVENOUS; SUBCUTANEOUS ONCE
Status: COMPLETED | OUTPATIENT
Start: 2023-10-16 | End: 2023-10-16

## 2023-10-16 RX ORDER — HYDROMORPHONE HYDROCHLORIDE 2 MG/ML
2 INJECTION, SOLUTION INTRAMUSCULAR; INTRAVENOUS; SUBCUTANEOUS
Status: DISCONTINUED | OUTPATIENT
Start: 2023-10-16 | End: 2023-10-17

## 2023-10-16 RX ORDER — POLYETHYLENE GLYCOL 3350 17 G/17G
17 POWDER, FOR SOLUTION ORAL DAILY
Status: DISCONTINUED | OUTPATIENT
Start: 2023-10-16 | End: 2023-10-26 | Stop reason: HOSPADM

## 2023-10-16 RX ORDER — BISACODYL 10 MG
10 SUPPOSITORY, RECTAL RECTAL DAILY PRN
Status: DISCONTINUED | OUTPATIENT
Start: 2023-10-16 | End: 2023-10-26 | Stop reason: HOSPADM

## 2023-10-16 RX ORDER — HYDROMORPHONE HYDROCHLORIDE 2 MG/ML
2 INJECTION, SOLUTION INTRAMUSCULAR; INTRAVENOUS; SUBCUTANEOUS EVERY 4 HOURS PRN
Status: DISCONTINUED | OUTPATIENT
Start: 2023-10-16 | End: 2023-10-16

## 2023-10-16 RX ADMIN — Medication: at 04:10

## 2023-10-16 RX ADMIN — Medication 6 MG: at 09:10

## 2023-10-16 RX ADMIN — BISACODYL 10 MG: 10 SUPPOSITORY RECTAL at 04:10

## 2023-10-16 RX ADMIN — HYDROMORPHONE HYDROCHLORIDE 2 MG: 2 INJECTION, SOLUTION INTRAMUSCULAR; INTRAVENOUS; SUBCUTANEOUS at 06:10

## 2023-10-16 RX ADMIN — SENNOSIDES AND DOCUSATE SODIUM 1 TABLET: 50; 8.6 TABLET ORAL at 08:10

## 2023-10-16 RX ADMIN — HYDROMORPHONE HYDROCHLORIDE 2 MG: 2 INJECTION, SOLUTION INTRAMUSCULAR; INTRAVENOUS; SUBCUTANEOUS at 12:10

## 2023-10-16 RX ADMIN — OXYCODONE AND ACETAMINOPHEN 1 TABLET: 10; 325 TABLET ORAL at 08:10

## 2023-10-16 RX ADMIN — HYDROMORPHONE HYDROCHLORIDE 2 MG: 2 INJECTION, SOLUTION INTRAMUSCULAR; INTRAVENOUS; SUBCUTANEOUS at 03:10

## 2023-10-16 RX ADMIN — FOLIC ACID 1 MG: 1 TABLET ORAL at 08:10

## 2023-10-16 RX ADMIN — OXYCODONE AND ACETAMINOPHEN 1 TABLET: 10; 325 TABLET ORAL at 10:10

## 2023-10-16 RX ADMIN — HYDROMORPHONE HYDROCHLORIDE 1 MG: 1 INJECTION, SOLUTION INTRAMUSCULAR; INTRAVENOUS; SUBCUTANEOUS at 01:10

## 2023-10-16 RX ADMIN — HYDROMORPHONE HYDROCHLORIDE 2 MG: 2 INJECTION, SOLUTION INTRAMUSCULAR; INTRAVENOUS; SUBCUTANEOUS at 09:10

## 2023-10-16 RX ADMIN — DEXTROSE AND SODIUM CHLORIDE: 5; 450 INJECTION, SOLUTION INTRAVENOUS at 06:10

## 2023-10-16 RX ADMIN — OXYCODONE AND ACETAMINOPHEN 1 TABLET: 10; 325 TABLET ORAL at 05:10

## 2023-10-16 RX ADMIN — PIPERACILLIN AND TAZOBACTAM 4.5 G: 4; .5 INJECTION, POWDER, LYOPHILIZED, FOR SOLUTION INTRAVENOUS; PARENTERAL at 02:10

## 2023-10-16 RX ADMIN — HYDROXYUREA 500 MG: 500 CAPSULE ORAL at 08:10

## 2023-10-16 RX ADMIN — OXYCODONE AND ACETAMINOPHEN 1 TABLET: 10; 325 TABLET ORAL at 02:10

## 2023-10-16 RX ADMIN — PIPERACILLIN AND TAZOBACTAM 4.5 G: 4; .5 INJECTION, POWDER, LYOPHILIZED, FOR SOLUTION INTRAVENOUS; PARENTERAL at 09:10

## 2023-10-16 RX ADMIN — POLYETHYLENE GLYCOL 3350 17 G: 17 POWDER, FOR SOLUTION ORAL at 04:10

## 2023-10-16 RX ADMIN — DEXTROSE AND SODIUM CHLORIDE: 5; 450 INJECTION, SOLUTION INTRAVENOUS at 12:10

## 2023-10-16 RX ADMIN — HYDROMORPHONE HYDROCHLORIDE 2 MG: 2 INJECTION, SOLUTION INTRAMUSCULAR; INTRAVENOUS; SUBCUTANEOUS at 08:10

## 2023-10-16 RX ADMIN — ENOXAPARIN SODIUM 40 MG: 40 INJECTION SUBCUTANEOUS at 01:10

## 2023-10-16 RX ADMIN — FLUOXETINE 10 MG: 10 CAPSULE ORAL at 08:10

## 2023-10-16 NOTE — RESPIRATORY THERAPY
"RAPID RESPONSE RESPIRATORY THERAPY ETCO2 CHECK         Time of visit: 916     Code Status: Full Code   : 1995  Bed: 81951/11448 A:   MRN: [id number]  Time spent at the bedside: < 15 min    SITUATION    Evaluated patient for: ETCo2 compliance    BACKGROUND    Why is the patient in the hospital?: Sickle-cell disease with vaso-occlusive pain    Patient has a past medical history of Sickle cell anemia and Sickle cell disease.    24 Hours Vitals Range:  Temp:  [97.5 °F (36.4 °C)-99.1 °F (37.3 °C)]   Pulse:  []   Resp:  [14-24]   BP: (132-177)/(62-97)   SpO2:  [95 %-98 %]     Labs:    Recent Labs     10/15/23  1528 10/16/23  0349    138   K 4.2 4.1   * 106   CO2 17* 19*   BUN 8 7   CREATININE 0.7 0.7   * 135*   PHOS  --  3.4        No results for input(s): "PH", "PCO2", "PO2", "HCO3", "POCSATURATED", "BE" in the last 72 hours.    ASSESSMENT/INTERVENTIONS      Last VS   Temp: 98.1 °F (36.7 °C) (10/16 0734)  Pulse: 67 (10/16 0734)  Resp: 14 (10/16 08)  BP: 177/92 (10/16 0734)  SpO2: 98 % (10/16 0734)    Level of Consciousness: Level of Consciousness (AVPU): alert  Respiratory Effort:   Expansion/Accessory Muscle Usage:    All Lung Field Breath Sounds:    Is the ETCO2 monitor on? No  Is the patient wearing a cannula? No  Are ETCO2 orders placed? No  Is the patient on a PCA pump? No  ETCO2 monitored:    Ambu at bedside:      Active Orders   Respiratory Care    Oxygen Continuous     Frequency: Continuous     Number of Occurrences: Until Specified     Order Questions:      Device type: Low flow      Device: Nasal Cannula (1- 5 Liters)      LPM: 2      Titrate O2 per Oxygen Titration Protocol: Yes      To maintain SpO2 goal of: >= 92%      Notify MD of: Inability to achieve desired SpO2       RECOMMENDATIONS    We recommend: RRT Recs: Continue POC per primary team.      FOLLOW-UP    Please call back the Rapid Response RT, Maryse Lowery RRT at x [phone] for any questions or concerns.               "

## 2023-10-16 NOTE — SUBJECTIVE & OBJECTIVE
Interval History: see above    Review of Systems   Constitutional:  Positive for activity change. Negative for appetite change and fever.   HENT:  Negative for trouble swallowing.    Respiratory:  Negative for cough and shortness of breath.    Cardiovascular:  Negative for chest pain.   Gastrointestinal:  Negative for abdominal pain, diarrhea, nausea and vomiting.   Genitourinary:  Negative for difficulty urinating.   Musculoskeletal:  Positive for arthralgias. Negative for gait problem and neck stiffness.   Neurological:  Negative for headaches.   Psychiatric/Behavioral:  Negative for agitation, behavioral problems and confusion.      Objective:     Vital Signs (Most Recent):  Temp: 98.1 °F (36.7 °C) (10/16/23 0734)  Pulse: 67 (10/16/23 0734)  Resp: 14 (10/16/23 0811)  BP: (!) 177/92 (10/16/23 0734)  SpO2: 98 % (10/16/23 0734) Vital Signs (24h Range):  Temp:  [97.5 °F (36.4 °C)-99.1 °F (37.3 °C)] 98.1 °F (36.7 °C)  Pulse:  [] 67  Resp:  [14-24] 14  SpO2:  [95 %-98 %] 98 %  BP: (132-177)/(62-97) 177/92     Weight: 83.9 kg (185 lb)  Body mass index is 25.09 kg/m².    Intake/Output Summary (Last 24 hours) at 10/16/2023 0913  Last data filed at 10/16/2023 0810  Gross per 24 hour   Intake --   Output 2300 ml   Net -2300 ml         Physical Exam  Constitutional:       General: He is not in acute distress.     Appearance: Normal appearance. He is not ill-appearing, toxic-appearing or diaphoretic.   HENT:      Head: Normocephalic and atraumatic.      Nose: Nose normal.      Mouth/Throat:      Mouth: Mucous membranes are moist.      Pharynx: Oropharynx is clear.   Eyes:      General: No scleral icterus.     Extraocular Movements: Extraocular movements intact.      Conjunctiva/sclera: Conjunctivae normal.      Pupils: Pupils are equal, round, and reactive to light.   Cardiovascular:      Rate and Rhythm: Normal rate and regular rhythm.      Pulses: Normal pulses.   Pulmonary:      Effort: Pulmonary effort is normal. No  respiratory distress.      Breath sounds: Normal breath sounds. No stridor. No wheezing, rhonchi or rales.   Chest:      Chest wall: No tenderness.   Abdominal:      General: Abdomen is flat. Bowel sounds are normal. There is no distension.      Palpations: Abdomen is soft.      Tenderness: There is no abdominal tenderness. There is no right CVA tenderness, left CVA tenderness, guarding or rebound.   Musculoskeletal:         General: No swelling, tenderness, deformity or signs of injury. Normal range of motion.      Cervical back: Normal range of motion and neck supple. No rigidity or tenderness.      Right lower leg: No edema.      Left lower leg: No edema.   Skin:     General: Skin is warm and dry.      Coloration: Skin is not jaundiced.      Findings: No bruising, erythema or rash.   Neurological:      General: No focal deficit present.      Mental Status: He is alert and oriented to person, place, and time. Mental status is at baseline.      Motor: No weakness.      Gait: Gait normal.   Psychiatric:         Mood and Affect: Mood normal.         Behavior: Behavior normal.         Thought Content: Thought content normal.         Judgment: Judgment normal.             Significant Labs: All pertinent labs within the past 24 hours have been reviewed.  CBC:   Recent Labs   Lab 10/15/23  1528 10/16/23  0349   WBC 12.93* 18.71*   HGB 9.8* 9.4*   HCT 26.2* 27.0*    375     CMP:   Recent Labs   Lab 10/15/23  1528 10/16/23  0349    138   K 4.2 4.1   * 106   CO2 17* 19*   * 135*   BUN 8 7   CREATININE 0.7 0.7   CALCIUM 9.3 9.5   PROT 8.6* 8.6*   ALBUMIN 4.1 4.1   BILITOT 3.7* 3.6*   ALKPHOS 116 104   AST 82* 76*   ALT 54* 51*   ANIONGAP 12 13       Significant Imaging: I have reviewed all pertinent imaging results/findings within the past 24 hours.

## 2023-10-16 NOTE — PROGRESS NOTES
"[facility] - Intensive Care ([address] Medicine  Progress Note    Patient Name: Parrish Davis  MRN: [id number]  Patient Class: IP- Inpatient   Admission Date: 10/15/2023  Length of Stay: 1 days  Attending Physician: Candace Harry MD  Primary Care Provider: Jovanny Douglas MD        Subjective:     Principal Problem:Sickle-cell disease with vaso-occlusive pain        HPI:  28 y.o. man with sickle cell anemia HbSS who presents to the ED complaining of  uncontrolled body aches/pain x1 day He reports the pain started late last night/early this morning with onset of severe lower back pain. It has progressed to involve his entire body per patient, stating that he hurts "all over". No reported cough, SOB, fevers, or chills. Pain poorly controlled with home PO medications, so he came to the ED.      Overview/Hospital Course:  10/16- wbc - 13->18, retic 10.4, Hb 9.4. bili 3.6. ast/alt- slight elevation. Cxr reviewed by me- No infiltrate, Interstitial markings look chronic.  Pain located in Back and knees. No abd pain. Reorts an episode of chest pain earlier now resolved. No SOB. He looks uncomfortable. Nurse to administer IV dialudid now. Continue IVFs.  His mother at bedside reports he had about 30 admission in [facility], approx 3 /yr, and 3 so far this year. /81  Pulse 71   Temp 97.3 °F (36.3 °C) (Oral)   Resp 18    SpO2 100%   Not on oxygen.        No new subjective & objective note has been filed under this hospital service since the last note was generated.      Assessment/Plan:      * Sickle-cell disease with vaso-occlusive pain  -Pt. With severe joint pains typical of vaso-occlusive crises. No reported chest pain, no hypoxia, and CXR without infiltrate (improved from prior) not consistent with acute chest  -Plan to treat with IV fluids, PCA pump ordered for more continuous pain med dosing as pt. Complained of severe psrsistent pain after interval dosing in ED. Continue home meds folic " acid and hydroxyurea (voxeletor not on formulary)    10/16- on IVFs, dilasudid 2 q 3 prn, and oxy 10 q 4 prn. PCA not being placed. cxr and uring neg for infection      Leukocytosis  Monitor. Wbc 13-> 17  Blood culture  Cxr, UA neg  No concern on abd exam  Likely reactive  Empiric zosyn.       Hyperbilirubinemia  -Chronic, suspect due to combination of hemolysis causing unconjugated hyperbilirubinemia, and intrahepatic cholestasis from SC disease  -Continue to monitor while admitted, pt. Currently follows with hepatology as outpatient        VTE Risk Mitigation (From admission, onward)         Ordered     enoxaparin injection 40 mg  Daily         10/15/23 2012     IP VTE LOW RISK PATIENT  Once         10/15/23 2012     IP VTE HIGH RISK PATIENT  Once         10/15/23 2012                Discharge Planning   [place]: 10/18/2023     Code Status: Full Code   Is the patient medically ready for discharge?: No    Reason for patient still in hospital (select all that apply): Patient trending condition             Candace Harry MD  Department of Hospital Medicine   [facility] - Intensive Care ([place]-16)

## 2023-10-16 NOTE — PROGRESS NOTES
"[facility] - [facility]ensive [facility] ([address] Medicine  Progress Note    Patient Name: Parrish Davis  MRN: [id number]  Patient Class: IP- Inpatient   Admission Date: 10/15/2023  Length of Stay: 1 days  Attending Physician: Candace Harry MD  Primary Care Provider: Jovanny Douglas MD        Subjective:     Principal Problem:Sickle-cell disease with vaso-occlusive pain        HPI:  28 y.o. man with sickle cell anemia HbSS who presents to the ED complaining of  uncontrolled body aches/pain x1 day He reports the pain started late last night/early this morning with onset of severe lower back pain. It has progressed to involve his entire body per patient, stating that he hurts "all over". No reported cough, SOB, fevers, or chills. Pain poorly controlled with home PO medications, so he came to the ED.      Overview/Hospital Course:  10/16- wbc - 13->18, retic 10.4, Hb 9.4. bili 3.6. ast/alt- slight elevation. Cxr reviewed by me- No infiltrate, Interstitial markings look chronic.  Pain located in Back and knees. No abd pain. Reorts an episode of chest pain earlier now resolved. No SOB. He looks uncomfortable. Nurse to administer IV dialudid now. Continue IVFs.  His mother at bedside reports he had about 30 admission in [facility], approx 3 /yr, and 3 so far this year. /81  Pulse 71   Temp 97.3 °F (36.3 °C) (Oral)   Resp 18    SpO2 100%   2 liters oxygen.        Interval History: see above    Review of Systems   Constitutional:  Positive for activity change. Negative for appetite change and fever.   HENT:  Negative for trouble swallowing.    Respiratory:  Negative for cough and shortness of breath.    Cardiovascular:  Negative for chest pain.   Gastrointestinal:  Negative for abdominal pain, diarrhea, nausea and vomiting.   Genitourinary:  Negative for difficulty urinating.   Musculoskeletal:  Positive for arthralgias. Negative for gait problem and neck stiffness.   Neurological:  Negative for " headaches.   Psychiatric/Behavioral:  Negative for agitation, behavioral problems and confusion.      Objective:     Vital Signs (Most Recent):  Temp: 98.1 °F (36.7 °C) (10/16/23 0734)  Pulse: 67 (10/16/23 0734)  Resp: 14 (10/16/23 0811)  BP: (!) 177/92 (10/16/23 0734)  SpO2: 98 % (10/16/23 0734) Vital Signs (24h Range):  Temp:  [97.5 °F (36.4 °C)-99.1 °F (37.3 °C)] 98.1 °F (36.7 °C)  Pulse:  [] 67  Resp:  [14-24] 14  SpO2:  [95 %-98 %] 98 %  BP: (132-177)/(62-97) 177/92     Weight: 83.9 kg (185 lb)  Body mass index is 25.09 kg/m².    Intake/Output Summary (Last 24 hours) at 10/16/2023 0913  Last data filed at 10/16/2023 0810  Gross per 24 hour   Intake --   Output 2300 ml   Net -2300 ml         Physical Exam  Constitutional:       General: He is not in acute distress.     Appearance: Normal appearance. He is not ill-appearing, toxic-appearing or diaphoretic.   HENT:      Head: Normocephalic and atraumatic.      Nose: Nose normal.      Mouth/Throat:      Mouth: Mucous membranes are moist.      Pharynx: Oropharynx is clear.   Eyes:      General: No scleral icterus.     Extraocular Movements: Extraocular movements intact.      Conjunctiva/sclera: Conjunctivae normal.      Pupils: Pupils are equal, round, and reactive to light.   Cardiovascular:      Rate and Rhythm: Normal rate and regular rhythm.      Pulses: Normal pulses.   Pulmonary:      Effort: Pulmonary effort is normal. No respiratory distress.      Breath sounds: Normal breath sounds. No stridor. No wheezing, rhonchi or rales.   Chest:      Chest wall: No tenderness.   Abdominal:      General: Abdomen is flat. Bowel sounds are normal. There is no distension.      Palpations: Abdomen is soft.      Tenderness: There is no abdominal tenderness. There is no right CVA tenderness, left CVA tenderness, guarding or rebound.   Musculoskeletal:         General: No swelling, tenderness, deformity or signs of injury. Normal range of motion.      Cervical back:  Normal range of motion and neck supple. No rigidity or tenderness.      Right lower leg: No edema.      Left lower leg: No edema.   Skin:     General: Skin is warm and dry.      Coloration: Skin is not jaundiced.      Findings: No bruising, erythema or rash.   Neurological:      General: No focal deficit present.      Mental Status: He is alert and oriented to person, place, and time. Mental status is at baseline.      Motor: No weakness.      Gait: Gait normal.   Psychiatric:         Mood and Affect: Mood normal.         Behavior: Behavior normal.         Thought Content: Thought content normal.         Judgment: Judgment normal.             Significant Labs: All pertinent labs within the past 24 hours have been reviewed.  CBC:   Recent Labs   Lab 10/15/23  1528 10/16/23  0349   WBC 12.93* 18.71*   HGB 9.8* 9.4*   HCT 26.2* 27.0*    375     CMP:   Recent Labs   Lab 10/15/23  1528 10/16/23  0349    138   K 4.2 4.1   * 106   CO2 17* 19*   * 135*   BUN 8 7   CREATININE 0.7 0.7   CALCIUM 9.3 9.5   PROT 8.6* 8.6*   ALBUMIN 4.1 4.1   BILITOT 3.7* 3.6*   ALKPHOS 116 104   AST 82* 76*   ALT 54* 51*   ANIONGAP 12 13       Significant Imaging: I have reviewed all pertinent imaging results/findings within the past 24 hours.      Assessment/Plan:      * Sickle-cell disease with vaso-occlusive pain  -Pt. With severe joint pains typical of vaso-occlusive crises. No reported chest pain, no hypoxia, and CXR without infiltrate (improved from prior) not consistent with acute chest  -Plan to treat with IV fluids, PCA pump ordered for more continuous pain med dosing as pt. Complained of severe psrsistent pain after interval dosing in ED. Continue home meds folic acid and hydroxyurea (voxeletor not on formulary)    10/16- on IVFs, dilasudid 2 q 3 prn, and oxy 10 q 4 prn. PCA not being placed. cxr and uring neg for infection    Leukocytosis  Monitor. Wbc 13-> 17  Blood culture  Cxr, UA neg  No concern on abd  exam  Likely reactive  Empiric zosyn.       Hyperbilirubinemia  -Chronic, suspect due to combination of hemolysis causing unconjugated hyperbilirubinemia, and intrahepatic cholestasis from SC disease  -Continue to monitor while admitted, pt. Currently follows with hepatology as outpatient      VTE Risk Mitigation (From admission, onward)           Ordered     enoxaparin injection 40 mg  Daily         10/15/23 2012     IP VTE LOW RISK PATIENT  Once         10/15/23 2012     IP VTE HIGH RISK PATIENT  Once         10/15/23 2012                    Discharge Planning   [place]: 10/18/2023     Code Status: Full Code   Is the patient medically ready for discharge?: No    Reason for patient still in hospital (select all that apply): Patient trending condition           Candace Harry MD  Department of Hospital Medicine   [facility] - Intensive Care ([place]-16)

## 2023-10-16 NOTE — NURSING
Pt arrived unit on stretcher moaning d/t pain in back; accompanied by mum. PCA pump started, pt reports little to no improvement in pain after about an hour. MD notified, dilaudid 1 mg ordered and administered.

## 2023-10-16 NOTE — ASSESSMENT & PLAN NOTE
-Pt. With severe joint pains typical of vaso-occlusive crises. No reported chest pain, no hypoxia, and CXR without infiltrate (improved from prior) not consistent with acute chest  -Plan to treat with IV fluids, PCA pump ordered for more continuous pain med dosing as pt. Complained of severe psrsistent pain after interval dosing in ED. Continue home meds folic acid and hydroxyurea (voxeletor not on formulary)    10/16- on IVFs, dilasudid 2 q 3 prn, and oxy 10 q 4 prn. PCA not being placed. cxr and uring neg for infection

## 2023-10-16 NOTE — ASSESSMENT & PLAN NOTE
Monitor. Wbc 13-> 17  Blood culture  Cxr, UA neg  No concern on abd exam  Likely reactive  Empiric zosyn.

## 2023-10-16 NOTE — HPI
"28 y.o. man with sickle cell anemia HbSS who presents to the ED complaining of  uncontrolled body aches/pain x1 day He reports the pain started late last night/early this morning with onset of severe lower back pain. It has progressed to involve his entire body per patient, stating that he hurts "all over". No reported cough, SOB, fevers, or chills. Pain poorly controlled with home PO medications, so he came to the ED.  "

## 2023-10-16 NOTE — HOSPITAL COURSE
10/16- wbc - 13->18, retic 10.4, Hb 9.4. bili 3.6. ast/alt- slight elevation. Cxr reviewed by me- No infiltrate, Interstitial markings look chronic.  Pain located in Back and knees. No abd pain. Reorts an episode of chest pain earlier now resolved. No SOB. He looks uncomfortable. Nurse to administer IV dialudid now. Continue IVFs.  His mother at bedside reports he had about 30 admission in [facility], approx 3 /yr, and 3 so far this year. /81  Pulse 71   Temp 97.3 °F (36.3 °C) (Oral)   Resp 18    SpO2 100%   2 liters oxygen.    Old CT chest from last admission 8.24.23, - Multifocal nodular opacities with adjacent ground-glass attenuation distributed throughout the bilateral lower lobes, differential considerations include infectious/inflammatory causes, aspiration, or pulmonary infarction in the setting of sickle cell disease.    10/17- /88 Pulse 100  SpO2 93%  . AF. Wbc 17. On empiric zosyn. Pt not room  when I came for a second visit 5 pm yesterday. He said he must have been in BR.  Cultures neg. Pain scale 10->8. Dilaudid PCA started on day 1 was stopped. 24 h MEDD 306.33 of 384.. On oxycodone 10 (MEDD 60), Dilaudid 1mg, 2 mg (MEDD 180) _ dilsudid pump. On IVFs.  Functional status per nurse:  Ambulating to BR, talking, eating, sleeping, has ADls. He reports not eating.  Will wean dilaudid to 1.5 q 3, today. Pain located in low back today. Looks comfortable after receiving dilaudid. Mother present in room. I gave them a lecture on how opioids work, their benefits and pitfalls, and how/why weaning or stopping the medication can cause rebound pain.  Recommended slow wean in supervised setting in hospital, while monitoring for complications of SSA. They expressed understanding  4:35 pm - started oxygen 2 liters, for sat 85%, now 92%, -110. Cxr ordered      10/18- Hb 9.4.  cxr 10/17- The lungs are symmetrically expanded bilaterally with coarse central hilar interstitial attenuation  "suggesting edema.  There is slightly increased parenchymal attenuation projected over the left lower lung zone, may reflect atelectasis however developing consolidation not excluded..  No large focal consolidation seen. There is no pneumothorax.  The osseous structures are unchanged.  On IVFs.   94% 1 iter NC.   /87    Pulse 138   Temp 99.7 °F   Resp 20 . ECHO 3/2023 - EF 60%.  Bl cult 10/16- NGSF. Ast/alt rising. Wbc 18-> 16.  Will add lactic acid, procal, repeat blood culture. CT chest/ abd/ pelvis.  Consulting Crit Care to follow. On Zosyn. Changing to cefepime. Adding vanc.   Nurse reports 7/10 pain this AM. still in lower back, not radiating anywhere. + rigors.  Low grade fever.   3:30 pm: CT + for Pulmonary Embolus, and forming infiltrate. Started FULL dose lovenox.  On cefepime and vanc.    Suspecting acute chest syndrome, hematology consulted.  Patient to be given blood transfusion, simple.  Per hematology: "If symptoms ( pain, hypoxemia) worsen he might benefit from partial/ complete exchange transfusion."Continue to monitor patient closely.    Patient is stable today. He reports mild abdominal pain, reports no BM since Sunday. Denies any aggravating or alleviating factors to his abdominal pain. Discussed using laxativbes. He is currently taking Miralax. Appetite has been improving. Denies any chest pain, shortness of breath. Continues with be stable on nasal canula. Hematology would like to trend reticulocytes daily and incentive spirometry.     10/21: Patient was febrile overnight. He denies feeling febrile. Does report weakness, planning to consulting PT/OT. Bilirubin is trending up from 10 to 15. Heme consulted and is following. Aware of the fever and bilirubin.      10/22:  Patient is pleasant, denies any chest pain, abdominal pain, denies any nausea.  Discussed with the patient current plan.  Consulting Infectious Disease for further antibiotic management, appreciate assistance.  Continues to " have an elevated bilirubin, reticulocytosis.  No acute events overnight.    10/23: Very pleasant, continues to report feeling well. Currently not on O2. Reports thigh pain, since admission. Able to ambulate better now. Discussed his labs wit him. Hgb stable. T.bili trending down. Afebrile. Nearing discharge in the next few days if he continues to improve.     10/24: Very pleasant, reports that he is not having any chest pain, abdominal pain, shortness and breath, diarrhea.  Patient reports feeling good.  He continues to be febrile overnight and during the day he had another temp.  Patient denies feeling chills or feeling the that his temperature is elevated.  Discussed the case with the hematologist.    10/25:  Stable overnight, continues to be off of the oxygen.  ALP trending up, checked GGT which was elevated as well.  Obtaining right upper quadrant ultrasound.    Patient stable on RA, afebrile >24 hours. Cultures all negative. Reports pain has resolved. RUQ u/s negative other than chronic hepatomegaly. Still with elevated liver enzymes, recommend f/u with hepatology outpt for continued workup. Stable for discharge with f/u.

## 2023-10-16 NOTE — NURSING
Pt not relieved by one time 1 mg dilaudid, MD notified PCA dose adjusted to continuous. Pt reports that pain still persist at a level of 10. Heat packs provided and applied; MD notified

## 2023-10-16 NOTE — ASSESSMENT & PLAN NOTE
-Pt. With severe joint pains typical of vaso-occlusive crises. No reported chest pain, no hypoxia, and CXR without infiltrate (improved from prior) not consistent with acute chest  -Plan to treat with IV fluids, PCA pump ordered for more continuous pain med dosing as pt. Complained of severe psrsistent pain after interval dosing in ED. Continue home meds folic acid and hydroxyurea (voxeletor not on formulary)

## 2023-10-16 NOTE — ASSESSMENT & PLAN NOTE
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 Currently follows with hepatology as outpatient

## 2023-10-16 NOTE — PLAN OF CARE
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]    Admission Date: 10/15/2023  Expected Discharge Date: 10/18/2023    Transition of Care Barriers: (P) None    Payor: BLUE CROSS BLUE SHIELD / Plan: BLUE CONNECT / Product Type: HMO /     Extended Emergency Contact Information  Primary Emergency Contact: Roz Davis   [facility]  Home Phone: [phone]  Relation: Mother    Discharge Plan A: (P) Home  Discharge Plan B: (P) Home      [id number] - [place], [place] - [address]  [address]  [place] [address]  Phone: [phone] Fax: [phone]      Initial Assessment (most recent)       Adult Discharge Assessment - 10/16/23 1417          Discharge Assessment    Assessment Type Discharge Planning Assessment (P)      Confirmed/corrected address, phone number and insurance Yes (P)      Confirmed Demographics Correct on Facesheet (P)      Source of Information patient;family (P)      Communicated [place] with patient/caregiver No (P)      Reason For Admission Sickle crisis (P)      People in Home friend(s) (P)    roommates    Facility Arrived From: home (P)      Do you expect to return to your current living situation? Yes (P)      Prior to hospitilization cognitive status: Unable to Assess (P)      Current cognitive status: Alert/Oriented (P)      Home Layout Bedroom on 2nd floor;Bathroom on 2nd floor (P)      Equipment Currently Used at Home none (P)      Readmission within 30 days? No (P)      Do you currently have service(s) that help you manage your care at home? No (P)      Do you take prescription medications? Yes (P)      Do you have prescription coverage? Yes (P)      Coverage [employer]BS (P)      Who is going to help you get home at discharge? Mom Roz Davis [phone] (P)      How do you get to doctors  appointments? car, drives self (P)      Are you on dialysis? No (P)      Do you take coumadin? No (P)      DME Needed Upon Discharge  none (P)      Discharge Plan discussed with: Patient;Parent(s) (P)      Name(s) and Number(s) Mom Roz Davis [phone] (P)      Transition of Care Barriers None (P)      Discharge Plan A Home (P)      Discharge Plan B Home (P)         Physical Activity    On average, how many days per week do you engage in moderate to strenuous exercise (like a brisk walk)? 0 days (P)      On average, how many minutes do you engage in exercise at this level? 0 min (P)         Financial Resource Strain    How hard is it for you to pay for the very basics like food, housing, medical care, and heating? Not very hard (P)         Housing Stability    In the last 12 months, was there a time when you were not able to pay the mortgage or rent on time? No (P)      In the last 12 months, how many places have you lived? 1 (P)      In the last 12 months, was there a time when you did not have a steady place to sleep or slept in a shelter (including now)? No (P)         Transportation Needs    In the past 12 months, has lack of transportation kept you from medical appointments or from getting medications? No (P)      In the past 12 months, has lack of transportation kept you from meetings, work, or from getting things needed for daily living? No (P)         Food Insecurity    Within the past 12 months, you worried that your food would run out before you got the money to buy more. Never true (P)      Within the past 12 months, the food you bought just didn't last and you didn't have money to get more. Never true (P)         Stress    Do you feel stress - tense, restless, nervous, or anxious, or unable to sleep at night because your mind is troubled all the time - these days? To some extent (P)         Social Connections    In a typical week, how many times do you talk on the phone with family, friends, or  neighbors? Once a week (P)      How often do you get together with friends or relatives? Once a week (P)      How often do you attend Zoroastrian or Evangelical services? Never (P)      Do you belong to any clubs or organizations such as Zoroastrian groups, unions, fraternal or athletic groups, or school groups? No (P)      How often do you attend meetings of the clubs or organizations you belong to? Never (P)      Are you , , , , never , or living with a partner? Never  (P)         Alcohol Use    Q1: How often do you have a drink containing alcohol? 2-3 times a week (P)      Q2: How many drinks containing alcohol do you have on a typical day when you are drinking? 3 or 4 (P)      Q3: How often do you have six or more drinks on one occasion? Never (P)         OTHER    Name(s) of People in Home roommates (P)                  CM spoke with pt and mom in room.  Pt lives in apt with roommates; lives on 2nd floor, no trouble with navigating stairs.  His mom will drive him home and he drives self to MD appts.  NO 30D readmission.  No HH, DME, coumadin or HD.  Indep with ADL's.  Pharmacy [facility].      Kathie To, KAMARIN, BS, RN, CCM

## 2023-10-16 NOTE — SUBJECTIVE & OBJECTIVE
Past Medical History:   Diagnosis Date    Sickle cell anemia     Sickle cell disease        Past Surgical History:   Procedure Laterality Date    chemoport      removed    CHOLECYSTECTOMY  2002    UMBILICAL HERNIA REPAIR  1995       Review of patient's allergies indicates:  No Known Allergies    No current facility-administered medications on file prior to encounter.     Current Outpatient Medications on File Prior to Encounter   Medication Sig    FLUoxetine 10 MG capsule Take 1 capsule (10 mg total) by mouth once daily.    folic acid (FOLVITE) 1 MG tablet TAKE 1 TABLET BY MOUTH EVERY DAY    hydroxyurea (HYDREA) 500 mg Cap TAKE 1 CAPSULE (500 MG TOTAL) BY MOUTH ONCE DAILY.    ibuprofen (ADVIL,MOTRIN) 100 MG tablet Take 100 mg by mouth every 6 (six) hours as needed.    voxelotor (OXBRYTA) 500 mg Tab Take 3 tablets (1,500 mg total) by mouth Daily.    acetaminophen (TYLENOL) 500 MG tablet Take 500 mg by mouth every 8 (eight) hours as needed for Pain.    calcium carbonate (TUMS) 200 mg calcium (500 mg) chewable tablet Take 2-3 tablets by mouth 2 (two) times daily as needed for Heartburn.    phenyleph-min oil-petrolatum 0.25-14-74.9 % Oint Place 1 applicator rectally 4 (four) times daily as needed (Hemorrhoid discomfort).     Family History    None       Tobacco Use    Smoking status: Never    Smokeless tobacco: Never   Substance and Sexual Activity    Alcohol use: Yes     Comment: Social drinker on weekends    Drug use: No    Sexual activity: Not Currently     Review of Systems   Constitutional:  Negative for activity change, chills, fever and unexpected weight change.   HENT:  Negative for congestion and sore throat.    Respiratory:  Negative for cough, shortness of breath and wheezing.    Cardiovascular:  Negative for chest pain, palpitations and leg swelling.   Gastrointestinal:  Negative for abdominal pain, blood in stool, nausea and vomiting.   Genitourinary:  Negative for dysuria and hematuria.   Musculoskeletal:   Positive for arthralgias and back pain. Negative for neck pain.   Skin:  Negative for color change and rash.   Neurological:  Negative for dizziness, seizures and numbness.   Psychiatric/Behavioral:  Negative for hallucinations and suicidal ideas.    Objective:     Vital Signs (Most Recent):  Temp: 99.1 °F (37.3 °C) (10/15/23 1811)  Pulse: 102 (10/15/23 1811)  Resp: 18 (10/15/23 1907)  BP: 132/71 (10/15/23 1811)  SpO2: 95 % (10/15/23 1811) Vital Signs (24h Range):  Temp:  [98.4 °F (36.9 °C)-99.1 °F (37.3 °C)] 99.1 °F (37.3 °C)  Pulse:  [] 102  Resp:  [16-22] 18  SpO2:  [95 %-98 %] 95 %  BP: (132)/(71-79) 132/71     Weight: 83.9 kg (185 lb)  Body mass index is 25.09 kg/m².     Physical Exam  Vitals reviewed.   Constitutional:       General: He is in acute distress.      Appearance: He is well-developed. He is diaphoretic.   HENT:      Head: Normocephalic and atraumatic.   Eyes:      General: Scleral icterus present.      Extraocular Movements: Extraocular movements intact.      Pupils: Pupils are equal, round, and reactive to light.   Neck:      Vascular: No JVD.      Trachea: No tracheal deviation.   Cardiovascular:      Rate and Rhythm: Normal rate and regular rhythm.      Heart sounds: No murmur heard.     No friction rub. No gallop.   Pulmonary:      Effort: No respiratory distress.      Breath sounds: Normal breath sounds. No wheezing or rales.   Abdominal:      General: Bowel sounds are normal. There is no distension.      Palpations: Abdomen is soft. There is no mass.      Tenderness: There is no abdominal tenderness.   Musculoskeletal:         General: No deformity.      Cervical back: Neck supple.   Lymphadenopathy:      Cervical: No cervical adenopathy.   Skin:     General: Skin is warm.      Findings: No rash.   Neurological:      Mental Status: He is alert and oriented to person, place, and time.            CRANIAL NERVES     CN III, IV, VI   Pupils are equal, round, and reactive to light.      Significant Labs: All pertinent labs within the past 24 hours have been reviewed.    Significant Imaging: I have reviewed all pertinent imaging results/findings within the past 24 hours.

## 2023-10-16 NOTE — H&P
"[facility] - Emergency Dept  [facility] Medicine  History & Physical    Patient Name: Parrish Davis  MRN: [id number]  Patient Class: IP- Inpatient  Admission Date: 10/15/2023  Attending Physician: Nicolas Lowery MD   Primary Care Provider: Jovanny Douglas MD         Patient information was obtained from patient, past medical records and ER records.     Subjective:     Principal Problem:Sickle-cell disease with vaso-occlusive pain    Chief Complaint:   Chief Complaint   Patient presents with    Sickle Cell Pain Crisis     Denies fever chills.         HPI: 28 y.o. man with sickle cell anemia HbSS who presents to the ED complaining of  uncontrolled body aches/pain x1 day He reports the pain started late last night/early this morning with onset of severe lower back pain. It has progressed to involve his entire body per patient, stating that he hurts "all over". No reported cough, SOB, fevers, or chills. Pain poorly controlled with home PO medications, so he came to the ED.      Past Medical History:   Diagnosis Date    Sickle cell anemia     Sickle cell disease        Past Surgical History:   Procedure Laterality Date    chemoport      removed    CHOLECYSTECTOMY  2002    UMBILICAL HERNIA REPAIR  1995       Review of patient's allergies indicates:  No Known Allergies    No current facility-administered medications on file prior to encounter.     Current Outpatient Medications on File Prior to Encounter   Medication Sig    FLUoxetine 10 MG capsule Take 1 capsule (10 mg total) by mouth once daily.    folic acid (FOLVITE) 1 MG tablet TAKE 1 TABLET BY MOUTH EVERY DAY    hydroxyurea (HYDREA) 500 mg Cap TAKE 1 CAPSULE (500 MG TOTAL) BY MOUTH ONCE DAILY.    ibuprofen (ADVIL,MOTRIN) 100 MG tablet Take 100 mg by mouth every 6 (six) hours as needed.    voxelotor (OXBRYTA) 500 mg Tab Take 3 tablets (1,500 mg total) by mouth Daily.    acetaminophen (TYLENOL) 500 MG tablet Take 500 mg by mouth every 8 (eight) hours as " needed for Pain.    calcium carbonate (TUMS) 200 mg calcium (500 mg) chewable tablet Take 2-3 tablets by mouth 2 (two) times daily as needed for Heartburn.    phenyleph-min oil-petrolatum 0.25-14-74.9 % Oint Place 1 applicator rectally 4 (four) times daily as needed (Hemorrhoid discomfort).     Family History    None       Tobacco Use    Smoking status: Never    Smokeless tobacco: Never   Substance and Sexual Activity    Alcohol use: Yes     Comment: Social drinker on weekends    Drug use: No    Sexual activity: Not Currently     Review of Systems   Constitutional:  Negative for activity change, chills, fever and unexpected weight change.   HENT:  Negative for congestion and sore throat.    Respiratory:  Negative for cough, shortness of breath and wheezing.    Cardiovascular:  Negative for chest pain, palpitations and leg swelling.   Gastrointestinal:  Negative for abdominal pain, blood in stool, nausea and vomiting.   Genitourinary:  Negative for dysuria and hematuria.   Musculoskeletal:  Positive for arthralgias and back pain. Negative for neck pain.   Skin:  Negative for color change and rash.   Neurological:  Negative for dizziness, seizures and numbness.   Psychiatric/Behavioral:  Negative for hallucinations and suicidal ideas.    Objective:     Vital Signs (Most Recent):  Temp: 99.1 °F (37.3 °C) (10/15/23 1811)  Pulse: 102 (10/15/23 1811)  Resp: 18 (10/15/23 1907)  BP: 132/71 (10/15/23 1811)  SpO2: 95 % (10/15/23 1811) Vital Signs (24h Range):  Temp:  [98.4 °F (36.9 °C)-99.1 °F (37.3 °C)] 99.1 °F (37.3 °C)  Pulse:  [] 102  Resp:  [16-22] 18  SpO2:  [95 %-98 %] 95 %  BP: (132)/(71-79) 132/71     Weight: 83.9 kg (185 lb)  Body mass index is 25.09 kg/m².     Physical Exam  Vitals reviewed.   Constitutional:       General: He is in acute distress.      Appearance: He is well-developed. He is diaphoretic.   HENT:      Head: Normocephalic and atraumatic.   Eyes:      General: Scleral icterus present.       Extraocular Movements: Extraocular movements intact.      Pupils: Pupils are equal, round, and reactive to light.   Neck:      Vascular: No JVD.      Trachea: No tracheal deviation.   Cardiovascular:      Rate and Rhythm: Normal rate and regular rhythm.      Heart sounds: No murmur heard.     No friction rub. No gallop.   Pulmonary:      Effort: No respiratory distress.      Breath sounds: Normal breath sounds. No wheezing or rales.   Abdominal:      General: Bowel sounds are normal. There is no distension.      Palpations: Abdomen is soft. There is no mass.      Tenderness: There is no abdominal tenderness.   Musculoskeletal:         General: No deformity.      Cervical back: Neck supple.   Lymphadenopathy:      Cervical: No cervical adenopathy.   Skin:     General: Skin is warm.      Findings: No rash.   Neurological:      Mental Status: He is alert and oriented to person, place, and time.            CRANIAL NERVES     CN III, IV, VI   Pupils are equal, round, and reactive to light.     Significant Labs: All pertinent labs within the past 24 hours have been reviewed.    Significant Imaging: I have reviewed all pertinent imaging results/findings within the past 24 hours.    Assessment/Plan:     * Sickle-cell disease with vaso-occlusive pain  -Pt. With severe joint pains typical of vaso-occlusive crises. No reported chest pain, no hypoxia, and CXR without infiltrate (improved from prior) not consistent with acute chest  -Plan to treat with IV fluids, PCA pump ordered for more continuous pain med dosing as pt. Complained of severe psrsistent pain after interval dosing in ED. Continue home meds folic acid and hydroxyurea (voxeletor not on formulary)      Hyperbilirubinemia  -Chronic, suspect due to combination of hemolysis causing unconjugated hyperbilirubinemia, and intrahepatic cholestasis from SC disease  -Continue to monitor while admitted, pt. Currently follows with hepatology as outpatient        VTE Risk  Mitigation (From admission, onward)         Ordered     enoxaparin injection 40 mg  Daily         10/15/23 2012     IP VTE LOW RISK PATIENT  Once         10/15/23 2012     IP VTE HIGH RISK PATIENT  Once         10/15/23 2012                           Nicolas Lowery MD  Department of Hospital Medicine  [facility] - Emergency Dept

## 2023-10-17 LAB
ALBUMIN SERPL BCP-MCNC: 3.7 G/DL (ref 3.5–5.2)
ALP SERPL-CCNC: 200 U/L (ref 55–135)
ALT SERPL W/O P-5'-P-CCNC: 51 U/L (ref 10–44)
ANION GAP SERPL CALC-SCNC: 9 MMOL/L (ref 8–16)
AST SERPL-CCNC: 78 U/L (ref 10–40)
BASOPHILS # BLD AUTO: 0.05 K/UL (ref 0–0.2)
BASOPHILS NFR BLD: 0.3 % (ref 0–1.9)
BILIRUB SERPL-MCNC: 6.6 MG/DL (ref 0.1–1)
BUN SERPL-MCNC: 7 MG/DL (ref 6–20)
CALCIUM SERPL-MCNC: 9.4 MG/DL (ref 8.7–10.5)
CHLORIDE SERPL-SCNC: 103 MMOL/L (ref 95–110)
CO2 SERPL-SCNC: 20 MMOL/L (ref 23–29)
CREAT SERPL-MCNC: 0.6 MG/DL (ref 0.5–1.4)
DIFFERENTIAL METHOD: ABNORMAL
EOSINOPHIL # BLD AUTO: 0.5 K/UL (ref 0–0.5)
EOSINOPHIL NFR BLD: 2.7 % (ref 0–8)
ERYTHROCYTE [DISTWIDTH] IN BLOOD BY AUTOMATED COUNT: 21.6 % (ref 11.5–14.5)
EST. GFR  (NO RACE VARIABLE): >60 ML/MIN/1.73 M^2
GLUCOSE SERPL-MCNC: 136 MG/DL (ref 70–110)
HCT VFR BLD AUTO: 28.4 % (ref 40–54)
HGB BLD-MCNC: 9.8 G/DL (ref 14–18)
IMM GRANULOCYTES # BLD AUTO: 0.22 K/UL (ref 0–0.04)
IMM GRANULOCYTES NFR BLD AUTO: 1.2 % (ref 0–0.5)
LACTATE SERPL-SCNC: 1.7 MMOL/L (ref 0.5–2.2)
LYMPHOCYTES # BLD AUTO: 0.8 K/UL (ref 1–4.8)
LYMPHOCYTES NFR BLD: 4.7 % (ref 18–48)
MCH RBC QN AUTO: 28.7 PG (ref 27–31)
MCHC RBC AUTO-ENTMCNC: 34.5 G/DL (ref 32–36)
MCV RBC AUTO: 83 FL (ref 82–98)
MONOCYTES # BLD AUTO: 1.1 K/UL (ref 0.3–1)
MONOCYTES NFR BLD: 6.1 % (ref 4–15)
NEUTROPHILS # BLD AUTO: 15.1 K/UL (ref 1.8–7.7)
NEUTROPHILS NFR BLD: 85 % (ref 38–73)
NRBC BLD-RTO: 3 /100 WBC
PHOSPHATE SERPL-MCNC: 2.5 MG/DL (ref 2.7–4.5)
PLATELET # BLD AUTO: 259 K/UL (ref 150–450)
PMV BLD AUTO: 9.7 FL (ref 9.2–12.9)
POTASSIUM SERPL-SCNC: 3.9 MMOL/L (ref 3.5–5.1)
PROT SERPL-MCNC: 8.4 G/DL (ref 6–8.4)
RBC # BLD AUTO: 3.41 M/UL (ref 4.6–6.2)
SODIUM SERPL-SCNC: 132 MMOL/L (ref 136–145)
WBC # BLD AUTO: 17.76 K/UL (ref 3.9–12.7)

## 2023-10-17 PROCEDURE — 80053 COMPREHEN METABOLIC PANEL: CPT | Performed by: HOSPITALIST

## 2023-10-17 PROCEDURE — 25000003 PHARM REV CODE 250: Performed by: HOSPITALIST

## 2023-10-17 PROCEDURE — 36415 COLL VENOUS BLD VENIPUNCTURE: CPT | Performed by: HOSPITALIST

## 2023-10-17 PROCEDURE — 83605 ASSAY OF LACTIC ACID: CPT | Performed by: HOSPITALIST

## 2023-10-17 PROCEDURE — 21400001 HC TELEMETRY ROOM

## 2023-10-17 PROCEDURE — 63600175 PHARM REV CODE 636 W HCPCS: Performed by: HOSPITALIST

## 2023-10-17 PROCEDURE — 99233 SBSQ HOSP IP/OBS HIGH 50: CPT | Mod: ,,, | Performed by: HOSPITALIST

## 2023-10-17 PROCEDURE — 12000002 HC ACUTE/MED SURGE SEMI-PRIVATE ROOM

## 2023-10-17 PROCEDURE — 25000003 PHARM REV CODE 250: Performed by: EMERGENCY MEDICINE

## 2023-10-17 PROCEDURE — 84100 ASSAY OF PHOSPHORUS: CPT | Performed by: HOSPITALIST

## 2023-10-17 PROCEDURE — 99233 PR SUBSEQUENT HOSPITAL CARE,LEVL III: ICD-10-PCS | Mod: ,,, | Performed by: HOSPITALIST

## 2023-10-17 PROCEDURE — S5010 5% DEXTROSE AND 0.45% SALINE: HCPCS | Performed by: HOSPITALIST

## 2023-10-17 PROCEDURE — 85025 COMPLETE CBC W/AUTO DIFF WBC: CPT | Performed by: HOSPITALIST

## 2023-10-17 RX ORDER — HYDROMORPHONE HYDROCHLORIDE 2 MG/ML
1.5 INJECTION, SOLUTION INTRAMUSCULAR; INTRAVENOUS; SUBCUTANEOUS
Status: DISCONTINUED | OUTPATIENT
Start: 2023-10-17 | End: 2023-10-18

## 2023-10-17 RX ADMIN — PIPERACILLIN AND TAZOBACTAM 4.5 G: 4; .5 INJECTION, POWDER, LYOPHILIZED, FOR SOLUTION INTRAVENOUS; PARENTERAL at 09:10

## 2023-10-17 RX ADMIN — OXYCODONE AND ACETAMINOPHEN 1 TABLET: 10; 325 TABLET ORAL at 02:10

## 2023-10-17 RX ADMIN — DEXTROSE AND SODIUM CHLORIDE: 5; 450 INJECTION, SOLUTION INTRAVENOUS at 04:10

## 2023-10-17 RX ADMIN — OXYCODONE AND ACETAMINOPHEN 1 TABLET: 10; 325 TABLET ORAL at 09:10

## 2023-10-17 RX ADMIN — SENNOSIDES AND DOCUSATE SODIUM 1 TABLET: 50; 8.6 TABLET ORAL at 09:10

## 2023-10-17 RX ADMIN — HYDROMORPHONE HYDROCHLORIDE 1.5 MG: 2 INJECTION, SOLUTION INTRAMUSCULAR; INTRAVENOUS; SUBCUTANEOUS at 04:10

## 2023-10-17 RX ADMIN — SENNOSIDES AND DOCUSATE SODIUM 1 TABLET: 50; 8.6 TABLET ORAL at 08:10

## 2023-10-17 RX ADMIN — ENOXAPARIN SODIUM 40 MG: 40 INJECTION SUBCUTANEOUS at 12:10

## 2023-10-17 RX ADMIN — FLUOXETINE 10 MG: 10 CAPSULE ORAL at 08:10

## 2023-10-17 RX ADMIN — HYDROMORPHONE HYDROCHLORIDE 1.5 MG: 2 INJECTION, SOLUTION INTRAMUSCULAR; INTRAVENOUS; SUBCUTANEOUS at 07:10

## 2023-10-17 RX ADMIN — HYDROMORPHONE HYDROCHLORIDE 1.5 MG: 2 INJECTION, SOLUTION INTRAMUSCULAR; INTRAVENOUS; SUBCUTANEOUS at 11:10

## 2023-10-17 RX ADMIN — DEXTROSE AND SODIUM CHLORIDE: 5; 450 INJECTION, SOLUTION INTRAVENOUS at 08:10

## 2023-10-17 RX ADMIN — HYDROMORPHONE HYDROCHLORIDE 2 MG: 2 INJECTION, SOLUTION INTRAMUSCULAR; INTRAVENOUS; SUBCUTANEOUS at 12:10

## 2023-10-17 RX ADMIN — PIPERACILLIN AND TAZOBACTAM 4.5 G: 4; .5 INJECTION, POWDER, LYOPHILIZED, FOR SOLUTION INTRAVENOUS; PARENTERAL at 01:10

## 2023-10-17 RX ADMIN — HYDROMORPHONE HYDROCHLORIDE 2 MG: 2 INJECTION, SOLUTION INTRAMUSCULAR; INTRAVENOUS; SUBCUTANEOUS at 04:10

## 2023-10-17 RX ADMIN — PIPERACILLIN AND TAZOBACTAM 4.5 G: 4; .5 INJECTION, POWDER, LYOPHILIZED, FOR SOLUTION INTRAVENOUS; PARENTERAL at 04:10

## 2023-10-17 RX ADMIN — POLYETHYLENE GLYCOL 3350 17 G: 17 POWDER, FOR SOLUTION ORAL at 08:10

## 2023-10-17 RX ADMIN — HYDROMORPHONE HYDROCHLORIDE 2 MG: 2 INJECTION, SOLUTION INTRAMUSCULAR; INTRAVENOUS; SUBCUTANEOUS at 08:10

## 2023-10-17 RX ADMIN — HYDROXYUREA 500 MG: 500 CAPSULE ORAL at 08:10

## 2023-10-17 RX ADMIN — OXYCODONE AND ACETAMINOPHEN 1 TABLET: 10; 325 TABLET ORAL at 12:10

## 2023-10-17 RX ADMIN — HYDROMORPHONE HYDROCHLORIDE 1.5 MG: 2 INJECTION, SOLUTION INTRAMUSCULAR; INTRAVENOUS; SUBCUTANEOUS at 12:10

## 2023-10-17 RX ADMIN — FOLIC ACID 1 MG: 1 TABLET ORAL at 08:10

## 2023-10-17 NOTE — SUBJECTIVE & OBJECTIVE
Interval History: see above    Review of Systems   Constitutional:  Positive for activity change. Negative for appetite change and fever.   HENT:  Negative for trouble swallowing.    Respiratory:  Negative for cough and shortness of breath.    Cardiovascular:  Negative for chest pain.   Gastrointestinal:  Negative for abdominal pain, diarrhea, nausea and vomiting.   Genitourinary:  Negative for difficulty urinating.   Musculoskeletal:  Positive for arthralgias. Negative for gait problem and neck stiffness.   Neurological:  Negative for headaches.   Psychiatric/Behavioral:  Negative for agitation, behavioral problems and confusion.      Objective:     Vital Signs (Most Recent):  Temp: 97.7 °F (36.5 °C) (10/17/23 0715)  Pulse: 100 (10/17/23 0715)  Resp: 16 (10/17/23 0949)  BP: (!) 166/88 (10/17/23 0715)  SpO2: (!) 93 % (10/17/23 0715) Vital Signs (24h Range):  Temp:  [97.3 °F (36.3 °C)-99.1 °F (37.3 °C)] 97.7 °F (36.5 °C)  Pulse:  [] 100  Resp:  [14-20] 16  SpO2:  [93 %-100 %] 93 %  BP: (131-166)/(69-88) 166/88     Weight: 84.1 kg (185 lb 6.5 oz)  Body mass index is 25.15 kg/m².  No intake or output data in the 24 hours ending 10/17/23 1042        Physical Exam  Constitutional:       General: He is not in acute distress.     Appearance: Normal appearance. He is not ill-appearing, toxic-appearing or diaphoretic.   HENT:      Head: Normocephalic and atraumatic.      Nose: Nose normal.      Mouth/Throat:      Mouth: Mucous membranes are moist.      Pharynx: Oropharynx is clear.   Eyes:      General: No scleral icterus.     Extraocular Movements: Extraocular movements intact.      Conjunctiva/sclera: Conjunctivae normal.      Pupils: Pupils are equal, round, and reactive to light.   Cardiovascular:      Rate and Rhythm: Normal rate and regular rhythm.      Pulses: Normal pulses.   Pulmonary:      Effort: Pulmonary effort is normal. No respiratory distress.      Breath sounds: Normal breath sounds. No stridor. No  wheezing, rhonchi or rales.   Chest:      Chest wall: No tenderness.   Abdominal:      General: Abdomen is flat. Bowel sounds are normal. There is no distension.      Palpations: Abdomen is soft.      Tenderness: There is no abdominal tenderness. There is no right CVA tenderness, left CVA tenderness, guarding or rebound.   Musculoskeletal:         General: No swelling, tenderness, deformity or signs of injury. Normal range of motion.      Cervical back: Normal range of motion and neck supple. No rigidity or tenderness.      Right lower leg: No edema.      Left lower leg: No edema.   Skin:     General: Skin is warm and dry.      Coloration: Skin is not jaundiced.      Findings: No bruising, erythema or rash.   Neurological:      General: No focal deficit present.      Mental Status: He is alert and oriented to person, place, and time. Mental status is at baseline.      Motor: No weakness.      Gait: Gait normal.   Psychiatric:         Mood and Affect: Mood normal.         Behavior: Behavior normal.         Thought Content: Thought content normal.         Judgment: Judgment normal.             Significant Labs: All pertinent labs within the past 24 hours have been reviewed.  CBC:   Recent Labs   Lab 10/15/23  1528 10/16/23  0349 10/17/23  0521   WBC 12.93* 18.71* 17.76*   HGB 9.8* 9.4* 9.8*   HCT 26.2* 27.0* 28.4*    375 259       CMP:   Recent Labs   Lab 10/15/23  1528 10/16/23  0349 10/17/23  0521    138 132*   K 4.2 4.1 3.9   * 106 103   CO2 17* 19* 20*   * 135* 136*   BUN 8 7 7   CREATININE 0.7 0.7 0.6   CALCIUM 9.3 9.5 9.4   PROT 8.6* 8.6* 8.4   ALBUMIN 4.1 4.1 3.7   BILITOT 3.7* 3.6* 6.6*   ALKPHOS 116 104 200*   AST 82* 76* 78*   ALT 54* 51* 51*   ANIONGAP 12 13 9         Significant Imaging: I have reviewed all pertinent imaging results/findings within the past 24 hours.

## 2023-10-17 NOTE — PROGRESS NOTES
"[facility] - [facility] ([address] Medicine  Progress Note    Patient Name: Parrish Davis  MRN: [id number]  Patient Class: IP- Inpatient   Admission Date: 10/15/2023  Length of Stay: 2 days  Attending Physician: Candace Harry MD  Primary Care Provider: Jovanny Douglas MD        Subjective:     Principal Problem:Sickle-cell disease with vaso-occlusive pain        HPI:  28 y.o. man with sickle cell anemia HbSS who presents to the ED complaining of  uncontrolled body aches/pain x1 day He reports the pain started late last night/early this morning with onset of severe lower back pain. It has progressed to involve his entire body per patient, stating that he hurts "all over". No reported cough, SOB, fevers, or chills. Pain poorly controlled with home PO medications, so he came to the ED.      Overview/Hospital Course:  10/16- wbc - 13->18, retic 10.4, Hb 9.4. bili 3.6. ast/alt- slight elevation. Cxr reviewed by me- No infiltrate, Interstitial markings look chronic.  Pain located in Back and knees. No abd pain. Reorts an episode of chest pain earlier now resolved. No SOB. He looks uncomfortable. Nurse to administer IV dialudid now. Continue IVFs.  His mother at bedside reports he had about 30 admission in [facility], approx 3 /yr, and 3 so far this year. /81  Pulse 71   Temp 97.3 °F (36.3 °C) (Oral)   Resp 18    SpO2 100%   2 liters oxygen.    Old CT chest from last admission 8.24.23, - Multifocal nodular opacities with adjacent ground-glass attenuation distributed throughout the bilateral lower lobes, differential considerations include infectious/inflammatory causes, aspiration, or pulmonary infarction in the setting of sickle cell disease.    10/17- /88 Pulse 100  SpO2 93%  . AF. Wbc 17. On empiric zosyn. Pt not room  when I came for a second visit 5 pm yesterday. He said he must have been in BR.  Cultures neg. Pain scale 10->8. Dilaudid PCA started on day 1 was stopped. 24 h " MEDD 306.33 of 384.. On oxycodone 10 (MEDD 60), Dilaudid 1mg, 2 mg (MEDD 180) _ dilsudid pump. On IVFs.  Functional status per nurse:  Ambulating to BR, talking, eating, sleeping, has ADls. He reports not eating.  Will wean dilaudid to 1.5 q 3, today. Pain located in low back today. Looks comfortable after receiving dilaudid. Mother present in room. I gave them a lecture on how opioids work, their benefits and pitfalls, and how/why weaning or stopping the medication can cause rebound pain.  Recommended slow wean in supervised setting in hospital, while monitoring for complications of SSA. They expressed understanding.     4:35 pm - started oxygen 2 liters, for sat 85%, now 92%, -110. Cxr ordered              Interval History: see above    Review of Systems   Constitutional:  Positive for activity change. Negative for appetite change and fever.   HENT:  Negative for trouble swallowing.    Respiratory:  Negative for cough and shortness of breath.    Cardiovascular:  Negative for chest pain.   Gastrointestinal:  Negative for abdominal pain, diarrhea, nausea and vomiting.   Genitourinary:  Negative for difficulty urinating.   Musculoskeletal:  Positive for arthralgias. Negative for gait problem and neck stiffness.   Neurological:  Negative for headaches.   Psychiatric/Behavioral:  Negative for agitation, behavioral problems and confusion.      Objective:     Vital Signs (Most Recent):  Temp: 97.7 °F (36.5 °C) (10/17/23 0715)  Pulse: 100 (10/17/23 0715)  Resp: 16 (10/17/23 0949)  BP: (!) 166/88 (10/17/23 0715)  SpO2: (!) 93 % (10/17/23 0715) Vital Signs (24h Range):  Temp:  [97.3 °F (36.3 °C)-99.1 °F (37.3 °C)] 97.7 °F (36.5 °C)  Pulse:  [] 100  Resp:  [14-20] 16  SpO2:  [93 %-100 %] 93 %  BP: (131-166)/(69-88) 166/88     Weight: 84.1 kg (185 lb 6.5 oz)  Body mass index is 25.15 kg/m².  No intake or output data in the 24 hours ending 10/17/23 1042        Physical Exam  Constitutional:       General: He  is not in acute distress.     Appearance: Normal appearance. He is not ill-appearing, toxic-appearing or diaphoretic.   HENT:      Head: Normocephalic and atraumatic.      Nose: Nose normal.      Mouth/Throat:      Mouth: Mucous membranes are moist.      Pharynx: Oropharynx is clear.   Eyes:      General: No scleral icterus.     Extraocular Movements: Extraocular movements intact.      Conjunctiva/sclera: Conjunctivae normal.      Pupils: Pupils are equal, round, and reactive to light.   Cardiovascular:      Rate and Rhythm: Normal rate and regular rhythm.      Pulses: Normal pulses.   Pulmonary:      Effort: Pulmonary effort is normal. No respiratory distress.      Breath sounds: Normal breath sounds. No stridor. No wheezing, rhonchi or rales.   Chest:      Chest wall: No tenderness.   Abdominal:      General: Abdomen is flat. Bowel sounds are normal. There is no distension.      Palpations: Abdomen is soft.      Tenderness: There is no abdominal tenderness. There is no right CVA tenderness, left CVA tenderness, guarding or rebound.   Musculoskeletal:         General: No swelling, tenderness, deformity or signs of injury. Normal range of motion.      Cervical back: Normal range of motion and neck supple. No rigidity or tenderness.      Right lower leg: No edema.      Left lower leg: No edema.   Skin:     General: Skin is warm and dry.      Coloration: Skin is not jaundiced.      Findings: No bruising, erythema or rash.   Neurological:      General: No focal deficit present.      Mental Status: He is alert and oriented to person, place, and time. Mental status is at baseline.      Motor: No weakness.      Gait: Gait normal.   Psychiatric:         Mood and Affect: Mood normal.         Behavior: Behavior normal.         Thought Content: Thought content normal.         Judgment: Judgment normal.             Significant Labs: All pertinent labs within the past 24 hours have been reviewed.  CBC:   Recent Labs   Lab  10/15/23  1528 10/16/23  0349 10/17/23  0521   WBC 12.93* 18.71* 17.76*   HGB 9.8* 9.4* 9.8*   HCT 26.2* 27.0* 28.4*    375 259       CMP:   Recent Labs   Lab 10/15/23  1528 10/16/23  0349 10/17/23  0521    138 132*   K 4.2 4.1 3.9   * 106 103   CO2 17* 19* 20*   * 135* 136*   BUN 8 7 7   CREATININE 0.7 0.7 0.6   CALCIUM 9.3 9.5 9.4   PROT 8.6* 8.6* 8.4   ALBUMIN 4.1 4.1 3.7   BILITOT 3.7* 3.6* 6.6*   ALKPHOS 116 104 200*   AST 82* 76* 78*   ALT 54* 51* 51*   ANIONGAP 12 13 9         Significant Imaging: I have reviewed all pertinent imaging results/findings within the past 24 hours.      Assessment/Plan:      * Sickle-cell disease with vaso-occlusive pain  -Pt. With severe joint pains typical of vaso-occlusive crises. No reported chest pain, no hypoxia, and CXR without infiltrate (improved from prior) not consistent with acute chest.   - ( Old CTchest 8/24/23, had bilat lower patchy interstitial edema.)   -Plan to treat with IV fluids, PCA pump ordered for more continuous pain med dosing as pt. Complained of severe persistent pain after interval dosing in ED. Continue home meds folic acid and hydroxyurea (voxeletor not on formulary)    10/16- on IVFs, dilaudid 2 q 3 prn, and oxy 10 q 4 prn. PCA discontinued. cxr and urine neg for infection. CT noted interstitial edema.     10/17 on IVFs, dilaudid 1.5 q 3. Oxycodone. Empiric zosyn, Pain in low back. No chest pain, SOB, not requiring oxygen      Leukocytosis  Monitor. Wbc 13-> 17  Blood culture  Cxr, UA neg  No concern on abd exam  Likely reactive  Empiric zosyn.       Hyperbilirubinemia  -Chronic, suspect due to combination of hemolysis causing unconjugated hyperbilirubinemia, and intrahepatic cholestasis from SC disease  -Continue to monitor while admitted, pt. Currently follows with hepatology as outpatient      VTE Risk Mitigation (From admission, onward)           Ordered     enoxaparin injection 40 mg  Daily         10/15/23 2012      IP VTE LOW RISK PATIENT  Once         10/15/23 2012     IP VTE HIGH RISK PATIENT  Once         10/15/23 2012                    Discharge Planning   [place]: 10/18/2023     Code Status: Full Code   Is the patient medically ready for discharge?: No    Reason for patient still in hospital (select all that apply): Patient trending condition  Discharge Plan A: Home          Candace Harry MD  Department of Hospital Medicine   [facility] - Intensive Care ([address]

## 2023-10-17 NOTE — ASSESSMENT & PLAN NOTE
-Pt. With severe joint pains typical of vaso-occlusive crises. No reported chest pain, no hypoxia, and CXR without infiltrate (improved from prior) not consistent with acute chest.   - ( Old CTchest 8/24/23, had bilat lower patchy interstitial edema.)   -Plan to treat with IV fluids, PCA pump ordered for more continuous pain med dosing as pt. Complained of severe persistent pain after interval dosing in ED. Continue home meds folic acid and hydroxyurea (voxeletor not on formulary)    10/16- on IVFs, dilaudid 2 q 3 prn, and oxy 10 q 4 prn. PCA discontinued. cxr and urine neg for infection. CT noted interstitial edema.     10/17 on IVFs, dilaudid 1.5 q 3. Oxycodone. Empiric zosyn, Pain in low back. No chest pain, SOB, not requiring oxygen

## 2023-10-17 NOTE — PLAN OF CARE
Problem: Adult Inpatient Plan of Care  Goal: Plan of Care Review  Outcome: Ongoing, Progressing  Goal: Patient-Specific Goal (Individualized)  Outcome: Ongoing, Progressing  Goal: Absence of Hospital-Acquired Illness or Injury  Outcome: Ongoing, Progressing  Goal: Optimal Comfort and Wellbeing  Outcome: Ongoing, Progressing  Goal: Readiness for Transition of Care  Outcome: Ongoing, Progressing     Problem: Fluid Imbalance (Pneumonia)  Goal: Fluid Balance  Outcome: Ongoing, Progressing     Problem: Infection (Pneumonia)  Goal: Resolution of Infection Signs and Symptoms  Outcome: Ongoing, Progressing     Problem: Respiratory Compromise (Pneumonia)  Goal: Effective Oxygenation and Ventilation  Outcome: Ongoing, Progressing     Problem: Fall Injury Risk  Goal: Absence of Fall and Fall-Related Injury  Outcome: Ongoing, Progressing     Problem: Pain Acute  Goal: Acceptable Pain Control and Functional Ability  Outcome: Ongoing, Progressing     Problem: Anemia  Goal: Anemia Symptom Improvement  Outcome: Ongoing, Progressing

## 2023-10-18 PROBLEM — A41.9 SEVERE SEPSIS: Status: ACTIVE | Noted: 2023-08-24

## 2023-10-18 PROBLEM — I26.99 ACUTE PULMONARY EMBOLISM: Status: ACTIVE | Noted: 2023-10-18

## 2023-10-18 PROBLEM — R65.20 SEVERE SEPSIS: Status: ACTIVE | Noted: 2023-08-24

## 2023-10-18 LAB
ALBUMIN SERPL BCP-MCNC: 3.2 G/DL (ref 3.5–5.2)
ALP SERPL-CCNC: 355 U/L (ref 55–135)
ALT SERPL W/O P-5'-P-CCNC: 51 U/L (ref 10–44)
AMPHET+METHAMPHET UR QL: NEGATIVE
ANION GAP SERPL CALC-SCNC: 8 MMOL/L (ref 8–16)
ANISOCYTOSIS BLD QL SMEAR: SLIGHT
AST SERPL-CCNC: 100 U/L (ref 10–40)
BARBITURATES UR QL SCN>200 NG/ML: NEGATIVE
BASO STIPL BLD QL SMEAR: ABNORMAL
BASOPHILS # BLD AUTO: 0.09 K/UL (ref 0–0.2)
BASOPHILS NFR BLD: 0.5 % (ref 0–1.9)
BENZODIAZ UR QL SCN>200 NG/ML: NEGATIVE
BILIRUB SERPL-MCNC: 10.3 MG/DL (ref 0.1–1)
BUN SERPL-MCNC: 7 MG/DL (ref 6–20)
BZE UR QL SCN: NEGATIVE
CALCIUM SERPL-MCNC: 9.2 MG/DL (ref 8.7–10.5)
CANNABINOIDS UR QL SCN: ABNORMAL
CHLORIDE SERPL-SCNC: 103 MMOL/L (ref 95–110)
CO2 SERPL-SCNC: 21 MMOL/L (ref 23–29)
CREAT SERPL-MCNC: 0.6 MG/DL (ref 0.5–1.4)
CREAT UR-MCNC: 70 MG/DL (ref 23–375)
DIFFERENTIAL METHOD: ABNORMAL
EOSINOPHIL # BLD AUTO: 0.1 K/UL (ref 0–0.5)
EOSINOPHIL NFR BLD: 0.4 % (ref 0–8)
ERYTHROCYTE [DISTWIDTH] IN BLOOD BY AUTOMATED COUNT: 21.9 % (ref 11.5–14.5)
EST. GFR  (NO RACE VARIABLE): >60 ML/MIN/1.73 M^2
ETHANOL UR-MCNC: <10 MG/DL
GLUCOSE SERPL-MCNC: 120 MG/DL (ref 70–110)
HCT VFR BLD AUTO: 26.3 % (ref 40–54)
HGB BLD-MCNC: 9.4 G/DL (ref 14–18)
HOWELL-JOLLY BOD BLD QL SMEAR: ABNORMAL
HYPOCHROMIA BLD QL SMEAR: ABNORMAL
IMM GRANULOCYTES # BLD AUTO: 0.35 K/UL (ref 0–0.04)
IMM GRANULOCYTES NFR BLD AUTO: 2.1 % (ref 0–0.5)
LACTATE SERPL-SCNC: 0.6 MMOL/L (ref 0.5–2.2)
LACTATE SERPL-SCNC: 1.3 MMOL/L (ref 0.5–2.2)
LACTATE SERPL-SCNC: 1.8 MMOL/L (ref 0.5–2.2)
LYMPHOCYTES # BLD AUTO: 1.5 K/UL (ref 1–4.8)
LYMPHOCYTES NFR BLD: 8.9 % (ref 18–48)
MCH RBC QN AUTO: 29.7 PG (ref 27–31)
MCHC RBC AUTO-ENTMCNC: 35.7 G/DL (ref 32–36)
MCV RBC AUTO: 83 FL (ref 82–98)
METHADONE UR QL SCN>300 NG/ML: NEGATIVE
MONOCYTES # BLD AUTO: 1.1 K/UL (ref 0.3–1)
MONOCYTES NFR BLD: 6.4 % (ref 4–15)
NEUTROPHILS # BLD AUTO: 13.6 K/UL (ref 1.8–7.7)
NEUTROPHILS NFR BLD: 81.7 % (ref 38–73)
NRBC BLD-RTO: 3 /100 WBC
OPIATES UR QL SCN: ABNORMAL
OVALOCYTES BLD QL SMEAR: ABNORMAL
PCP UR QL SCN>25 NG/ML: NEGATIVE
PHOSPHATE SERPL-MCNC: 2.4 MG/DL (ref 2.7–4.5)
PLATELET # BLD AUTO: 194 K/UL (ref 150–450)
PLATELET BLD QL SMEAR: ABNORMAL
PMV BLD AUTO: 10.5 FL (ref 9.2–12.9)
POIKILOCYTOSIS BLD QL SMEAR: SLIGHT
POLYCHROMASIA BLD QL SMEAR: ABNORMAL
POTASSIUM SERPL-SCNC: 4.6 MMOL/L (ref 3.5–5.1)
PROCALCITONIN SERPL IA-MCNC: 0.1 NG/ML
PROT SERPL-MCNC: 8.1 G/DL (ref 6–8.4)
RBC # BLD AUTO: 3.16 M/UL (ref 4.6–6.2)
SCHISTOCYTES BLD QL SMEAR: ABNORMAL
SCHISTOCYTES BLD QL SMEAR: PRESENT
SICKLE CELLS BLD QL SMEAR: ABNORMAL
SODIUM SERPL-SCNC: 132 MMOL/L (ref 136–145)
SPHEROCYTES BLD QL SMEAR: ABNORMAL
TARGETS BLD QL SMEAR: ABNORMAL
TOXICOLOGY INFORMATION: ABNORMAL
WBC # BLD AUTO: 16.62 K/UL (ref 3.9–12.7)
WBC TOXIC VACUOLES BLD QL SMEAR: PRESENT

## 2023-10-18 PROCEDURE — 99233 PR SUBSEQUENT HOSPITAL CARE,LEVL III: ICD-10-PCS | Mod: ,,, | Performed by: NURSE PRACTITIONER

## 2023-10-18 PROCEDURE — 99233 SBSQ HOSP IP/OBS HIGH 50: CPT | Mod: ,,, | Performed by: HOSPITALIST

## 2023-10-18 PROCEDURE — 25500020 PHARM REV CODE 255: Performed by: STUDENT IN AN ORGANIZED HEALTH CARE EDUCATION/TRAINING PROGRAM

## 2023-10-18 PROCEDURE — 25500020 PHARM REV CODE 255: Performed by: HOSPITALIST

## 2023-10-18 PROCEDURE — 99233 SBSQ HOSP IP/OBS HIGH 50: CPT | Mod: ,,, | Performed by: NURSE PRACTITIONER

## 2023-10-18 PROCEDURE — 80307 DRUG TEST PRSMV CHEM ANLYZR: CPT | Performed by: HOSPITALIST

## 2023-10-18 PROCEDURE — 83605 ASSAY OF LACTIC ACID: CPT | Mod: 91 | Performed by: NURSE PRACTITIONER

## 2023-10-18 PROCEDURE — 36415 COLL VENOUS BLD VENIPUNCTURE: CPT | Performed by: HOSPITALIST

## 2023-10-18 PROCEDURE — 85025 COMPLETE CBC W/AUTO DIFF WBC: CPT | Performed by: HOSPITALIST

## 2023-10-18 PROCEDURE — S5010 5% DEXTROSE AND 0.45% SALINE: HCPCS | Performed by: HOSPITALIST

## 2023-10-18 PROCEDURE — 87040 BLOOD CULTURE FOR BACTERIA: CPT | Performed by: HOSPITALIST

## 2023-10-18 PROCEDURE — 84145 PROCALCITONIN (PCT): CPT | Performed by: HOSPITALIST

## 2023-10-18 PROCEDURE — 25000003 PHARM REV CODE 250: Performed by: HOSPITALIST

## 2023-10-18 PROCEDURE — 63600175 PHARM REV CODE 636 W HCPCS: Performed by: HOSPITALIST

## 2023-10-18 PROCEDURE — 84100 ASSAY OF PHOSPHORUS: CPT | Performed by: HOSPITALIST

## 2023-10-18 PROCEDURE — 83605 ASSAY OF LACTIC ACID: CPT | Mod: 91 | Performed by: HOSPITALIST

## 2023-10-18 PROCEDURE — 12000002 HC ACUTE/MED SURGE SEMI-PRIVATE ROOM

## 2023-10-18 PROCEDURE — 80053 COMPREHEN METABOLIC PANEL: CPT | Performed by: HOSPITALIST

## 2023-10-18 PROCEDURE — 83605 ASSAY OF LACTIC ACID: CPT | Performed by: HOSPITALIST

## 2023-10-18 PROCEDURE — 25000003 PHARM REV CODE 250: Performed by: EMERGENCY MEDICINE

## 2023-10-18 PROCEDURE — 21400001 HC TELEMETRY ROOM

## 2023-10-18 PROCEDURE — 36415 COLL VENOUS BLD VENIPUNCTURE: CPT | Performed by: NURSE PRACTITIONER

## 2023-10-18 PROCEDURE — 99233 PR SUBSEQUENT HOSPITAL CARE,LEVL III: ICD-10-PCS | Mod: ,,, | Performed by: HOSPITALIST

## 2023-10-18 RX ORDER — HYDROMORPHONE HYDROCHLORIDE 2 MG/ML
1 INJECTION, SOLUTION INTRAMUSCULAR; INTRAVENOUS; SUBCUTANEOUS
Status: DISCONTINUED | OUTPATIENT
Start: 2023-10-18 | End: 2023-10-26 | Stop reason: HOSPADM

## 2023-10-18 RX ORDER — ACETAMINOPHEN 325 MG/1
650 TABLET ORAL EVERY 6 HOURS PRN
Status: DISCONTINUED | OUTPATIENT
Start: 2023-10-18 | End: 2023-10-26 | Stop reason: HOSPADM

## 2023-10-18 RX ORDER — ENOXAPARIN SODIUM 100 MG/ML
1 INJECTION SUBCUTANEOUS EVERY 12 HOURS
Status: DISCONTINUED | OUTPATIENT
Start: 2023-10-18 | End: 2023-10-20

## 2023-10-18 RX ADMIN — HYDROMORPHONE HYDROCHLORIDE 1 MG: 2 INJECTION, SOLUTION INTRAMUSCULAR; INTRAVENOUS; SUBCUTANEOUS at 11:10

## 2023-10-18 RX ADMIN — HYDROMORPHONE HYDROCHLORIDE 1.5 MG: 2 INJECTION, SOLUTION INTRAMUSCULAR; INTRAVENOUS; SUBCUTANEOUS at 03:10

## 2023-10-18 RX ADMIN — HYDROMORPHONE HYDROCHLORIDE 1.5 MG: 2 INJECTION, SOLUTION INTRAMUSCULAR; INTRAVENOUS; SUBCUTANEOUS at 08:10

## 2023-10-18 RX ADMIN — DEXTROSE AND SODIUM CHLORIDE: 5; 450 INJECTION, SOLUTION INTRAVENOUS at 01:10

## 2023-10-18 RX ADMIN — HYDROMORPHONE HYDROCHLORIDE 1 MG: 2 INJECTION, SOLUTION INTRAMUSCULAR; INTRAVENOUS; SUBCUTANEOUS at 08:10

## 2023-10-18 RX ADMIN — ENOXAPARIN SODIUM 80 MG: 80 INJECTION SUBCUTANEOUS at 04:10

## 2023-10-18 RX ADMIN — POLYETHYLENE GLYCOL 3350 17 G: 17 POWDER, FOR SOLUTION ORAL at 08:10

## 2023-10-18 RX ADMIN — DEXTROSE AND SODIUM CHLORIDE: 5; 450 INJECTION, SOLUTION INTRAVENOUS at 08:10

## 2023-10-18 RX ADMIN — IOHEXOL 100 ML: 350 INJECTION, SOLUTION INTRAVENOUS at 02:10

## 2023-10-18 RX ADMIN — ACETAMINOPHEN 650 MG: 325 TABLET ORAL at 04:10

## 2023-10-18 RX ADMIN — HYDROXYUREA 500 MG: 500 CAPSULE ORAL at 08:10

## 2023-10-18 RX ADMIN — SENNOSIDES AND DOCUSATE SODIUM 1 TABLET: 50; 8.6 TABLET ORAL at 08:10

## 2023-10-18 RX ADMIN — OXYCODONE AND ACETAMINOPHEN 1 TABLET: 10; 325 TABLET ORAL at 01:10

## 2023-10-18 RX ADMIN — HYDROMORPHONE HYDROCHLORIDE 1.5 MG: 2 INJECTION, SOLUTION INTRAMUSCULAR; INTRAVENOUS; SUBCUTANEOUS at 04:10

## 2023-10-18 RX ADMIN — FLUOXETINE 10 MG: 10 CAPSULE ORAL at 08:10

## 2023-10-18 RX ADMIN — ENOXAPARIN SODIUM 40 MG: 40 INJECTION SUBCUTANEOUS at 01:10

## 2023-10-18 RX ADMIN — FOLIC ACID 1 MG: 1 TABLET ORAL at 08:10

## 2023-10-18 RX ADMIN — IOHEXOL 15 ML: 350 INJECTION, SOLUTION INTRAVENOUS at 11:10

## 2023-10-18 RX ADMIN — DEXTROSE AND SODIUM CHLORIDE: 5; 450 INJECTION, SOLUTION INTRAVENOUS at 05:10

## 2023-10-18 RX ADMIN — CEFEPIME 2 G: 2 INJECTION, POWDER, FOR SOLUTION INTRAVENOUS at 04:10

## 2023-10-18 RX ADMIN — PIPERACILLIN AND TAZOBACTAM 4.5 G: 4; .5 INJECTION, POWDER, LYOPHILIZED, FOR SOLUTION INTRAVENOUS; PARENTERAL at 01:10

## 2023-10-18 RX ADMIN — VANCOMYCIN HYDROCHLORIDE 2000 MG: 10 INJECTION, POWDER, LYOPHILIZED, FOR SOLUTION INTRAVENOUS at 11:10

## 2023-10-18 RX ADMIN — PIPERACILLIN AND TAZOBACTAM 4.5 G: 4; .5 INJECTION, POWDER, LYOPHILIZED, FOR SOLUTION INTRAVENOUS; PARENTERAL at 04:10

## 2023-10-18 NOTE — SUBJECTIVE & OBJECTIVE
Interval History: see above    Review of Systems   Constitutional:  Positive for activity change. Negative for appetite change and fever.   HENT:  Negative for trouble swallowing.    Respiratory:  Negative for cough and shortness of breath.    Cardiovascular:  Negative for chest pain.   Gastrointestinal:  Negative for abdominal pain, diarrhea, nausea and vomiting.   Genitourinary:  Negative for difficulty urinating.   Musculoskeletal:  Positive for arthralgias. Negative for gait problem and neck stiffness.   Neurological:  Negative for headaches.   Psychiatric/Behavioral:  Negative for agitation, behavioral problems and confusion.      Objective:     Vital Signs (Most Recent):  Temp: 100.3 °F (37.9 °C) (10/18/23 1159)  Pulse: (!) 117 (10/18/23 1159)  Resp: (!) 22 (10/18/23 1526)  BP: (!) 151/88 (10/18/23 1159)  SpO2: 96 % (10/18/23 1159) Vital Signs (24h Range):  Temp:  [98.4 °F (36.9 °C)-100.3 °F (37.9 °C)] 100.3 °F (37.9 °C)  Pulse:  [105-160] 117  Resp:  [16-24] 22  SpO2:  [86 %-97 %] 96 %  BP: (143-162)/(86-97) 151/88     Weight: 84.1 kg (185 lb 6.5 oz)  Body mass index is 25.15 kg/m².    Intake/Output Summary (Last 24 hours) at 10/18/2023 1611  Last data filed at 10/18/2023 1411  Gross per 24 hour   Intake --   Output 4150 ml   Net -4150 ml           Physical Exam  Constitutional:       General: He is not in acute distress.     Appearance: Normal appearance. He is not ill-appearing, toxic-appearing or diaphoretic.   HENT:      Head: Normocephalic and atraumatic.      Nose: Nose normal.      Mouth/Throat:      Mouth: Mucous membranes are moist.      Pharynx: Oropharynx is clear.   Eyes:      General: No scleral icterus.     Extraocular Movements: Extraocular movements intact.      Conjunctiva/sclera: Conjunctivae normal.      Pupils: Pupils are equal, round, and reactive to light.   Cardiovascular:      Rate and Rhythm: Normal rate and regular rhythm.      Pulses: Normal pulses.   Pulmonary:      Effort:  Pulmonary effort is normal. No respiratory distress.      Breath sounds: Normal breath sounds. No stridor. No wheezing, rhonchi or rales.   Chest:      Chest wall: No tenderness.   Abdominal:      General: Abdomen is flat. Bowel sounds are normal. There is no distension.      Palpations: Abdomen is soft.      Tenderness: There is no abdominal tenderness. There is no right CVA tenderness, left CVA tenderness, guarding or rebound.   Musculoskeletal:         General: No swelling, tenderness, deformity or signs of injury. Normal range of motion.      Cervical back: Normal range of motion and neck supple. No rigidity or tenderness.      Right lower leg: No edema.      Left lower leg: No edema.   Skin:     General: Skin is warm and dry.      Coloration: Skin is not jaundiced.      Findings: No bruising, erythema or rash.   Neurological:      General: No focal deficit present.      Mental Status: He is alert and oriented to person, place, and time. Mental status is at baseline.      Motor: No weakness.      Gait: Gait normal.   Psychiatric:         Mood and Affect: Mood normal.         Behavior: Behavior normal.         Thought Content: Thought content normal.         Judgment: Judgment normal.             Significant Labs: All pertinent labs within the past 24 hours have been reviewed.  CBC:   Recent Labs   Lab 10/17/23  0521 10/18/23  0251   WBC 17.76* 16.62*   HGB 9.8* 9.4*   HCT 28.4* 26.3*    194       CMP:   Recent Labs   Lab 10/17/23  0521 10/18/23  0251   * 132*   K 3.9 4.6    103   CO2 20* 21*   * 120*   BUN 7 7   CREATININE 0.6 0.6   CALCIUM 9.4 9.2   PROT 8.4 8.1   ALBUMIN 3.7 3.2*   BILITOT 6.6* 10.3*   ALKPHOS 200* 355*   AST 78* 100*   ALT 51* 51*   ANIONGAP 9 8         Significant Imaging: I have reviewed all pertinent imaging results/findings within the past 24 hours.

## 2023-10-18 NOTE — ASSESSMENT & PLAN NOTE
Critical care consulted due to concern for sepsis given his tachycardia and leukocytosis. He has been afebrile with no symptoms concerning for infection. Lactate WNL.    Recs:  --Agree with broad-spectrum antibiotics and fluids  --Agree with CT scan  --Suspect tachycardia 2/2 sickle-cell crisis; no symptoms to suggest acute chest

## 2023-10-18 NOTE — CONSULTS
"[facility] ([place] [id number])  Critical Care Medicine  Consult Note    Patient Name: Parrish Davis  MRN: [id number]  Admission Date: 10/15/2023  Hospital Length of Stay: 3 days  Code Status: Full Code  Attending Physician: Candace Harry MD   Primary Care Provider: Jovanny Douglas MD   Principal Problem: Sickle-cell disease with vaso-occlusive pain    Inpatient consult to Critical Care Medicine  Consult performed by: Erika Malave NP  Consult ordered by: Candace Hrary MD        Subjective:     HPI:  This is a 28 y.o. man with sickle cell anemia HbSS who presented to the ED on 10/15 complaining of  uncontrolled body aches/pain x1 day He reports the pain started late last night/early this morning with onset of severe lower back pain. It has progressed to involve his entire body per patient, stating that he hurts "all over". No reported cough, SOB, fevers, or chills. Pain poorly controlled with home PO medications, so he came to the ED.    He was admitted to hospital medicine for sickle cell crisis. On 10/18, critical care consulted for persistent tachycardia and concern for sepsis without source.      Hospital/ICU Course:  No notes on file    Past Medical History:   Diagnosis Date    Sickle cell anemia     Sickle cell disease        Past Surgical History:   Procedure Laterality Date    chemoport      removed    CHOLECYSTECTOMY  2002    UMBILICAL HERNIA REPAIR  1995       Review of patient's allergies indicates:  No Known Allergies    Family History    None       Tobacco Use    Smoking status: Never    Smokeless tobacco: Never   Substance and Sexual Activity    Alcohol use: Yes     Comment: Social drinker on weekends    Drug use: No    Sexual activity: Not Currently      Review of Systems   Constitutional:  Negative for fatigue and fever.   HENT:  Negative for congestion, postnasal drip and rhinorrhea.    Respiratory:  Negative for cough and shortness of breath.  "   Cardiovascular:  Negative for chest pain and leg swelling.   Gastrointestinal:  Negative for abdominal pain, constipation, diarrhea and vomiting.   Genitourinary:  Negative for difficulty urinating and dysuria.   Musculoskeletal:  Positive for back pain.     Objective:     Vital Signs (Most Recent):  Temp: 100.3 °F (37.9 °C) (10/18/23 1159)  Pulse: (!) 117 (10/18/23 1159)  Resp: 18 (10/18/23 1159)  BP: (!) 151/88 (10/18/23 1159)  SpO2: 96 % (10/18/23 1159) Vital Signs (24h Range):  Temp:  [98.4 °F (36.9 °C)-100.3 °F (37.9 °C)] 100.3 °F (37.9 °C)  Pulse:  [105-160] 117  Resp:  [16-24] 18  SpO2:  [86 %-97 %] 96 %  BP: (143-162)/(86-97) 151/88   Weight: 84.1 kg (185 lb 6.5 oz)  Body mass index is 25.15 kg/m².      Intake/Output Summary (Last 24 hours) at 10/18/2023 1323  Last data filed at 10/18/2023 1028  Gross per 24 hour   Intake --   Output 3150 ml   Net -3150 ml          Physical Exam  Constitutional:       General: He is awake. He is not in acute distress.     Appearance: He is not toxic-appearing.   HENT:      Head: Normocephalic and atraumatic.   Cardiovascular:      Rate and Rhythm: Regular rhythm. Tachycardia present.      Heart sounds: S1 normal and S2 normal. No murmur heard.  Pulmonary:      Effort: Pulmonary effort is normal. No tachypnea or respiratory distress.      Breath sounds: Normal breath sounds. No decreased breath sounds, wheezing, rhonchi or rales.   Abdominal:      General: Abdomen is flat.      Palpations: Abdomen is soft.      Tenderness: There is no abdominal tenderness. There is no guarding or rebound.   Musculoskeletal:      Cervical back: Neck supple.   Neurological:      Mental Status: He is alert.      GCS: GCS eye subscore is 4. GCS verbal subscore is 5. GCS motor subscore is 6.            Vents:     Lines/Drains/Airways       Peripheral Intravenous Line  Duration                  Peripheral IV - Single Lumen 10/15/23 1512 20 G;1 in Anterior;Proximal;Right Forearm 2 days          "         Significant Labs:    CBC/Anemia Profile:  Recent Labs   Lab 10/17/23  0521 10/18/23  0251   WBC 17.76* 16.62*   HGB 9.8* 9.4*   HCT 28.4* 26.3*    194   MCV 83 83   RDW 21.6* 21.9*        Chemistries:  Recent Labs   Lab 10/17/23  0521 10/18/23  0251   * 132*   K 3.9 4.6    103   CO2 20* 21*   BUN 7 7   CREATININE 0.6 0.6   CALCIUM 9.4 9.2   ALBUMIN 3.7 3.2*   PROT 8.4 8.1   BILITOT 6.6* 10.3*   ALKPHOS 200* 355*   ALT 51* 51*   AST 78* 100*   PHOS 2.5* 2.4*       All pertinent labs within the past 24 hours have been reviewed.    Significant Imaging: I have reviewed all pertinent imaging results/findings within the past 24 hours.      ABG  No results for input(s): "PH", "PO2", "PCO2", "HCO3", "BE" in the last 168 hours.  Assessment/Plan:     ID  Severe sepsis  Critical care consulted due to concern for sepsis given his tachycardia and leukocytosis. He has been afebrile with no symptoms concerning for infection. Lactate WNL.    Recs:  --Agree with broad-spectrum antibiotics and fluids  --Agree with CT scan  --Suspect tachycardia 2/2 sickle-cell crisis; no symptoms to suggest acute chest    GI  Elevated transaminase level  Notable rise in LFTs and T Bili; has seen hepatology outpatient. No cirrhosis on recent US    --Trend and consider hepatology consult       I have spent 35 min with this patient, with over 50% of this time spent coordinating care and speaking with the family.    Thank you for your consult. I will sign off. Please contact us if you have any additional questions.     Erika Malave, NP  Critical Care Medicine  [facility] ([place]  "

## 2023-10-18 NOTE — SUBJECTIVE & OBJECTIVE
Past Medical History:   Diagnosis Date    Sickle cell anemia     Sickle cell disease        Past Surgical History:   Procedure Laterality Date    chemoport      removed    CHOLECYSTECTOMY  2002    UMBILICAL HERNIA REPAIR  1995       Review of patient's allergies indicates:  No Known Allergies    Family History    None       Tobacco Use    Smoking status: Never    Smokeless tobacco: Never   Substance and Sexual Activity    Alcohol use: Yes     Comment: Social drinker on weekends    Drug use: No    Sexual activity: Not Currently      Review of Systems   Constitutional:  Negative for fatigue and fever.   HENT:  Negative for congestion, postnasal drip and rhinorrhea.    Respiratory:  Negative for cough and shortness of breath.    Cardiovascular:  Negative for chest pain and leg swelling.   Gastrointestinal:  Negative for abdominal pain, constipation, diarrhea and vomiting.   Genitourinary:  Negative for difficulty urinating and dysuria.   Musculoskeletal:  Positive for back pain.     Objective:     Vital Signs (Most Recent):  Temp: 100.3 °F (37.9 °C) (10/18/23 1159)  Pulse: (!) 117 (10/18/23 1159)  Resp: 18 (10/18/23 1159)  BP: (!) 151/88 (10/18/23 1159)  SpO2: 96 % (10/18/23 1159) Vital Signs (24h Range):  Temp:  [98.4 °F (36.9 °C)-100.3 °F (37.9 °C)] 100.3 °F (37.9 °C)  Pulse:  [105-160] 117  Resp:  [16-24] 18  SpO2:  [86 %-97 %] 96 %  BP: (143-162)/(86-97) 151/88   Weight: 84.1 kg (185 lb 6.5 oz)  Body mass index is 25.15 kg/m².      Intake/Output Summary (Last 24 hours) at 10/18/2023 1323  Last data filed at 10/18/2023 1028  Gross per 24 hour   Intake --   Output 3150 ml   Net -3150 ml          Physical Exam  Constitutional:       General: He is awake. He is not in acute distress.     Appearance: He is not toxic-appearing.   HENT:      Head: Normocephalic and atraumatic.   Cardiovascular:      Rate and Rhythm: Regular rhythm. Tachycardia present.      Heart sounds: S1 normal and S2 normal. No murmur  heard.  Pulmonary:      Effort: Pulmonary effort is normal. No tachypnea or respiratory distress.      Breath sounds: Normal breath sounds. No decreased breath sounds, wheezing, rhonchi or rales.   Abdominal:      General: Abdomen is flat.      Palpations: Abdomen is soft.      Tenderness: There is no abdominal tenderness. There is no guarding or rebound.   Musculoskeletal:      Cervical back: Neck supple.   Neurological:      Mental Status: He is alert.      GCS: GCS eye subscore is 4. GCS verbal subscore is 5. GCS motor subscore is 6.            Vents:     Lines/Drains/Airways       Peripheral Intravenous Line  Duration                  Peripheral IV - Single Lumen 10/15/23 1512 20 G;1 in Anterior;Proximal;Right Forearm 2 days                  Significant Labs:    CBC/Anemia Profile:  Recent Labs   Lab 10/17/23  0521 10/18/23  0251   WBC 17.76* 16.62*   HGB 9.8* 9.4*   HCT 28.4* 26.3*    194   MCV 83 83   RDW 21.6* 21.9*        Chemistries:  Recent Labs   Lab 10/17/23  0521 10/18/23  0251   * 132*   K 3.9 4.6    103   CO2 20* 21*   BUN 7 7   CREATININE 0.6 0.6   CALCIUM 9.4 9.2   ALBUMIN 3.7 3.2*   PROT 8.4 8.1   BILITOT 6.6* 10.3*   ALKPHOS 200* 355*   ALT 51* 51*   AST 78* 100*   PHOS 2.5* 2.4*       All pertinent labs within the past 24 hours have been reviewed.    Significant Imaging: I have reviewed all pertinent imaging results/findings within the past 24 hours.

## 2023-10-18 NOTE — HPI
"This is a 28 y.o. man with sickle cell anemia HbSS who presented to the ED on 10/15 complaining of  uncontrolled body aches/pain x1 day He reports the pain started late last night/early this morning with onset of severe lower back pain. It has progressed to involve his entire body per patient, stating that he hurts "all over". No reported cough, SOB, fevers, or chills. Pain poorly controlled with home PO medications, so he came to the ED.    He was admitted to hospital medicine for sickle cell crisis. On 10/18, critical care consulted for persistent tachycardia and concern for sepsis without source.  "

## 2023-10-18 NOTE — SUBJECTIVE & OBJECTIVE
Interval History: see above    Review of Systems   Constitutional:  Positive for activity change. Negative for appetite change and fever.   HENT:  Negative for trouble swallowing.    Respiratory:  Negative for cough and shortness of breath.    Cardiovascular:  Negative for chest pain.   Gastrointestinal:  Negative for abdominal pain, diarrhea, nausea and vomiting.   Genitourinary:  Negative for difficulty urinating.   Musculoskeletal:  Positive for arthralgias. Negative for gait problem and neck stiffness.   Neurological:  Negative for headaches.   Psychiatric/Behavioral:  Negative for agitation, behavioral problems and confusion.      Objective:     Vital Signs (Most Recent):  Temp: 99.7 °F (37.6 °C) (10/18/23 0819)  Pulse: (!) 138 (10/18/23 0819)  Resp: 20 (10/18/23 0843)  BP: (!) 143/87 (10/18/23 0819)  SpO2: (!) 94 % (10/18/23 0819) Vital Signs (24h Range):  Temp:  [98.3 °F (36.8 °C)-99.7 °F (37.6 °C)] 99.7 °F (37.6 °C)  Pulse:  [105-160] 138  Resp:  [16-24] 20  SpO2:  [86 %-97 %] 94 %  BP: (143-162)/(86-97) 143/87     Weight: 84.1 kg (185 lb 6.5 oz)  Body mass index is 25.15 kg/m².    Intake/Output Summary (Last 24 hours) at 10/18/2023 0942  Last data filed at 10/18/2023 0619  Gross per 24 hour   Intake --   Output 2150 ml   Net -2150 ml           Physical Exam  Constitutional:       General: He is not in acute distress.     Appearance: Normal appearance. He is not ill-appearing, toxic-appearing or diaphoretic.   HENT:      Head: Normocephalic and atraumatic.      Nose: Nose normal.      Mouth/Throat:      Mouth: Mucous membranes are moist.      Pharynx: Oropharynx is clear.   Eyes:      General: No scleral icterus.     Extraocular Movements: Extraocular movements intact.      Conjunctiva/sclera: Conjunctivae normal.      Pupils: Pupils are equal, round, and reactive to light.   Cardiovascular:      Rate and Rhythm: Normal rate and regular rhythm.      Pulses: Normal pulses.   Pulmonary:      Effort: Pulmonary  effort is normal. No respiratory distress.      Breath sounds: Normal breath sounds. No stridor. No wheezing, rhonchi or rales.   Chest:      Chest wall: No tenderness.   Abdominal:      General: Abdomen is flat. Bowel sounds are normal. There is no distension.      Palpations: Abdomen is soft.      Tenderness: There is no abdominal tenderness. There is no right CVA tenderness, left CVA tenderness, guarding or rebound.   Musculoskeletal:         General: No swelling, tenderness, deformity or signs of injury. Normal range of motion.      Cervical back: Normal range of motion and neck supple. No rigidity or tenderness.      Right lower leg: No edema.      Left lower leg: No edema.   Skin:     General: Skin is warm and dry.      Coloration: Skin is not jaundiced.      Findings: No bruising, erythema or rash.   Neurological:      General: No focal deficit present.      Mental Status: He is alert and oriented to person, place, and time. Mental status is at baseline.      Motor: No weakness.      Gait: Gait normal.   Psychiatric:         Mood and Affect: Mood normal.         Behavior: Behavior normal.         Thought Content: Thought content normal.         Judgment: Judgment normal.             Significant Labs: All pertinent labs within the past 24 hours have been reviewed.  CBC:   Recent Labs   Lab 10/17/23  0521 10/18/23  0251   WBC 17.76* 16.62*   HGB 9.8* 9.4*   HCT 28.4* 26.3*    194       CMP:   Recent Labs   Lab 10/17/23  0521 10/18/23  0251   * 132*   K 3.9 4.6    103   CO2 20* 21*   * 120*   BUN 7 7   CREATININE 0.6 0.6   CALCIUM 9.4 9.2   PROT 8.4 8.1   ALBUMIN 3.7 3.2*   BILITOT 6.6* 10.3*   ALKPHOS 200* 355*   AST 78* 100*   ALT 51* 51*   ANIONGAP 9 8         Significant Imaging: I have reviewed all pertinent imaging results/findings within the past 24 hours.

## 2023-10-18 NOTE — PROGRESS NOTES
Pharmacokinetic Initial Assessment: IV Vancomycin    Assessment/Plan:    Initiate intravenous vancomycin with loading dose of 2000 mg once   Followed by a maintenance dose of vancomycin 1500 mg IV every 12 hours  Desired empiric serum trough concentration is 15 to 20 mcg/mL  Draw vancomycin trough level 60 min prior to fourth dose on 10/19 at approximately 2130  Pharmacy will continue to follow and monitor vancomycin.      Please contact pharmacy at extension [phone] with any questions regarding this assessment.     Thank you for the consult,   Chris Mares       Patient brief summary:  Parrish Davis is a 28 y.o. male initiated on antimicrobial therapy with IV Vancomycin for treatment of suspected sepsis    Drug Allergies:   Review of patient's allergies indicates:  No Known Allergies    Actual Body Weight:   84.1 kg    Renal Function:   Estimated Creatinine Clearance: 199.4 mL/min (based on SCr of 0.6 mg/dL).,     Dialysis Method (if applicable):  N/A    CBC (last 72 hours):  Recent Labs   Lab Result Units 10/15/23  1528 10/16/23  0349 10/17/23  0521 10/18/23  0251   WBC K/uL 12.93* 18.71* 17.76* 16.62*   Hemoglobin g/dL 9.8* 9.4* 9.8* 9.4*   Hematocrit % 26.2* 27.0* 28.4* 26.3*   Platelets K/uL 416 375 259 194   Gran % % 74.0* 81.6* 85.0* 81.7*   Lymph % % 16.0* 6.2* 4.7* 8.9*   Mono % % 9.0 10.3 6.1 6.4   Eosinophil % % 1.0 0.0 2.7 0.4   Basophil % % 0.0 0.4 0.3 0.5   Differential Method  Manual Automated Automated Automated       Metabolic Panel (last 72 hours):  Recent Labs   Lab Result Units 10/15/23  1528 10/15/23  1819 10/16/23  0349 10/17/23  0521 10/18/23  0251 10/18/23  0501   Sodium mmol/L 140  --  138 132* 132*  --    Potassium mmol/L 4.2  --  4.1 3.9 4.6  --    Chloride mmol/L 111*  --  106 103 103  --    CO2 mmol/L 17*  --  19* 20* 21*  --    Glucose mg/dL 114*  --  135* 136* 120*  --    Glucose, UA   --  Negative  --   --   --   --    BUN mg/dL 8  --  7 7 7  --    Creatinine mg/dL 0.7  --  0.7 0.6  "0.6  --    Creatinine, Urine mg/dL  --   --   --   --   --  70.0   Albumin g/dL 4.1  --  4.1 3.7 3.2*  --    Total Bilirubin mg/dL 3.7*  --  3.6* 6.6* 10.3*  --    Alkaline Phosphatase U/L 116  --  104 200* 355*  --    AST U/L 82*  --  76* 78* 100*  --    ALT U/L 54*  --  51* 51* 51*  --    Phosphorus mg/dL  --   --  3.4 2.5* 2.4*  --        Drug levels (last 3 results):  No results for input(s): "VANCOMYCINRA", "VANCORANDOM", "VANCOMYCINPE", "VANCOPEAK", "VANCOMYCINTR", "VANCOTROUGH" in the last 72 hours.    Microbiologic Results:  Microbiology Results (last 7 days)       Procedure Component Value Units Date/Time    Blood culture [[id number]] Collected: 10/18/23 0920    Order Status: Sent Specimen: Blood Updated: 10/18/23 0927    Blood culture [[id number]] Collected: 10/16/23 1431    Order Status: Completed Specimen: Blood Updated: 10/17/23 1612     Blood Culture, Routine No Growth to date      No Growth to date            "

## 2023-10-18 NOTE — PLAN OF CARE
AAOX4. Tachycardic and hypertensive. Pt. has been in constant 7-8/10 pain throughout shift that is unrelieved with pain medication. PRN IV and PO pain medication given. IV ABX administered. No adverse events noted during this shift.    Problem: Adult Inpatient Plan of Care  Goal: Plan of Care Review  Outcome: Ongoing, Progressing  Flowsheets (Taken 10/18/2023 0556)  Plan of Care Reviewed With: patient  Goal: Absence of Hospital-Acquired Illness or Injury  Outcome: Ongoing, Progressing  Intervention: Identify and Manage Fall Risk  Flowsheets (Taken 10/18/2023 0556)  Safety Promotion/Fall Prevention:   assistive device/personal item within reach   commode/urinal/bedpan at bedside   medications reviewed   nonskid shoes/socks when out of bed   pulse ox   room near unit station   side rails raised x 3  Goal: Optimal Comfort and Wellbeing  Outcome: Ongoing, Progressing  Intervention: Monitor Pain and Promote Comfort  Flowsheets (Taken 10/18/2023 0556)  Pain Management Interventions:   care clustered   pillow support provided   medication offered   quiet environment facilitated     Problem: Fluid Imbalance (Pneumonia)  Goal: Fluid Balance  Outcome: Ongoing, Progressing  Intervention: Monitor and Manage Fluid Balance  Flowsheets (Taken 10/18/2023 0556)  Fluid/Electrolyte Management:   fluids adjusted   intravenous fluids adjusted     Problem: Pain Acute  Goal: Acceptable Pain Control and Functional Ability  Outcome: Ongoing, Progressing  Intervention: Prevent or Manage Pain  Flowsheets (Taken 10/18/2023 0556)  Sleep/Rest Enhancement:   awakenings minimized   noise level reduced   regular sleep/rest pattern promoted   room darkened  Medication Review/Management: medications reviewed

## 2023-10-18 NOTE — PROGRESS NOTES
"[facility] - Intensive Care ([address] Medicine  Progress Note    Patient Name: Parrish Davis  MRN: [id number]  Patient Class: IP- Inpatient   Admission Date: 10/15/2023  Length of Stay: 3 days  Attending Physician: Candace Harry MD  Primary Care Provider: Jovanny Douglas MD        Subjective:     Principal Problem:Sickle-cell disease with vaso-occlusive pain        HPI:  28 y.o. man with sickle cell anemia HbSS who presents to the ED complaining of  uncontrolled body aches/pain x1 day He reports the pain started late last night/early this morning with onset of severe lower back pain. It has progressed to involve his entire body per patient, stating that he hurts "all over". No reported cough, SOB, fevers, or chills. Pain poorly controlled with home PO medications, so he came to the ED.      Overview/Hospital Course:  10/16- wbc - 13->18, retic 10.4, Hb 9.4. bili 3.6. ast/alt- slight elevation. Cxr reviewed by me- No infiltrate, Interstitial markings look chronic.  Pain located in Back and knees. No abd pain. Reorts an episode of chest pain earlier now resolved. No SOB. He looks uncomfortable. Nurse to administer IV dialudid now. Continue IVFs.  His mother at bedside reports he had about 30 admission in [facility], approx 3 /yr, and 3 so far this year. /81  Pulse 71   Temp 97.3 °F (36.3 °C) (Oral)   Resp 18    SpO2 100%   2 liters oxygen.    Old CT chest from last admission 8.24.23, - Multifocal nodular opacities with adjacent ground-glass attenuation distributed throughout the bilateral lower lobes, differential considerations include infectious/inflammatory causes, aspiration, or pulmonary infarction in the setting of sickle cell disease.    10/17- /88 Pulse 100  SpO2 93%  . AF. Wbc 17. On empiric zosyn. Pt not room  when I came for a second visit 5 pm yesterday. He said he must have been in BR.  Cultures neg. Pain scale 10->8. Dilaudid PCA started on day 1 was stopped. 24 h " MEDD 306.33 of 384.. On oxycodone 10 (MEDD 60), Dilaudid 1mg, 2 mg (MEDD 180) _ dilsudid pump. On IVFs.  Functional status per nurse:  Ambulating to BR, talking, eating, sleeping, has ADls. He reports not eating.  Will wean dilaudid to 1.5 q 3, today. Pain located in low back today. Looks comfortable after receiving dilaudid. Mother present in room. I gave them a lecture on how opioids work, their benefits and pitfalls, and how/why weaning or stopping the medication can cause rebound pain.  Recommended slow wean in supervised setting in hospital, while monitoring for complications of SSA. They expressed understanding  4:35 pm - started oxygen 2 liters, for sat 85%, now 92%, -110. Cxr ordered      10/18- Hb 9.4.  cxr 10/17- The lungs are symmetrically expanded bilaterally with coarse central hilar interstitial attenuation suggesting edema.  There is slightly increased parenchymal attenuation projected over the left lower lung zone, may reflect atelectasis however developing consolidation not excluded..  No large focal consolidation seen. There is no pneumothorax.  The osseous structures are unchanged.  On IVFs.   94% 1 iter NC.   /87    Pulse 138   Temp 99.7 °F   Resp 20 . ECHO 3/2023 - EF 60%.  Bl cult 10/16- NGSF. Ast/alt rising. Wbc 18-> 16.  Will add lactic acid, procal, repeat blood culture. CT chest/ abd/ pelvis.  Consulting Crit Care to follow. On Zosyn. Changing to cefepime. Adding vanc.   Nurse reports 7/10 pain this AM. still in lower back, not radiating anywhere. + rigors.  Low grade fever.   3:30 pm: CT + for Pulmonary Embolus, and forming infiltrate. Started FULL dose lovenox.  On cefepime and vanc.              Interval History: see above    Review of Systems   Constitutional:  Positive for activity change. Negative for appetite change and fever.   HENT:  Negative for trouble swallowing.    Respiratory:  Negative for cough and shortness of breath.    Cardiovascular:  Negative for  chest pain.   Gastrointestinal:  Negative for abdominal pain, diarrhea, nausea and vomiting.   Genitourinary:  Negative for difficulty urinating.   Musculoskeletal:  Positive for arthralgias. Negative for gait problem and neck stiffness.   Neurological:  Negative for headaches.   Psychiatric/Behavioral:  Negative for agitation, behavioral problems and confusion.      Objective:     Vital Signs (Most Recent):  Temp: 100.3 °F (37.9 °C) (10/18/23 1159)  Pulse: (!) 117 (10/18/23 1159)  Resp: (!) 22 (10/18/23 1526)  BP: (!) 151/88 (10/18/23 1159)  SpO2: 96 % (10/18/23 1159) Vital Signs (24h Range):  Temp:  [98.4 °F (36.9 °C)-100.3 °F (37.9 °C)] 100.3 °F (37.9 °C)  Pulse:  [105-160] 117  Resp:  [16-24] 22  SpO2:  [86 %-97 %] 96 %  BP: (143-162)/(86-97) 151/88     Weight: 84.1 kg (185 lb 6.5 oz)  Body mass index is 25.15 kg/m².    Intake/Output Summary (Last 24 hours) at 10/18/2023 1611  Last data filed at 10/18/2023 1411  Gross per 24 hour   Intake --   Output 4150 ml   Net -4150 ml           Physical Exam  Constitutional:       General: He is not in acute distress.     Appearance: Normal appearance. He is not ill-appearing, toxic-appearing or diaphoretic.   HENT:      Head: Normocephalic and atraumatic.      Nose: Nose normal.      Mouth/Throat:      Mouth: Mucous membranes are moist.      Pharynx: Oropharynx is clear.   Eyes:      General: No scleral icterus.     Extraocular Movements: Extraocular movements intact.      Conjunctiva/sclera: Conjunctivae normal.      Pupils: Pupils are equal, round, and reactive to light.   Cardiovascular:      Rate and Rhythm: Normal rate and regular rhythm.      Pulses: Normal pulses.   Pulmonary:      Effort: Pulmonary effort is normal. No respiratory distress.      Breath sounds: Normal breath sounds. No stridor. No wheezing, rhonchi or rales.   Chest:      Chest wall: No tenderness.   Abdominal:      General: Abdomen is flat. Bowel sounds are normal. There is no distension.       Palpations: Abdomen is soft.      Tenderness: There is no abdominal tenderness. There is no right CVA tenderness, left CVA tenderness, guarding or rebound.   Musculoskeletal:         General: No swelling, tenderness, deformity or signs of injury. Normal range of motion.      Cervical back: Normal range of motion and neck supple. No rigidity or tenderness.      Right lower leg: No edema.      Left lower leg: No edema.   Skin:     General: Skin is warm and dry.      Coloration: Skin is not jaundiced.      Findings: No bruising, erythema or rash.   Neurological:      General: No focal deficit present.      Mental Status: He is alert and oriented to person, place, and time. Mental status is at baseline.      Motor: No weakness.      Gait: Gait normal.   Psychiatric:         Mood and Affect: Mood normal.         Behavior: Behavior normal.         Thought Content: Thought content normal.         Judgment: Judgment normal.             Significant Labs: All pertinent labs within the past 24 hours have been reviewed.  CBC:   Recent Labs   Lab 10/17/23  0521 10/18/23  0251   WBC 17.76* 16.62*   HGB 9.8* 9.4*   HCT 28.4* 26.3*    194       CMP:   Recent Labs   Lab 10/17/23  0521 10/18/23  0251   * 132*   K 3.9 4.6    103   CO2 20* 21*   * 120*   BUN 7 7   CREATININE 0.6 0.6   CALCIUM 9.4 9.2   PROT 8.4 8.1   ALBUMIN 3.7 3.2*   BILITOT 6.6* 10.3*   ALKPHOS 200* 355*   AST 78* 100*   ALT 51* 51*   ANIONGAP 9 8         Significant Imaging: I have reviewed all pertinent imaging results/findings within the past 24 hours.      Assessment/Plan:      * Sickle-cell disease with vaso-occlusive pain  -Pt. With severe joint pains typical of vaso-occlusive crises. No reported chest pain, no hypoxia, and CXR without infiltrate (improved from prior) not consistent with acute chest.   - ( Old CTchest 8/24/23, had bilat lower patchy interstitial edema.)   -Plan to treat with IV fluids, PCA pump ordered for more  continuous pain med dosing as pt. Complained of severe persistent pain after interval dosing in ED. Continue home meds folic acid and hydroxyurea (voxeletor not on formulary)    10/16- on IVFs, dilaudid 2 q 3 prn, and oxy 10 q 4 prn. PCA discontinued. cxr and urine neg for infection. CT noted interstitial edema.     10/17 on IVFs, dilaudid 1.5 q 3. Oxycodone. Empiric zosyn, Pain in low back. No chest pain, SOB, not requiring oxygen    10/18- Repeating lactic acid, procal, repeat blood culture. CT chest/ abd/ pelvis.  Consulting Crit Care to follow. On Zosyn. Adding vanc, No change in pain meds today.         Acute pulmonary embolism  Lovenox full dose started  Per CT chest/a/p.  Tachycardia 110-120, no chest pain, minimal hypoxia  US LE bilat  Will need to convert to eliquis        Severe sepsis  Monitor. Wbc 13-> 17 -> 16  Blood culture  Cxr, UA neg  No concern on abd exam  Likely reactive  Empiric zosyn.     1018- adding vanc, CT ch/ abd/ pelvis. cxr may have developing infiltrate, on IVFs. . Meets requirements for Severe SEPSIS with high wbc, tachycardia, hypoxia, RR 20, rising LFTs.  Consuted Crit care to follow.       Elevated transaminase level  Monitor consider hepatology consult      Hyperbilirubinemia  -Chronic, suspect due to combination of hemolysis causing unconjugated hyperbilirubinemia, and intrahepatic cholestasis from SC disease  -Continue to monitor while admitted, pt. Currently follows with hepatology as outpatient        VTE Risk Mitigation (From admission, onward)         Ordered     enoxaparin injection 80 mg  Every 12 hours         10/18/23 1530     IP VTE LOW RISK PATIENT  Once         10/15/23 2012     IP VTE HIGH RISK PATIENT  Once         10/15/23 2012                Discharge Planning   [place]: 10/19/2023     Code Status: Full Code   Is the patient medically ready for discharge?: No    Reason for patient still in hospital (select all that apply): Patient trending condition  Discharge Plan A:  Home                  Candace Harry MD  Department of Hospital Medicine   [facility] - Intensive Care ([place]-16)

## 2023-10-18 NOTE — CARE UPDATE
RAPID RESPONSE NURSE CHART REVIEW        Chart Reviewed: 10/18/2023, 1:50 PM    MRN: [id number]  Bed: 57472/19416 A    Dx: Sickle-cell disease with vaso-occlusive pain    Parrish Davis has a past medical history of Sickle cell anemia and Sickle cell disease.    Last VS: BP (!) 151/88 (Patient Position: Lying)   Pulse (!) 117   Temp 100.3 °F (37.9 °C) (Oral)   Resp 18   Ht 6' (1.829 m)   Wt 84.1 kg (185 lb 6.5 oz)   SpO2 96%   BMI 25.15 kg/m²     24H Vital Sign Range:  Temp:  [98.4 °F (36.9 °C)-100.3 °F (37.9 °C)]   Pulse:  [105-160]   Resp:  [16-24]   BP: (143-162)/(86-97)   SpO2:  [86 %-97 %]     Level of Consciousness (AVPU): alert    Recent Labs     10/16/23  0349 10/17/23  0521 10/18/23  0251   WBC 18.71* 17.76* 16.62*   HGB 9.4* 9.8* 9.4*   HCT 27.0* 28.4* 26.3*    259 194       Recent Labs     10/16/23  0349 10/17/23  0521 10/18/23  0251    132* 132*   K 4.1 3.9 4.6    103 103   CO2 19* 20* 21*   BUN 7 7 7   CREATININE 0.7 0.6 0.6   * 136* 120*   PHOS 3.4 2.5* 2.4*      OXYGEN:  Flow (L/min): 1    MEWS score: 3    Rounding completed with charge ANDREINA Blackman.  No additional concerns verbalized at this time. Instructed to call [facility] for further concerns or assistance.    Lisa Torres RN

## 2023-10-18 NOTE — CARE UPDATE
RAPID RESPONSE NURSE ROUND       Rounding completed with charge RNAgueda for tachycardia reports will continue to monitor. No additional concerns verbalized at this time. Instructed to call [phone] for further concerns or assistance.

## 2023-10-18 NOTE — ASSESSMENT & PLAN NOTE
-Pt. With severe joint pains typical of vaso-occlusive crises. No reported chest pain, no hypoxia, and CXR without infiltrate (improved from prior) not consistent with acute chest.   - ( Old CTchest 8/24/23, had bilat lower patchy interstitial edema.)   -Plan to treat with IV fluids, PCA pump ordered for more continuous pain med dosing as pt. Complained of severe persistent pain after interval dosing in ED. Continue home meds folic acid and hydroxyurea (voxeletor not on formulary)    10/16- on IVFs, dilaudid 2 q 3 prn, and oxy 10 q 4 prn. PCA discontinued. cxr and urine neg for infection. CT noted interstitial edema.     10/17 on IVFs, dilaudid 1.5 q 3. Oxycodone. Empiric zosyn, Pain in low back. No chest pain, SOB, not requiring oxygen    10/18- Repeating lactic acid, procal, repeat blood culture. CT chest/ abd/ pelvis.  Consulting Crit Care to follow. On Zosyn. Adding vanc, No change in pain meds today.

## 2023-10-18 NOTE — ASSESSMENT & PLAN NOTE
Monitor. Wbc 13-> 17 -> 16  Blood culture  Cxr, UA neg  No concern on abd exam  Likely reactive  Empiric zosyn.     1018- adding vanc, CT ch/ abd/ pelvis. cxr may have developing infiltrate, on IVFs. . Meets requirements for Severe SEPSIS with high wbc, tachycardia, hypoxia, RR 20, rising LFTs.  Consuted Crit care to follow.

## 2023-10-18 NOTE — ASSESSMENT & PLAN NOTE
Notable rise in LFTs and T Bili; has seen hepatology outpatient. No cirrhosis on recent US    --Trend and consider hepatology consult

## 2023-10-18 NOTE — ASSESSMENT & PLAN NOTE
Lovenox full dose started  Per CT chest/a/p.  Tachycardia 110-120, no chest pain, minimal hypoxia  US LE bilat  Will need to convert to eliquis

## 2023-10-18 NOTE — PROGRESS NOTES
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 It has progressed to involve his entire body per patient, stating that he hurts "all over". No reported cough, SOB, fevers, or chills. Pain poorly controlled with home PO medications, so he came to the ED.      Overview/Hospital Course:  10/16- wbc - 13->18, retic 10.4, Hb 9.4. bili 3.6. ast/alt- slight elevation. Cxr reviewed by me- No infiltrate, Interstitial markings look chronic.  Pain located in Back and knees. No abd pain. Reorts an episode of chest pain earlier now resolved. No SOB. He looks uncomfortable. Nurse to administer IV dialudid now. Continue IVFs.  His mother at bedside reports he had about 30 admission in [facility], approx 3 /yr, and 3 so far this year. /81  Pulse 71   Temp 97.3 °F (36.3 °C) (Oral)   Resp 18    SpO2 100%   2 liters oxygen.    Old CT chest from last admission 8.24.23, - Multifocal nodular opacities with adjacent ground-glass attenuation distributed throughout the bilateral lower lobes, differential considerations include infectious/inflammatory causes, aspiration, or pulmonary infarction in the setting of sickle cell disease.    10/17- /88 Pulse 100  SpO2 93%  . AF. Wbc 17. On empiric zosyn. Pt not room  when I came for a second visit 5 pm yesterday. He said he must have been in BR.  Cultures neg. Pain scale 10->8. Dilaudid PCA started on day 1 was stopped. 24 h " MEDD 306.33 of 384.. On oxycodone 10 (MEDD 60), Dilaudid 1mg, 2 mg (MEDD 180) _ dilsudid pump. On IVFs.  Functional status per nurse:  Ambulating to BR, talking, eating, sleeping, has ADls. He reports not eating.  Will wean dilaudid to 1.5 q 3, today. Pain located in low back today. Looks comfortable after receiving dilaudid. Mother present in room. I gave them a lecture on how opioids work, their benefits and pitfalls, and how/why weaning or stopping the medication can cause rebound pain.  Recommended slow wean in supervised setting in hospital, while monitoring for complications of SSA. They expressed understanding  4:35 pm - started oxygen 2 liters, for sat 85%, now 92%, -110. Cxr ordered      10/18- Hb 9.4.  cxr 10/17- The lungs are symmetrically expanded bilaterally with coarse central hilar interstitial attenuation suggesting edema.  There is slightly increased parenchymal attenuation projected over the left lower lung zone, may reflect atelectasis however developing consolidation not excluded..  No large focal consolidation seen. There is no pneumothorax.  The osseous structures are unchanged.  On IVFs.   94% 1 iter NC.   /87    Pulse 138   Temp 99.7 °F   Resp 20 . ECHO 3/2023 - EF 60%.  Bl cult 10/16- NGSF. Ast/alt rising. Wbc 18-> 16.  Will add lactic acid, procal, repeat blood culture. CT chest/ abd/ pelvis.  Consulting Crit Care to follow. On Zosyn. Adding vanc.   Nurse reports 7/10 this AM. still in lower back, not radiating anywhere. + rigors.               Interval History: see above    Review of Systems   Constitutional:  Positive for activity change. Negative for appetite change and fever.   HENT:  Negative for trouble swallowing.    Respiratory:  Negative for cough and shortness of breath.    Cardiovascular:  Negative for chest pain.   Gastrointestinal:  Negative for abdominal pain, diarrhea, nausea and vomiting.   Genitourinary:  Negative for difficulty urinating.    Musculoskeletal:  Positive for arthralgias. Negative for gait problem and neck stiffness.   Neurological:  Negative for headaches.   Psychiatric/Behavioral:  Negative for agitation, behavioral problems and confusion.      Objective:     Vital Signs (Most Recent):  Temp: 99.7 °F (37.6 °C) (10/18/23 0819)  Pulse: (!) 138 (10/18/23 0819)  Resp: 20 (10/18/23 0843)  BP: (!) 143/87 (10/18/23 0819)  SpO2: (!) 94 % (10/18/23 0819) Vital Signs (24h Range):  Temp:  [98.3 °F (36.8 °C)-99.7 °F (37.6 °C)] 99.7 °F (37.6 °C)  Pulse:  [105-160] 138  Resp:  [16-24] 20  SpO2:  [86 %-97 %] 94 %  BP: (143-162)/(86-97) 143/87     Weight: 84.1 kg (185 lb 6.5 oz)  Body mass index is 25.15 kg/m².    Intake/Output Summary (Last 24 hours) at 10/18/2023 0942  Last data filed at 10/18/2023 0619  Gross per 24 hour   Intake --   Output 2150 ml   Net -2150 ml           Physical Exam  Constitutional:       General: He is not in acute distress.     Appearance: Normal appearance. He is not ill-appearing, toxic-appearing or diaphoretic.   HENT:      Head: Normocephalic and atraumatic.      Nose: Nose normal.      Mouth/Throat:      Mouth: Mucous membranes are moist.      Pharynx: Oropharynx is clear.   Eyes:      General: No scleral icterus.     Extraocular Movements: Extraocular movements intact.      Conjunctiva/sclera: Conjunctivae normal.      Pupils: Pupils are equal, round, and reactive to light.   Cardiovascular:      Rate and Rhythm: Normal rate and regular rhythm.      Pulses: Normal pulses.   Pulmonary:      Effort: Pulmonary effort is normal. No respiratory distress.      Breath sounds: Normal breath sounds. No stridor. No wheezing, rhonchi or rales.   Chest:      Chest wall: No tenderness.   Abdominal:      General: Abdomen is flat. Bowel sounds are normal. There is no distension.      Palpations: Abdomen is soft.      Tenderness: There is no abdominal tenderness. There is no right CVA tenderness, left CVA tenderness, guarding or  rebound.   Musculoskeletal:         General: No swelling, tenderness, deformity or signs of injury. Normal range of motion.      Cervical back: Normal range of motion and neck supple. No rigidity or tenderness.      Right lower leg: No edema.      Left lower leg: No edema.   Skin:     General: Skin is warm and dry.      Coloration: Skin is not jaundiced.      Findings: No bruising, erythema or rash.   Neurological:      General: No focal deficit present.      Mental Status: He is alert and oriented to person, place, and time. Mental status is at baseline.      Motor: No weakness.      Gait: Gait normal.   Psychiatric:         Mood and Affect: Mood normal.         Behavior: Behavior normal.         Thought Content: Thought content normal.         Judgment: Judgment normal.             Significant Labs: All pertinent labs within the past 24 hours have been reviewed.  CBC:   Recent Labs   Lab 10/17/23  0521 10/18/23  0251   WBC 17.76* 16.62*   HGB 9.8* 9.4*   HCT 28.4* 26.3*    194       CMP:   Recent Labs   Lab 10/17/23  0521 10/18/23  0251   * 132*   K 3.9 4.6    103   CO2 20* 21*   * 120*   BUN 7 7   CREATININE 0.6 0.6   CALCIUM 9.4 9.2   PROT 8.4 8.1   ALBUMIN 3.7 3.2*   BILITOT 6.6* 10.3*   ALKPHOS 200* 355*   AST 78* 100*   ALT 51* 51*   ANIONGAP 9 8         Significant Imaging: I have reviewed all pertinent imaging results/findings within the past 24 hours.      Assessment/Plan:      * Sickle-cell disease with vaso-occlusive pain  -Pt. With severe joint pains typical of vaso-occlusive crises. No reported chest pain, no hypoxia, and CXR without infiltrate (improved from prior) not consistent with acute chest.   - ( Old CTchest 8/24/23, had bilat lower patchy interstitial edema.)   -Plan to treat with IV fluids, PCA pump ordered for more continuous pain med dosing as pt. Complained of severe persistent pain after interval dosing in ED. Continue home meds folic acid and hydroxyurea  (voxeletor not on formulary)    10/16- on IVFs, dilaudid 2 q 3 prn, and oxy 10 q 4 prn. PCA discontinued. cxr and urine neg for infection. CT noted interstitial edema.     10/17 on IVFs, dilaudid 1.5 q 3. Oxycodone. Empiric zosyn, Pain in low back. No chest pain, SOB, not requiring oxygen    10/18- Repeating lactic acid, procal, repeat blood culture. CT chest/ abd/ pelvis.  Consulting Crit Care to follow. On Zosyn. Adding vanc, No change in pain meds today.         Severe sepsis  Monitor. Wbc 13-> 17 -> 16  Blood culture  Cxr, UA neg  No concern on abd exam  Likely reactive  Empiric zosyn.     1018- adding vanc, CT ch/ abd/ pelvis. cxr may have developing infiltrate, on IVFs. . Meets requirements for Severe SEPSIS with high wbc, tachycardia, hypoxia, RR 20, rising LFTs.  Consuted Crit care to follow.       Hyperbilirubinemia  -Chronic, suspect due to combination of hemolysis causing unconjugated hyperbilirubinemia, and intrahepatic cholestasis from SC disease  -Continue to monitor while admitted, pt. Currently follows with hepatology as outpatient      VTE Risk Mitigation (From admission, onward)         Ordered     enoxaparin injection 40 mg  Daily         10/15/23 2012     IP VTE LOW RISK PATIENT  Once         10/15/23 2012     IP VTE HIGH RISK PATIENT  Once         10/15/23 2012                Discharge Planning   [place]: 10/18/2023     Code Status: Full Code   Is the patient medically ready for discharge?: No    Reason for patient still in hospital (select all that apply): Patient trending condition  Discharge Plan A: Home          Candace Harry MD  Department of Hospital Medicine   [facility] - Intensive Care ([place]-16)

## 2023-10-19 LAB
ABO + RH BLD: NORMAL
ALBUMIN SERPL BCP-MCNC: 2.5 G/DL (ref 3.5–5.2)
ALBUMIN SERPL BCP-MCNC: 2.6 G/DL (ref 3.5–5.2)
ALP SERPL-CCNC: 321 U/L (ref 55–135)
ALP SERPL-CCNC: 325 U/L (ref 55–135)
ALT SERPL W/O P-5'-P-CCNC: 47 U/L (ref 10–44)
ALT SERPL W/O P-5'-P-CCNC: 48 U/L (ref 10–44)
ANION GAP SERPL CALC-SCNC: 7 MMOL/L (ref 8–16)
ANION GAP SERPL CALC-SCNC: 9 MMOL/L (ref 8–16)
ASCENDING AORTA: 2.94 CM
AST SERPL-CCNC: 65 U/L (ref 10–40)
AST SERPL-CCNC: 66 U/L (ref 10–40)
BASOPHILS # BLD AUTO: 0.05 K/UL (ref 0–0.2)
BASOPHILS # BLD AUTO: 0.09 K/UL (ref 0–0.2)
BASOPHILS NFR BLD: 0.4 % (ref 0–1.9)
BASOPHILS NFR BLD: 0.6 % (ref 0–1.9)
BILIRUB SERPL-MCNC: 14.9 MG/DL (ref 0.1–1)
BILIRUB SERPL-MCNC: 16 MG/DL (ref 0.1–1)
BLD GP AB SCN CELLS X3 SERPL QL: NORMAL
BLD PROD TYP BPU: NORMAL
BLOOD UNIT EXPIRATION DATE: NORMAL
BLOOD UNIT TYPE CODE: 9500
BLOOD UNIT TYPE: NORMAL
BNP SERPL-MCNC: 15 PG/ML (ref 0–99)
BSA FOR ECHO PROCEDURE: 2.08 M2
BUN SERPL-MCNC: 7 MG/DL (ref 6–20)
BUN SERPL-MCNC: 7 MG/DL (ref 6–20)
CALCIUM SERPL-MCNC: 8.5 MG/DL (ref 8.7–10.5)
CALCIUM SERPL-MCNC: 8.5 MG/DL (ref 8.7–10.5)
CHLORIDE SERPL-SCNC: 100 MMOL/L (ref 95–110)
CHLORIDE SERPL-SCNC: 97 MMOL/L (ref 95–110)
CO2 SERPL-SCNC: 25 MMOL/L (ref 23–29)
CO2 SERPL-SCNC: 25 MMOL/L (ref 23–29)
CODING SYSTEM: NORMAL
CREAT SERPL-MCNC: 0.6 MG/DL (ref 0.5–1.4)
CREAT SERPL-MCNC: 0.6 MG/DL (ref 0.5–1.4)
CROSSMATCH INTERPRETATION: NORMAL
CV ECHO LV RWT: 0.47 CM
DIFFERENTIAL METHOD: ABNORMAL
DIFFERENTIAL METHOD: ABNORMAL
DISPENSE STATUS: NORMAL
DOP CALC LVOT AREA: 4.4 CM2
DOP CALC LVOT DIAMETER: 2.38 CM
DOP CALC LVOT PEAK VEL: 1.15 M/S
DOP CALC LVOT STROKE VOLUME: 87.51 CM3
DOP CALCLVOT PEAK VEL VTI: 19.68 CM
E WAVE DECELERATION TIME: 270.17 MSEC
E/A RATIO: 1.24
E/E' RATIO: 5.19 M/S
ECHO LV POSTERIOR WALL: 1.18 CM (ref 0.6–1.1)
EOSINOPHIL # BLD AUTO: 0.2 K/UL (ref 0–0.5)
EOSINOPHIL # BLD AUTO: 0.3 K/UL (ref 0–0.5)
EOSINOPHIL NFR BLD: 1.5 % (ref 0–8)
EOSINOPHIL NFR BLD: 2.2 % (ref 0–8)
ERYTHROCYTE [DISTWIDTH] IN BLOOD BY AUTOMATED COUNT: 21.5 % (ref 11.5–14.5)
ERYTHROCYTE [DISTWIDTH] IN BLOOD BY AUTOMATED COUNT: 22.2 % (ref 11.5–14.5)
EST. GFR  (NO RACE VARIABLE): >60 ML/MIN/1.73 M^2
EST. GFR  (NO RACE VARIABLE): >60 ML/MIN/1.73 M^2
FRACTIONAL SHORTENING: 39 % (ref 28–44)
GLUCOSE SERPL-MCNC: 127 MG/DL (ref 70–110)
GLUCOSE SERPL-MCNC: 310 MG/DL (ref 70–110)
HCT VFR BLD AUTO: 21.8 % (ref 40–54)
HCT VFR BLD AUTO: 24.3 % (ref 40–54)
HGB BLD-MCNC: 7.9 G/DL (ref 14–18)
HGB BLD-MCNC: 8.7 G/DL (ref 14–18)
IMM GRANULOCYTES # BLD AUTO: 0.22 K/UL (ref 0–0.04)
IMM GRANULOCYTES # BLD AUTO: 0.34 K/UL (ref 0–0.04)
IMM GRANULOCYTES NFR BLD AUTO: 1.7 % (ref 0–0.5)
IMM GRANULOCYTES NFR BLD AUTO: 2.3 % (ref 0–0.5)
INTERVENTRICULAR SEPTUM: 0.86 CM (ref 0.6–1.1)
LA MAJOR: 5.89 CM
LA MINOR: 5.59 CM
LA WIDTH: 4.87 CM
LEFT ATRIUM SIZE: 2.81 CM
LEFT ATRIUM VOLUME INDEX: 32.1 ML/M2
LEFT ATRIUM VOLUME: 66.72 CM3
LEFT INTERNAL DIMENSION IN SYSTOLE: 3.05 CM (ref 2.1–4)
LEFT VENTRICLE DIASTOLIC VOLUME INDEX: 56.16 ML/M2
LEFT VENTRICLE DIASTOLIC VOLUME: 116.82 ML
LEFT VENTRICLE MASS INDEX: 89 G/M2
LEFT VENTRICLE SYSTOLIC VOLUME INDEX: 17.5 ML/M2
LEFT VENTRICLE SYSTOLIC VOLUME: 36.45 ML
LEFT VENTRICULAR INTERNAL DIMENSION IN DIASTOLE: 4.97 CM (ref 3.5–6)
LEFT VENTRICULAR MASS: 185.06 G
LV LATERAL E/E' RATIO: 4.37 M/S
LV SEPTAL E/E' RATIO: 6.38 M/S
LYMPHOCYTES # BLD AUTO: 1 K/UL (ref 1–4.8)
LYMPHOCYTES # BLD AUTO: 1.5 K/UL (ref 1–4.8)
LYMPHOCYTES NFR BLD: 7.8 % (ref 18–48)
LYMPHOCYTES NFR BLD: 9.8 % (ref 18–48)
MCH RBC QN AUTO: 29.3 PG (ref 27–31)
MCH RBC QN AUTO: 29.8 PG (ref 27–31)
MCHC RBC AUTO-ENTMCNC: 35.8 G/DL (ref 32–36)
MCHC RBC AUTO-ENTMCNC: 36.2 G/DL (ref 32–36)
MCV RBC AUTO: 82 FL (ref 82–98)
MCV RBC AUTO: 82 FL (ref 82–98)
MONOCYTES # BLD AUTO: 1 K/UL (ref 0.3–1)
MONOCYTES # BLD AUTO: 1 K/UL (ref 0.3–1)
MONOCYTES NFR BLD: 6.8 % (ref 4–15)
MONOCYTES NFR BLD: 7.9 % (ref 4–15)
MV PEAK A VEL: 0.67 M/S
MV PEAK E VEL: 0.83 M/S
MV STENOSIS PRESSURE HALF TIME: 78.35 MS
MV VALVE AREA P 1/2 METHOD: 2.81 CM2
NEUTROPHILS # BLD AUTO: 10.3 K/UL (ref 1.8–7.7)
NEUTROPHILS # BLD AUTO: 11.8 K/UL (ref 1.8–7.7)
NEUTROPHILS NFR BLD: 79 % (ref 38–73)
NEUTROPHILS NFR BLD: 80 % (ref 38–73)
NRBC BLD-RTO: 2 /100 WBC
NRBC BLD-RTO: 2 /100 WBC
NUM UNITS TRANS PACKED RBC: NORMAL
OHS LV EJECTION FRACTION SIMPSONS BIPLANE MOD: 60 %
PHOSPHATE SERPL-MCNC: 2.8 MG/DL (ref 2.7–4.5)
PISA TR MAX VEL: 2.61 M/S
PLATELET # BLD AUTO: 151 K/UL (ref 150–450)
PLATELET # BLD AUTO: 167 K/UL (ref 150–450)
PMV BLD AUTO: 10.4 FL (ref 9.2–12.9)
PMV BLD AUTO: 11.1 FL (ref 9.2–12.9)
POTASSIUM SERPL-SCNC: 3.1 MMOL/L (ref 3.5–5.1)
POTASSIUM SERPL-SCNC: 3.3 MMOL/L (ref 3.5–5.1)
PROT SERPL-MCNC: 6.8 G/DL (ref 6–8.4)
PROT SERPL-MCNC: 6.9 G/DL (ref 6–8.4)
RA MAJOR: 4.7 CM
RA PRESSURE ESTIMATED: 3 MMHG
RA WIDTH: 3.86 CM
RBC # BLD AUTO: 2.65 M/UL (ref 4.6–6.2)
RBC # BLD AUTO: 2.97 M/UL (ref 4.6–6.2)
RIGHT VENTRICULAR END-DIASTOLIC DIMENSION: 3.46 CM
RV TB RVSP: 6 MMHG
SINUS: 2.91 CM
SODIUM SERPL-SCNC: 131 MMOL/L (ref 136–145)
SODIUM SERPL-SCNC: 132 MMOL/L (ref 136–145)
SPECIMEN OUTDATE: NORMAL
STJ: 2.53 CM
TDI LATERAL: 0.19 M/S
TDI SEPTAL: 0.13 M/S
TDI: 0.16 M/S
TR MAX PG: 27 MMHG
TRICUSPID ANNULAR PLANE SYSTOLIC EXCURSION: 2.08 CM
TROPONIN I SERPL DL<=0.01 NG/ML-MCNC: <0.006 NG/ML (ref 0–0.03)
TV REST PULMONARY ARTERY PRESSURE: 30 MMHG
WBC # BLD AUTO: 12.89 K/UL (ref 3.9–12.7)
WBC # BLD AUTO: 14.94 K/UL (ref 3.9–12.7)
Z-SCORE OF LEFT VENTRICULAR DIMENSION IN END DIASTOLE: -2.48
Z-SCORE OF LEFT VENTRICULAR DIMENSION IN END SYSTOLE: -1.93

## 2023-10-19 PROCEDURE — S5010 5% DEXTROSE AND 0.45% SALINE: HCPCS | Performed by: HOSPITALIST

## 2023-10-19 PROCEDURE — 21400001 HC TELEMETRY ROOM

## 2023-10-19 PROCEDURE — 25000003 PHARM REV CODE 250: Performed by: EMERGENCY MEDICINE

## 2023-10-19 PROCEDURE — 83020 HEMOGLOBIN ELECTROPHORESIS: CPT | Performed by: HOSPITALIST

## 2023-10-19 PROCEDURE — 27000221 HC OXYGEN, UP TO 24 HOURS

## 2023-10-19 PROCEDURE — 99223 PR INITIAL HOSPITAL CARE,LEVL III: ICD-10-PCS | Mod: ,,, | Performed by: INTERNAL MEDICINE

## 2023-10-19 PROCEDURE — 12000002 HC ACUTE/MED SURGE SEMI-PRIVATE ROOM

## 2023-10-19 PROCEDURE — P9016 RBC LEUKOCYTES REDUCED: HCPCS | Performed by: PHYSICIAN ASSISTANT

## 2023-10-19 PROCEDURE — 85025 COMPLETE CBC W/AUTO DIFF WBC: CPT | Mod: 91 | Performed by: STUDENT IN AN ORGANIZED HEALTH CARE EDUCATION/TRAINING PROGRAM

## 2023-10-19 PROCEDURE — 84484 ASSAY OF TROPONIN QUANT: CPT | Performed by: PHYSICIAN ASSISTANT

## 2023-10-19 PROCEDURE — 25000003 PHARM REV CODE 250: Performed by: STUDENT IN AN ORGANIZED HEALTH CARE EDUCATION/TRAINING PROGRAM

## 2023-10-19 PROCEDURE — 36415 COLL VENOUS BLD VENIPUNCTURE: CPT | Performed by: PHYSICIAN ASSISTANT

## 2023-10-19 PROCEDURE — 99223 1ST HOSP IP/OBS HIGH 75: CPT | Mod: ,,, | Performed by: INTERNAL MEDICINE

## 2023-10-19 PROCEDURE — 86850 RBC ANTIBODY SCREEN: CPT | Performed by: PHYSICIAN ASSISTANT

## 2023-10-19 PROCEDURE — 94761 N-INVAS EAR/PLS OXIMETRY MLT: CPT

## 2023-10-19 PROCEDURE — 25000003 PHARM REV CODE 250: Performed by: HOSPITALIST

## 2023-10-19 PROCEDURE — 85025 COMPLETE CBC W/AUTO DIFF WBC: CPT | Performed by: HOSPITALIST

## 2023-10-19 PROCEDURE — 76937 US GUIDE VASCULAR ACCESS: CPT

## 2023-10-19 PROCEDURE — 63600175 PHARM REV CODE 636 W HCPCS: Performed by: HOSPITALIST

## 2023-10-19 PROCEDURE — 86902 BLOOD TYPE ANTIGEN DONOR EA: CPT | Performed by: PHYSICIAN ASSISTANT

## 2023-10-19 PROCEDURE — 80053 COMPREHEN METABOLIC PANEL: CPT | Mod: 91 | Performed by: STUDENT IN AN ORGANIZED HEALTH CARE EDUCATION/TRAINING PROGRAM

## 2023-10-19 PROCEDURE — 80053 COMPREHEN METABOLIC PANEL: CPT | Performed by: HOSPITALIST

## 2023-10-19 PROCEDURE — 36430 TRANSFUSION BLD/BLD COMPNT: CPT

## 2023-10-19 PROCEDURE — 83880 ASSAY OF NATRIURETIC PEPTIDE: CPT | Performed by: PHYSICIAN ASSISTANT

## 2023-10-19 PROCEDURE — 36415 COLL VENOUS BLD VENIPUNCTURE: CPT | Performed by: HOSPITALIST

## 2023-10-19 PROCEDURE — 99291 CRITICAL CARE FIRST HOUR: CPT | Mod: ,,, | Performed by: PHYSICIAN ASSISTANT

## 2023-10-19 PROCEDURE — 36410 VNPNXR 3YR/> PHY/QHP DX/THER: CPT

## 2023-10-19 PROCEDURE — 84100 ASSAY OF PHOSPHORUS: CPT | Performed by: HOSPITALIST

## 2023-10-19 PROCEDURE — 86920 COMPATIBILITY TEST SPIN: CPT | Performed by: PHYSICIAN ASSISTANT

## 2023-10-19 PROCEDURE — 99291 PR CRITICAL CARE, E/M 30-74 MINUTES: ICD-10-PCS | Mod: ,,, | Performed by: PHYSICIAN ASSISTANT

## 2023-10-19 PROCEDURE — 87040 BLOOD CULTURE FOR BACTERIA: CPT | Performed by: STUDENT IN AN ORGANIZED HEALTH CARE EDUCATION/TRAINING PROGRAM

## 2023-10-19 PROCEDURE — C1751 CATH, INF, PER/CENT/MIDLINE: HCPCS

## 2023-10-19 RX ORDER — POTASSIUM CHLORIDE 20 MEQ/1
40 TABLET, EXTENDED RELEASE ORAL ONCE
Status: COMPLETED | OUTPATIENT
Start: 2023-10-19 | End: 2023-10-19

## 2023-10-19 RX ORDER — HYDROCODONE BITARTRATE AND ACETAMINOPHEN 500; 5 MG/1; MG/1
TABLET ORAL
Status: DISCONTINUED | OUTPATIENT
Start: 2023-10-19 | End: 2023-10-21

## 2023-10-19 RX ADMIN — ACETAMINOPHEN 650 MG: 325 TABLET ORAL at 06:10

## 2023-10-19 RX ADMIN — FOLIC ACID 1 MG: 1 TABLET ORAL at 08:10

## 2023-10-19 RX ADMIN — CEFEPIME 2 G: 2 INJECTION, POWDER, FOR SOLUTION INTRAVENOUS at 05:10

## 2023-10-19 RX ADMIN — HYDROMORPHONE HYDROCHLORIDE 1 MG: 2 INJECTION, SOLUTION INTRAMUSCULAR; INTRAVENOUS; SUBCUTANEOUS at 06:10

## 2023-10-19 RX ADMIN — CEFEPIME 2 G: 2 INJECTION, POWDER, FOR SOLUTION INTRAVENOUS at 08:10

## 2023-10-19 RX ADMIN — VANCOMYCIN HYDROCHLORIDE 1500 MG: 1.5 INJECTION, POWDER, LYOPHILIZED, FOR SOLUTION INTRAVENOUS at 09:10

## 2023-10-19 RX ADMIN — SENNOSIDES AND DOCUSATE SODIUM 1 TABLET: 50; 8.6 TABLET ORAL at 09:10

## 2023-10-19 RX ADMIN — HYDROMORPHONE HYDROCHLORIDE 1 MG: 2 INJECTION, SOLUTION INTRAMUSCULAR; INTRAVENOUS; SUBCUTANEOUS at 03:10

## 2023-10-19 RX ADMIN — DEXTROSE AND SODIUM CHLORIDE: 5; 450 INJECTION, SOLUTION INTRAVENOUS at 09:10

## 2023-10-19 RX ADMIN — HYDROMORPHONE HYDROCHLORIDE 1 MG: 2 INJECTION, SOLUTION INTRAMUSCULAR; INTRAVENOUS; SUBCUTANEOUS at 02:10

## 2023-10-19 RX ADMIN — DEXTROSE AND SODIUM CHLORIDE: 5; 450 INJECTION, SOLUTION INTRAVENOUS at 04:10

## 2023-10-19 RX ADMIN — VANCOMYCIN HYDROCHLORIDE 1500 MG: 1.5 INJECTION, POWDER, LYOPHILIZED, FOR SOLUTION INTRAVENOUS at 12:10

## 2023-10-19 RX ADMIN — ENOXAPARIN SODIUM 80 MG: 80 INJECTION SUBCUTANEOUS at 03:10

## 2023-10-19 RX ADMIN — CEFEPIME 2 G: 2 INJECTION, POWDER, FOR SOLUTION INTRAVENOUS at 01:10

## 2023-10-19 RX ADMIN — OXYCODONE AND ACETAMINOPHEN 1 TABLET: 10; 325 TABLET ORAL at 09:10

## 2023-10-19 RX ADMIN — POTASSIUM CHLORIDE 40 MEQ: 1500 TABLET, EXTENDED RELEASE ORAL at 09:10

## 2023-10-19 RX ADMIN — POLYETHYLENE GLYCOL 3350 17 G: 17 POWDER, FOR SOLUTION ORAL at 08:10

## 2023-10-19 RX ADMIN — ENOXAPARIN SODIUM 80 MG: 80 INJECTION SUBCUTANEOUS at 05:10

## 2023-10-19 RX ADMIN — HYDROMORPHONE HYDROCHLORIDE 1 MG: 2 INJECTION, SOLUTION INTRAMUSCULAR; INTRAVENOUS; SUBCUTANEOUS at 09:10

## 2023-10-19 RX ADMIN — FLUOXETINE 10 MG: 10 CAPSULE ORAL at 08:10

## 2023-10-19 RX ADMIN — SENNOSIDES AND DOCUSATE SODIUM 1 TABLET: 50; 8.6 TABLET ORAL at 08:10

## 2023-10-19 RX ADMIN — HYDROMORPHONE HYDROCHLORIDE 1 MG: 2 INJECTION, SOLUTION INTRAMUSCULAR; INTRAVENOUS; SUBCUTANEOUS at 10:10

## 2023-10-19 RX ADMIN — HYDROXYUREA 500 MG: 500 CAPSULE ORAL at 08:10

## 2023-10-19 NOTE — CARE UPDATE
RAPID RESPONSE NURSE ROUND       Rounding completed with charge RN, Cj. No concerns verbalized at this time. Instructed to call [phone] for further concerns or assistance.

## 2023-10-19 NOTE — PLAN OF CARE
[facility] - Intensive Care ([facility]-16)  Discharge Reassessment    Primary Care Provider: Jovanny Douglas MD    Expected Discharge Date: 10/23/2023    Reassessment (most recent)       Discharge Reassessment - 10/19/23 1132          Discharge Reassessment    Assessment Type Discharge Planning Reassessment (P)      Did the patient's condition or plan change since previous assessment? No (P)      Discharge Plan discussed with: Parent(s);Patient (P)      Name(s) and Number(s) Roz Davis [phone] (P)      Communicated [place] with patient/caregiver No (P)      Discharge Plan A Home (P)      Discharge Plan B Home (P)      DME Needed Upon Discharge  none (P)      Transition of Care Barriers None (P)      Why the patient remains in the hospital Requires continued medical care (P)         Post-Acute Status    Coverage [employer] (P)      Discharge Delays None known at this time (P)                  CM spoke with pt and mom in room.  Plan is to go home, mom will give a ride home.  Denies DME needs.    KAMARI TillmanN, BS, RN, CCM

## 2023-10-19 NOTE — SUBJECTIVE & OBJECTIVE
Interval History:  Patient denies any chest pain or abdominal pain.  He continues to report mild arthralgia at this point.  He denies feeling shortness of breath.        Objective:     Vital Signs (Most Recent):  Temp: (!) 101.7 °F (38.7 °C) (10/19/23 1534)  Pulse: (!) 111 (10/19/23 1547)  Resp: 18 (10/19/23 1547)  BP: 138/79 (10/19/23 1534)  SpO2: 97 % (10/19/23 1547) Vital Signs (24h Range):  Temp:  [98.4 °F (36.9 °C)-101.7 °F (38.7 °C)] 101.7 °F (38.7 °C)  Pulse:  [] 111  Resp:  [18-20] 18  SpO2:  [78 %-99 %] 97 %  BP: (125-142)/(76-86) 138/79     Weight: 83.9 kg (185 lb)  Body mass index is 24.41 kg/m².    Intake/Output Summary (Last 24 hours) at 10/19/2023 1712  Last data filed at 10/19/2023 1545  Gross per 24 hour   Intake 402.08 ml   Output 3050 ml   Net -2647.92 ml         Physical Exam  Constitutional:       General: He is not in acute distress.     Appearance: Normal appearance. He is not ill-appearing, toxic-appearing or diaphoretic.   HENT:      Head: Normocephalic and atraumatic.      Nose: Nose normal.      Mouth/Throat:      Mouth: Mucous membranes are moist.      Pharynx: Oropharynx is clear.   Eyes:      General: Scleral icterus present.      Extraocular Movements: Extraocular movements intact.      Conjunctiva/sclera: Conjunctivae normal.      Pupils: Pupils are equal, round, and reactive to light.   Cardiovascular:      Rate and Rhythm: Regular rhythm. Tachycardia present.      Pulses: Normal pulses.   Pulmonary:      Effort: Pulmonary effort is normal. No respiratory distress.      Breath sounds: Normal breath sounds. No stridor. No wheezing, rhonchi or rales.   Chest:      Chest wall: No tenderness.   Abdominal:      General: Abdomen is flat. Bowel sounds are normal. There is no distension.      Palpations: Abdomen is soft.      Tenderness: There is no abdominal tenderness. There is no right CVA tenderness, left CVA tenderness, guarding or rebound.   Musculoskeletal:         General: No  swelling. Normal range of motion.      Cervical back: Normal range of motion and neck supple. No rigidity or tenderness.      Right lower leg: No edema.      Left lower leg: No edema.   Skin:     General: Skin is warm and dry.      Coloration: Skin is not jaundiced.      Findings: No bruising, erythema or rash.   Neurological:      General: No focal deficit present.      Mental Status: He is alert and oriented to person, place, and time. Mental status is at baseline.      Motor: No weakness.      Gait: Gait normal.   Psychiatric:         Mood and Affect: Mood normal.         Behavior: Behavior normal.         Thought Content: Thought content normal.         Judgment: Judgment normal.             Significant Labs: All pertinent labs within the past 24 hours have been reviewed.    Significant Imaging: I have reviewed all pertinent imaging results/findings within the past 24 hours.

## 2023-10-19 NOTE — PLAN OF CARE
AAOX4. Tachycardic and hypertensive. SpO2 values decreased - oxygen increased to 2L NC and SpO2 values normalized. Pt. has been in constant 7-8/10 pain throughout shift that is unrelieved with pain medication. PRN IV pain medication given. IV ABX administered. No adverse events noted during this shift.    Problem: Adult Inpatient Plan of Care  Goal: Plan of Care Review  Outcome: Ongoing, Progressing  Flowsheets (Taken 10/19/2023 0630)  Plan of Care Reviewed With: patient  Goal: Absence of Hospital-Acquired Illness or Injury  Outcome: Ongoing, Progressing  Intervention: Prevent and Manage VTE (Venous Thromboembolism) Risk  Flowsheets (Taken 10/19/2023 0630)  Activity Management: Ambulated in room - L4  VTE Prevention/Management:   ambulation promoted   bleeding risk assessed   fluids promoted   intravenous hydration   ROM (active) performed  Range of Motion: active ROM (range of motion) encouraged  Goal: Optimal Comfort and Wellbeing  Outcome: Ongoing, Progressing  Intervention: Provide Person-Centered Care  Flowsheets (Taken 10/19/2023 0630)  Trust Relationship/Rapport:   care explained   choices provided   questions encouraged   questions answered   thoughts/feelings acknowledged     Problem: Fall Injury Risk  Goal: Absence of Fall and Fall-Related Injury  Outcome: Ongoing, Progressing  Intervention: Identify and Manage Contributors  Flowsheets (Taken 10/19/2023 0630)  Self-Care Promotion:   independence encouraged   BADL personal objects within reach  Medication Review/Management: medications reviewed     Problem: Pain Acute  Goal: Acceptable Pain Control and Functional Ability  Outcome: Ongoing, Progressing  Intervention: Optimize Psychosocial Wellbeing  Flowsheets (Taken 10/19/2023 0630)  Supportive Measures:   active listening utilized   verbalization of feelings encouraged   self-care encouraged  Diversional Activities: television

## 2023-10-19 NOTE — CONSULTS
[facility] ([place] [address])  Hematology/Oncology  Consult Note    Patient Name: Parrish Davis  MRN: [id number]  Admission Date: 10/15/2023  Hospital Length of Stay: 4 days  Code Status: Full Code   Attending Provider: Getachew Krishna DO  Consulting Provider: Maricarmen Del Cid DO  Primary Care Physician: Jovanny Douglas MD  Principal Problem:Sickle-cell disease with vaso-occlusive pain    Inpatient consult to Hematology/Oncology  Consult performed by: Maricarmen Del Cid DO  Consult ordered by: Getachew Krishna DO        Subjective:     HPI:  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 Hematology was consulted for possible acute chest syndrome.     Mr. Davis follows with Dr. Hi and has generally well-controlled sickle cell disease. His last transfusion was one year ago. He has been admitted twice previously this year for sickle cell pain crises. He has never required an exchange transfusion. He presented to the hospital on 10/15 with lower back and bilateral upper leg pain consistent with previous sickle cell pain crises. He cannot identify any instigating factors. He has been improving, but yesterday a CT Chest was ordered for tachycardia and fever. He was found to have a right pulmonary artery pulmonary thromboembolism. Additionally, he had a consolidation vs infarction noted in the left lower lobe. He denies any cough, shortness of breath, or chest pain. He is requiring 2L of oxygen via nasal cannula.       Oncology Treatment Plan:   [No matching plan found]    Medications:  Continuous Infusions:   dextrose 5 % and 0.45 % NaCl 100 mL/hr at 10/19/23 0454     Scheduled Meds:   ceFEPime (MAXIPIME) IVPB  2 g Intravenous Q8H    enoxparin  1 mg/kg Subcutaneous Q12H (treatment, non-standard time)    FLUoxetine  10 mg Oral Daily    folic acid  1 mg Oral Daily    hydroxyurea  500 mg Oral Daily    polyethylene glycol  17 g Oral  Daily    senna-docusate 8.6-50 mg  1 tablet Oral BID    vancomycin (VANCOCIN) IV (PEDS and ADULTS)  1,500 mg Intravenous Q12H     PRN Meds:0.9%  NaCl infusion (for blood administration), acetaminophen, bisacodyL, diphenhydrAMINE, HYDROmorphone (PF), iohexol, melatonin, ondansetron, oxyCODONE-acetaminophen, sodium chloride 0.9%, sodium chloride 0.9%, Pharmacy to dose Vancomycin consult **AND** vancomycin - pharmacy to dose     Review of patient's allergies indicates:  No Known Allergies     Past Medical History:   Diagnosis Date    Sickle cell anemia     Sickle cell disease      Past Surgical History:   Procedure Laterality Date    chemoport      removed    CHOLECYSTECTOMY  2002    UMBILICAL HERNIA REPAIR  1995     Family History    None       Tobacco Use    Smoking status: Never    Smokeless tobacco: Never   Substance and Sexual Activity    Alcohol use: Yes     Comment: Social drinker on weekends    Drug use: No    Sexual activity: Not Currently       Review of Systems   Constitutional:  Positive for fever. Negative for chills, fatigue and unexpected weight change.   HENT:  Negative for rhinorrhea and sore throat.    Eyes:  Negative for pain and redness.   Respiratory:  Negative for cough and shortness of breath.    Cardiovascular:  Negative for chest pain and palpitations.   Gastrointestinal:  Negative for abdominal pain, constipation, diarrhea, nausea and vomiting.   Endocrine: Negative for polydipsia and polyuria.   Genitourinary:  Negative for dysuria and hematuria.   Musculoskeletal:  Negative for back pain and neck pain.   Skin:  Negative for color change and rash.   Neurological:  Negative for light-headedness and headaches.   Hematological:  Negative for adenopathy. Does not bruise/bleed easily.   Psychiatric/Behavioral:  Negative for confusion. The patient is not nervous/anxious.      Objective:     Vital Signs (Most Recent):  Temp: 99 °F (37.2 °C) (10/19/23 1350)  Pulse: 90 (10/19/23  1350)  Resp: 18 (10/19/23 1423)  BP: 139/78 (10/19/23 1350)  SpO2: 99 % (10/19/23 1350) Vital Signs (24h Range):  Temp:  [98.4 °F (36.9 °C)-101.8 °F (38.8 °C)] 99 °F (37.2 °C)  Pulse:  [] 90  Resp:  [18-22] 18  SpO2:  [78 %-99 %] 99 %  BP: (125-156)/(76-86) 139/78     Weight: 83.9 kg (185 lb)  Body mass index is 24.41 kg/m².  Body surface area is 2.08 meters squared.      Intake/Output Summary (Last 24 hours) at 10/19/2023 1458  Last data filed at 10/19/2023 1247  Gross per 24 hour   Intake --   Output 3050 ml   Net -3050 ml        Physical Exam  Constitutional:       General: He is not in acute distress.     Appearance: He is well-developed. He is not diaphoretic.   HENT:      Head: Normocephalic and atraumatic.   Eyes:      General: No scleral icterus.     Conjunctiva/sclera: Conjunctivae normal.   Cardiovascular:      Rate and Rhythm: Regular rhythm. Tachycardia present.      Heart sounds: Normal heart sounds. No murmur heard.     No friction rub. No gallop.   Pulmonary:      Effort: Pulmonary effort is normal. No respiratory distress.      Breath sounds: No wheezing or rales.      Comments: Diminished breath sounds left base  Abdominal:      General: Bowel sounds are normal. There is no distension.      Palpations: Abdomen is soft.      Tenderness: There is no abdominal tenderness. There is no guarding.   Musculoskeletal:         General: Normal range of motion.      Cervical back: Normal range of motion and neck supple.   Lymphadenopathy:      Cervical: No cervical adenopathy.   Skin:     General: Skin is warm and dry.      Findings: No rash.   Neurological:      Mental Status: He is alert and oriented to person, place, and time.   Psychiatric:         Behavior: Behavior normal.          Significant Labs:   CBC:   Recent Labs   Lab 10/18/23  0251 10/19/23  0458   WBC 16.62* 14.94*   HGB 9.4* 7.9*   HCT 26.3* 21.8*    151    and CMP:   Recent Labs   Lab 10/18/23  0251 10/19/23  0458   * 131*    K 4.6 3.1*    97   CO2 21* 25   * 127*   BUN 7 7   CREATININE 0.6 0.6   CALCIUM 9.2 8.5*   PROT 8.1 6.9   ALBUMIN 3.2* 2.6*   BILITOT 10.3* 16.0*   ALKPHOS 355* 325*   * 65*   ALT 51* 47*   ANIONGAP 8 9       Diagnostic Results:  I have reviewed all pertinent imaging results/findings within the past 24 hours.    Assessment/Plan:     * Sickle-cell disease with vaso-occlusive pain  Mr. Davis is a 28 year old male with HbSS disease, followed by Dr. Hi, on hydrea, folic acid, and voxelotor at home. He was admitted with sickle cell pain crisis and hematology was consulted for possible acute chest syndrome.     He has had fever, hypoxia, and has a small infiltrate vs infarction in the left lower lobe seen on CT Chest. He also has a pulmonary embolus in the right pulmonary artery.     He is currently clinically stable, breathing easily and saturating 97% on 2L NC. His hemoglobin dropped from his baseline of 9 to 7.9 this morning. He is not iron overloaded based on most recent iron studies.     - recommended simple transfusion of one unit pRBCs.   - he does not currently require exchange transfusion, though this should be considered if he has any decline in his clinical status  - please page or call the hematology fellow on call with any decompensation or significant increase in oxygen requirements  - please check CBC, CMP, reticulocyte count daily  - IV hydration to maintain euvolemia  - pain control with opioids as needed  - recommend incentive spirometry        Thank you for your consult. I will follow-up with patient. Please contact us if you have any additional questions.    Maricarmen Del Cid, DO  Hematology/Oncology  [facility] ([facility]

## 2023-10-19 NOTE — NURSING
End of shift note    AAOx4  RA  PRN dilaudid x3  1 unit RBC transfused  Temp this evening 101.3- MD aware. Tylenol x1 given  VSS  NADN- safety checks performed  Call light in reach

## 2023-10-19 NOTE — CARE UPDATE
RAPID RESPONSE VASCULAR ACCESS NOTE       Single lumen 20G, 10CM midline placed in the right basilic vein. Needle advanced into the vessel under real time ultrasound guidance. Image recorded and saved.    Max dwell date: 11/17/23   Lot number: [id number]

## 2023-10-19 NOTE — SUBJECTIVE & OBJECTIVE
Oncology Treatment Plan:   [No matching plan found]    Medications:  Continuous Infusions:   dextrose 5 % and 0.45 % NaCl 100 mL/hr at 10/19/23 0454     Scheduled Meds:   ceFEPime (MAXIPIME) IVPB  2 g Intravenous Q8H    enoxparin  1 mg/kg Subcutaneous Q12H (treatment, non-standard time)    FLUoxetine  10 mg Oral Daily    folic acid  1 mg Oral Daily    hydroxyurea  500 mg Oral Daily    polyethylene glycol  17 g Oral Daily    senna-docusate 8.6-50 mg  1 tablet Oral BID    vancomycin (VANCOCIN) IV (PEDS and ADULTS)  1,500 mg Intravenous Q12H     PRN Meds:0.9%  NaCl infusion (for blood administration), acetaminophen, bisacodyL, diphenhydrAMINE, HYDROmorphone (PF), iohexol, melatonin, ondansetron, oxyCODONE-acetaminophen, sodium chloride 0.9%, sodium chloride 0.9%, Pharmacy to dose Vancomycin consult **AND** vancomycin - pharmacy to dose     Review of patient's allergies indicates:  No Known Allergies     Past Medical History:   Diagnosis Date    Sickle cell anemia     Sickle cell disease      Past Surgical History:   Procedure Laterality Date    chemoport      removed    CHOLECYSTECTOMY  2002    UMBILICAL HERNIA REPAIR  1995     Family History    None       Tobacco Use    Smoking status: Never    Smokeless tobacco: Never   Substance and Sexual Activity    Alcohol use: Yes     Comment: Social drinker on weekends    Drug use: No    Sexual activity: Not Currently       Review of Systems   Constitutional:  Positive for fever. Negative for chills, fatigue and unexpected weight change.   HENT:  Negative for rhinorrhea and sore throat.    Eyes:  Negative for pain and redness.   Respiratory:  Negative for cough and shortness of breath.    Cardiovascular:  Negative for chest pain and palpitations.   Gastrointestinal:  Negative for abdominal pain, constipation, diarrhea, nausea and vomiting.   Endocrine: Negative for polydipsia and polyuria.   Genitourinary:  Negative for dysuria and hematuria.   Musculoskeletal:  Negative for  back pain and neck pain.   Skin:  Negative for color change and rash.   Neurological:  Negative for light-headedness and headaches.   Hematological:  Negative for adenopathy. Does not bruise/bleed easily.   Psychiatric/Behavioral:  Negative for confusion. The patient is not nervous/anxious.      Objective:     Vital Signs (Most Recent):  Temp: 99 °F (37.2 °C) (10/19/23 1350)  Pulse: 90 (10/19/23 1350)  Resp: 18 (10/19/23 1423)  BP: 139/78 (10/19/23 1350)  SpO2: 99 % (10/19/23 1350) Vital Signs (24h Range):  Temp:  [98.4 °F (36.9 °C)-101.8 °F (38.8 °C)] 99 °F (37.2 °C)  Pulse:  [] 90  Resp:  [18-22] 18  SpO2:  [78 %-99 %] 99 %  BP: (125-156)/(76-86) 139/78     Weight: 83.9 kg (185 lb)  Body mass index is 24.41 kg/m².  Body surface area is 2.08 meters squared.      Intake/Output Summary (Last 24 hours) at 10/19/2023 1458  Last data filed at 10/19/2023 1247  Gross per 24 hour   Intake --   Output 3050 ml   Net -3050 ml        Physical Exam  Constitutional:       General: He is not in acute distress.     Appearance: He is well-developed. He is not diaphoretic.   HENT:      Head: Normocephalic and atraumatic.   Eyes:      General: No scleral icterus.     Conjunctiva/sclera: Conjunctivae normal.   Cardiovascular:      Rate and Rhythm: Regular rhythm. Tachycardia present.      Heart sounds: Normal heart sounds. No murmur heard.     No friction rub. No gallop.   Pulmonary:      Effort: Pulmonary effort is normal. No respiratory distress.      Breath sounds: No wheezing or rales.      Comments: Diminished breath sounds left base  Abdominal:      General: Bowel sounds are normal. There is no distension.      Palpations: Abdomen is soft.      Tenderness: There is no abdominal tenderness. There is no guarding.   Musculoskeletal:         General: Normal range of motion.      Cervical back: Normal range of motion and neck supple.   Lymphadenopathy:      Cervical: No cervical adenopathy.   Skin:     General: Skin is warm and  dry.      Findings: No rash.   Neurological:      Mental Status: He is alert and oriented to person, place, and time.   Psychiatric:         Behavior: Behavior normal.          Significant Labs:   CBC:   Recent Labs   Lab 10/18/23  0251 10/19/23  0458   WBC 16.62* 14.94*   HGB 9.4* 7.9*   HCT 26.3* 21.8*    151    and CMP:   Recent Labs   Lab 10/18/23  0251 10/19/23  0458   * 131*   K 4.6 3.1*    97   CO2 21* 25   * 127*   BUN 7 7   CREATININE 0.6 0.6   CALCIUM 9.2 8.5*   PROT 8.1 6.9   ALBUMIN 3.2* 2.6*   BILITOT 10.3* 16.0*   ALKPHOS 355* 325*   * 65*   ALT 51* 47*   ANIONGAP 8 9       Diagnostic Results:  I have reviewed all pertinent imaging results/findings within the past 24 hours.

## 2023-10-19 NOTE — HPI
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 Hematology was consulted for possible acute chest syndrome.     Mr. Davis follows with Dr. Hi and has generally well-controlled sickle cell disease. His last transfusion was one year ago. He has been admitted twice previously this year for sickle cell pain crises. He has never required an exchange transfusion. He presented to the hospital on 10/15 with lower back and bilateral upper leg pain consistent with previous sickle cell pain crises. He cannot identify any instigating factors. He has been improving, but yesterday a CT Chest was ordered for tachycardia and fever. He was found to have a right pulmonary artery pulmonary thromboembolism. Additionally, he had a consolidation vs infarction noted in the left lower lobe. He denies any cough, shortness of breath, or chest pain. He is requiring 2L of oxygen via nasal cannula.

## 2023-10-19 NOTE — HPI
Parrish Davis has a past medical history of Sickle cell anemia and Sickle cell disease.     Hospital Course:  Patient admitted for sickle cell pain crisis. CT imaging consistent with PE and infiltrate in LLL concerning for pulmonary infarct vs infectious. Patient requiring 2L NC to maintain oxygen saturations of 96%. He was febrile in the last 24 hours. Infectious work up remains negative. He is on cefepime and vancomycin. T bili has doubled from his baseline since admission.      On morning exam, patient resting comfortably on 2L NC. He describes his pain mostly in right hip, upper thigh and knee. Also reports trouble walking since admission. Denies chest or back pain. States he feels SOB when moving in bed or walking to the bathroom. His coworkers have been sick recently but he denies cough or fevers prior to admission. Denies history of ACS.      Of note, patient was hospitalized at [facility] 8/24-8/27 for pain crisis as well. He had a CT chest at that time with pulmonary findings. Hematology consulted and felt less likely for ACS at that time. He did not receive blood transfusion during that admission. He follows with Dr. Hi in hematology at [facility] as an outpatient.

## 2023-10-19 NOTE — PROGRESS NOTES
"[facility] ([address] Medicine  Progress Note    Patient Name: Parrish Davis  MRN: [id number]  Patient Class: IP- Inpatient   Admission Date: 10/15/2023  Length of Stay: 4 days  Attending Physician: Getachew Krishna DO  Primary Care Provider: Jovanny Douglas MD        Subjective:     Principal Problem:Sickle-cell disease with vaso-occlusive pain        HPI:  28 y.o. man with sickle cell anemia HbSS who presents to the ED complaining of  uncontrolled body aches/pain x1 day He reports the pain started late last night/early this morning with onset of severe lower back pain. It has progressed to involve his entire body per patient, stating that he hurts "all over". No reported cough, SOB, fevers, or chills. Pain poorly controlled with home PO medications, so he came to the ED.      Overview/Hospital Course:  10/16- wbc - 13->18, retic 10.4, Hb 9.4. bili 3.6. ast/alt- slight elevation. Cxr reviewed by me- No infiltrate, Interstitial markings look chronic.  Pain located in Back and knees. No abd pain. Reorts an episode of chest pain earlier now resolved. No SOB. He looks uncomfortable. Nurse to administer IV dialudid now. Continue IVFs.  His mother at bedside reports he had about 30 admission in [facility], approx 3 /yr, and 3 so far this year. /81  Pulse 71   Temp 97.3 °F (36.3 °C) (Oral)   Resp 18    SpO2 100%   2 liters oxygen.    Old CT chest from last admission 8.24.23, - Multifocal nodular opacities with adjacent ground-glass attenuation distributed throughout the bilateral lower lobes, differential considerations include infectious/inflammatory causes, aspiration, or pulmonary infarction in the setting of sickle cell disease.    10/17- /88 Pulse 100  SpO2 93%  . AF. Wbc 17. On empiric zosyn. Pt not room  when I came for a second visit 5 pm yesterday. He said he must have been in BR.  Cultures neg. Pain scale 10->8. Dilaudid PCA started on day 1 was stopped. 24 h " "MEDD 306.33 of 384.. On oxycodone 10 (MEDD 60), Dilaudid 1mg, 2 mg (MEDD 180) _ dilsudid pump. On IVFs.  Functional status per nurse:  Ambulating to BR, talking, eating, sleeping, has ADls. He reports not eating.  Will wean dilaudid to 1.5 q 3, today. Pain located in low back today. Looks comfortable after receiving dilaudid. Mother present in room. I gave them a lecture on how opioids work, their benefits and pitfalls, and how/why weaning or stopping the medication can cause rebound pain.  Recommended slow wean in supervised setting in hospital, while monitoring for complications of SSA. They expressed understanding  4:35 pm - started oxygen 2 liters, for sat 85%, now 92%, -110. Cxr ordered      10/18- Hb 9.4.  cxr 10/17- The lungs are symmetrically expanded bilaterally with coarse central hilar interstitial attenuation suggesting edema.  There is slightly increased parenchymal attenuation projected over the left lower lung zone, may reflect atelectasis however developing consolidation not excluded..  No large focal consolidation seen. There is no pneumothorax.  The osseous structures are unchanged.  On IVFs.   94% 1 iter NC.   /87    Pulse 138   Temp 99.7 °F   Resp 20 . ECHO 3/2023 - EF 60%.  Bl cult 10/16- NGSF. Ast/alt rising. Wbc 18-> 16.  Will add lactic acid, procal, repeat blood culture. CT chest/ abd/ pelvis.  Consulting Crit Care to follow. On Zosyn. Changing to cefepime. Adding vanc.   Nurse reports 7/10 pain this AM. still in lower back, not radiating anywhere. + rigors.  Low grade fever.   3:30 pm: CT + for Pulmonary Embolus, and forming infiltrate. Started FULL dose lovenox.  On cefepime and vanc.    Suspecting acute chest syndrome, hematology consulted this morning.  Patient to be given blood transfusion, simple.  Per hematology: "If symptoms ( pain, hypoxemia) worsen he might benefit from partial/ complete exchange transfusion."Continue to monitor patient " closely.              Interval History:  Patient denies any chest pain or abdominal pain.  He continues to report mild arthralgia at this point.  He denies feeling shortness of breath.        Objective:     Vital Signs (Most Recent):  Temp: (!) 101.7 °F (38.7 °C) (10/19/23 1534)  Pulse: (!) 111 (10/19/23 1547)  Resp: 18 (10/19/23 1547)  BP: 138/79 (10/19/23 1534)  SpO2: 97 % (10/19/23 1547) Vital Signs (24h Range):  Temp:  [98.4 °F (36.9 °C)-101.7 °F (38.7 °C)] 101.7 °F (38.7 °C)  Pulse:  [] 111  Resp:  [18-20] 18  SpO2:  [78 %-99 %] 97 %  BP: (125-142)/(76-86) 138/79     Weight: 83.9 kg (185 lb)  Body mass index is 24.41 kg/m².    Intake/Output Summary (Last 24 hours) at 10/19/2023 1712  Last data filed at 10/19/2023 1545  Gross per 24 hour   Intake 402.08 ml   Output 3050 ml   Net -2647.92 ml         Physical Exam  Constitutional:       General: He is not in acute distress.     Appearance: Normal appearance. He is not ill-appearing, toxic-appearing or diaphoretic.   HENT:      Head: Normocephalic and atraumatic.      Nose: Nose normal.      Mouth/Throat:      Mouth: Mucous membranes are moist.      Pharynx: Oropharynx is clear.   Eyes:      General: Scleral icterus present.      Extraocular Movements: Extraocular movements intact.      Conjunctiva/sclera: Conjunctivae normal.      Pupils: Pupils are equal, round, and reactive to light.   Cardiovascular:      Rate and Rhythm: Regular rhythm. Tachycardia present.      Pulses: Normal pulses.   Pulmonary:      Effort: Pulmonary effort is normal. No respiratory distress.      Breath sounds: Normal breath sounds. No stridor. No wheezing, rhonchi or rales.   Chest:      Chest wall: No tenderness.   Abdominal:      General: Abdomen is flat. Bowel sounds are normal. There is no distension.      Palpations: Abdomen is soft.      Tenderness: There is no abdominal tenderness. There is no right CVA tenderness, left CVA tenderness, guarding or rebound.   Musculoskeletal:          General: No swelling. Normal range of motion.      Cervical back: Normal range of motion and neck supple. No rigidity or tenderness.      Right lower leg: No edema.      Left lower leg: No edema.   Skin:     General: Skin is warm and dry.      Coloration: Skin is not jaundiced.      Findings: No bruising, erythema or rash.   Neurological:      General: No focal deficit present.      Mental Status: He is alert and oriented to person, place, and time. Mental status is at baseline.      Motor: No weakness.      Gait: Gait normal.   Psychiatric:         Mood and Affect: Mood normal.         Behavior: Behavior normal.         Thought Content: Thought content normal.         Judgment: Judgment normal.             Significant Labs: All pertinent labs within the past 24 hours have been reviewed.    Significant Imaging: I have reviewed all pertinent imaging results/findings within the past 24 hours.      Assessment/Plan:      * Sickle-cell disease with vaso-occlusive pain  -Pt. With severe joint pains typical of vaso-occlusive crises. No reported chest pain, no hypoxia, and CXR without infiltrate (improved from prior) not consistent with acute chest.   - ( Old CTchest 8/24/23, had bilat lower patchy interstitial edema.)   -Plan to treat with IV fluids, PCA pump ordered for more continuous pain med dosing as pt. Complained of severe persistent pain after interval dosing in ED. Continue home meds folic acid and hydroxyurea (voxeletor not on formulary)    10/16- on IVFs, dilaudid 2 q 3 prn, and oxy 10 q 4 prn. PCA discontinued. cxr and urine neg for infection. CT noted interstitial edema.     10/17 on IVFs, dilaudid 1.5 q 3. Oxycodone. Empiric zosyn, Pain in low back. No chest pain, SOB, not requiring oxygen    10/18- Repeating lactic acid, procal, repeat blood culture. CT chest/ abd/ pelvis.  Consulting Crit Care to follow. On Zosyn. Adding vanc, No change in pain meds today.     10/19:  Concern for ACS, hematology  consulted this morning.  Recommending simple transfusion at this point.     Acute pulmonary embolism  Lovenox full dose started  Per CT chest/a/p.  Tachycardia 110-120, no chest pain, minimal hypoxia  US LE bilat  Will need to convert to eliquis        Elevated transaminase level  Monitor consider hepatology consult      Severe sepsis  Monitor. Wbc 13-> 17 -> 16  Blood culture  Cxr, UA neg  No concern on abd exam  Likely reactive  Empiric zosyn.     1018- adding vanc, CT ch/ abd/ pelvis. cxr may have developing infiltrate, on IVFs. . Meets requirements for Severe SEPSIS with high wbc, tachycardia, hypoxia, RR 20, rising LFTs.  Consuted Crit care to follow.       Hyperbilirubinemia  -Chronic, suspect due to combination of hemolysis causing unconjugated hyperbilirubinemia, and intrahepatic cholestasis from SC disease  -Continue to monitor while admitted, pt. Currently follows with hepatology as outpatient        VTE Risk Mitigation (From admission, onward)         Ordered     enoxaparin injection 80 mg  Every 12 hours         10/18/23 1530     IP VTE LOW RISK PATIENT  Once         10/15/23 2012     IP VTE HIGH RISK PATIENT  Once         10/15/23 2012                Discharge Planning   [place]: 10/23/2023     Code Status: Full Code   Is the patient medically ready for discharge?: No    Reason for patient still in hospital (select all that apply): Patient trending condition and Treatment  Discharge Plan A: Home   Discharge Delays: None known at this time              Getachew Krishna DO  Department of Hospital Medicine   [facility] - [facility] ([place])

## 2023-10-19 NOTE — ASSESSMENT & PLAN NOTE
-Pt. With severe joint pains typical of vaso-occlusive crises. No reported chest pain, no hypoxia, and CXR without infiltrate (improved from prior) not consistent with acute chest.   - ( Old CTchest 8/24/23, had bilat lower patchy interstitial edema.)   -Plan to treat with IV fluids, PCA pump ordered for more continuous pain med dosing as pt. Complained of severe persistent pain after interval dosing in ED. Continue home meds folic acid and hydroxyurea (voxeletor not on formulary)    10/16- on IVFs, dilaudid 2 q 3 prn, and oxy 10 q 4 prn. PCA discontinued. cxr and urine neg for infection. CT noted interstitial edema.     10/17 on IVFs, dilaudid 1.5 q 3. Oxycodone. Empiric zosyn, Pain in low back. No chest pain, SOB, not requiring oxygen    10/18- Repeating lactic acid, procal, repeat blood culture. CT chest/ abd/ pelvis.  Consulting Crit Care to follow. On Zosyn. Adding vanc, No change in pain meds today.     10/19:  Concern for ACS, hematology consulted this morning.  Recommending simple transfusion at this point.

## 2023-10-19 NOTE — ASSESSMENT & PLAN NOTE
Mr. Davis is a 28 year old male with HbSS disease, followed by Dr. Hi, on hydrea, folic acid, and voxelotor at home. He was admitted with sickle cell pain crisis and hematology was consulted for possible acute chest syndrome.     He has had fever, hypoxia, and has a small infiltrate vs infarction in the left lower lobe seen on CT Chest. He also has a pulmonary embolus in the right pulmonary artery.     He is currently clinically stable, breathing easily and saturating 97% on 2L NC. His hemoglobin dropped from his baseline of 9 to 7.9 this morning. He is not iron overloaded based on most recent iron studies.     - recommended simple transfusion of one unit pRBCs.   - he does not currently require exchange transfusion, though this should be considered if he has any decline in his clinical status  - please page or call the hematology fellow on call with any decompensation or significant increase in oxygen requirements  - please check CBC, CMP, reticulocyte count daily  - IV hydration to maintain euvolemia  - pain control with opioids as needed  - recommend incentive spirometry

## 2023-10-20 LAB
ALBUMIN SERPL BCP-MCNC: 2.5 G/DL (ref 3.5–5.2)
ALP SERPL-CCNC: 327 U/L (ref 55–135)
ALT SERPL W/O P-5'-P-CCNC: 49 U/L (ref 10–44)
ANION GAP SERPL CALC-SCNC: 8 MMOL/L (ref 8–16)
AST SERPL-CCNC: 81 U/L (ref 10–40)
BASOPHILS # BLD AUTO: 0.07 K/UL (ref 0–0.2)
BASOPHILS NFR BLD: 0.6 % (ref 0–1.9)
BILIRUB SERPL-MCNC: 14.8 MG/DL (ref 0.1–1)
BUN SERPL-MCNC: 7 MG/DL (ref 6–20)
CALCIUM SERPL-MCNC: 8.6 MG/DL (ref 8.7–10.5)
CHLORIDE SERPL-SCNC: 99 MMOL/L (ref 95–110)
CO2 SERPL-SCNC: 26 MMOL/L (ref 23–29)
CREAT SERPL-MCNC: 0.5 MG/DL (ref 0.5–1.4)
DIFFERENTIAL METHOD: ABNORMAL
EOSINOPHIL # BLD AUTO: 0.5 K/UL (ref 0–0.5)
EOSINOPHIL NFR BLD: 3.7 % (ref 0–8)
ERYTHROCYTE [DISTWIDTH] IN BLOOD BY AUTOMATED COUNT: 21.7 % (ref 11.5–14.5)
EST. GFR  (NO RACE VARIABLE): >60 ML/MIN/1.73 M^2
GLUCOSE SERPL-MCNC: 110 MG/DL (ref 70–110)
HCT VFR BLD AUTO: 24.7 % (ref 40–54)
HGB BLD-MCNC: 8.6 G/DL (ref 14–18)
HGB FRACT BLD ELPH-IMP: NORMAL
HGB S MFR BLD ELPH: 94 %
IMM GRANULOCYTES # BLD AUTO: 0.28 K/UL (ref 0–0.04)
IMM GRANULOCYTES NFR BLD AUTO: 2.3 % (ref 0–0.5)
LYMPHOCYTES # BLD AUTO: 1.4 K/UL (ref 1–4.8)
LYMPHOCYTES NFR BLD: 11.6 % (ref 18–48)
MCH RBC QN AUTO: 28.5 PG (ref 27–31)
MCHC RBC AUTO-ENTMCNC: 34.8 G/DL (ref 32–36)
MCV RBC AUTO: 82 FL (ref 82–98)
MONOCYTES # BLD AUTO: 1 K/UL (ref 0.3–1)
MONOCYTES NFR BLD: 8.2 % (ref 4–15)
NEUTROPHILS # BLD AUTO: 9 K/UL (ref 1.8–7.7)
NEUTROPHILS NFR BLD: 73.6 % (ref 38–73)
NRBC BLD-RTO: 2 /100 WBC
PHOSPHATE SERPL-MCNC: 2.7 MG/DL (ref 2.7–4.5)
PLATELET # BLD AUTO: 165 K/UL (ref 150–450)
PMV BLD AUTO: 10.7 FL (ref 9.2–12.9)
POTASSIUM SERPL-SCNC: 4.2 MMOL/L (ref 3.5–5.1)
PROT SERPL-MCNC: 7.1 G/DL (ref 6–8.4)
RBC # BLD AUTO: 3.02 M/UL (ref 4.6–6.2)
RETICS/RBC NFR AUTO: 15.2 % (ref 0.4–2)
SODIUM SERPL-SCNC: 133 MMOL/L (ref 136–145)
VANCOMYCIN TROUGH SERPL-MCNC: 2.7 UG/ML (ref 10–22)
WBC # BLD AUTO: 12.29 K/UL (ref 3.9–12.7)

## 2023-10-20 PROCEDURE — 63600175 PHARM REV CODE 636 W HCPCS: Performed by: STUDENT IN AN ORGANIZED HEALTH CARE EDUCATION/TRAINING PROGRAM

## 2023-10-20 PROCEDURE — 21400001 HC TELEMETRY ROOM

## 2023-10-20 PROCEDURE — 80053 COMPREHEN METABOLIC PANEL: CPT | Performed by: HOSPITALIST

## 2023-10-20 PROCEDURE — 36415 COLL VENOUS BLD VENIPUNCTURE: CPT | Performed by: HOSPITALIST

## 2023-10-20 PROCEDURE — 80202 ASSAY OF VANCOMYCIN: CPT | Performed by: STUDENT IN AN ORGANIZED HEALTH CARE EDUCATION/TRAINING PROGRAM

## 2023-10-20 PROCEDURE — 63600175 PHARM REV CODE 636 W HCPCS: Performed by: HOSPITALIST

## 2023-10-20 PROCEDURE — 12000002 HC ACUTE/MED SURGE SEMI-PRIVATE ROOM

## 2023-10-20 PROCEDURE — 25000003 PHARM REV CODE 250: Performed by: STUDENT IN AN ORGANIZED HEALTH CARE EDUCATION/TRAINING PROGRAM

## 2023-10-20 PROCEDURE — 85045 AUTOMATED RETICULOCYTE COUNT: CPT | Performed by: STUDENT IN AN ORGANIZED HEALTH CARE EDUCATION/TRAINING PROGRAM

## 2023-10-20 PROCEDURE — 27000221 HC OXYGEN, UP TO 24 HOURS

## 2023-10-20 PROCEDURE — 25000003 PHARM REV CODE 250: Performed by: EMERGENCY MEDICINE

## 2023-10-20 PROCEDURE — 25000003 PHARM REV CODE 250: Performed by: HOSPITALIST

## 2023-10-20 PROCEDURE — 84100 ASSAY OF PHOSPHORUS: CPT | Performed by: HOSPITALIST

## 2023-10-20 PROCEDURE — 85025 COMPLETE CBC W/AUTO DIFF WBC: CPT | Performed by: HOSPITALIST

## 2023-10-20 PROCEDURE — 36415 COLL VENOUS BLD VENIPUNCTURE: CPT | Performed by: STUDENT IN AN ORGANIZED HEALTH CARE EDUCATION/TRAINING PROGRAM

## 2023-10-20 PROCEDURE — 94761 N-INVAS EAR/PLS OXIMETRY MLT: CPT

## 2023-10-20 RX ADMIN — CEFEPIME 2 G: 2 INJECTION, POWDER, FOR SOLUTION INTRAVENOUS at 04:10

## 2023-10-20 RX ADMIN — HYDROMORPHONE HYDROCHLORIDE 1 MG: 2 INJECTION, SOLUTION INTRAMUSCULAR; INTRAVENOUS; SUBCUTANEOUS at 04:10

## 2023-10-20 RX ADMIN — HYDROMORPHONE HYDROCHLORIDE 1 MG: 2 INJECTION, SOLUTION INTRAMUSCULAR; INTRAVENOUS; SUBCUTANEOUS at 05:10

## 2023-10-20 RX ADMIN — VANCOMYCIN HYDROCHLORIDE 1500 MG: 1.5 INJECTION, POWDER, LYOPHILIZED, FOR SOLUTION INTRAVENOUS at 12:10

## 2023-10-20 RX ADMIN — SENNOSIDES AND DOCUSATE SODIUM 1 TABLET: 50; 8.6 TABLET ORAL at 08:10

## 2023-10-20 RX ADMIN — OXYCODONE AND ACETAMINOPHEN 1 TABLET: 10; 325 TABLET ORAL at 03:10

## 2023-10-20 RX ADMIN — VANCOMYCIN HYDROCHLORIDE 1500 MG: 1.5 INJECTION, POWDER, LYOPHILIZED, FOR SOLUTION INTRAVENOUS at 11:10

## 2023-10-20 RX ADMIN — ENOXAPARIN SODIUM 80 MG: 80 INJECTION SUBCUTANEOUS at 05:10

## 2023-10-20 RX ADMIN — OXYCODONE AND ACETAMINOPHEN 1 TABLET: 10; 325 TABLET ORAL at 08:10

## 2023-10-20 RX ADMIN — FOLIC ACID 1 MG: 1 TABLET ORAL at 08:10

## 2023-10-20 RX ADMIN — HYDROMORPHONE HYDROCHLORIDE 1 MG: 2 INJECTION, SOLUTION INTRAMUSCULAR; INTRAVENOUS; SUBCUTANEOUS at 08:10

## 2023-10-20 RX ADMIN — POLYETHYLENE GLYCOL 3350 17 G: 17 POWDER, FOR SOLUTION ORAL at 08:10

## 2023-10-20 RX ADMIN — CEFEPIME 2 G: 2 INJECTION, POWDER, FOR SOLUTION INTRAVENOUS at 08:10

## 2023-10-20 RX ADMIN — OXYCODONE AND ACETAMINOPHEN 1 TABLET: 10; 325 TABLET ORAL at 11:10

## 2023-10-20 RX ADMIN — HYDROMORPHONE HYDROCHLORIDE 1 MG: 2 INJECTION, SOLUTION INTRAMUSCULAR; INTRAVENOUS; SUBCUTANEOUS at 12:10

## 2023-10-20 RX ADMIN — ENOXAPARIN SODIUM 80 MG: 80 INJECTION SUBCUTANEOUS at 04:10

## 2023-10-20 RX ADMIN — FLUOXETINE 10 MG: 10 CAPSULE ORAL at 08:10

## 2023-10-20 RX ADMIN — HYDROMORPHONE HYDROCHLORIDE 1 MG: 2 INJECTION, SOLUTION INTRAMUSCULAR; INTRAVENOUS; SUBCUTANEOUS at 01:10

## 2023-10-20 RX ADMIN — CEFEPIME 2 G: 2 INJECTION, POWDER, FOR SOLUTION INTRAVENOUS at 01:10

## 2023-10-20 RX ADMIN — HYDROXYUREA 500 MG: 500 CAPSULE ORAL at 08:10

## 2023-10-20 RX ADMIN — VANCOMYCIN HYDROCHLORIDE 1500 MG: 1.5 INJECTION, POWDER, LYOPHILIZED, FOR SOLUTION INTRAVENOUS at 08:10

## 2023-10-20 NOTE — PLAN OF CARE
AAOX4. Hypertensive. Maintains SpO2 on 2L NC. Pt. continues to have 7-8/10 pain unrelieved with pain medication. PRN IV and PO pain medication given. IV ABX administered. No adverse events noted during this shift.    Problem: Adult Inpatient Plan of Care  Goal: Plan of Care Review  Outcome: Ongoing, Progressing  Flowsheets (Taken 10/20/2023 0441)  Plan of Care Reviewed With: patient  Goal: Absence of Hospital-Acquired Illness or Injury  Outcome: Ongoing, Progressing  Intervention: Prevent Skin Injury  Flowsheets (Taken 10/20/2023 0441)  Body Position: position changed independently  Skin Protection:   adhesive use limited   incontinence pads utilized   transparent dressing maintained   tubing/devices free from skin contact  Goal: Optimal Comfort and Wellbeing  Outcome: Ongoing, Progressing  Intervention: Monitor Pain and Promote Comfort  Flowsheets (Taken 10/20/2023 0441)  Pain Management Interventions:   medication offered   care clustered   pillow support provided   quiet environment facilitated   relaxation techniques promoted     Problem: Infection (Pneumonia)  Goal: Resolution of Infection Signs and Symptoms  Outcome: Ongoing, Progressing  Intervention: Prevent Infection Progression  Flowsheets (Taken 10/20/2023 0441)  Fever Reduction/Comfort Measures: lightweight bedding     Problem: Fall Injury Risk  Goal: Absence of Fall and Fall-Related Injury  Outcome: Ongoing, Progressing  Intervention: Promote Injury-Free Environment  Flowsheets (Taken 10/20/2023 0441)  Safety Promotion/Fall Prevention:   assistive device/personal item within reach   commode/urinal/bedpan at bedside   medications reviewed   nonskid shoes/socks when out of bed   pulse ox   side rails raised x 2   room near unit station     Problem: Pain Acute  Goal: Acceptable Pain Control and Functional Ability  Outcome: Ongoing, Progressing  Intervention: Optimize Psychosocial Wellbeing  Flowsheets (Taken 10/20/2023 0441)  Supportive Measures:   active  listening utilized   self-care encouraged   verbalization of feelings encouraged  Diversional Activities: television

## 2023-10-20 NOTE — PROGRESS NOTES
"[facility] ([address] Medicine  Progress Note    Patient Name: Parrish Davis  MRN: [id number]  Patient Class: IP- Inpatient   Admission Date: 10/15/2023  Length of Stay: 5 days  Attending Physician: Getachew Krishna DO  Primary Care Provider: Jovanny Douglas MD        Subjective:     Principal Problem:Sickle-cell disease with vaso-occlusive pain        HPI:  28 y.o. man with sickle cell anemia HbSS who presents to the ED complaining of  uncontrolled body aches/pain x1 day He reports the pain started late last night/early this morning with onset of severe lower back pain. It has progressed to involve his entire body per patient, stating that he hurts "all over". No reported cough, SOB, fevers, or chills. Pain poorly controlled with home PO medications, so he came to the ED.      Overview/Hospital Course:  10/16- wbc - 13->18, retic 10.4, Hb 9.4. bili 3.6. ast/alt- slight elevation. Cxr reviewed by me- No infiltrate, Interstitial markings look chronic.  Pain located in Back and knees. No abd pain. Reorts an episode of chest pain earlier now resolved. No SOB. He looks uncomfortable. Nurse to administer IV dialudid now. Continue IVFs.  His mother at bedside reports he had about 30 admission in [facility], approx 3 /yr, and 3 so far this year. /81  Pulse 71   Temp 97.3 °F (36.3 °C) (Oral)   Resp 18    SpO2 100%   2 liters oxygen.    Old CT chest from last admission 8.24.23, - Multifocal nodular opacities with adjacent ground-glass attenuation distributed throughout the bilateral lower lobes, differential considerations include infectious/inflammatory causes, aspiration, or pulmonary infarction in the setting of sickle cell disease.    10/17- /88 Pulse 100  SpO2 93%  . AF. Wbc 17. On empiric zosyn. Pt not room  when I came for a second visit 5 pm yesterday. He said he must have been in BR.  Cultures neg. Pain scale 10->8. Dilaudid PCA started on day 1 was stopped. 24 h " "MEDD 306.33 of 384.. On oxycodone 10 (MEDD 60), Dilaudid 1mg, 2 mg (MEDD 180) _ dilsudid pump. On IVFs.  Functional status per nurse:  Ambulating to BR, talking, eating, sleeping, has ADls. He reports not eating.  Will wean dilaudid to 1.5 q 3, today. Pain located in low back today. Looks comfortable after receiving dilaudid. Mother present in room. I gave them a lecture on how opioids work, their benefits and pitfalls, and how/why weaning or stopping the medication can cause rebound pain.  Recommended slow wean in supervised setting in hospital, while monitoring for complications of SSA. They expressed understanding  4:35 pm - started oxygen 2 liters, for sat 85%, now 92%, -110. Cxr ordered      10/18- Hb 9.4.  cxr 10/17- The lungs are symmetrically expanded bilaterally with coarse central hilar interstitial attenuation suggesting edema.  There is slightly increased parenchymal attenuation projected over the left lower lung zone, may reflect atelectasis however developing consolidation not excluded..  No large focal consolidation seen. There is no pneumothorax.  The osseous structures are unchanged.  On IVFs.   94% 1 iter NC.   /87    Pulse 138   Temp 99.7 °F   Resp 20 . ECHO 3/2023 - EF 60%.  Bl cult 10/16- NGSF. Ast/alt rising. Wbc 18-> 16.  Will add lactic acid, procal, repeat blood culture. CT chest/ abd/ pelvis.  Consulting Crit Care to follow. On Zosyn. Changing to cefepime. Adding vanc.   Nurse reports 7/10 pain this AM. still in lower back, not radiating anywhere. + rigors.  Low grade fever.   3:30 pm: CT + for Pulmonary Embolus, and forming infiltrate. Started FULL dose lovenox.  On cefepime and vanc.    Suspecting acute chest syndrome, hematology consulted.  Patient to be given blood transfusion, simple.  Per hematology: "If symptoms ( pain, hypoxemia) worsen he might benefit from partial/ complete exchange transfusion."Continue to monitor patient closely.      Patient is stable " today. He reports mild abdominal pain, reports no BM since Sunday. Denies any aggravating or alleviating factors to his abdominal pain. Discussed using laxativbes. He is currently taking Miralax. Appetite has been improving. Denies any chest pain, shortness of breath. Continues with be stable on nasal canula. Hematology would like to trend reticulocytes daily and incentive spirometry.       Interval History: see above      Objective:     Vital Signs (Most Recent):  Temp: 98.6 °F (37 °C) (10/20/23 1534)  Pulse: 91 (10/20/23 1534)  Resp: 18 (10/20/23 1534)  BP: (!) 156/85 (10/20/23 1534)  SpO2: 96 % (10/20/23 1534) Vital Signs (24h Range):  Temp:  [98.6 °F (37 °C)-101.2 °F (38.4 °C)] 98.6 °F (37 °C)  Pulse:  [] 91  Resp:  [16-20] 18  SpO2:  [94 %-100 %] 96 %  BP: (141-156)/(84-90) 156/85     Weight: 83.9 kg (185 lb)  Body mass index is 24.41 kg/m².    Intake/Output Summary (Last 24 hours) at 10/20/2023 1606  Last data filed at 10/20/2023 1320  Gross per 24 hour   Intake 360 ml   Output 2700 ml   Net -2340 ml         Physical Exam  Constitutional:       General: He is not in acute distress.     Appearance: Normal appearance. He is not ill-appearing, toxic-appearing or diaphoretic.   HENT:      Head: Normocephalic and atraumatic.      Nose: Nose normal.      Mouth/Throat:      Mouth: Mucous membranes are moist.      Pharynx: Oropharynx is clear.   Eyes:      General: Scleral icterus (improved since yesterday) present.      Extraocular Movements: Extraocular movements intact.      Conjunctiva/sclera: Conjunctivae normal.      Pupils: Pupils are equal, round, and reactive to light.   Cardiovascular:      Rate and Rhythm: Regular rhythm. Tachycardia present.      Pulses: Normal pulses.   Pulmonary:      Effort: Pulmonary effort is normal. No respiratory distress.      Breath sounds: Normal breath sounds. No stridor. No wheezing, rhonchi or rales.   Chest:      Chest wall: No tenderness.   Abdominal:      General:  Abdomen is flat. Bowel sounds are normal. There is no distension.      Palpations: Abdomen is soft.      Tenderness: There is no abdominal tenderness. There is no right CVA tenderness, left CVA tenderness, guarding or rebound.   Musculoskeletal:         General: No swelling. Normal range of motion.      Cervical back: Normal range of motion and neck supple. No rigidity or tenderness.      Right lower leg: No edema.      Left lower leg: No edema.   Skin:     General: Skin is warm and dry.      Coloration: Skin is not jaundiced.      Findings: No bruising, erythema or rash.   Neurological:      General: No focal deficit present.      Mental Status: He is alert and oriented to person, place, and time. Mental status is at baseline.      Motor: No weakness.      Gait: Gait normal.   Psychiatric:         Mood and Affect: Mood normal.         Behavior: Behavior normal.         Thought Content: Thought content normal.         Judgment: Judgment normal.             Significant Labs: All pertinent labs within the past 24 hours have been reviewed.    Significant Imaging: I have reviewed all pertinent imaging results/findings within the past 24 hours.      Assessment/Plan:      * Sickle-cell disease with vaso-occlusive pain  -Pt. With severe joint pains typical of vaso-occlusive crises. No reported chest pain, no hypoxia, and CXR without infiltrate (improved from prior) not consistent with acute chest.   - ( Old CTchest 8/24/23, had bilat lower patchy interstitial edema.)   -Plan to treat with IV fluids, PCA pump ordered for more continuous pain med dosing as pt. Complained of severe persistent pain after interval dosing in ED. Continue home meds folic acid and hydroxyurea (voxeletor not on formulary)    10/16- on IVFs, dilaudid 2 q 3 prn, and oxy 10 q 4 prn. PCA discontinued. cxr and urine neg for infection. CT noted interstitial edema.     10/17 on IVFs, dilaudid 1.5 q 3. Oxycodone. Empiric zosyn, Pain in low back. No chest  pain, SOB, not requiring oxygen    10/18- Repeating lactic acid, procal, repeat blood culture. CT chest/ abd/ pelvis.  Consulting Crit Care to follow. On Zosyn. Adding vanc, No change in pain meds today.     10/19:  Concern for ACS, hematology consulted this morning.  Recommending simple transfusion at this point.     Acute pulmonary embolism  Lovenox full dose started  Per CT chest/a/p.  Tachycardia 110-120, no chest pain, minimal hypoxia  US LE bilat  Will need to convert to eliquis. Discussed with hematologist and is ok with switching. Appreciate assistance.  Plan to switch to Eliquis tomorrow. 10 mg BID followed by 5mg BID. He already had 2 days lovenox treatment dose.         Elevated transaminase level  Monitor consider hepatology consult      Severe sepsis  Monitor. Wbc 13-> 17 -> 16  Blood culture  Cxr, UA neg  No concern on abd exam  Likely reactive  Empiric zosyn.     1018- adding vanc, CT ch/ abd/ pelvis. cxr may have developing infiltrate, on IVFs. . Meets requirements for Severe SEPSIS with high wbc, tachycardia, hypoxia, RR 20, rising LFTs.  Consuted Crit care to follow.       Hyperbilirubinemia  -Chronic, suspect due to combination of hemolysis causing unconjugated hyperbilirubinemia, and intrahepatic cholestasis from SC disease  -Continue to monitor while admitted, pt. Currently follows with hepatology as outpatient        VTE Risk Mitigation (From admission, onward)         Ordered     apixaban tablet 5 mg  2 times daily         10/20/23 1643     apixaban tablet 10 mg  2 times daily         10/20/23 1643     IP VTE LOW RISK PATIENT  Once         10/15/23 2012     IP VTE HIGH RISK PATIENT  Once         10/15/23 2012                Discharge Planning   [place]: 10/23/2023     Code Status: Full Code   Is the patient medically ready for discharge?: No    Reason for patient still in hospital (select all that apply): Patient trending condition and Treatment  Discharge Plan A: Home   Discharge Delays: None  known at this time              Getachew Krishna DO  Department of Hospital Medicine   [facility] - [facility] ([place])

## 2023-10-20 NOTE — SUBJECTIVE & OBJECTIVE
Interval History: see above      Objective:     Vital Signs (Most Recent):  Temp: 98.6 °F (37 °C) (10/20/23 1534)  Pulse: 91 (10/20/23 1534)  Resp: 18 (10/20/23 1534)  BP: (!) 156/85 (10/20/23 1534)  SpO2: 96 % (10/20/23 1534) Vital Signs (24h Range):  Temp:  [98.6 °F (37 °C)-101.2 °F (38.4 °C)] 98.6 °F (37 °C)  Pulse:  [] 91  Resp:  [16-20] 18  SpO2:  [94 %-100 %] 96 %  BP: (141-156)/(84-90) 156/85     Weight: 83.9 kg (185 lb)  Body mass index is 24.41 kg/m².    Intake/Output Summary (Last 24 hours) at 10/20/2023 1606  Last data filed at 10/20/2023 1320  Gross per 24 hour   Intake 360 ml   Output 2700 ml   Net -2340 ml         Physical Exam  Constitutional:       General: He is not in acute distress.     Appearance: Normal appearance. He is not ill-appearing, toxic-appearing or diaphoretic.   HENT:      Head: Normocephalic and atraumatic.      Nose: Nose normal.      Mouth/Throat:      Mouth: Mucous membranes are moist.      Pharynx: Oropharynx is clear.   Eyes:      General: Scleral icterus (improved since yesterday) present.      Extraocular Movements: Extraocular movements intact.      Conjunctiva/sclera: Conjunctivae normal.      Pupils: Pupils are equal, round, and reactive to light.   Cardiovascular:      Rate and Rhythm: Regular rhythm. Tachycardia present.      Pulses: Normal pulses.   Pulmonary:      Effort: Pulmonary effort is normal. No respiratory distress.      Breath sounds: Normal breath sounds. No stridor. No wheezing, rhonchi or rales.   Chest:      Chest wall: No tenderness.   Abdominal:      General: Abdomen is flat. Bowel sounds are normal. There is no distension.      Palpations: Abdomen is soft.      Tenderness: There is no abdominal tenderness. There is no right CVA tenderness, left CVA tenderness, guarding or rebound.   Musculoskeletal:         General: No swelling. Normal range of motion.      Cervical back: Normal range of motion and neck supple. No rigidity or tenderness.      Right  lower leg: No edema.      Left lower leg: No edema.   Skin:     General: Skin is warm and dry.      Coloration: Skin is not jaundiced.      Findings: No bruising, erythema or rash.   Neurological:      General: No focal deficit present.      Mental Status: He is alert and oriented to person, place, and time. Mental status is at baseline.      Motor: No weakness.      Gait: Gait normal.   Psychiatric:         Mood and Affect: Mood normal.         Behavior: Behavior normal.         Thought Content: Thought content normal.         Judgment: Judgment normal.             Significant Labs: All pertinent labs within the past 24 hours have been reviewed.    Significant Imaging: I have reviewed all pertinent imaging results/findings within the past 24 hours.

## 2023-10-20 NOTE — PROGRESS NOTES
Pharmacokinetic Assessment Follow Up: IV Vancomycin    Vancomycin serum concentration assessment(s):    -Vancomycin level < 10 mcg/mL.  -Sepsis goal 15-20 mcg/mL.  -Renal function stable.    Vancomycin Regimen Plan:    -Increase vancomycin 1500 mg IV Q8H.  -Trough 10/21 @ 11:00.    Drug levels (last 3 results):  Recent Labs   Lab Result Units 10/20/23  1033   Vancomycin-Trough ug/mL 2.7*       Pharmacy will continue to follow and monitor vancomycin.    Please contact pharmacy at extension [phone] for questions regarding this assessment.    Thank you for the consult,   Logan Portillo       Patient brief summary:  Parrish Davis is a 28 y.o. male initiated on antimicrobial therapy with IV Vancomycin for treatment of sepsis    Drug Allergies:   Review of patient's allergies indicates:  No Known Allergies    Actual Body Weight:   83.9 kg    Renal Function:   Estimated Creatinine Clearance: 246.4 mL/min (based on SCr of 0.5 mg/dL).,     Dialysis Method (if applicable):  N/A    CBC (last 72 hours):  Recent Labs   Lab Result Units 10/18/23  0251 10/19/23  0458 10/19/23  1811 10/20/23  0221   WBC K/uL 16.62* 14.94* 12.89* 12.29   Hemoglobin g/dL 9.4* 7.9* 8.7* 8.6*   Hemoglobin Electrophoresis Interp   --  See comment  --   --    Hematocrit % 26.3* 21.8* 24.3* 24.7*   Platelets K/uL 194 151 167 165   Gran % % 81.7* 79.0* 80.0* 73.6*   Lymph % % 8.9* 9.8* 7.8* 11.6*   Mono % % 6.4 6.8 7.9 8.2   Eosinophil % % 0.4 1.5 2.2 3.7   Basophil % % 0.5 0.6 0.4 0.6   Differential Method  Automated Automated Automated Automated       Metabolic Panel (last 72 hours):  Recent Labs   Lab Result Units 10/18/23  0251 10/18/23  0501 10/19/23  0458 10/19/23  1811 10/20/23  0221   Sodium mmol/L 132*  --  131* 132* 133*   Potassium mmol/L 4.6  --  3.1* 3.3* 4.2   Chloride mmol/L 103  --  97 100 99   CO2 mmol/L 21*  --  25 25 26   Glucose mg/dL 120*  --  127* 310* 110   BUN mg/dL 7  --  7 7 7   Creatinine mg/dL 0.6  --  0.6 0.6 0.5    Creatinine, Urine mg/dL  --  70.0  --   --   --    Albumin g/dL 3.2*  --  2.6* 2.5* 2.5*   Total Bilirubin mg/dL 10.3*  --  16.0* 14.9* 14.8*   Alkaline Phosphatase U/L 355*  --  325* 321* 327*   AST U/L 100*  --  65* 66* 81*   ALT U/L 51*  --  47* 48* 49*   Phosphorus mg/dL 2.4*  --  2.8  --  2.7       Vancomycin Administrations:  vancomycin given in the last 96 hours                     vancomycin 1,500 mg in dextrose 5 % (D5W) 250 mL IVPB (Vial-Mate) (mg) 1,500 mg New Bag 10/20/23 1125     1,500 mg New Bag  0021     1,500 mg New Bag 10/19/23 0952     1,500 mg New Bag  0003    vancomycin 2 g in dextrose 5 % 500 mL IVPB (mg) 2,000 mg New Bag 10/18/23 1114                    Microbiologic Results:  Microbiology Results (last 7 days)       Procedure Component Value Units Date/Time    Blood culture [[id number]] Collected: 10/18/23 0920    Order Status: Completed Specimen: Blood Updated: 10/20/23 1212     Blood Culture, Routine No Growth to date      No Growth to date      No Growth to date    Blood culture [[id number]] Collected: 10/19/23 1810    Order Status: Completed Specimen: Blood Updated: 10/20/23 0115     Blood Culture, Routine No Growth to date    Blood culture [[id number]] Collected: 10/16/23 1431    Order Status: Completed Specimen: Blood Updated: 10/19/23 1612     Blood Culture, Routine No Growth to date      No Growth to date      No Growth to date      No Growth to date

## 2023-10-20 NOTE — ASSESSMENT & PLAN NOTE
Lovenox full dose started  Per CT chest/a/p.  Tachycardia 110-120, no chest pain, minimal hypoxia  US LE bilat  Will need to convert to eliquis. Discussed with hematologist and is ok with switching. Appreciate assistance.  Plan to switch to Eliquis tomorrow. 10 mg BID followed by 5mg BID. He already had 2 days lovenox treatment dose.

## 2023-10-20 NOTE — CARE UPDATE
RAPID RESPONSE NURSE ROUND       Rounding completed with charge RN, Madhu. No additional concerns verbalized at this time. Instructed to call [phone] for further concerns or assistance.

## 2023-10-20 NOTE — PLAN OF CARE
Problem: Adult Inpatient Plan of Care  Goal: Absence of Hospital-Acquired Illness or Injury  Outcome: Ongoing, Progressing  Goal: Readiness for Transition of Care  Outcome: Ongoing, Progressing     Problem: Glycemic Control Impaired (Sepsis/Septic Shock)  Goal: Blood Glucose Level Within Desired Range  Outcome: Ongoing, Progressing     Problem: Nutrition Impaired (Sepsis/Septic Shock)  Goal: Optimal Nutrition Intake  Outcome: Ongoing, Progressing

## 2023-10-21 LAB
ALBUMIN SERPL BCP-MCNC: 2.5 G/DL (ref 3.5–5.2)
ALP SERPL-CCNC: 355 U/L (ref 55–135)
ALT SERPL W/O P-5'-P-CCNC: 60 U/L (ref 10–44)
ANION GAP SERPL CALC-SCNC: 11 MMOL/L (ref 8–16)
ANISOCYTOSIS BLD QL SMEAR: SLIGHT
AST SERPL-CCNC: 86 U/L (ref 10–40)
BACTERIA BLD CULT: NORMAL
BASOPHILS # BLD AUTO: 0.06 K/UL (ref 0–0.2)
BASOPHILS NFR BLD: 0.6 % (ref 0–1.9)
BILIRUB SERPL-MCNC: 17.3 MG/DL (ref 0.1–1)
BUN SERPL-MCNC: 7 MG/DL (ref 6–20)
BURR CELLS BLD QL SMEAR: ABNORMAL
CALCIUM SERPL-MCNC: 8.9 MG/DL (ref 8.7–10.5)
CHLORIDE SERPL-SCNC: 97 MMOL/L (ref 95–110)
CO2 SERPL-SCNC: 26 MMOL/L (ref 23–29)
CREAT SERPL-MCNC: 0.6 MG/DL (ref 0.5–1.4)
DIFFERENTIAL METHOD: ABNORMAL
EOSINOPHIL # BLD AUTO: 0.5 K/UL (ref 0–0.5)
EOSINOPHIL NFR BLD: 4.4 % (ref 0–8)
ERYTHROCYTE [DISTWIDTH] IN BLOOD BY AUTOMATED COUNT: 21 % (ref 11.5–14.5)
EST. GFR  (NO RACE VARIABLE): >60 ML/MIN/1.73 M^2
GLUCOSE SERPL-MCNC: 123 MG/DL (ref 70–110)
HCT VFR BLD AUTO: 25.6 % (ref 40–54)
HGB BLD-MCNC: 9.2 G/DL (ref 14–18)
HOWELL-JOLLY BOD BLD QL SMEAR: ABNORMAL
HYPOCHROMIA BLD QL SMEAR: ABNORMAL
IMM GRANULOCYTES # BLD AUTO: 0.19 K/UL (ref 0–0.04)
IMM GRANULOCYTES NFR BLD AUTO: 1.9 % (ref 0–0.5)
LYMPHOCYTES # BLD AUTO: 1.6 K/UL (ref 1–4.8)
LYMPHOCYTES NFR BLD: 15.9 % (ref 18–48)
MCH RBC QN AUTO: 29.1 PG (ref 27–31)
MCHC RBC AUTO-ENTMCNC: 35.9 G/DL (ref 32–36)
MCV RBC AUTO: 81 FL (ref 82–98)
MONOCYTES # BLD AUTO: 0.9 K/UL (ref 0.3–1)
MONOCYTES NFR BLD: 8.4 % (ref 4–15)
NEUTROPHILS # BLD AUTO: 7 K/UL (ref 1.8–7.7)
NEUTROPHILS NFR BLD: 68.8 % (ref 38–73)
NRBC BLD-RTO: 4 /100 WBC
OVALOCYTES BLD QL SMEAR: ABNORMAL
PHOSPHATE SERPL-MCNC: 3.2 MG/DL (ref 2.7–4.5)
PLATELET # BLD AUTO: 185 K/UL (ref 150–450)
PLATELET BLD QL SMEAR: ABNORMAL
PMV BLD AUTO: 10.7 FL (ref 9.2–12.9)
POIKILOCYTOSIS BLD QL SMEAR: SLIGHT
POLYCHROMASIA BLD QL SMEAR: ABNORMAL
POTASSIUM SERPL-SCNC: 3 MMOL/L (ref 3.5–5.1)
PROT SERPL-MCNC: 7.3 G/DL (ref 6–8.4)
RBC # BLD AUTO: 3.16 M/UL (ref 4.6–6.2)
SCHISTOCYTES BLD QL SMEAR: ABNORMAL
SICKLE CELLS BLD QL SMEAR: ABNORMAL
SODIUM SERPL-SCNC: 134 MMOL/L (ref 136–145)
TARGETS BLD QL SMEAR: ABNORMAL
VANCOMYCIN TROUGH SERPL-MCNC: 6.2 UG/ML (ref 10–22)
WBC # BLD AUTO: 10.15 K/UL (ref 3.9–12.7)

## 2023-10-21 PROCEDURE — 36410 VNPNXR 3YR/> PHY/QHP DX/THER: CPT

## 2023-10-21 PROCEDURE — 63600175 PHARM REV CODE 636 W HCPCS: Performed by: STUDENT IN AN ORGANIZED HEALTH CARE EDUCATION/TRAINING PROGRAM

## 2023-10-21 PROCEDURE — 25000003 PHARM REV CODE 250: Performed by: HOSPITALIST

## 2023-10-21 PROCEDURE — 36415 COLL VENOUS BLD VENIPUNCTURE: CPT | Performed by: HOSPITALIST

## 2023-10-21 PROCEDURE — 21400001 HC TELEMETRY ROOM

## 2023-10-21 PROCEDURE — S5010 5% DEXTROSE AND 0.45% SALINE: HCPCS | Performed by: HOSPITALIST

## 2023-10-21 PROCEDURE — 80202 ASSAY OF VANCOMYCIN: CPT | Performed by: STUDENT IN AN ORGANIZED HEALTH CARE EDUCATION/TRAINING PROGRAM

## 2023-10-21 PROCEDURE — 85025 COMPLETE CBC W/AUTO DIFF WBC: CPT | Performed by: HOSPITALIST

## 2023-10-21 PROCEDURE — 63600175 PHARM REV CODE 636 W HCPCS: Performed by: HOSPITALIST

## 2023-10-21 PROCEDURE — 84100 ASSAY OF PHOSPHORUS: CPT | Performed by: HOSPITALIST

## 2023-10-21 PROCEDURE — 36415 COLL VENOUS BLD VENIPUNCTURE: CPT | Performed by: STUDENT IN AN ORGANIZED HEALTH CARE EDUCATION/TRAINING PROGRAM

## 2023-10-21 PROCEDURE — 80053 COMPREHEN METABOLIC PANEL: CPT | Performed by: HOSPITALIST

## 2023-10-21 PROCEDURE — 25000003 PHARM REV CODE 250: Performed by: EMERGENCY MEDICINE

## 2023-10-21 PROCEDURE — 25000003 PHARM REV CODE 250: Performed by: STUDENT IN AN ORGANIZED HEALTH CARE EDUCATION/TRAINING PROGRAM

## 2023-10-21 PROCEDURE — 12000002 HC ACUTE/MED SURGE SEMI-PRIVATE ROOM

## 2023-10-21 RX ORDER — POTASSIUM CHLORIDE 20 MEQ/1
40 TABLET, EXTENDED RELEASE ORAL 2 TIMES DAILY
Status: COMPLETED | OUTPATIENT
Start: 2023-10-21 | End: 2023-10-21

## 2023-10-21 RX ORDER — MUPIROCIN 20 MG/G
OINTMENT TOPICAL 2 TIMES DAILY
Status: DISCONTINUED | OUTPATIENT
Start: 2023-10-21 | End: 2023-10-26 | Stop reason: HOSPADM

## 2023-10-21 RX ADMIN — CEFEPIME 2 G: 2 INJECTION, POWDER, FOR SOLUTION INTRAVENOUS at 08:10

## 2023-10-21 RX ADMIN — POTASSIUM CHLORIDE 40 MEQ: 1500 TABLET, EXTENDED RELEASE ORAL at 01:10

## 2023-10-21 RX ADMIN — MUPIROCIN: 20 OINTMENT TOPICAL at 09:10

## 2023-10-21 RX ADMIN — DEXTROSE AND SODIUM CHLORIDE: 5; 450 INJECTION, SOLUTION INTRAVENOUS at 03:10

## 2023-10-21 RX ADMIN — VANCOMYCIN HYDROCHLORIDE 1500 MG: 1.5 INJECTION, POWDER, LYOPHILIZED, FOR SOLUTION INTRAVENOUS at 12:10

## 2023-10-21 RX ADMIN — SENNOSIDES AND DOCUSATE SODIUM 1 TABLET: 50; 8.6 TABLET ORAL at 09:10

## 2023-10-21 RX ADMIN — OXYCODONE AND ACETAMINOPHEN 1 TABLET: 10; 325 TABLET ORAL at 12:10

## 2023-10-21 RX ADMIN — HYDROXYUREA 500 MG: 500 CAPSULE ORAL at 08:10

## 2023-10-21 RX ADMIN — HYDROMORPHONE HYDROCHLORIDE 1 MG: 2 INJECTION, SOLUTION INTRAMUSCULAR; INTRAVENOUS; SUBCUTANEOUS at 09:10

## 2023-10-21 RX ADMIN — SENNOSIDES AND DOCUSATE SODIUM 1 TABLET: 50; 8.6 TABLET ORAL at 08:10

## 2023-10-21 RX ADMIN — APIXABAN 10 MG: 5 TABLET, FILM COATED ORAL at 08:10

## 2023-10-21 RX ADMIN — CEFEPIME 2 G: 2 INJECTION, POWDER, FOR SOLUTION INTRAVENOUS at 12:10

## 2023-10-21 RX ADMIN — HYDROMORPHONE HYDROCHLORIDE 1 MG: 2 INJECTION, SOLUTION INTRAMUSCULAR; INTRAVENOUS; SUBCUTANEOUS at 07:10

## 2023-10-21 RX ADMIN — OXYCODONE AND ACETAMINOPHEN 1 TABLET: 10; 325 TABLET ORAL at 06:10

## 2023-10-21 RX ADMIN — DEXTROSE AND SODIUM CHLORIDE: 5; 450 INJECTION, SOLUTION INTRAVENOUS at 05:10

## 2023-10-21 RX ADMIN — POLYETHYLENE GLYCOL 3350 17 G: 17 POWDER, FOR SOLUTION ORAL at 08:10

## 2023-10-21 RX ADMIN — VANCOMYCIN HYDROCHLORIDE 2000 MG: 10 INJECTION, POWDER, LYOPHILIZED, FOR SOLUTION INTRAVENOUS at 09:10

## 2023-10-21 RX ADMIN — FOLIC ACID 1 MG: 1 TABLET ORAL at 08:10

## 2023-10-21 RX ADMIN — FLUOXETINE 10 MG: 10 CAPSULE ORAL at 08:10

## 2023-10-21 RX ADMIN — HYDROMORPHONE HYDROCHLORIDE 1 MG: 2 INJECTION, SOLUTION INTRAMUSCULAR; INTRAVENOUS; SUBCUTANEOUS at 11:10

## 2023-10-21 RX ADMIN — VANCOMYCIN HYDROCHLORIDE 1500 MG: 1.5 INJECTION, POWDER, LYOPHILIZED, FOR SOLUTION INTRAVENOUS at 03:10

## 2023-10-21 RX ADMIN — POTASSIUM CHLORIDE 40 MEQ: 1500 TABLET, EXTENDED RELEASE ORAL at 09:10

## 2023-10-21 RX ADMIN — APIXABAN 10 MG: 5 TABLET, FILM COATED ORAL at 09:10

## 2023-10-21 RX ADMIN — CEFEPIME 2 G: 2 INJECTION, POWDER, FOR SOLUTION INTRAVENOUS at 04:10

## 2023-10-21 RX ADMIN — HYDROMORPHONE HYDROCHLORIDE 1 MG: 2 INJECTION, SOLUTION INTRAMUSCULAR; INTRAVENOUS; SUBCUTANEOUS at 01:10

## 2023-10-21 NOTE — PLAN OF CARE
Problem: Adult Inpatient Plan of Care  Goal: Plan of Care Review  Outcome: Ongoing, Progressing  Goal: Patient-Specific Goal (Individualized)  Outcome: Ongoing, Progressing  Goal: Absence of Hospital-Acquired Illness or Injury  Outcome: Ongoing, Progressing  Goal: Optimal Comfort and Wellbeing  Outcome: Ongoing, Progressing  Goal: Readiness for Transition of Care  Outcome: Ongoing, Progressing     Problem: Fluid Imbalance (Pneumonia)  Goal: Fluid Balance  Outcome: Ongoing, Progressing     Problem: Infection (Pneumonia)  Goal: Resolution of Infection Signs and Symptoms  Outcome: Ongoing, Progressing     Problem: Fall Injury Risk  Goal: Absence of Fall and Fall-Related Injury  Outcome: Ongoing, Progressing

## 2023-10-21 NOTE — PROGRESS NOTES
Pharmacokinetic Assessment Follow Up: IV Vancomycin    Vancomycin serum concentration assessment(s):    Vancomycin trough = 6.2 mcg/mL. Well below goal.     Vancomycin Regimen Plan:  Vancomycin 2000 mg IV q8h  Repeat trough @ 1200 on 10/22  Goal trough = 15-20 mcg/mL    Thank you for the consult, will continue to follow    Enrrique Martinez Pharm.D., BCPS  [id number]       Patient brief summary:  Parrish Davis is a 28 y.o. male initiated on antimicrobial therapy with IV Vancomycin for treatment of sepsis    Drug levels (last 3 results):  Recent Labs   Lab Result Units 10/20/23  1033 10/21/23  1111   Vancomycin-Trough ug/mL 2.7* 6.2*         Drug Allergies:   Review of patient's allergies indicates:  No Known Allergies    Actual Body Weight:   83.9 kg    Renal Function:   Estimated Creatinine Clearance: 205.3 mL/min (based on SCr of 0.6 mg/dL).,       CBC (last 72 hours):  Recent Labs   Lab Result Units 10/19/23  0458 10/19/23  1811 10/20/23  0221 10/21/23  0327   WBC K/uL 14.94* 12.89* 12.29 10.15   Hemoglobin g/dL 7.9* 8.7* 8.6* 9.2*   Hemoglobin Electrophoresis Interp  See comment  --   --   --    Hematocrit % 21.8* 24.3* 24.7* 25.6*   Platelets K/uL 151 167 165 185   Gran % % 79.0* 80.0* 73.6* 68.8   Lymph % % 9.8* 7.8* 11.6* 15.9*   Mono % % 6.8 7.9 8.2 8.4   Eosinophil % % 1.5 2.2 3.7 4.4   Basophil % % 0.6 0.4 0.6 0.6   Differential Method  Automated Automated Automated Automated         Metabolic Panel (last 72 hours):  Recent Labs   Lab Result Units 10/19/23  0458 10/19/23  1811 10/20/23  0221 10/21/23  0327   Sodium mmol/L 131* 132* 133* 134*   Potassium mmol/L 3.1* 3.3* 4.2 3.0*   Chloride mmol/L 97 100 99 97   CO2 mmol/L 25 25 26 26   Glucose mg/dL 127* 310* 110 123*   BUN mg/dL 7 7 7 7   Creatinine mg/dL 0.6 0.6 0.5 0.6   Albumin g/dL 2.6* 2.5* 2.5* 2.5*   Total Bilirubin mg/dL 16.0* 14.9* 14.8* 17.3*   Alkaline Phosphatase U/L 325* 321* 327* 355*   AST U/L 65* 66* 81* 86*   ALT U/L 47* 48* 49* 60*   Phosphorus  mg/dL 2.8  --  2.7 3.2         Vancomycin Administrations:  vancomycin given in the last 96 hours                     vancomycin 1,500 mg in dextrose 5 % (D5W) 250 mL IVPB (Vial-Mate) (mg) 1,500 mg New Bag 10/20/23 1125     1,500 mg New Bag  0021     1,500 mg New Bag 10/19/23 0952     1,500 mg New Bag  0003    vancomycin 2 g in dextrose 5 % 500 mL IVPB (mg) 2,000 mg New Bag 10/18/23 1114                    Microbiologic Results:  Microbiology Results (last 7 days)       Procedure Component Value Units Date/Time    Blood culture [[id number]] Collected: 10/18/23 0920    Order Status: Completed Specimen: Blood Updated: 10/21/23 1212     Blood Culture, Routine No Growth to date      No Growth to date      No Growth to date      No Growth to date    Blood culture [[id number]] Collected: 10/19/23 1810    Order Status: Completed Specimen: Blood Updated: 10/20/23 2012     Blood Culture, Routine No Growth to date      No Growth to date    Blood culture [[id number]] Collected: 10/16/23 1431    Order Status: Completed Specimen: Blood Updated: 10/20/23 1612     Blood Culture, Routine No Growth to date      No Growth to date      No Growth to date      No Growth to date      No Growth to date

## 2023-10-21 NOTE — PLAN OF CARE
Problem: Adult Inpatient Plan of Care  Goal: Plan of Care Review  10/21/2023 0922 by Dante Green RN  Outcome: Ongoing, Not Progressing  10/21/2023 0922 by Dante Green RN  Outcome: Ongoing, Progressing  Goal: Patient-Specific Goal (Individualized)  10/21/2023 0922 by Dante Green RN  Outcome: Ongoing, Not Progressing  10/21/2023 0922 by Dante Green RN  Outcome: Ongoing, Progressing  Goal: Absence of Hospital-Acquired Illness or Injury  10/21/2023 0922 by Dante Green RN  Outcome: Ongoing, Not Progressing  10/21/2023 0922 by Dante Green RN  Outcome: Ongoing, Progressing  Goal: Optimal Comfort and Wellbeing  10/21/2023 0922 by Dante Green RN  Outcome: Ongoing, Not Progressing  10/21/2023 0922 by Dante Green RN  Outcome: Ongoing, Progressing  Goal: Readiness for Transition of Care  10/21/2023 0922 by Datne Green RN  Outcome: Ongoing, Not Progressing  10/21/2023 0922 by Dante Green RN  Outcome: Ongoing, Progressing     Problem: Fluid Imbalance (Pneumonia)  Goal: Fluid Balance  10/21/2023 0922 by Dante Green RN  Outcome: Ongoing, Not Progressing  10/21/2023 0922 by Dante Green RN  Outcome: Ongoing, Progressing     Problem: Infection (Pneumonia)  Goal: Resolution of Infection Signs and Symptoms  10/21/2023 0922 by Danet Green RN  Outcome: Ongoing, Not Progressing  10/21/2023 0922 by Dante Green RN  Outcome: Ongoing, Progressing     Problem: Respiratory Compromise (Pneumonia)  Goal: Effective Oxygenation and Ventilation  10/21/2023 0922 by Dante Green RN  Outcome: Ongoing, Not Progressing  10/21/2023 0922 by Dante Green RN  Outcome: Ongoing, Progressing     Problem: Fall Injury Risk  Goal: Absence of Fall and Fall-Related Injury  10/21/2023 0922 by Dante Green RN  Outcome: Ongoing, Not Progressing  10/21/2023 0922 by Dante Green RN  Outcome: Ongoing, Progressing     Problem: Pain Acute  Goal: Acceptable Pain Control and Functional Ability  10/21/2023 0922 by Peter  ANDREINA Howell  Outcome: Ongoing, Not Progressing  10/21/2023 0922 by Dante Green RN  Outcome: Ongoing, Progressing     Problem: Anemia  Goal: Anemia Symptom Improvement  10/21/2023 0922 by Dante Green RN  Outcome: Ongoing, Not Progressing  10/21/2023 0922 by Dante Green RN  Outcome: Ongoing, Progressing     Problem: Adjustment to Illness (Sepsis/Septic Shock)  Goal: Optimal Coping  10/21/2023 0922 by Dante Green RN  Outcome: Ongoing, Not Progressing  10/21/2023 0922 by Dante Green RN  Outcome: Ongoing, Progressing     Problem: Bleeding (Sepsis/Septic Shock)  Goal: Absence of Bleeding  10/21/2023 0922 by Dante Green RN  Outcome: Ongoing, Not Progressing  10/21/2023 0922 by Dante Green RN  Outcome: Ongoing, Progressing     Problem: Glycemic Control Impaired (Sepsis/Septic Shock)  Goal: Blood Glucose Level Within Desired Range  10/21/2023 0922 by Dante Green RN  Outcome: Ongoing, Not Progressing  10/21/2023 0922 by Dante Green RN  Outcome: Ongoing, Progressing     Problem: Infection Progression (Sepsis/Septic Shock)  Goal: Absence of Infection Signs and Symptoms  10/21/2023 0922 by Dante Green RN  Outcome: Ongoing, Not Progressing  10/21/2023 0922 by Dante Green RN  Outcome: Ongoing, Progressing     Problem: Nutrition Impaired (Sepsis/Septic Shock)  Goal: Optimal Nutrition Intake  10/21/2023 0922 by Dante Green RN  Outcome: Ongoing, Not Progressing  10/21/2023 0922 by Dante Green RN  Outcome: Ongoing, Progressing     Problem: Infection  Goal: Absence of Infection Signs and Symptoms  10/21/2023 0922 by Dante Green RN  Outcome: Ongoing, Not Progressing  10/21/2023 0922 by Dante Green RN  Outcome: Ongoing, Progressing

## 2023-10-21 NOTE — ASSESSMENT & PLAN NOTE
-Pt. With severe joint pains typical of vaso-occlusive crises. No reported chest pain, no hypoxia, and CXR without infiltrate (improved from prior) not consistent with acute chest.   - ( Old CTchest 8/24/23, had bilat lower patchy interstitial edema.)   -Plan to treat with IV fluids, PCA pump ordered for more continuous pain med dosing as pt. Complained of severe persistent pain after interval dosing in ED. Continue home meds folic acid and hydroxyurea (voxeletor not on formulary)    10/16- on IVFs, dilaudid 2 q 3 prn, and oxy 10 q 4 prn. PCA discontinued. cxr and urine neg for infection. CT noted interstitial edema.     10/17 on IVFs, dilaudid 1.5 q 3. Oxycodone. Empiric zosyn, Pain in low back. No chest pain, SOB, not requiring oxygen    10/18- Repeating lactic acid, procal, repeat blood culture. CT chest/ abd/ pelvis.  Consulting Crit Care to follow. On Zosyn. Adding vanc, No change in pain meds today.     10/19:  Concern for ACS, hematology consulted this morning.  Recommending simple transfusion at this point.     10/21: Febrile overnight, patient denies any complaints. T.bili increased from 10->15. Heme aware.

## 2023-10-21 NOTE — PLAN OF CARE
Problem: Adult Inpatient Plan of Care  Goal: Plan of Care Review  Outcome: Ongoing, Progressing  Goal: Patient-Specific Goal (Individualized)  Outcome: Ongoing, Progressing  Goal: Absence of Hospital-Acquired Illness or Injury  Outcome: Ongoing, Progressing  Goal: Optimal Comfort and Wellbeing  Outcome: Ongoing, Progressing  Goal: Readiness for Transition of Care  Outcome: Ongoing, Progressing     Problem: Fluid Imbalance (Pneumonia)  Goal: Fluid Balance  Outcome: Ongoing, Progressing     Problem: Infection (Pneumonia)  Goal: Resolution of Infection Signs and Symptoms  Outcome: Ongoing, Progressing     Problem: Respiratory Compromise (Pneumonia)  Goal: Effective Oxygenation and Ventilation  Outcome: Ongoing, Progressing     Problem: Fall Injury Risk  Goal: Absence of Fall and Fall-Related Injury  Outcome: Ongoing, Progressing     Problem: Pain Acute  Goal: Acceptable Pain Control and Functional Ability  Outcome: Ongoing, Progressing     Problem: Anemia  Goal: Anemia Symptom Improvement  Outcome: Ongoing, Progressing     Problem: Adjustment to Illness (Sepsis/Septic Shock)  Goal: Optimal Coping  Outcome: Ongoing, Progressing     Problem: Bleeding (Sepsis/Septic Shock)  Goal: Absence of Bleeding  Outcome: Ongoing, Progressing     Problem: Glycemic Control Impaired (Sepsis/Septic Shock)  Goal: Blood Glucose Level Within Desired Range  Outcome: Ongoing, Progressing     Problem: Infection Progression (Sepsis/Septic Shock)  Goal: Absence of Infection Signs and Symptoms  Outcome: Ongoing, Progressing     Problem: Nutrition Impaired (Sepsis/Septic Shock)  Goal: Optimal Nutrition Intake  Outcome: Ongoing, Progressing     Problem: Infection  Goal: Absence of Infection Signs and Symptoms  Outcome: Ongoing, Progressing

## 2023-10-21 NOTE — PROGRESS NOTES
"[facility] - [facility] ([address] Medicine  Progress Note    Patient Name: Parrish Davis  MRN: [id number]  Patient Class: IP- Inpatient   Admission Date: 10/15/2023  Length of Stay: 6 days  Attending Physician: Getachew Krishna DO  Primary Care Provider: Jovanny Douglas MD        Subjective:     Principal Problem:Sickle-cell disease with vaso-occlusive pain        HPI:  28 y.o. man with sickle cell anemia HbSS who presents to the ED complaining of  uncontrolled body aches/pain x1 day He reports the pain started late last night/early this morning with onset of severe lower back pain. It has progressed to involve his entire body per patient, stating that he hurts "all over". No reported cough, SOB, fevers, or chills. Pain poorly controlled with home PO medications, so he came to the ED.      Overview/Hospital Course:  10/16- wbc - 13->18, retic 10.4, Hb 9.4. bili 3.6. ast/alt- slight elevation. Cxr reviewed by me- No infiltrate, Interstitial markings look chronic.  Pain located in Back and knees. No abd pain. Reorts an episode of chest pain earlier now resolved. No SOB. He looks uncomfortable. Nurse to administer IV dialudid now. Continue IVFs.  His mother at bedside reports he had about 30 admission in [facility], approx 3 /yr, and 3 so far this year. /81  Pulse 71   Temp 97.3 °F (36.3 °C) (Oral)   Resp 18    SpO2 100%   2 liters oxygen.    Old CT chest from last admission 8.24.23, - Multifocal nodular opacities with adjacent ground-glass attenuation distributed throughout the bilateral lower lobes, differential considerations include infectious/inflammatory causes, aspiration, or pulmonary infarction in the setting of sickle cell disease.    10/17- /88 Pulse 100  SpO2 93%  . AF. Wbc 17. On empiric zosyn. Pt not room  when I came for a second visit 5 pm yesterday. He said he must have been in BR.  Cultures neg. Pain scale 10->8. Dilaudid PCA started on day 1 was stopped. 24 h " "MEDD 306.33 of 384.. On oxycodone 10 (MEDD 60), Dilaudid 1mg, 2 mg (MEDD 180) _ dilsudid pump. On IVFs.  Functional status per nurse:  Ambulating to BR, talking, eating, sleeping, has ADls. He reports not eating.  Will wean dilaudid to 1.5 q 3, today. Pain located in low back today. Looks comfortable after receiving dilaudid. Mother present in room. I gave them a lecture on how opioids work, their benefits and pitfalls, and how/why weaning or stopping the medication can cause rebound pain.  Recommended slow wean in supervised setting in hospital, while monitoring for complications of SSA. They expressed understanding  4:35 pm - started oxygen 2 liters, for sat 85%, now 92%, -110. Cxr ordered      10/18- Hb 9.4.  cxr 10/17- The lungs are symmetrically expanded bilaterally with coarse central hilar interstitial attenuation suggesting edema.  There is slightly increased parenchymal attenuation projected over the left lower lung zone, may reflect atelectasis however developing consolidation not excluded..  No large focal consolidation seen. There is no pneumothorax.  The osseous structures are unchanged.  On IVFs.   94% 1 iter NC.   /87    Pulse 138   Temp 99.7 °F   Resp 20 . ECHO 3/2023 - EF 60%.  Bl cult 10/16- NGSF. Ast/alt rising. Wbc 18-> 16.  Will add lactic acid, procal, repeat blood culture. CT chest/ abd/ pelvis.  Consulting Crit Care to follow. On Zosyn. Changing to cefepime. Adding vanc.   Nurse reports 7/10 pain this AM. still in lower back, not radiating anywhere. + rigors.  Low grade fever.   3:30 pm: CT + for Pulmonary Embolus, and forming infiltrate. Started FULL dose lovenox.  On cefepime and vanc.    Suspecting acute chest syndrome, hematology consulted.  Patient to be given blood transfusion, simple.  Per hematology: "If symptoms ( pain, hypoxemia) worsen he might benefit from partial/ complete exchange transfusion."Continue to monitor patient closely.    Patient is stable today. " He reports mild abdominal pain, reports no BM since Sunday. Denies any aggravating or alleviating factors to his abdominal pain. Discussed using laxativbes. He is currently taking Miralax. Appetite has been improving. Denies any chest pain, shortness of breath. Continues with be stable on nasal canula. Hematology would like to trend reticulocytes daily and incentive spirometry.     10/21: Patient was febrile overnight. He denies feeling febrile. Does report weakness, planning to consulting PT/OT. Bilirubin is trending up from 10 to 15. Heme consulted and is following. Aware of the fever and bilirubin.            Interval History: Patient denies any other complaints. He reports feeling better today.       Objective:     Vital Signs (Most Recent):  Temp: 98.3 °F (36.8 °C) (10/21/23 1205)  Pulse: 100 (10/21/23 1205)  Resp: 16 (10/21/23 1250)  BP: 126/70 (10/21/23 1205)  SpO2: 95 % (10/21/23 1205) Vital Signs (24h Range):  Temp:  [97.7 °F (36.5 °C)-101.2 °F (38.4 °C)] 98.3 °F (36.8 °C)  Pulse:  [] 100  Resp:  [16-20] 16  SpO2:  [94 %-100 %] 95 %  BP: (126-156)/(70-85) 126/70     Weight: 83.9 kg (185 lb)  Body mass index is 24.41 kg/m².    Intake/Output Summary (Last 24 hours) at 10/21/2023 1517  Last data filed at 10/21/2023 0800  Gross per 24 hour   Intake 240 ml   Output 3300 ml   Net -3060 ml         Physical Exam  Constitutional:       General: He is not in acute distress.     Appearance: Normal appearance. He is not ill-appearing, toxic-appearing or diaphoretic.   HENT:      Head: Normocephalic and atraumatic.      Nose: Nose normal.      Mouth/Throat:      Mouth: Mucous membranes are moist.      Pharynx: Oropharynx is clear.   Eyes:      General: Scleral icterus (improved since yesterday) present.      Extraocular Movements: Extraocular movements intact.      Conjunctiva/sclera: Conjunctivae normal.      Pupils: Pupils are equal, round, and reactive to light.   Cardiovascular:      Rate and Rhythm: Regular  rhythm. Tachycardia present.      Pulses: Normal pulses.   Pulmonary:      Effort: Pulmonary effort is normal. No respiratory distress.      Breath sounds: Normal breath sounds. No stridor. No wheezing, rhonchi or rales.   Chest:      Chest wall: No tenderness.   Abdominal:      General: Abdomen is flat. Bowel sounds are normal. There is no distension.      Palpations: Abdomen is soft.      Tenderness: There is no abdominal tenderness. There is no right CVA tenderness, left CVA tenderness, guarding or rebound.   Musculoskeletal:         General: No swelling. Normal range of motion.      Cervical back: Normal range of motion and neck supple. No rigidity or tenderness.      Right lower leg: No edema.      Left lower leg: No edema.   Skin:     General: Skin is warm and dry.      Coloration: Skin is not jaundiced.      Findings: No bruising, erythema or rash.   Neurological:      General: No focal deficit present.      Mental Status: He is alert and oriented to person, place, and time. Mental status is at baseline.      Motor: No weakness.      Gait: Gait normal.   Psychiatric:         Mood and Affect: Mood normal.         Behavior: Behavior normal.         Thought Content: Thought content normal.         Judgment: Judgment normal.             Significant Labs: All pertinent labs within the past 24 hours have been reviewed.    Significant Imaging: I have reviewed all pertinent imaging results/findings within the past 24 hours.      Assessment/Plan:      * Sickle-cell disease with vaso-occlusive pain  -Pt. With severe joint pains typical of vaso-occlusive crises. No reported chest pain, no hypoxia, and CXR without infiltrate (improved from prior) not consistent with acute chest.   - ( Old CTchest 8/24/23, had bilat lower patchy interstitial edema.)   -Plan to treat with IV fluids, PCA pump ordered for more continuous pain med dosing as pt. Complained of severe persistent pain after interval dosing in ED. Continue home  meds folic acid and hydroxyurea (voxeletor not on formulary)    10/16- on IVFs, dilaudid 2 q 3 prn, and oxy 10 q 4 prn. PCA discontinued. cxr and urine neg for infection. CT noted interstitial edema.     10/17 on IVFs, dilaudid 1.5 q 3. Oxycodone. Empiric zosyn, Pain in low back. No chest pain, SOB, not requiring oxygen    10/18- Repeating lactic acid, procal, repeat blood culture. CT chest/ abd/ pelvis.  Consulting Crit Care to follow. On Zosyn. Adding vanc, No change in pain meds today.     10/19:  Concern for ACS, hematology consulted this morning.  Recommending simple transfusion at this point.     10/21: Febrile overnight, patient denies any complaints. T.bili increased from 10->15. Heme aware.     Acute pulmonary embolism  Lovenox full dose started  Per CT chest/a/p.  Tachycardia 110-120, no chest pain, minimal hypoxia  US LE bilat  Will need to convert to eliquis. Discussed with hematologist and is ok with switching. Appreciate assistance.  Plan to switch to Eliquis tomorrow. 10 mg BID followed by 5mg BID. He already had 2 days lovenox treatment dose.         Elevated transaminase level  Monitor consider hepatology consult      Severe sepsis  Monitor. Wbc 13-> 17 -> 16  Blood culture  Cxr, UA neg  No concern on abd exam  Likely reactive  Empiric zosyn.     1018- adding vanc, CT ch/ abd/ pelvis. cxr may have developing infiltrate, on IVFs. . Meets requirements for Severe SEPSIS with high wbc, tachycardia, hypoxia, RR 20, rising LFTs.  Consuted Crit care to follow.       Hyperbilirubinemia  -Chronic, suspect due to combination of hemolysis causing unconjugated hyperbilirubinemia, and intrahepatic cholestasis from SC disease  -Continue to monitor while admitted, pt. Currently follows with hepatology as outpatient        VTE Risk Mitigation (From admission, onward)         Ordered     apixaban tablet 5 mg  2 times daily         10/20/23 1643     apixaban tablet 10 mg  2 times daily         10/20/23 1643     IP  VTE LOW RISK PATIENT  Once         10/15/23 2012     IP VTE HIGH RISK PATIENT  Once         10/15/23 2012                Discharge Planning   [place]: 10/23/2023     Code Status: Full Code   Is the patient medically ready for discharge?: No    Reason for patient still in hospital (select all that apply): Patient trending condition and Treatment  Discharge Plan A: Home   Discharge Delays: None known at this time              Getachew Krishna DO  Department of Hospital Medicine   [facility] - Intensive Care ([place]-16)

## 2023-10-21 NOTE — SUBJECTIVE & OBJECTIVE
Interval History: Patient denies any other complaints. He reports feeling better today.       Objective:     Vital Signs (Most Recent):  Temp: 98.3 °F (36.8 °C) (10/21/23 1205)  Pulse: 100 (10/21/23 1205)  Resp: 16 (10/21/23 1250)  BP: 126/70 (10/21/23 1205)  SpO2: 95 % (10/21/23 1205) Vital Signs (24h Range):  Temp:  [97.7 °F (36.5 °C)-101.2 °F (38.4 °C)] 98.3 °F (36.8 °C)  Pulse:  [] 100  Resp:  [16-20] 16  SpO2:  [94 %-100 %] 95 %  BP: (126-156)/(70-85) 126/70     Weight: 83.9 kg (185 lb)  Body mass index is 24.41 kg/m².    Intake/Output Summary (Last 24 hours) at 10/21/2023 1517  Last data filed at 10/21/2023 0800  Gross per 24 hour   Intake 240 ml   Output 3300 ml   Net -3060 ml         Physical Exam  Constitutional:       General: He is not in acute distress.     Appearance: Normal appearance. He is not ill-appearing, toxic-appearing or diaphoretic.   HENT:      Head: Normocephalic and atraumatic.      Nose: Nose normal.      Mouth/Throat:      Mouth: Mucous membranes are moist.      Pharynx: Oropharynx is clear.   Eyes:      General: Scleral icterus (improved since yesterday) present.      Extraocular Movements: Extraocular movements intact.      Conjunctiva/sclera: Conjunctivae normal.      Pupils: Pupils are equal, round, and reactive to light.   Cardiovascular:      Rate and Rhythm: Regular rhythm. Tachycardia present.      Pulses: Normal pulses.   Pulmonary:      Effort: Pulmonary effort is normal. No respiratory distress.      Breath sounds: Normal breath sounds. No stridor. No wheezing, rhonchi or rales.   Chest:      Chest wall: No tenderness.   Abdominal:      General: Abdomen is flat. Bowel sounds are normal. There is no distension.      Palpations: Abdomen is soft.      Tenderness: There is no abdominal tenderness. There is no right CVA tenderness, left CVA tenderness, guarding or rebound.   Musculoskeletal:         General: No swelling. Normal range of motion.      Cervical back: Normal range  of motion and neck supple. No rigidity or tenderness.      Right lower leg: No edema.      Left lower leg: No edema.   Skin:     General: Skin is warm and dry.      Coloration: Skin is not jaundiced.      Findings: No bruising, erythema or rash.   Neurological:      General: No focal deficit present.      Mental Status: He is alert and oriented to person, place, and time. Mental status is at baseline.      Motor: No weakness.      Gait: Gait normal.   Psychiatric:         Mood and Affect: Mood normal.         Behavior: Behavior normal.         Thought Content: Thought content normal.         Judgment: Judgment normal.             Significant Labs: All pertinent labs within the past 24 hours have been reviewed.    Significant Imaging: I have reviewed all pertinent imaging results/findings within the past 24 hours.

## 2023-10-22 LAB
ALBUMIN SERPL BCP-MCNC: 2.4 G/DL (ref 3.5–5.2)
ALP SERPL-CCNC: 376 U/L (ref 55–135)
ALT SERPL W/O P-5'-P-CCNC: 76 U/L (ref 10–44)
ANION GAP SERPL CALC-SCNC: 11 MMOL/L (ref 8–16)
AST SERPL-CCNC: 113 U/L (ref 10–40)
BASOPHILS # BLD AUTO: 0.08 K/UL (ref 0–0.2)
BASOPHILS NFR BLD: 0.8 % (ref 0–1.9)
BILIRUB SERPL-MCNC: 15.1 MG/DL (ref 0.1–1)
BUN SERPL-MCNC: 5 MG/DL (ref 6–20)
CALCIUM SERPL-MCNC: 9.2 MG/DL (ref 8.7–10.5)
CHLORIDE SERPL-SCNC: 96 MMOL/L (ref 95–110)
CO2 SERPL-SCNC: 26 MMOL/L (ref 23–29)
CREAT SERPL-MCNC: 0.6 MG/DL (ref 0.5–1.4)
DIFFERENTIAL METHOD: ABNORMAL
EOSINOPHIL # BLD AUTO: 0.4 K/UL (ref 0–0.5)
EOSINOPHIL NFR BLD: 3.3 % (ref 0–8)
ERYTHROCYTE [DISTWIDTH] IN BLOOD BY AUTOMATED COUNT: 20.7 % (ref 11.5–14.5)
EST. GFR  (NO RACE VARIABLE): >60 ML/MIN/1.73 M^2
GLUCOSE SERPL-MCNC: 112 MG/DL (ref 70–110)
HCT VFR BLD AUTO: 23.4 % (ref 40–54)
HGB BLD-MCNC: 8.3 G/DL (ref 14–18)
IMM GRANULOCYTES # BLD AUTO: 0.21 K/UL (ref 0–0.04)
IMM GRANULOCYTES NFR BLD AUTO: 2 % (ref 0–0.5)
LYMPHOCYTES # BLD AUTO: 1.7 K/UL (ref 1–4.8)
LYMPHOCYTES NFR BLD: 16 % (ref 18–48)
MAGNESIUM SERPL-MCNC: 1.8 MG/DL (ref 1.6–2.6)
MCH RBC QN AUTO: 27.9 PG (ref 27–31)
MCHC RBC AUTO-ENTMCNC: 35.5 G/DL (ref 32–36)
MCV RBC AUTO: 79 FL (ref 82–98)
MONOCYTES # BLD AUTO: 1.3 K/UL (ref 0.3–1)
MONOCYTES NFR BLD: 12.3 % (ref 4–15)
NEUTROPHILS # BLD AUTO: 6.9 K/UL (ref 1.8–7.7)
NEUTROPHILS NFR BLD: 65.6 % (ref 38–73)
NRBC BLD-RTO: 2 /100 WBC
PHOSPHATE SERPL-MCNC: 3.2 MG/DL (ref 2.7–4.5)
PLATELET # BLD AUTO: 199 K/UL (ref 150–450)
PMV BLD AUTO: 10.5 FL (ref 9.2–12.9)
POTASSIUM SERPL-SCNC: 3.4 MMOL/L (ref 3.5–5.1)
PROT SERPL-MCNC: 7.5 G/DL (ref 6–8.4)
RBC # BLD AUTO: 2.97 M/UL (ref 4.6–6.2)
SODIUM SERPL-SCNC: 133 MMOL/L (ref 136–145)
VANCOMYCIN TROUGH SERPL-MCNC: 9.1 UG/ML (ref 10–22)
WBC # BLD AUTO: 10.54 K/UL (ref 3.9–12.7)

## 2023-10-22 PROCEDURE — 63600175 PHARM REV CODE 636 W HCPCS: Performed by: STUDENT IN AN ORGANIZED HEALTH CARE EDUCATION/TRAINING PROGRAM

## 2023-10-22 PROCEDURE — 25000003 PHARM REV CODE 250: Performed by: HOSPITALIST

## 2023-10-22 PROCEDURE — 99222 PR INITIAL HOSPITAL CARE,LEVL II: ICD-10-PCS | Mod: ,,, | Performed by: STUDENT IN AN ORGANIZED HEALTH CARE EDUCATION/TRAINING PROGRAM

## 2023-10-22 PROCEDURE — 99222 1ST HOSP IP/OBS MODERATE 55: CPT | Mod: ,,, | Performed by: STUDENT IN AN ORGANIZED HEALTH CARE EDUCATION/TRAINING PROGRAM

## 2023-10-22 PROCEDURE — 12000002 HC ACUTE/MED SURGE SEMI-PRIVATE ROOM

## 2023-10-22 PROCEDURE — 97530 THERAPEUTIC ACTIVITIES: CPT

## 2023-10-22 PROCEDURE — 25000003 PHARM REV CODE 250: Performed by: EMERGENCY MEDICINE

## 2023-10-22 PROCEDURE — 25000003 PHARM REV CODE 250: Performed by: STUDENT IN AN ORGANIZED HEALTH CARE EDUCATION/TRAINING PROGRAM

## 2023-10-22 PROCEDURE — 63600175 PHARM REV CODE 636 W HCPCS: Performed by: HOSPITALIST

## 2023-10-22 PROCEDURE — 63700000 PHARM REV CODE 250 ALT 637 W/O HCPCS: Performed by: STUDENT IN AN ORGANIZED HEALTH CARE EDUCATION/TRAINING PROGRAM

## 2023-10-22 PROCEDURE — 97161 PT EVAL LOW COMPLEX 20 MIN: CPT

## 2023-10-22 PROCEDURE — 97535 SELF CARE MNGMENT TRAINING: CPT

## 2023-10-22 PROCEDURE — 84100 ASSAY OF PHOSPHORUS: CPT | Performed by: HOSPITALIST

## 2023-10-22 PROCEDURE — 85025 COMPLETE CBC W/AUTO DIFF WBC: CPT | Performed by: HOSPITALIST

## 2023-10-22 PROCEDURE — S5010 5% DEXTROSE AND 0.45% SALINE: HCPCS | Performed by: HOSPITALIST

## 2023-10-22 PROCEDURE — 80053 COMPREHEN METABOLIC PANEL: CPT | Performed by: HOSPITALIST

## 2023-10-22 PROCEDURE — 83735 ASSAY OF MAGNESIUM: CPT | Performed by: STUDENT IN AN ORGANIZED HEALTH CARE EDUCATION/TRAINING PROGRAM

## 2023-10-22 PROCEDURE — 36415 COLL VENOUS BLD VENIPUNCTURE: CPT | Performed by: HOSPITALIST

## 2023-10-22 PROCEDURE — 80202 ASSAY OF VANCOMYCIN: CPT | Performed by: STUDENT IN AN ORGANIZED HEALTH CARE EDUCATION/TRAINING PROGRAM

## 2023-10-22 PROCEDURE — 36415 COLL VENOUS BLD VENIPUNCTURE: CPT | Performed by: STUDENT IN AN ORGANIZED HEALTH CARE EDUCATION/TRAINING PROGRAM

## 2023-10-22 PROCEDURE — 97165 OT EVAL LOW COMPLEX 30 MIN: CPT

## 2023-10-22 RX ORDER — POTASSIUM CHLORIDE 20 MEQ/1
40 TABLET, EXTENDED RELEASE ORAL ONCE
Status: COMPLETED | OUTPATIENT
Start: 2023-10-22 | End: 2023-10-22

## 2023-10-22 RX ORDER — AZITHROMYCIN 250 MG/1
250 TABLET, FILM COATED ORAL DAILY
Status: COMPLETED | OUTPATIENT
Start: 2023-10-23 | End: 2023-10-26

## 2023-10-22 RX ORDER — AZITHROMYCIN 250 MG/1
500 TABLET, FILM COATED ORAL ONCE
Status: COMPLETED | OUTPATIENT
Start: 2023-10-22 | End: 2023-10-22

## 2023-10-22 RX ADMIN — OXYCODONE AND ACETAMINOPHEN 1 TABLET: 10; 325 TABLET ORAL at 05:10

## 2023-10-22 RX ADMIN — DEXTROSE AND SODIUM CHLORIDE: 5; 450 INJECTION, SOLUTION INTRAVENOUS at 12:10

## 2023-10-22 RX ADMIN — APIXABAN 10 MG: 5 TABLET, FILM COATED ORAL at 09:10

## 2023-10-22 RX ADMIN — POLYETHYLENE GLYCOL 3350 17 G: 17 POWDER, FOR SOLUTION ORAL at 08:10

## 2023-10-22 RX ADMIN — OXYCODONE AND ACETAMINOPHEN 1 TABLET: 10; 325 TABLET ORAL at 07:10

## 2023-10-22 RX ADMIN — CEFEPIME 2 G: 2 INJECTION, POWDER, FOR SOLUTION INTRAVENOUS at 08:10

## 2023-10-22 RX ADMIN — AZITHROMYCIN 500 MG: 250 TABLET, FILM COATED ORAL at 02:10

## 2023-10-22 RX ADMIN — CEFEPIME 2 G: 2 INJECTION, POWDER, FOR SOLUTION INTRAVENOUS at 01:10

## 2023-10-22 RX ADMIN — SENNOSIDES AND DOCUSATE SODIUM 1 TABLET: 50; 8.6 TABLET ORAL at 09:10

## 2023-10-22 RX ADMIN — POTASSIUM CHLORIDE 40 MEQ: 1500 TABLET, EXTENDED RELEASE ORAL at 09:10

## 2023-10-22 RX ADMIN — APIXABAN 10 MG: 5 TABLET, FILM COATED ORAL at 08:10

## 2023-10-22 RX ADMIN — VANCOMYCIN HYDROCHLORIDE 2000 MG: 10 INJECTION, POWDER, LYOPHILIZED, FOR SOLUTION INTRAVENOUS at 12:10

## 2023-10-22 RX ADMIN — MUPIROCIN: 20 OINTMENT TOPICAL at 09:10

## 2023-10-22 RX ADMIN — OXYCODONE AND ACETAMINOPHEN 1 TABLET: 10; 325 TABLET ORAL at 09:10

## 2023-10-22 RX ADMIN — FOLIC ACID 1 MG: 1 TABLET ORAL at 08:10

## 2023-10-22 RX ADMIN — DEXTROSE AND SODIUM CHLORIDE: 5; 450 INJECTION, SOLUTION INTRAVENOUS at 01:10

## 2023-10-22 RX ADMIN — FLUOXETINE 10 MG: 10 CAPSULE ORAL at 08:10

## 2023-10-22 RX ADMIN — MUPIROCIN: 20 OINTMENT TOPICAL at 08:10

## 2023-10-22 RX ADMIN — SENNOSIDES AND DOCUSATE SODIUM 1 TABLET: 50; 8.6 TABLET ORAL at 08:10

## 2023-10-22 RX ADMIN — HYDROMORPHONE HYDROCHLORIDE 1 MG: 2 INJECTION, SOLUTION INTRAMUSCULAR; INTRAVENOUS; SUBCUTANEOUS at 03:10

## 2023-10-22 RX ADMIN — VANCOMYCIN HYDROCHLORIDE 2000 MG: 10 INJECTION, POWDER, LYOPHILIZED, FOR SOLUTION INTRAVENOUS at 03:10

## 2023-10-22 RX ADMIN — HYDROXYUREA 500 MG: 500 CAPSULE ORAL at 08:10

## 2023-10-22 RX ADMIN — CEFTRIAXONE SODIUM 2 G: 2 INJECTION, POWDER, FOR SOLUTION INTRAMUSCULAR; INTRAVENOUS at 03:10

## 2023-10-22 RX ADMIN — OXYCODONE AND ACETAMINOPHEN 1 TABLET: 10; 325 TABLET ORAL at 12:10

## 2023-10-22 NOTE — ASSESSMENT & PLAN NOTE
28M with PMH of sickle cell disease admitted for treatment of pain crisis. Patient had a leukocytosis on admission which has now normalized. CT C/A/P with contrast showing R sided PE and LLL opacity either infection vs infarct. TTE shows EF 55-60%. Blood cultures without any growth on 10/16, 10/18, and 10/19. Last febrile on 10/20 up to 101.2, has been afebrile since then. Was on vancomycin and zosyn from 10/15 - 10/18, then switched zosyn to cefepime. Will treat for CAP.     Recommendations  · Stop vancomycin  · Switch cefepime to ceftriaxone, continue for 7-10 days total  · Add azithromycin, continue for 5 days  · Repeat CXR

## 2023-10-22 NOTE — PLAN OF CARE
PT Eval complete and POC established.    10/22/2023    Problem: Physical Therapy  Goal: Physical Therapy Goal  Description: Goals to be met by: 2023     Patient will increase functional independence with mobility by performin. Sit to stand transfer with [place]  2. Gait  x 200 feet with [place] using No Assistive Device.   3. Ascend/descend 15 stair with right Handrails Supervision using No Assistive Device.     Outcome: Ongoing, Progressing

## 2023-10-22 NOTE — SUBJECTIVE & OBJECTIVE
Interval History: see above      Objective:     Vital Signs (Most Recent):  Temp: 99.3 °F (37.4 °C) (10/22/23 1130)  Pulse: 96 (10/22/23 1130)  Resp: 16 (10/22/23 1227)  BP: 136/84 (10/22/23 1130)  SpO2: 100 % (10/22/23 1130) Vital Signs (24h Range):  Temp:  [97.6 °F (36.4 °C)-100.3 °F (37.9 °C)] 99.3 °F (37.4 °C)  Pulse:  [75-98] 96  Resp:  [16-20] 16  SpO2:  [97 %-100 %] 100 %  BP: (126-164)/(74-90) 136/84     Weight: 83.9 kg (185 lb)  Body mass index is 24.41 kg/m².    Intake/Output Summary (Last 24 hours) at 10/22/2023 1411  Last data filed at 10/22/2023 0927  Gross per 24 hour   Intake 100 ml   Output 1100 ml   Net -1000 ml         Physical Exam  Constitutional:       General: He is not in acute distress.     Appearance: Normal appearance. He is not ill-appearing, toxic-appearing or diaphoretic.   HENT:      Head: Normocephalic and atraumatic.      Nose: Nose normal.      Mouth/Throat:      Mouth: Mucous membranes are moist.      Pharynx: Oropharynx is clear.   Eyes:      General: Scleral icterus (improved since yesterday) present.      Extraocular Movements: Extraocular movements intact.      Conjunctiva/sclera: Conjunctivae normal.      Pupils: Pupils are equal, round, and reactive to light.   Cardiovascular:      Rate and Rhythm: Regular rhythm. Tachycardia present.      Pulses: Normal pulses.   Pulmonary:      Effort: Pulmonary effort is normal. No respiratory distress.      Breath sounds: Normal breath sounds. No stridor. No wheezing, rhonchi or rales.   Chest:      Chest wall: No tenderness.   Abdominal:      General: Abdomen is flat. Bowel sounds are normal. There is no distension.      Palpations: Abdomen is soft.      Tenderness: There is no abdominal tenderness. There is no right CVA tenderness, left CVA tenderness, guarding or rebound.   Musculoskeletal:         General: No swelling. Normal range of motion.      Cervical back: Normal range of motion and neck supple. No rigidity or tenderness.       Right lower leg: No edema.      Left lower leg: No edema.   Skin:     General: Skin is warm and dry.      Coloration: Skin is not jaundiced.      Findings: No bruising, erythema or rash.   Neurological:      General: No focal deficit present.      Mental Status: He is alert and oriented to person, place, and time. Mental status is at baseline.      Motor: No weakness.      Gait: Gait normal.   Psychiatric:         Mood and Affect: Mood normal.         Behavior: Behavior normal.         Thought Content: Thought content normal.         Judgment: Judgment normal.             Significant Labs: All pertinent labs within the past 24 hours have been reviewed.    Significant Imaging: I have reviewed all pertinent imaging results/findings within the past 24 hours.

## 2023-10-22 NOTE — PROGRESS NOTES
Pharmacokinetic Assessment Follow Up: IV Vancomycin    Vancomycin serum concentration assessment(s):    Vancomycin trough = 9.1 mcg/mL. Patient remains below goal for the entire course of vancomycin so far. Dosing/frequency starting to reach upper limits.     Vancomycin Regimen Plan:  Continue vancomycin 2000 mg IV q8h   Repeat trough @ 1200 on 10/23 - curious to see if there will be continued accumulation on current regimen  Goal trough = 15-20 mcg/mL  ID consulted for antibiotic de-escalation/changes    Thank you for the consult, will continue to follow    Enrrique Martinez PharmKamronD., BCPS  [id number]       Patient brief summary:  Parrish Davis is a 28 y.o. male initiated on antimicrobial therapy with IV Vancomycin for treatment of sepsis    Drug levels (last 3 results):  Recent Labs   Lab Result Units 10/20/23  1033 10/21/23  1111 10/22/23  1106   Vancomycin-Trough ug/mL 2.7* 6.2* 9.1*         Drug Allergies:   Review of patient's allergies indicates:  No Known Allergies    Actual Body Weight:   83.9 kg    Renal Function:   Estimated Creatinine Clearance: 205.3 mL/min (based on SCr of 0.6 mg/dL).,       CBC (last 72 hours):  Recent Labs   Lab Result Units 10/19/23  1811 10/20/23  0221 10/21/23  0327 10/22/23  0403   WBC K/uL 12.89* 12.29 10.15 10.54   Hemoglobin g/dL 8.7* 8.6* 9.2* 8.3*   Hematocrit % 24.3* 24.7* 25.6* 23.4*   Platelets K/uL 167 165 185 199   Gran % % 80.0* 73.6* 68.8 65.6   Lymph % % 7.8* 11.6* 15.9* 16.0*   Mono % % 7.9 8.2 8.4 12.3   Eosinophil % % 2.2 3.7 4.4 3.3   Basophil % % 0.4 0.6 0.6 0.8   Differential Method  Automated Automated Automated Automated         Metabolic Panel (last 72 hours):  Recent Labs   Lab Result Units 10/19/23  1811 10/20/23  0221 10/21/23  0327 10/22/23  0403   Sodium mmol/L 132* 133* 134* 133*   Potassium mmol/L 3.3* 4.2 3.0* 3.4*   Chloride mmol/L 100 99 97 96   CO2 mmol/L 25 26 26 26   Glucose mg/dL 310* 110 123* 112*   BUN mg/dL 7 7 7 5*   Creatinine mg/dL 0.6 0.5 0.6  0.6   Albumin g/dL 2.5* 2.5* 2.5* 2.4*   Total Bilirubin mg/dL 14.9* 14.8* 17.3* 15.1*   Alkaline Phosphatase U/L 321* 327* 355* 376*   AST U/L 66* 81* 86* 113*   ALT U/L 48* 49* 60* 76*   Magnesium mg/dL  --   --   --  1.8   Phosphorus mg/dL  --  2.7 3.2 3.2         Vancomycin Administrations:  vancomycin given in the last 96 hours                     vancomycin 1,500 mg in dextrose 5 % (D5W) 250 mL IVPB (Vial-Mate) (mg) 1,500 mg New Bag 10/20/23 1125     1,500 mg New Bag  0021     1,500 mg New Bag 10/19/23 0952     1,500 mg New Bag  0003    vancomycin 2 g in dextrose 5 % 500 mL IVPB (mg) 2,000 mg New Bag 10/18/23 1114                    Microbiologic Results:  Microbiology Results (last 7 days)       Procedure Component Value Units Date/Time    Blood culture [[id number]] Collected: 10/18/23 0920    Order Status: Completed Specimen: Blood Updated: 10/22/23 1212     Blood Culture, Routine No Growth to date      No Growth to date      No Growth to date      No Growth to date      No Growth to date    Blood culture [[id number]] Collected: 10/19/23 1810    Order Status: Completed Specimen: Blood Updated: 10/21/23 2012     Blood Culture, Routine No Growth to date      No Growth to date      No Growth to date    Blood culture [[id number]] Collected: 10/16/23 1431    Order Status: Completed Specimen: Blood Updated: 10/21/23 1612     Blood Culture, Routine No growth after 5 days.

## 2023-10-22 NOTE — PROGRESS NOTES
"[facility] - [facility] ([address] Medicine  Progress Note    Patient Name: Parrish Davis  MRN: [id number]  Patient Class: IP- Inpatient   Admission Date: 10/15/2023  Length of Stay: 7 days  Attending Physician: Getachew Krishna DO  Primary Care Provider: Jovanny Douglas MD        Subjective:     Principal Problem:Sickle-cell disease with vaso-occlusive pain        HPI:  28 y.o. man with sickle cell anemia HbSS who presents to the ED complaining of  uncontrolled body aches/pain x1 day He reports the pain started late last night/early this morning with onset of severe lower back pain. It has progressed to involve his entire body per patient, stating that he hurts "all over". No reported cough, SOB, fevers, or chills. Pain poorly controlled with home PO medications, so he came to the ED.      Overview/Hospital Course:  10/16- wbc - 13->18, retic 10.4, Hb 9.4. bili 3.6. ast/alt- slight elevation. Cxr reviewed by me- No infiltrate, Interstitial markings look chronic.  Pain located in Back and knees. No abd pain. Reorts an episode of chest pain earlier now resolved. No SOB. He looks uncomfortable. Nurse to administer IV dialudid now. Continue IVFs.  His mother at bedside reports he had about 30 admission in [facility], approx 3 /yr, and 3 so far this year. /81  Pulse 71   Temp 97.3 °F (36.3 °C) (Oral)   Resp 18    SpO2 100%   2 liters oxygen.    Old CT chest from last admission 8.24.23, - Multifocal nodular opacities with adjacent ground-glass attenuation distributed throughout the bilateral lower lobes, differential considerations include infectious/inflammatory causes, aspiration, or pulmonary infarction in the setting of sickle cell disease.    10/17- /88 Pulse 100  SpO2 93%  . AF. Wbc 17. On empiric zosyn. Pt not room  when I came for a second visit 5 pm yesterday. He said he must have been in BR.  Cultures neg. Pain scale 10->8. Dilaudid PCA started on day 1 was stopped. 24 h " "MEDD 306.33 of 384.. On oxycodone 10 (MEDD 60), Dilaudid 1mg, 2 mg (MEDD 180) _ dilsudid pump. On IVFs.  Functional status per nurse:  Ambulating to BR, talking, eating, sleeping, has ADls. He reports not eating.  Will wean dilaudid to 1.5 q 3, today. Pain located in low back today. Looks comfortable after receiving dilaudid. Mother present in room. I gave them a lecture on how opioids work, their benefits and pitfalls, and how/why weaning or stopping the medication can cause rebound pain.  Recommended slow wean in supervised setting in hospital, while monitoring for complications of SSA. They expressed understanding  4:35 pm - started oxygen 2 liters, for sat 85%, now 92%, -110. Cxr ordered      10/18- Hb 9.4.  cxr 10/17- The lungs are symmetrically expanded bilaterally with coarse central hilar interstitial attenuation suggesting edema.  There is slightly increased parenchymal attenuation projected over the left lower lung zone, may reflect atelectasis however developing consolidation not excluded..  No large focal consolidation seen. There is no pneumothorax.  The osseous structures are unchanged.  On IVFs.   94% 1 iter NC.   /87    Pulse 138   Temp 99.7 °F   Resp 20 . ECHO 3/2023 - EF 60%.  Bl cult 10/16- NGSF. Ast/alt rising. Wbc 18-> 16.  Will add lactic acid, procal, repeat blood culture. CT chest/ abd/ pelvis.  Consulting Crit Care to follow. On Zosyn. Changing to cefepime. Adding vanc.   Nurse reports 7/10 pain this AM. still in lower back, not radiating anywhere. + rigors.  Low grade fever.   3:30 pm: CT + for Pulmonary Embolus, and forming infiltrate. Started FULL dose lovenox.  On cefepime and vanc.    Suspecting acute chest syndrome, hematology consulted.  Patient to be given blood transfusion, simple.  Per hematology: "If symptoms ( pain, hypoxemia) worsen he might benefit from partial/ complete exchange transfusion."Continue to monitor patient closely.    Patient is stable today. " He reports mild abdominal pain, reports no BM since Sunday. Denies any aggravating or alleviating factors to his abdominal pain. Discussed using laxativbes. He is currently taking Miralax. Appetite has been improving. Denies any chest pain, shortness of breath. Continues with be stable on nasal canula. Hematology would like to trend reticulocytes daily and incentive spirometry.     10/21: Patient was febrile overnight. He denies feeling febrile. Does report weakness, planning to consulting PT/OT. Bilirubin is trending up from 10 to 15. Heme consulted and is following. Aware of the fever and bilirubin.      10/22:  Patient is pleasant, denies any chest pain, abdominal pain, denies any nausea.  Discussed with the patient current plan.  Consulting Infectious Disease for further antibiotic management, appreciate assistance.  Continues to have an elevated bilirubin, reticulocytosis.  No acute events overnight.        Interval History: see above      Objective:     Vital Signs (Most Recent):  Temp: 99.3 °F (37.4 °C) (10/22/23 1130)  Pulse: 96 (10/22/23 1130)  Resp: 16 (10/22/23 1227)  BP: 136/84 (10/22/23 1130)  SpO2: 100 % (10/22/23 1130) Vital Signs (24h Range):  Temp:  [97.6 °F (36.4 °C)-100.3 °F (37.9 °C)] 99.3 °F (37.4 °C)  Pulse:  [75-98] 96  Resp:  [16-20] 16  SpO2:  [97 %-100 %] 100 %  BP: (126-164)/(74-90) 136/84     Weight: 83.9 kg (185 lb)  Body mass index is 24.41 kg/m².    Intake/Output Summary (Last 24 hours) at 10/22/2023 1411  Last data filed at 10/22/2023 0927  Gross per 24 hour   Intake 100 ml   Output 1100 ml   Net -1000 ml         Physical Exam  Constitutional:       General: He is not in acute distress.     Appearance: Normal appearance. He is not ill-appearing, toxic-appearing or diaphoretic.   HENT:      Head: Normocephalic and atraumatic.      Nose: Nose normal.      Mouth/Throat:      Mouth: Mucous membranes are moist.      Pharynx: Oropharynx is clear.   Eyes:      General: Scleral icterus  (improved since yesterday) present.      Extraocular Movements: Extraocular movements intact.      Conjunctiva/sclera: Conjunctivae normal.      Pupils: Pupils are equal, round, and reactive to light.   Cardiovascular:      Rate and Rhythm: Regular rhythm. Tachycardia present.      Pulses: Normal pulses.   Pulmonary:      Effort: Pulmonary effort is normal. No respiratory distress.      Breath sounds: Normal breath sounds. No stridor. No wheezing, rhonchi or rales.   Chest:      Chest wall: No tenderness.   Abdominal:      General: Abdomen is flat. Bowel sounds are normal. There is no distension.      Palpations: Abdomen is soft.      Tenderness: There is no abdominal tenderness. There is no right CVA tenderness, left CVA tenderness, guarding or rebound.   Musculoskeletal:         General: No swelling. Normal range of motion.      Cervical back: Normal range of motion and neck supple. No rigidity or tenderness.      Right lower leg: No edema.      Left lower leg: No edema.   Skin:     General: Skin is warm and dry.      Coloration: Skin is not jaundiced.      Findings: No bruising, erythema or rash.   Neurological:      General: No focal deficit present.      Mental Status: He is alert and oriented to person, place, and time. Mental status is at baseline.      Motor: No weakness.      Gait: Gait normal.   Psychiatric:         Mood and Affect: Mood normal.         Behavior: Behavior normal.         Thought Content: Thought content normal.         Judgment: Judgment normal.             Significant Labs: All pertinent labs within the past 24 hours have been reviewed.    Significant Imaging: I have reviewed all pertinent imaging results/findings within the past 24 hours.      Assessment/Plan:      * Sickle-cell disease with vaso-occlusive pain  -Pt. With severe joint pains typical of vaso-occlusive crises. No reported chest pain, no hypoxia, and CXR without infiltrate (improved from prior) not consistent with acute  chest.   - ( Old CTchest 8/24/23, had bilat lower patchy interstitial edema.)   -Plan to treat with IV fluids, PCA pump ordered for more continuous pain med dosing as pt. Complained of severe persistent pain after interval dosing in ED. Continue home meds folic acid and hydroxyurea (voxeletor not on formulary)    10/16- on IVFs, dilaudid 2 q 3 prn, and oxy 10 q 4 prn. PCA discontinued. cxr and urine neg for infection. CT noted interstitial edema.     10/17 on IVFs, dilaudid 1.5 q 3. Oxycodone. Empiric zosyn, Pain in low back. No chest pain, SOB, not requiring oxygen    10/18- Repeating lactic acid, procal, repeat blood culture. CT chest/ abd/ pelvis.  Consulting Crit Care to follow. On Zosyn. Adding vanc, No change in pain meds today.     10/19:  Concern for ACS, hematology consulted this morning.  Recommending simple transfusion at this point.     10/21: Febrile overnight, patient denies any complaints. T.bili increased from 10->15. Heme aware.     Acute pulmonary embolism  Lovenox full dose started  Per CT chest/a/p.  Tachycardia 110-120, no chest pain, minimal hypoxia  US LE bilat  Will need to convert to eliquis. Discussed with hematologist and is ok with switching. Appreciate assistance.  Plan to switch to Eliquis tomorrow. 10 mg BID followed by 5mg BID. He already had 2 days lovenox treatment dose.         Elevated transaminase level  Monitor consider hepatology consult      Severe sepsis  Monitor. Wbc 13-> 17 -> 16  Blood culture  Cxr, UA neg  No concern on abd exam  Likely reactive  Empiric zosyn.     1018- adding vanc, CT ch/ abd/ pelvis. cxr may have developing infiltrate, on IVFs. . Meets requirements for Severe SEPSIS with high wbc, tachycardia, hypoxia, RR 20, rising LFTs.  Consuted Crit care to follow.     10/22: consulting ID to de-escalate abx. Appreciate assistance.       Hyperbilirubinemia  -Chronic, suspect due to combination of hemolysis causing unconjugated hyperbilirubinemia, and intrahepatic  cholestasis from SC disease  -Continue to monitor while admitted, pt. Currently follows with hepatology as outpatient        VTE Risk Mitigation (From admission, onward)         Ordered     apixaban tablet 5 mg  2 times daily         10/20/23 1643     apixaban tablet 10 mg  2 times daily         10/20/23 1643     IP VTE LOW RISK PATIENT  Once         10/15/23 2012     IP VTE HIGH RISK PATIENT  Once         10/15/23 2012                Discharge Planning   [place]: 10/23/2023     Code Status: Full Code   Is the patient medically ready for discharge?: No    Reason for patient still in hospital (select all that apply): Patient trending condition and Treatment  Discharge Plan A: Home   Discharge Delays: None known at this time              Getachew Krishna DO  Department of Hospital Medicine   [facility] - Intensive Care ([place])

## 2023-10-22 NOTE — PT/OT/SLP EVAL
Physical Therapy Co-Evaluation  Co-eval performed to appropriately and safely assess patient's strength and endurance while facilitating functional tasks in addition to accommodating for patient's activity tolerance.    Patient Name:  Parrish Davis   MRN:  [id number]    Recommendations:     Discharge Recommendations: Low Intensity Therapy   Discharge Equipment Recommendations: none   Barriers to discharge: None    Assessment:     Parrish Davis is a 28 y.o. male admitted with a medical diagnosis of Sickle-cell disease with vaso-occlusive pain.  He presents with the following impairments/functional limitations: weakness, decreased lower extremity function, decreased upper extremity function, pain. Pt primarily limited by pain, but is mobilizing safely but slowly.    Rehab Prognosis: Good; patient would benefit from acute skilled PT services to address these deficits and reach maximum level of function.    Recent Surgery: * No surgery found *      Plan:     During this hospitalization, patient to be seen 3 x/week to address the identified rehab impairments via gait training, therapeutic activities, therapeutic exercises, neuromuscular re-education and progress toward the following goals:    Plan of Care Expires:  11/21/23    Subjective     Chief Complaint: pain in knees (L>R)  Patient/Family Comments/goals: return to PLOF  Pain/Comfort:  Pain Rating 1:  (not rated)  Location - Side 1: Left  Location 1: knee (LEs in general hurt, but L knee and thigh are the most pain, dull ache)  Pain Addressed 1: Reposition, Distraction  Pain Rating Post-Intervention 1: other (see comments) (not rated)    Patients cultural, spiritual, Tenriism conflicts given the current situation: no    Living Environment:  Living Environment: Patient lives with mom in a 2 SH with 3 MAGGIE to enter and B HRs (patent can reach them at the same time). Patient has R handrail to second floor. Patient has bedroom on second floor. Patient has a tub  shower combo with no bench or grab bar. Patient has a low toilet. Patient drives and works in office. Patient owns no DME. Patient was independent with ADLs and functional mobility PTA.     Objective:     Communicated with RN prior to session.  Patient found HOB elevated with peripheral IV, pulse ox (continuous)  upon PT entry to room.    General Precautions: Standard,    Orthopedic Precautions:N/A   Braces: N/A  Respiratory Status: Room air    Exams:  Cognitive Exam:  Patient is oriented to Person, Place, Time, and Situation  Gross Motor Coordination:  WFL  Postural Exam:  Patient presented with the following abnormalities:    -       No postural abnormalities identified  Sensation:  intact  RLE ROM: WFL  RLE Strength: grossly 4/5 to 5/5, limited by pain  LLE ROM: WFL  LLE Strength:grossly 4/5 to 5/5, limited by pain    Functional Mobility:  Bed Mobility:     Scooting: independence  Supine to Sit: independence  Sit to Supine: independence  Transfers:     Sit to Stand from bed:  supervision with no AD  Sit to Stand:  minimum assistance with no AD  Gait: 12 ft x2, Supervision, no AD; decreased gait speed      AM-PAC 6 CLICK MOBILITY  Total Score:20       Treatment & Education:  Patient educated on role of therapy, goals of session, and benefits of mobilizing.   Discussed PT plan of care during hospitalization.   Patient educated on calling for assistance.   Patient educated on how their diagnosis impacts their mobility within PT scope of practice.   All questions answered within PT scope of practice.    Patient left HOB elevated with all lines intact, call button in reach, and pt's mother present.    GOALS:   Multidisciplinary Problems       Physical Therapy Goals          Problem: Physical Therapy    Goal Priority Disciplines Outcome Goal Variances Interventions   Physical Therapy Goal     PT, PT/OT Ongoing, Progressing     Description: Goals to be met by: 11/5/2023     Patient will increase functional independence  with mobility by performin. Sit to stand transfer with [place]  2. Gait  x 200 feet with [place] using No Assistive Device.   3. Ascend/descend 15 stair with right Handrails Supervision using No Assistive Device.                          History:     Past Medical History:   Diagnosis Date    Sickle cell anemia     Sickle cell disease        Past Surgical History:   Procedure Laterality Date    chemoport      removed    CHOLECYSTECTOMY      UMBILICAL HERNIA REPAIR         Time Tracking:     PT Received On: 10/22/23  PT Start Time: 1538     PT Stop Time: 1554  PT Total Time (min): 16 min     Billable Minutes: Evaluation 8 and Therapeutic Activity 8      10/22/2023

## 2023-10-22 NOTE — PT/OT/SLP EVAL
Occupational Therapy   Evaluation/Treatment    Name: Parrish Davis  MRN: [id number]  Admitting Diagnosis: Sickle-cell disease with vaso-occlusive pain  Recent Surgery: * No surgery found *      Recommendations:     Discharge Recommendations: Low Intensity Therapy  Discharge Equipment Recommendations:  Patient educated on walker, rolling and riser for toilet with/without handles  Barriers to discharge:  None    Assessment:     Parrish Davis is a 28 y.o. male with a medical diagnosis of Sickle-cell disease with vaso-occlusive pain. Performance deficits affecting function: weakness, impaired endurance, impaired self care skills, impaired functional mobility, gait instability, impaired balance, pain, decreased upper extremity function, decreased lower extremity function. Patient mostly limited by pain and having difficulty from low surfaces for example getting off and on low toilet. Patient and mother educated on attachment for toilet for riser with/without handles if needed if patient for weakness to LEs to assist with sit<>stands. Also discussed RW use if needed to use due to UE strength is strong to assist with weakness to compensate for weakness of LEs when ambulating if needed. Patient would benefit from continued skilled acute OT 3x/wk to improve functional mobility, increase independence with ADLs, and address established goals. Recommending low intensity once medically appropriate for discharge to increase maximal independence, reduce burden of care, and ensure safety.     Rehab Prognosis: Good; patient would benefit from acute skilled OT services to address these deficits and reach maximum level of function.       Plan:     Patient to be seen 3 x/week to address the above listed problems via self-care/home management, therapeutic activities, therapeutic exercises  Plan of Care Expires: 11/22/23  Plan of Care Reviewed with: patient, mother    Subjective     Chief Complaint: pain  Patient/Family  Comments/goals: patient agreed to therapy    Occupational Profile:  Living Environment: Patient lives with mom in a 2 SH with 3 MAGGIE to enter and B HRs (patent can reach them at the same time). Patient has R handrail to second floor. Patient has bedroom on second floor. Patient has a tub shower combo with no bench or grab bar. Patient has a low toilet. Patient drives and works in office. Patient owns no DME. Patient was independent with ADLs and functional mobility PTA.     Pain/Comfort:  Pain Rating 1:  (patient did not rate)  Location - Side 1: Left  Location - Orientation 1: generalized  Location 1: knee  Pain Addressed 1: Reposition, Distraction  Pain Rating Post-Intervention 1:  (patient did not rate)    Patients cultural, spiritual, Pentecostalism conflicts given the current situation:  no    Objective:     Communicated with: NSGERARD prior to session.  Patient found HOB elevated with peripheral IV, pulse ox (continuous) upon OT entry to room.    General Precautions: Standard, fall  Orthopedic Precautions: N/A  Braces: N/A    Occupational Performance:    Bed Mobility:    Patient completed Supine to Sit with independence  Patient completed Sit to Supine with independence    Functional Mobility/Transfers:  Patient completed Sit <> Stand Transfer with supervision  with  no assistive device to and from bed and min A to and from low toilet with no AD.   Patient completed Toilet Transfer bed<>toilet with functional ambulation technique with supervision with  no AD once upright, but needed min A to and from low toilet for sit<>stand     Activities of Daily Living:  Lower Body Dressing: setup for donning and doffing slippers EOB      Cognitive/Visual Perceptual:  Cognitive/Psychosocial Skills:     -       Oriented to: Person, Place, Time, and Situation   -       Follows Commands/attention:Follows multistep  commands  -       Communication: clear/fluent  -       Memory: No Deficits noted  -       Safety awareness/insight to  disability: intact   -       Mood/Affect/Coping skills/emotional control: Appropriate to situation  Visual/Perceptual:      -Intact      Physical Exam:  Upper Extremity Range of Motion:     -       Right Upper Extremity: WFL  -       Left Upper Extremity: WFL  Upper Extremity Strength:    -       Right Upper Extremity: WFL  -       Left Upper Extremity: WFL   Strength:    -       Right Upper Extremity: WFL  -       Left Upper Extremity: WFL  Fine Motor Coordination:    -       Intact  Gross motor coordination:   WFL  R handed      AMPAC 6 Click ADL:  AMPAC Total Score: 21    Treatment & Education:  Role of OT and POC  ADL retraining  Functional mobility training  Safety  Discharge planning  Importance EOB/OOB activity    Co-treatment performed due to patient's multiple deficits requiring two skilled therapists to appropriately and safely assess patient's strength and endurance while facilitating functional tasks in addition to accommodating for patient's activity tolerance.     Patient left HOB elevated with all lines intact, call button in reach, mother present, and all needs met.     GOALS:   Multidisciplinary Problems       Occupational Therapy Goals          Problem: Occupational Therapy    Goal Priority Disciplines Outcome Interventions   Occupational Therapy Goal     OT, PT/OT Ongoing, Progressing    Description: Goals to be met by: 11/13/2023    Patient will increase functional independence with ADLs by performing:    Grooming while standing at sink with [place].  Toileting from toilet with [place] for hygiene and clothing management.   Stand pivot transfers with [place].  Toilet transfer to toilet with [place].                         History:     Past Medical History:   Diagnosis Date    Sickle cell anemia     Sickle cell disease          Past Surgical History:   Procedure Laterality Date    chemoport      removed    CHOLECYSTECTOMY  2002    UMBILICAL HERNIA REPAIR  1995       Time  Tracking:     OT Date of Treatment: 10/22/23  OT Start Time: 1539  OT Stop Time: 1554  OT Total Time (min): 15 min    Billable Minutes:Evaluation 7  Self Care/Home Management 8    10/22/2023

## 2023-10-22 NOTE — ASSESSMENT & PLAN NOTE
Monitor. Wbc 13-> 17 -> 16  Blood culture  Cxr, UA neg  No concern on abd exam  Likely reactive  Empiric zosyn.     1018- adding vanc, CT ch/ abd/ pelvis. cxr may have developing infiltrate, on IVFs. . Meets requirements for Severe SEPSIS with high wbc, tachycardia, hypoxia, RR 20, rising LFTs.  Consuted Crit care to follow.     10/22: consulting ID to de-escalate abx. Appreciate assistance.

## 2023-10-22 NOTE — CONSULTS
"[facility] - Intensive Care ([place] [id number])  Infectious Disease  Consult Note    Patient Name: Parrish Davis  MRN: [id number]  Admission Date: 10/15/2023  Hospital Length of Stay: 7 days  Attending Physician: Getachew Krishna DO  Primary Care Provider: Jovanny Douglas MD     Isolation Status: No active isolations    Patient information was obtained from patient and ER records.      Inpatient consult to Infectious Diseases  Consult performed by: Deanna Rocha DO  Consult ordered by: Getachew Krishna DO        Assessment/Plan:     Pulmonary  CAP (community acquired pneumonia)  28M with PMH of sickle cell disease admitted for treatment of pain crisis. Patient had a leukocytosis on admission which has now normalized. CT C/A/P with contrast showing R sided PE and LLL opacity either infection vs infarct. TTE shows EF 55-60%. Blood cultures without any growth on 10/16, 10/18, and 10/19. Last febrile on 10/20 up to 101.2, has been afebrile since then. Was on vancomycin and zosyn from 10/15 - 10/18, then switched zosyn to cefepime. Will treat for CAP.     Recommendations  Stop vancomycin  Switch cefepime to ceftriaxone, continue for 7-10 days total  Add azithromycin, continue for 5 days  Repeat CXR        Thank you for your consult. I will follow-up with patient. Please contact us if you have any additional questions.    Deanna Rocha DO  Infectious Disease  [facility] - Intensive Care ([place] [address])    Subjective:     Principal Problem: Sickle-cell disease with vaso-occlusive pain    HPI: Mr. Davis is a 28M with PMH of sickle cell disease admitted for treatment of pain crisis. Infectious disease consulted for "Sickle-cell disease with vaso-occlusive pain and PE. Started on broad spectrum antibiotic for "sepsis", without source. Vanc & zosyn on 10/18. Febrile yesterday. However, denies any complaints. Bcx negative. abx recs, further w/u, thank you".    Patient had a leukocytosis on admission which has now normalized. " CT C/A/P with contrast showing R sided PE and LLL opacity either infection vs infarct. TTE shows EF 55-60%. Blood cultures without any growth on 10/16, 10/18, and 10/19. Last febrile on 10/20 up to 101.2, has been afebrile since then. Was on vancomycin and zosyn from 10/15 - 10/18, then switched zosyn to cefepime.       Past Medical History:   Diagnosis Date    Sickle cell anemia     Sickle cell disease        Past Surgical History:   Procedure Laterality Date    chemoport      removed    CHOLECYSTECTOMY  2002    UMBILICAL HERNIA REPAIR  1995       Review of patient's allergies indicates:  No Known Allergies    Medications:  Medications Prior to Admission   Medication Sig    FLUoxetine 10 MG capsule Take 1 capsule (10 mg total) by mouth once daily.    folic acid (FOLVITE) 1 MG tablet TAKE 1 TABLET BY MOUTH EVERY DAY    hydroxyurea (HYDREA) 500 mg Cap TAKE 1 CAPSULE (500 MG TOTAL) BY MOUTH ONCE DAILY.    ibuprofen (ADVIL,MOTRIN) 100 MG tablet Take 100 mg by mouth every 6 (six) hours as needed.    voxelotor (OXBRYTA) 500 mg Tab Take 3 tablets (1,500 mg total) by mouth Daily.    acetaminophen (TYLENOL) 500 MG tablet Take 500 mg by mouth every 8 (eight) hours as needed for Pain.    calcium carbonate (TUMS) 200 mg calcium (500 mg) chewable tablet Take 2-3 tablets by mouth 2 (two) times daily as needed for Heartburn.    phenyleph-min oil-petrolatum 0.25-14-74.9 % Oint Place 1 applicator rectally 4 (four) times daily as needed (Hemorrhoid discomfort).     Antibiotics (From admission, onward)      Start     Stop Route Frequency Ordered    10/21/23 2100  mupirocin 2 % ointment         10/26/23 2059 Nasl 2 times daily 10/21/23 1319    10/21/23 2000  vancomycin 2 g in dextrose 5 % 500 mL IVPB         -- IV Every 8 hours (non-standard times) 10/21/23 1304    10/18/23 1700  ceFEPIme (MAXIPIME) 2 g in dextrose 5 % in water (D5W) 100 mL IVPB (MB+)         -- IV Every 8 hours (non-standard times) 10/18/23 1326          Antifungals  (From admission, onward)      None          Antivirals (From admission, onward)      None             Immunization History   Administered Date(s) Administered    COVID-19, MRNA, LN-S, PF (Pfizer) (Purple Cap) 02/01/2021, 02/22/2021, 12/21/2021    DTaP 1994, 1994, 1994, 12/07/1995, 09/21/1998    Hepatitis B, Pediatric/Adolescent 1994, 1994, 1994    Influenza - Quadrivalent - PF *Preferred* (6 months and older) 10/10/2016, 11/22/2021, 10/03/2022    MMR 06/23/1995, 09/21/1998    Meningococcal Conjugate (MCV4P) 05/14/2007    OPV 1994, 1994, 1994, 09/21/1998    PPD Test 09/21/1998    Pneumococcal Conjugate - 13 Valent 02/10/2022    Tdap 02/21/2011    Varicella 02/21/2011       Family History    None       Social History     Socioeconomic History    Marital status: Single   Tobacco Use    Smoking status: Never    Smokeless tobacco: Never   Substance and Sexual Activity    Alcohol use: Yes     Comment: Social drinker on weekends    Drug use: No    Sexual activity: Not Currently     Social Determinants of Health     Financial Resource Strain: Low Risk  (10/16/2023)    Overall Financial Resource Strain (CARDIA)     Difficulty of Paying Living Expenses: Not very hard   Food Insecurity: No Food Insecurity (10/16/2023)    Hunger Vital Sign     Worried About Running Out of Food in the Last Year: Never true     Ran Out of Food in the Last Year: Never true   Transportation Needs: No Transportation Needs (10/16/2023)    PRAPARE - Transportation     Lack of Transportation (Medical): No     Lack of Transportation (Non-Medical): No   Physical Activity: Inactive (10/16/2023)    Exercise Vital Sign     Days of Exercise per Week: 0 days     Minutes of Exercise per Session: 0 min   Stress: Stress Concern Present (10/16/2023)    [place] [employer] - Occupational Stress Questionnaire     Feeling of Stress : To some extent   Social Connections: Socially Isolated  (10/16/2023)    Social Connection and Isolation Panel [NHANES]     Frequency of Communication with Friends and Family: Once a week     Frequency of Social Gatherings with Friends and Family: Once a week     Attends Hinduism Services: Never     Active Member of Clubs or Organizations: No     Attends Club or Organization Meetings: Never     Marital Status: Never    Housing Stability: Low Risk  (10/16/2023)    Housing Stability Vital Sign     Unable to Pay for Housing in the Last Year: No     Number of Places Lived in the Last Year: 1     Unstable Housing in the Last Year: No     Review of Systems   Constitutional:  Negative for chills and fever.   Respiratory:  Negative for cough and shortness of breath.    Cardiovascular:  Negative for chest pain.   Gastrointestinal:  Negative for abdominal pain, constipation, diarrhea, nausea and vomiting.   Genitourinary:  Negative for dysuria and hematuria.   Musculoskeletal:  Positive for arthralgias and myalgias.   Skin:  Negative for rash.   Neurological:  Negative for weakness and headaches.     Objective:     Vital Signs (Most Recent):  Temp: 99.3 °F (37.4 °C) (10/22/23 1130)  Pulse: 96 (10/22/23 1130)  Resp: 16 (10/22/23 1227)  BP: 136/84 (10/22/23 1130)  SpO2: 100 % (10/22/23 1130) Vital Signs (24h Range):  Temp:  [97.6 °F (36.4 °C)-100.3 °F (37.9 °C)] 99.3 °F (37.4 °C)  Pulse:  [75-98] 96  Resp:  [16-20] 16  SpO2:  [97 %-100 %] 100 %  BP: (126-164)/(74-90) 136/84     Weight: 83.9 kg (185 lb)  Body mass index is 24.41 kg/m².    Estimated Creatinine Clearance: 205.3 mL/min (based on SCr of 0.6 mg/dL).     Physical Exam  Vitals reviewed.   Constitutional:       General: He is not in acute distress.     Appearance: Normal appearance. He is not ill-appearing.   HENT:      Head: Normocephalic and atraumatic.   Eyes:      Extraocular Movements: Extraocular movements intact.      Conjunctiva/sclera: Conjunctivae normal.   Cardiovascular:      Rate and Rhythm: Normal rate  and regular rhythm.      Heart sounds: No murmur heard.  Pulmonary:      Effort: Pulmonary effort is normal. No respiratory distress.      Breath sounds: Normal breath sounds.   Abdominal:      General: Abdomen is flat. Bowel sounds are normal.      Palpations: Abdomen is soft.      Tenderness: There is no abdominal tenderness.   Musculoskeletal:      Cervical back: Normal range of motion.   Skin:     General: Skin is warm and dry.   Neurological:      General: No focal deficit present.      Mental Status: He is alert and oriented to person, place, and time.   Psychiatric:         Mood and Affect: Mood normal.         Behavior: Behavior normal.         Thought Content: Thought content normal.          Significant Labs: All pertinent labs within the past 24 hours have been reviewed.  Recent Lab Results         10/22/23  1106   10/22/23  0403        Albumin   2.4       ALP   376       ALT   76       Anion Gap   11       AST   113       Baso #   0.08       Basophil %   0.8       BILIRUBIN TOTAL   15.1  Comment: For infants and newborns, interpretation of results should be based  on gestational age, weight and in agreement with clinical  observations.    Premature Infant recommended reference ranges:  Up to 24 hours.............<8.0 mg/dL  Up to 48 hours............<12.0 mg/dL  3-5 days..................<15.0 mg/dL  6-29 days.................<15.0 mg/dL         BUN   5       Calcium   9.2       Chloride   96       CO2   26       Creatinine   0.6       Differential Method   Automated       eGFR   >60.0       Eos #   0.4       Eosinophil %   3.3       Glucose   112       Gran # (ANC)   6.9       Gran %   65.6       Hematocrit   23.4       Hemoglobin   8.3       Immature Grans (Abs)   0.21  Comment: Mild elevation in immature granulocytes is non specific and   can be seen in a variety of conditions including stress response,   acute inflammation, trauma and pregnancy. Correlation with other   laboratory and clinical  findings is essential.         Immature Granulocytes   2.0       Lymph #   1.7       Lymph %   16.0       Magnesium    1.8       MCH   27.9       MCHC   35.5       MCV   79       Mono #   1.3       Mono %   12.3       MPV   10.5       nRBC   2       Phosphorus Level   3.2       Platelet Count   199       Potassium   3.4       PROTEIN TOTAL   7.5       RBC   2.97       RDW   20.7       Sodium   133       Vancomycin-Trough 9.1         WBC   10.54               Significant Imaging: I have reviewed all pertinent imaging results/findings within the past 24 hours.

## 2023-10-22 NOTE — PROGRESS NOTES
VANCOMYCIN DOSING BY PHARMACY DISCONTINUATION NOTE    Parrish Davis is a 28 y.o. male who had been consulted for vancomycin dosing.    The pharmacy consult for vancomycin dosing has been discontinued.     Vancomycin Dosing by Pharmacy Consult will sign-off. Please reconsult if necessary. Thank you for allowing us to participate in this patient's care.     Thank you for allowing pharmacy participate in this patient's care.     Stefany Tapia, PharmD  Clinical Pharmacist, [facility] Team  [email or web address]  office [phone]

## 2023-10-22 NOTE — HPI
"Mr. Davis is a 28M with PMH of sickle cell disease admitted for treatment of pain crisis. Infectious disease consulted for "Sickle-cell disease with vaso-occlusive pain and PE. Started on broad spectrum antibiotic for "sepsis", without source. Vanc & zosyn on 10/18. Febrile yesterday. However, denies any complaints. Bcx negative. abx recs, further w/u, thank you".    Patient had a leukocytosis on admission which has now normalized. CT C/A/P with contrast showing R sided PE and LLL opacity either infection vs infarct. TTE shows EF 55-60%. Blood cultures without any growth on 10/16, 10/18, and 10/19. Last febrile on 10/20 up to 101.2, has been afebrile since then. Was on vancomycin and zosyn from 10/15 - 10/18, then switched zosyn to cefepime.   "

## 2023-10-22 NOTE — SUBJECTIVE & OBJECTIVE
Past Medical History:   Diagnosis Date    Sickle cell anemia     Sickle cell disease        Past Surgical History:   Procedure Laterality Date    chemoport      removed    CHOLECYSTECTOMY  2002    UMBILICAL HERNIA REPAIR  1995       Review of patient's allergies indicates:  No Known Allergies    Medications:  Medications Prior to Admission   Medication Sig    FLUoxetine 10 MG capsule Take 1 capsule (10 mg total) by mouth once daily.    folic acid (FOLVITE) 1 MG tablet TAKE 1 TABLET BY MOUTH EVERY DAY    hydroxyurea (HYDREA) 500 mg Cap TAKE 1 CAPSULE (500 MG TOTAL) BY MOUTH ONCE DAILY.    ibuprofen (ADVIL,MOTRIN) 100 MG tablet Take 100 mg by mouth every 6 (six) hours as needed.    voxelotor (OXBRYTA) 500 mg Tab Take 3 tablets (1,500 mg total) by mouth Daily.    acetaminophen (TYLENOL) 500 MG tablet Take 500 mg by mouth every 8 (eight) hours as needed for Pain.    calcium carbonate (TUMS) 200 mg calcium (500 mg) chewable tablet Take 2-3 tablets by mouth 2 (two) times daily as needed for Heartburn.    phenyleph-min oil-petrolatum 0.25-14-74.9 % Oint Place 1 applicator rectally 4 (four) times daily as needed (Hemorrhoid discomfort).     Antibiotics (From admission, onward)      Start     Stop Route Frequency Ordered    10/21/23 2100  mupirocin 2 % ointment         10/26/23 2059 Nasl 2 times daily 10/21/23 1319    10/21/23 2000  vancomycin 2 g in dextrose 5 % 500 mL IVPB         -- IV Every 8 hours (non-standard times) 10/21/23 1304    10/18/23 1700  ceFEPIme (MAXIPIME) 2 g in dextrose 5 % in water (D5W) 100 mL IVPB (MB+)         -- IV Every 8 hours (non-standard times) 10/18/23 1326          Antifungals (From admission, onward)      None          Antivirals (From admission, onward)      None             Immunization History   Administered Date(s) Administered    COVID-19, MRNA, LN-S, PF (Pfizer) (Purple Cap) 02/01/2021, 02/22/2021, 12/21/2021    DTaP 1994, 1994, 1994, 12/07/1995, 09/21/1998     Hepatitis B, Pediatric/Adolescent 1994, 1994, 1994    Influenza - Quadrivalent - PF *Preferred* (6 months and older) 10/10/2016, 11/22/2021, 10/03/2022    MMR 06/23/1995, 09/21/1998    Meningococcal Conjugate (MCV4P) 05/14/2007    OPV 1994, 1994, 1994, 09/21/1998    PPD Test 09/21/1998    Pneumococcal Conjugate - 13 Valent 02/10/2022    Tdap 02/21/2011    Varicella 02/21/2011       Family History    None       Social History     Socioeconomic History    Marital status: Single   Tobacco Use    Smoking status: Never    Smokeless tobacco: Never   Substance and Sexual Activity    Alcohol use: Yes     Comment: Social drinker on weekends    Drug use: No    Sexual activity: Not Currently     Social Determinants of Health     Financial Resource Strain: Low Risk  (10/16/2023)    Overall Financial Resource Strain (CARDIA)     Difficulty of Paying Living Expenses: Not very hard   Food Insecurity: No Food Insecurity (10/16/2023)    Hunger Vital Sign     Worried About Running Out of Food in the Last Year: Never true     Ran Out of Food in the Last Year: Never true   Transportation Needs: No Transportation Needs (10/16/2023)    PRAPARE - Transportation     Lack of Transportation (Medical): No     Lack of Transportation (Non-Medical): No   Physical Activity: Inactive (10/16/2023)    Exercise Vital Sign     Days of Exercise per Week: 0 days     Minutes of Exercise per Session: 0 min   Stress: Stress Concern Present (10/16/2023)    [place] [employer] - Occupational Stress Questionnaire     Feeling of Stress : To some extent   Social Connections: Socially Isolated (10/16/2023)    Social Connection and Isolation Panel [NHANES]     Frequency of Communication with Friends and Family: Once a week     Frequency of Social Gatherings with Friends and Family: Once a week     Attends Worship Services: Never     Active Member of Clubs or Organizations: No     Attends Club or  Organization Meetings: Never     Marital Status: Never    Housing Stability: Low Risk  (10/16/2023)    Housing Stability Vital Sign     Unable to Pay for Housing in the Last Year: No     Number of Places Lived in the Last Year: 1     Unstable Housing in the Last Year: No     Review of Systems   Constitutional:  Negative for chills and fever.   Respiratory:  Negative for cough and shortness of breath.    Cardiovascular:  Negative for chest pain.   Gastrointestinal:  Negative for abdominal pain, constipation, diarrhea, nausea and vomiting.   Genitourinary:  Negative for dysuria and hematuria.   Musculoskeletal:  Positive for arthralgias and myalgias.   Skin:  Negative for rash.   Neurological:  Negative for weakness and headaches.     Objective:     Vital Signs (Most Recent):  Temp: 99.3 °F (37.4 °C) (10/22/23 1130)  Pulse: 96 (10/22/23 1130)  Resp: 16 (10/22/23 1227)  BP: 136/84 (10/22/23 1130)  SpO2: 100 % (10/22/23 1130) Vital Signs (24h Range):  Temp:  [97.6 °F (36.4 °C)-100.3 °F (37.9 °C)] 99.3 °F (37.4 °C)  Pulse:  [75-98] 96  Resp:  [16-20] 16  SpO2:  [97 %-100 %] 100 %  BP: (126-164)/(74-90) 136/84     Weight: 83.9 kg (185 lb)  Body mass index is 24.41 kg/m².    Estimated Creatinine Clearance: 205.3 mL/min (based on SCr of 0.6 mg/dL).     Physical Exam  Vitals reviewed.   Constitutional:       General: He is not in acute distress.     Appearance: Normal appearance. He is not ill-appearing.   HENT:      Head: Normocephalic and atraumatic.   Eyes:      Extraocular Movements: Extraocular movements intact.      Conjunctiva/sclera: Conjunctivae normal.   Cardiovascular:      Rate and Rhythm: Normal rate and regular rhythm.      Heart sounds: No murmur heard.  Pulmonary:      Effort: Pulmonary effort is normal. No respiratory distress.      Breath sounds: Normal breath sounds.   Abdominal:      General: Abdomen is flat. Bowel sounds are normal.      Palpations: Abdomen is soft.      Tenderness: There is no  abdominal tenderness.   Musculoskeletal:      Cervical back: Normal range of motion.   Skin:     General: Skin is warm and dry.   Neurological:      General: No focal deficit present.      Mental Status: He is alert and oriented to person, place, and time.   Psychiatric:         Mood and Affect: Mood normal.         Behavior: Behavior normal.         Thought Content: Thought content normal.          Significant Labs: All pertinent labs within the past 24 hours have been reviewed.  Recent Lab Results         10/22/23  1106   10/22/23  0403        Albumin   2.4       ALP   376       ALT   76       Anion Gap   11       AST   113       Baso #   0.08       Basophil %   0.8       BILIRUBIN TOTAL   15.1  Comment: For infants and newborns, interpretation of results should be based  on gestational age, weight and in agreement with clinical  observations.    Premature Infant recommended reference ranges:  Up to 24 hours.............<8.0 mg/dL  Up to 48 hours............<12.0 mg/dL  3-5 days..................<15.0 mg/dL  6-29 days.................<15.0 mg/dL         BUN   5       Calcium   9.2       Chloride   96       CO2   26       Creatinine   0.6       Differential Method   Automated       eGFR   >60.0       Eos #   0.4       Eosinophil %   3.3       Glucose   112       Gran # (ANC)   6.9       Gran %   65.6       Hematocrit   23.4       Hemoglobin   8.3       Immature Grans (Abs)   0.21  Comment: Mild elevation in immature granulocytes is non specific and   can be seen in a variety of conditions including stress response,   acute inflammation, trauma and pregnancy. Correlation with other   laboratory and clinical findings is essential.         Immature Granulocytes   2.0       Lymph #   1.7       Lymph %   16.0       Magnesium    1.8       MCH   27.9       MCHC   35.5       MCV   79       Mono #   1.3       Mono %   12.3       MPV   10.5       nRBC   2       Phosphorus Level   3.2       Platelet Count   199        Potassium   3.4       PROTEIN TOTAL   7.5       RBC   2.97       RDW   20.7       Sodium   133       Vancomycin-Trough 9.1         WBC   10.54               Significant Imaging: I have reviewed all pertinent imaging results/findings within the past 24 hours.

## 2023-10-22 NOTE — PLAN OF CARE
Problem: Occupational Therapy  Goal: Occupational Therapy Goal  Description: Goals to be met by: 11/13/2023    Patient will increase functional independence with ADLs by performing:    Grooming while standing at sink with [place].  Toileting from toilet with [place] for hygiene and clothing management.   Stand pivot transfers with [place].  Toilet transfer to toilet with [place].    Outcome: Ongoing, Progressing   Patient's goals are set.    Problem: At Risk for Falls  Intervention: Risk for Fall Interventions (specify)  Selectively initiate interventions based on patient-specific fall risks. Document in  [facility] on Daily Cares.   01/17/19 0015   Safety Measures   Environmental Safety Measures Maintained Done   Patient Specific Safety Measures in Use Encourage personal sensory support item use;Safe lighting as appropriate   Risk for Falls Interventions   Patient's Personal Risk Factors History of falling ([place]);Problems with walking or moving;Potential for overestimating ability   Mobility and Gait Measures Collaborate with PT;Encourage personal mobility support item use;Mobilize (Early and progressive ambulation unless contraindicated);Use gait belt   OTHER   Elimination Risk Interventions Urinal at bedside;Offer toileting with every interaction (patient able to identify need);Utilize visual cues (e.g. yield sign)         Problem: At Risk for Injury Due to Fall  Intervention: Risk for Fall-related Injury Interventions (specify)  Document in  [facility]ated [facility]ows or Daily Cares   01/17/19 0015   Risk for Fall-Related Injury Interventions   Patient-specific Special Conditions Dizziness or fainting;Irregular heart beat;Recovering from surgery or other procedure   Interventions to Prevent Special Condition Falling Inform/remind patient/family about special conditions/risks;Utilize visual cues (e.g. yield sign);Supervision during activities;Bed/Chair exit alarms (patient overestimates ability)

## 2023-10-23 LAB
ALBUMIN SERPL BCP-MCNC: 2.4 G/DL (ref 3.5–5.2)
ALP SERPL-CCNC: 411 U/L (ref 55–135)
ALT SERPL W/O P-5'-P-CCNC: 103 U/L (ref 10–44)
ANION GAP SERPL CALC-SCNC: 11 MMOL/L (ref 8–16)
ANISOCYTOSIS BLD QL SMEAR: SLIGHT
AST SERPL-CCNC: 128 U/L (ref 10–40)
BACTERIA BLD CULT: NORMAL
BASO STIPL BLD QL SMEAR: ABNORMAL
BASOPHILS # BLD AUTO: 0.12 K/UL (ref 0–0.2)
BASOPHILS NFR BLD: 1 % (ref 0–1.9)
BILIRUB SERPL-MCNC: 12.8 MG/DL (ref 0.1–1)
BUN SERPL-MCNC: 6 MG/DL (ref 6–20)
BURR CELLS BLD QL SMEAR: ABNORMAL
CALCIUM SERPL-MCNC: 9.3 MG/DL (ref 8.7–10.5)
CHLORIDE SERPL-SCNC: 98 MMOL/L (ref 95–110)
CO2 SERPL-SCNC: 24 MMOL/L (ref 23–29)
CREAT SERPL-MCNC: 0.6 MG/DL (ref 0.5–1.4)
DACRYOCYTES BLD QL SMEAR: ABNORMAL
DIFFERENTIAL METHOD: ABNORMAL
EOSINOPHIL # BLD AUTO: 0.4 K/UL (ref 0–0.5)
EOSINOPHIL NFR BLD: 2.9 % (ref 0–8)
ERYTHROCYTE [DISTWIDTH] IN BLOOD BY AUTOMATED COUNT: 20.7 % (ref 11.5–14.5)
EST. GFR  (NO RACE VARIABLE): >60 ML/MIN/1.73 M^2
GLUCOSE SERPL-MCNC: 104 MG/DL (ref 70–110)
HCT VFR BLD AUTO: 23.3 % (ref 40–54)
HGB BLD-MCNC: 8.3 G/DL (ref 14–18)
HOWELL-JOLLY BOD BLD QL SMEAR: ABNORMAL
HYPOCHROMIA BLD QL SMEAR: ABNORMAL
IMM GRANULOCYTES # BLD AUTO: 0.54 K/UL (ref 0–0.04)
IMM GRANULOCYTES NFR BLD AUTO: 4.4 % (ref 0–0.5)
LYMPHOCYTES # BLD AUTO: 2.3 K/UL (ref 1–4.8)
LYMPHOCYTES NFR BLD: 18.7 % (ref 18–48)
MCH RBC QN AUTO: 28.3 PG (ref 27–31)
MCHC RBC AUTO-ENTMCNC: 35.6 G/DL (ref 32–36)
MCV RBC AUTO: 80 FL (ref 82–98)
MONOCYTES # BLD AUTO: 1.5 K/UL (ref 0.3–1)
MONOCYTES NFR BLD: 11.9 % (ref 4–15)
NEUTROPHILS # BLD AUTO: 7.6 K/UL (ref 1.8–7.7)
NEUTROPHILS NFR BLD: 61.1 % (ref 38–73)
NRBC BLD-RTO: 1 /100 WBC
OVALOCYTES BLD QL SMEAR: ABNORMAL
PHOSPHATE SERPL-MCNC: 3.6 MG/DL (ref 2.7–4.5)
PLATELET # BLD AUTO: 218 K/UL (ref 150–450)
PLATELET BLD QL SMEAR: ABNORMAL
PMV BLD AUTO: 11.3 FL (ref 9.2–12.9)
POIKILOCYTOSIS BLD QL SMEAR: SLIGHT
POLYCHROMASIA BLD QL SMEAR: ABNORMAL
POTASSIUM SERPL-SCNC: 3.7 MMOL/L (ref 3.5–5.1)
PROT SERPL-MCNC: 7.5 G/DL (ref 6–8.4)
RBC # BLD AUTO: 2.93 M/UL (ref 4.6–6.2)
RETICS/RBC NFR AUTO: 7.7 % (ref 0.4–2)
SICKLE CELLS BLD QL SMEAR: ABNORMAL
SODIUM SERPL-SCNC: 133 MMOL/L (ref 136–145)
SPHEROCYTES BLD QL SMEAR: ABNORMAL
TARGETS BLD QL SMEAR: ABNORMAL
TOXIC GRANULES BLD QL SMEAR: PRESENT
WBC # BLD AUTO: 12.35 K/UL (ref 3.9–12.7)

## 2023-10-23 PROCEDURE — 25000003 PHARM REV CODE 250: Performed by: HOSPITALIST

## 2023-10-23 PROCEDURE — 99232 PR SUBSEQUENT HOSPITAL CARE,LEVL II: ICD-10-PCS | Mod: ,,, | Performed by: INTERNAL MEDICINE

## 2023-10-23 PROCEDURE — 12000002 HC ACUTE/MED SURGE SEMI-PRIVATE ROOM

## 2023-10-23 PROCEDURE — 85025 COMPLETE CBC W/AUTO DIFF WBC: CPT | Performed by: HOSPITALIST

## 2023-10-23 PROCEDURE — 85045 AUTOMATED RETICULOCYTE COUNT: CPT | Performed by: STUDENT IN AN ORGANIZED HEALTH CARE EDUCATION/TRAINING PROGRAM

## 2023-10-23 PROCEDURE — 63600175 PHARM REV CODE 636 W HCPCS: Performed by: STUDENT IN AN ORGANIZED HEALTH CARE EDUCATION/TRAINING PROGRAM

## 2023-10-23 PROCEDURE — 25000003 PHARM REV CODE 250: Performed by: STUDENT IN AN ORGANIZED HEALTH CARE EDUCATION/TRAINING PROGRAM

## 2023-10-23 PROCEDURE — 80053 COMPREHEN METABOLIC PANEL: CPT | Performed by: HOSPITALIST

## 2023-10-23 PROCEDURE — 94761 N-INVAS EAR/PLS OXIMETRY MLT: CPT

## 2023-10-23 PROCEDURE — 25000003 PHARM REV CODE 250: Performed by: EMERGENCY MEDICINE

## 2023-10-23 PROCEDURE — S5010 5% DEXTROSE AND 0.45% SALINE: HCPCS | Performed by: HOSPITALIST

## 2023-10-23 PROCEDURE — 94799 UNLISTED PULMONARY SVC/PX: CPT

## 2023-10-23 PROCEDURE — 36415 COLL VENOUS BLD VENIPUNCTURE: CPT | Performed by: HOSPITALIST

## 2023-10-23 PROCEDURE — 84100 ASSAY OF PHOSPHORUS: CPT | Performed by: HOSPITALIST

## 2023-10-23 PROCEDURE — 99232 SBSQ HOSP IP/OBS MODERATE 35: CPT | Mod: ,,, | Performed by: INTERNAL MEDICINE

## 2023-10-23 PROCEDURE — 63700000 PHARM REV CODE 250 ALT 637 W/O HCPCS: Performed by: STUDENT IN AN ORGANIZED HEALTH CARE EDUCATION/TRAINING PROGRAM

## 2023-10-23 RX ADMIN — POLYETHYLENE GLYCOL 3350 17 G: 17 POWDER, FOR SOLUTION ORAL at 08:10

## 2023-10-23 RX ADMIN — AZITHROMYCIN 250 MG: 250 TABLET, FILM COATED ORAL at 08:10

## 2023-10-23 RX ADMIN — HYDROXYUREA 500 MG: 500 CAPSULE ORAL at 08:10

## 2023-10-23 RX ADMIN — APIXABAN 10 MG: 5 TABLET, FILM COATED ORAL at 08:10

## 2023-10-23 RX ADMIN — OXYCODONE AND ACETAMINOPHEN 1 TABLET: 10; 325 TABLET ORAL at 02:10

## 2023-10-23 RX ADMIN — MUPIROCIN: 20 OINTMENT TOPICAL at 08:10

## 2023-10-23 RX ADMIN — FOLIC ACID 1 MG: 1 TABLET ORAL at 08:10

## 2023-10-23 RX ADMIN — OXYCODONE AND ACETAMINOPHEN 1 TABLET: 10; 325 TABLET ORAL at 08:10

## 2023-10-23 RX ADMIN — FLUOXETINE 10 MG: 10 CAPSULE ORAL at 09:10

## 2023-10-23 RX ADMIN — SENNOSIDES AND DOCUSATE SODIUM 1 TABLET: 50; 8.6 TABLET ORAL at 08:10

## 2023-10-23 RX ADMIN — CEFTRIAXONE SODIUM 2 G: 2 INJECTION, POWDER, FOR SOLUTION INTRAMUSCULAR; INTRAVENOUS at 04:10

## 2023-10-23 RX ADMIN — DEXTROSE AND SODIUM CHLORIDE: 5; 450 INJECTION, SOLUTION INTRAVENOUS at 12:10

## 2023-10-23 RX ADMIN — DEXTROSE AND SODIUM CHLORIDE: 5; 450 INJECTION, SOLUTION INTRAVENOUS at 11:10

## 2023-10-23 RX ADMIN — DEXTROSE AND SODIUM CHLORIDE: 5; 450 INJECTION, SOLUTION INTRAVENOUS at 08:10

## 2023-10-23 RX ADMIN — OXYCODONE AND ACETAMINOPHEN 1 TABLET: 10; 325 TABLET ORAL at 04:10

## 2023-10-23 RX ADMIN — OXYCODONE AND ACETAMINOPHEN 1 TABLET: 10; 325 TABLET ORAL at 12:10

## 2023-10-23 NOTE — SUBJECTIVE & OBJECTIVE
Interval History: No acute events overnight. Afebrile overnight. Patient reports feeling a little better today than prior days. No respiratory symptoms.    Review of Systems   Constitutional:  Negative for chills and fever.   Respiratory:  Negative for cough and shortness of breath.    Cardiovascular:  Negative for chest pain.   Gastrointestinal:  Negative for abdominal pain, constipation, diarrhea, nausea and vomiting.   Genitourinary:  Negative for dysuria and hematuria.   Musculoskeletal:  Positive for arthralgias and myalgias.   Skin:  Negative for rash.   Neurological:  Negative for weakness and headaches.     Objective:     Vital Signs (Most Recent):  Temp: 99.8 °F (37.7 °C) (10/23/23 1606)  Pulse: 106 (10/23/23 1129)  Resp: 18 (10/23/23 1626)  BP: 130/73 (10/23/23 1606)  SpO2: 95 % (10/23/23 1606) Vital Signs (24h Range):  Temp:  [98.5 °F (36.9 °C)-99.8 °F (37.7 °C)] 99.8 °F (37.7 °C)  Pulse:  [] 106  Resp:  [16-19] 18  SpO2:  [93 %-96 %] 95 %  BP: (128-143)/(72-81) 130/73     Weight: 83.9 kg (185 lb)  Body mass index is 24.41 kg/m².    Estimated Creatinine Clearance: 205.3 mL/min (based on SCr of 0.6 mg/dL).     Physical Exam  Vitals reviewed.   Constitutional:       General: He is not in acute distress.     Appearance: Normal appearance. He is not ill-appearing.   HENT:      Head: Normocephalic and atraumatic.   Eyes:      Extraocular Movements: Extraocular movements intact.      Conjunctiva/sclera: Conjunctivae normal.   Cardiovascular:      Rate and Rhythm: Normal rate and regular rhythm.      Heart sounds: No murmur heard.  Pulmonary:      Effort: Pulmonary effort is normal. No respiratory distress.      Breath sounds: Normal breath sounds.   Abdominal:      General: Abdomen is flat. Bowel sounds are normal.      Palpations: Abdomen is soft.      Tenderness: There is no abdominal tenderness.   Musculoskeletal:      Cervical back: Normal range of motion.   Skin:     General: Skin is warm and dry.    Neurological:      General: No focal deficit present.      Mental Status: He is alert and oriented to person, place, and time.   Psychiatric:         Mood and Affect: Mood normal.         Behavior: Behavior normal.         Thought Content: Thought content normal.          Significant Labs: CBC:   Recent Labs   Lab 10/22/23  0403 10/23/23  0444   WBC 10.54 12.35   HGB 8.3* 8.3*   HCT 23.4* 23.3*    218     CMP:   Recent Labs   Lab 10/22/23  0403 10/23/23  0444   * 133*   K 3.4* 3.7   CL 96 98   CO2 26 24   * 104   BUN 5* 6   CREATININE 0.6 0.6   CALCIUM 9.2 9.3   PROT 7.5 7.5   ALBUMIN 2.4* 2.4*   BILITOT 15.1* 12.8*   ALKPHOS 376* 411*   * 128*   ALT 76* 103*   ANIONGAP 11 11       Significant Imaging: I have reviewed all pertinent imaging results/findings within the past 24 hours.

## 2023-10-23 NOTE — ASSESSMENT & PLAN NOTE
28M with PMH of sickle cell disease admitted for treatment of pain crisis. Patient had a leukocytosis on admission which has now normalized. CT C/A/P with contrast showing R sided PE and LLL opacity either infection vs infarct. TTE shows EF 55-60%. Blood cultures without any growth on 10/16, 10/18, and 10/19. Last febrile on 10/20 up to 101.2, has been afebrile since then. Was on vancomycin and zosyn from 10/16 - 10/18, then switched zosyn to cefepime. Will treat for CAP. Repeat CXR stable. L spine x-ray without abrnomalities. Patient afebrile overnight on 10/22 and reporting improvement.    Recommendations  · Patient was started on Zosyn on 10/16, now on day 8 of antibiotics; given his rising liver enzymes in the setting of sickle cell crisis, believe it is reasonable to stop Ceftriaxone at this time to prevent further liver injury.  · Continue PO Azithromycin 250mg qd for CAP coverage; last dose on 10/26.      Infectious Diseases will sign-off at this time. Please call with any additional questions, concerns or changes in the patient's clinical status. Thank you for involving us in the care of this patient. Patient discussed with Dr. Shanks, staff attestation to follow.

## 2023-10-23 NOTE — PLAN OF CARE
Problem: Adult Inpatient Plan of Care  Goal: Plan of Care Review  Outcome: Ongoing, Progressing  Goal: Patient-Specific Goal (Individualized)  Outcome: Ongoing, Progressing  Goal: Absence of Hospital-Acquired Illness or Injury  Outcome: Ongoing, Progressing  Goal: Optimal Comfort and Wellbeing  Outcome: Ongoing, Progressing  Goal: Readiness for Transition of Care  Outcome: Ongoing, Progressing     Problem: Fall Injury Risk  Goal: Absence of Fall and Fall-Related Injury  Outcome: Ongoing, Progressing     Problem: Pain Acute  Goal: Acceptable Pain Control and Functional Ability  Outcome: Ongoing, Progressing     Problem: Anemia  Goal: Anemia Symptom Improvement  Outcome: Ongoing, Progressing     Problem: Adjustment to Illness (Sepsis/Septic Shock)  Goal: Optimal Coping  Outcome: Ongoing, Progressing     Problem: Nutrition Impaired (Sepsis/Septic Shock)  Goal: Optimal Nutrition Intake  Outcome: Ongoing, Progressing     Problem: Infection  Goal: Absence of Infection Signs and Symptoms  Outcome: Ongoing, Progressing

## 2023-10-23 NOTE — SUBJECTIVE & OBJECTIVE
Interval History: see above.       Objective:     Vital Signs (Most Recent):  Temp: 99.4 °F (37.4 °C) (10/23/23 1129)  Pulse: 106 (10/23/23 1129)  Resp: 18 (10/23/23 1225)  BP: 133/79 (10/23/23 1129)  SpO2: (!) 93 % (10/23/23 1129) Vital Signs (24h Range):  Temp:  [98.5 °F (36.9 °C)-99.6 °F (37.6 °C)] 99.4 °F (37.4 °C)  Pulse:  [] 106  Resp:  [16-19] 18  SpO2:  [93 %-96 %] 93 %  BP: (128-143)/(72-81) 133/79     Weight: 83.9 kg (185 lb)  Body mass index is 24.41 kg/m².    Intake/Output Summary (Last 24 hours) at 10/23/2023 1545  Last data filed at 10/23/2023 0823  Gross per 24 hour   Intake 1000 ml   Output 1100 ml   Net -100 ml         Physical Exam  Constitutional:       General: He is not in acute distress.     Appearance: Normal appearance. He is not ill-appearing, toxic-appearing or diaphoretic.   HENT:      Head: Normocephalic and atraumatic.      Nose: Nose normal.      Mouth/Throat:      Mouth: Mucous membranes are moist.      Pharynx: Oropharynx is clear.   Eyes:      General: Scleral icterus (improved since yesterday) present.      Extraocular Movements: Extraocular movements intact.      Conjunctiva/sclera: Conjunctivae normal.      Pupils: Pupils are equal, round, and reactive to light.   Cardiovascular:      Rate and Rhythm: Regular rhythm. Tachycardia present.      Pulses: Normal pulses.   Pulmonary:      Effort: Pulmonary effort is normal. No respiratory distress.      Breath sounds: Normal breath sounds. No stridor. No wheezing, rhonchi or rales.   Chest:      Chest wall: No tenderness.   Abdominal:      General: Abdomen is flat. Bowel sounds are normal. There is no distension.      Palpations: Abdomen is soft.      Tenderness: There is no abdominal tenderness. There is no right CVA tenderness, left CVA tenderness, guarding or rebound.   Musculoskeletal:         General: No swelling. Normal range of motion.      Cervical back: Normal range of motion and neck supple. No rigidity or tenderness.       Right lower leg: No edema.      Left lower leg: No edema.   Skin:     General: Skin is warm and dry.      Coloration: Skin is not jaundiced.      Findings: No bruising, erythema or rash.   Neurological:      General: No focal deficit present.      Mental Status: He is alert and oriented to person, place, and time. Mental status is at baseline.      Motor: No weakness.      Gait: Gait normal.   Psychiatric:         Mood and Affect: Mood normal.         Behavior: Behavior normal.         Thought Content: Thought content normal.         Judgment: Judgment normal.             Significant Labs: All pertinent labs within the past 24 hours have been reviewed.    Significant Imaging: I have reviewed all pertinent imaging results/findings within the past 24 hours.

## 2023-10-23 NOTE — PROGRESS NOTES
"[facility] ([address] Medicine  Progress Note    Patient Name: Parrish Davis  MRN: [id number]  Patient Class: IP- Inpatient   Admission Date: 10/15/2023  Length of Stay: 8 days  Attending Physician: Getachew Krishna DO  Primary Care Provider: Jovanny Douglas MD        Subjective:     Principal Problem:Sickle-cell disease with vaso-occlusive pain        HPI:  28 y.o. man with sickle cell anemia HbSS who presents to the ED complaining of  uncontrolled body aches/pain x1 day He reports the pain started late last night/early this morning with onset of severe lower back pain. It has progressed to involve his entire body per patient, stating that he hurts "all over". No reported cough, SOB, fevers, or chills. Pain poorly controlled with home PO medications, so he came to the ED.      Overview/Hospital Course:  10/16- wbc - 13->18, retic 10.4, Hb 9.4. bili 3.6. ast/alt- slight elevation. Cxr reviewed by me- No infiltrate, Interstitial markings look chronic.  Pain located in Back and knees. No abd pain. Reorts an episode of chest pain earlier now resolved. No SOB. He looks uncomfortable. Nurse to administer IV dialudid now. Continue IVFs.  His mother at bedside reports he had about 30 admission in [facility], approx 3 /yr, and 3 so far this year. /81  Pulse 71   Temp 97.3 °F (36.3 °C) (Oral)   Resp 18    SpO2 100%   2 liters oxygen.    Old CT chest from last admission 8.24.23, - Multifocal nodular opacities with adjacent ground-glass attenuation distributed throughout the bilateral lower lobes, differential considerations include infectious/inflammatory causes, aspiration, or pulmonary infarction in the setting of sickle cell disease.    10/17- /88 Pulse 100  SpO2 93%  . AF. Wbc 17. On empiric zosyn. Pt not room  when I came for a second visit 5 pm yesterday. He said he must have been in BR.  Cultures neg. Pain scale 10->8. Dilaudid PCA started on day 1 was stopped. 24 h " "MEDD 306.33 of 384.. On oxycodone 10 (MEDD 60), Dilaudid 1mg, 2 mg (MEDD 180) _ dilsudid pump. On IVFs.  Functional status per nurse:  Ambulating to BR, talking, eating, sleeping, has ADls. He reports not eating.  Will wean dilaudid to 1.5 q 3, today. Pain located in low back today. Looks comfortable after receiving dilaudid. Mother present in room. I gave them a lecture on how opioids work, their benefits and pitfalls, and how/why weaning or stopping the medication can cause rebound pain.  Recommended slow wean in supervised setting in hospital, while monitoring for complications of SSA. They expressed understanding  4:35 pm - started oxygen 2 liters, for sat 85%, now 92%, -110. Cxr ordered      10/18- Hb 9.4.  cxr 10/17- The lungs are symmetrically expanded bilaterally with coarse central hilar interstitial attenuation suggesting edema.  There is slightly increased parenchymal attenuation projected over the left lower lung zone, may reflect atelectasis however developing consolidation not excluded..  No large focal consolidation seen. There is no pneumothorax.  The osseous structures are unchanged.  On IVFs.   94% 1 iter NC.   /87    Pulse 138   Temp 99.7 °F   Resp 20 . ECHO 3/2023 - EF 60%.  Bl cult 10/16- NGSF. Ast/alt rising. Wbc 18-> 16.  Will add lactic acid, procal, repeat blood culture. CT chest/ abd/ pelvis.  Consulting Crit Care to follow. On Zosyn. Changing to cefepime. Adding vanc.   Nurse reports 7/10 pain this AM. still in lower back, not radiating anywhere. + rigors.  Low grade fever.   3:30 pm: CT + for Pulmonary Embolus, and forming infiltrate. Started FULL dose lovenox.  On cefepime and vanc.    Suspecting acute chest syndrome, hematology consulted.  Patient to be given blood transfusion, simple.  Per hematology: "If symptoms ( pain, hypoxemia) worsen he might benefit from partial/ complete exchange transfusion."Continue to monitor patient closely.    Patient is stable today. " He reports mild abdominal pain, reports no BM since Sunday. Denies any aggravating or alleviating factors to his abdominal pain. Discussed using laxativbes. He is currently taking Miralax. Appetite has been improving. Denies any chest pain, shortness of breath. Continues with be stable on nasal canula. Hematology would like to trend reticulocytes daily and incentive spirometry.     10/21: Patient was febrile overnight. He denies feeling febrile. Does report weakness, planning to consulting PT/OT. Bilirubin is trending up from 10 to 15. Heme consulted and is following. Aware of the fever and bilirubin.      10/22:  Patient is pleasant, denies any chest pain, abdominal pain, denies any nausea.  Discussed with the patient current plan.  Consulting Infectious Disease for further antibiotic management, appreciate assistance.  Continues to have an elevated bilirubin, reticulocytosis.  No acute events overnight.    10/23: Very pleasant, continues to report feeling well. Currently not on O2. Reports thigh pain, since admission. Able to ambulate better now. Discussed his labs wit him. Hgb stable. T.bili trending down. Afebrile. Nearing discharge in the next few days if he continues to improve.       Interval History: see above.       Objective:     Vital Signs (Most Recent):  Temp: 99.4 °F (37.4 °C) (10/23/23 1129)  Pulse: 106 (10/23/23 1129)  Resp: 18 (10/23/23 1225)  BP: 133/79 (10/23/23 1129)  SpO2: (!) 93 % (10/23/23 1129) Vital Signs (24h Range):  Temp:  [98.5 °F (36.9 °C)-99.6 °F (37.6 °C)] 99.4 °F (37.4 °C)  Pulse:  [] 106  Resp:  [16-19] 18  SpO2:  [93 %-96 %] 93 %  BP: (128-143)/(72-81) 133/79     Weight: 83.9 kg (185 lb)  Body mass index is 24.41 kg/m².    Intake/Output Summary (Last 24 hours) at 10/23/2023 1547  Last data filed at 10/23/2023 0823  Gross per 24 hour   Intake 1000 ml   Output 1100 ml   Net -100 ml         Physical Exam  Constitutional:       General: He is not in acute distress.      Appearance: Normal appearance. He is not ill-appearing, toxic-appearing or diaphoretic.   HENT:      Head: Normocephalic and atraumatic.      Nose: Nose normal.      Mouth/Throat:      Mouth: Mucous membranes are moist.      Pharynx: Oropharynx is clear.   Eyes:      General: Scleral icterus (improved since yesterday) present.      Extraocular Movements: Extraocular movements intact.      Conjunctiva/sclera: Conjunctivae normal.      Pupils: Pupils are equal, round, and reactive to light.   Cardiovascular:      Rate and Rhythm: Regular rhythm. Tachycardia present.      Pulses: Normal pulses.   Pulmonary:      Effort: Pulmonary effort is normal. No respiratory distress.      Breath sounds: Normal breath sounds. No stridor. No wheezing, rhonchi or rales.   Chest:      Chest wall: No tenderness.   Abdominal:      General: Abdomen is flat. Bowel sounds are normal. There is no distension.      Palpations: Abdomen is soft.      Tenderness: There is no abdominal tenderness. There is no right CVA tenderness, left CVA tenderness, guarding or rebound.   Musculoskeletal:         General: No swelling. Normal range of motion.      Cervical back: Normal range of motion and neck supple. No rigidity or tenderness.      Right lower leg: No edema.      Left lower leg: No edema.   Skin:     General: Skin is warm and dry.      Coloration: Skin is not jaundiced.      Findings: No bruising, erythema or rash.   Neurological:      General: No focal deficit present.      Mental Status: He is alert and oriented to person, place, and time. Mental status is at baseline.      Motor: No weakness.      Gait: Gait normal.   Psychiatric:         Mood and Affect: Mood normal.         Behavior: Behavior normal.         Thought Content: Thought content normal.         Judgment: Judgment normal.             Significant Labs: All pertinent labs within the past 24 hours have been reviewed.    Significant Imaging: I have reviewed all pertinent imaging  results/findings within the past 24 hours.      Assessment/Plan:      * Sickle-cell disease with vaso-occlusive pain  -Pt. With severe joint pains typical of vaso-occlusive crises. No reported chest pain, no hypoxia, and CXR without infiltrate (improved from prior) not consistent with acute chest.   - ( Old CTchest 8/24/23, had bilat lower patchy interstitial edema.)   -Plan to treat with IV fluids, PCA pump ordered for more continuous pain med dosing as pt. Complained of severe persistent pain after interval dosing in ED. Continue home meds folic acid and hydroxyurea (voxeletor not on formulary)    10/16- on IVFs, dilaudid 2 q 3 prn, and oxy 10 q 4 prn. PCA discontinued. cxr and urine neg for infection. CT noted interstitial edema.     10/17 on IVFs, dilaudid 1.5 q 3. Oxycodone. Empiric zosyn, Pain in low back. No chest pain, SOB, not requiring oxygen    10/18- Repeating lactic acid, procal, repeat blood culture. CT chest/ abd/ pelvis.  Consulting Crit Care to follow. On Zosyn. Adding vanc, No change in pain meds today.     10/19:  Concern for ACS, hematology consulted this morning.  Recommending simple transfusion at this point.     10/21: Febrile overnight, patient denies any complaints. T.bili increased from 10->15. Heme aware.     Acute pulmonary embolism  Lovenox full dose started  Per CT chest/a/p.  Tachycardia 110-120, no chest pain, minimal hypoxia  US LE bilat  Will need to convert to eliquis. Discussed with hematologist and is ok with switching. Appreciate assistance.  Plan to switch to Eliquis tomorrow. 10 mg BID followed by 5mg BID. He already had 2 days lovenox treatment dose.         Elevated transaminase level  Monitor consider hepatology consult      Severe sepsis  Monitor. Wbc 13-> 17 -> 16  Blood culture  Cxr, UA neg  No concern on abd exam  Likely reactive  Empiric zosyn.     1018- adding vanc, CT ch/ abd/ pelvis. cxr may have developing infiltrate, on IVFs. . Meets requirements for Severe SEPSIS  with high wbc, tachycardia, hypoxia, RR 20, rising LFTs.  Consuted Crit care to follow.     10/22: consulting ID to de-escalate abx. Appreciate assistance.       Hyperbilirubinemia  -Chronic, suspect due to combination of hemolysis causing unconjugated hyperbilirubinemia, and intrahepatic cholestasis from SC disease  -Continue to monitor while admitted, pt. Currently follows with hepatology as outpatient        VTE Risk Mitigation (From admission, onward)         Ordered     apixaban tablet 5 mg  2 times daily         10/20/23 1643     apixaban tablet 10 mg  2 times daily         10/20/23 1643     IP VTE LOW RISK PATIENT  Once         10/15/23 2012     IP VTE HIGH RISK PATIENT  Once         10/15/23 2012                Discharge Planning   [place]: 10/26/2023     Code Status: Full Code   Is the patient medically ready for discharge?: No    Reason for patient still in hospital (select all that apply): Patient trending condition and Treatment  Discharge Plan A: Home   Discharge Delays: None known at this time              Getachew Krishna DO  Department of Hospital Medicine   [facility] - [facility] ([place])

## 2023-10-23 NOTE — PLAN OF CARE
Problem: Adult Inpatient Plan of Care  Goal: Plan of Care Review  Outcome: Ongoing, Progressing  Goal: Patient-Specific Goal (Individualized)  Outcome: Ongoing, Progressing     Problem: Fluid Imbalance (Pneumonia)  Goal: Fluid Balance  Outcome: Ongoing, Progressing     Problem: Infection (Pneumonia)  Goal: Resolution of Infection Signs and Symptoms  Outcome: Ongoing, Progressing     Problem: Respiratory Compromise (Pneumonia)  Goal: Effective Oxygenation and Ventilation  Outcome: Ongoing, Progressing     Problem: Pain Acute  Goal: Acceptable Pain Control and Functional Ability  Outcome: Ongoing, Progressing

## 2023-10-23 NOTE — PROGRESS NOTES
"[facility] ([place] [address])  Infectious Disease  Progress Note    Patient Name: Parrish Davis  MRN: [id number]  Admission Date: 10/15/2023  Length of Stay: 8 days  Attending Physician: Getachew Krishna DO  Primary Care Provider: Jovanny Douglas MD    Isolation Status: No active isolations  Assessment/Plan:      Pulmonary  CAP (community acquired pneumonia)  28M with PMH of sickle cell disease admitted for treatment of pain crisis. Patient had a leukocytosis on admission which has now normalized. CT C/A/P with contrast showing R sided PE and LLL opacity either infection vs infarct. TTE shows EF 55-60%. Blood cultures without any growth on 10/16, 10/18, and 10/19. Last febrile on 10/20 up to 101.2, has been afebrile since then. Was on vancomycin and zosyn from 10/16 - 10/18, then switched zosyn to cefepime. Will treat for CAP. Repeat CXR stable. L spine x-ray without abrnomalities. Patient afebrile overnight on 10/22 and reporting improvement.    Recommendations  · Patient was started on Zosyn on 10/16, now on day 8 of antibiotics; given his rising liver enzymes in the setting of sickle cell crisis, believe it is reasonable to stop Ceftriaxone at this time to prevent further liver injury.  · Continue PO Azithromycin 250mg qd for CAP coverage; last dose on 10/26.      Infectious Diseases will sign-off at this time. Please call with any additional questions, concerns or changes in the patient's clinical status. Thank you for involving us in the care of this patient. Patient discussed with Dr. Shanks, staff attestation to follow.      Tal Pearce MD  Infectious Disease  [facility] ([place] [address])    Subjective:     Principal Problem:Sickle-cell disease with vaso-occlusive pain    HPI: Mr. Davis is a 28M with PMH of sickle cell disease admitted for treatment of pain crisis. Infectious disease consulted for "Sickle-cell disease with vaso-occlusive pain and PE. Started on broad " "spectrum antibiotic for "sepsis", without source. Vanc & zosyn on 10/18. Febrile yesterday. However, denies any complaints. Bcx negative. abx recs, further w/u, thank you".    Patient had a leukocytosis on admission which has now normalized. CT C/A/P with contrast showing R sided PE and LLL opacity either infection vs infarct. TTE shows EF 55-60%. Blood cultures without any growth on 10/16, 10/18, and 10/19. Last febrile on 10/20 up to 101.2, has been afebrile since then. Was on vancomycin and zosyn from 10/15 - 10/18, then switched zosyn to cefepime.     Interval History: No acute events overnight. Afebrile overnight. Patient reports feeling a little better today than prior days. No respiratory symptoms.    Review of Systems   Constitutional:  Negative for chills and fever.   Respiratory:  Negative for cough and shortness of breath.    Cardiovascular:  Negative for chest pain.   Gastrointestinal:  Negative for abdominal pain, constipation, diarrhea, nausea and vomiting.   Genitourinary:  Negative for dysuria and hematuria.   Musculoskeletal:  Positive for arthralgias and myalgias.   Skin:  Negative for rash.   Neurological:  Negative for weakness and headaches.     Objective:     Vital Signs (Most Recent):  Temp: 99.8 °F (37.7 °C) (10/23/23 1606)  Pulse: 106 (10/23/23 1129)  Resp: 18 (10/23/23 1626)  BP: 130/73 (10/23/23 1606)  SpO2: 95 % (10/23/23 1606) Vital Signs (24h Range):  Temp:  [98.5 °F (36.9 °C)-99.8 °F (37.7 °C)] 99.8 °F (37.7 °C)  Pulse:  [] 106  Resp:  [16-19] 18  SpO2:  [93 %-96 %] 95 %  BP: (128-143)/(72-81) 130/73     Weight: 83.9 kg (185 lb)  Body mass index is 24.41 kg/m².    Estimated Creatinine Clearance: 205.3 mL/min (based on SCr of 0.6 mg/dL).     Physical Exam  Vitals reviewed.   Constitutional:       General: He is not in acute distress.     Appearance: Normal appearance. He is not ill-appearing.   HENT:      Head: Normocephalic and atraumatic.   Eyes:      Extraocular Movements: " Extraocular movements intact.      Conjunctiva/sclera: Conjunctivae normal.   Cardiovascular:      Rate and Rhythm: Normal rate and regular rhythm.      Heart sounds: No murmur heard.  Pulmonary:      Effort: Pulmonary effort is normal. No respiratory distress.      Breath sounds: Normal breath sounds.   Abdominal:      General: Abdomen is flat. Bowel sounds are normal.      Palpations: Abdomen is soft.      Tenderness: There is no abdominal tenderness.   Musculoskeletal:      Cervical back: Normal range of motion.   Skin:     General: Skin is warm and dry.   Neurological:      General: No focal deficit present.      Mental Status: He is alert and oriented to person, place, and time.   Psychiatric:         Mood and Affect: Mood normal.         Behavior: Behavior normal.         Thought Content: Thought content normal.          Significant Labs: CBC:   Recent Labs   Lab 10/22/23  0403 10/23/23  0444   WBC 10.54 12.35   HGB 8.3* 8.3*   HCT 23.4* 23.3*    218     CMP:   Recent Labs   Lab 10/22/23  0403 10/23/23  0444   * 133*   K 3.4* 3.7   CL 96 98   CO2 26 24   * 104   BUN 5* 6   CREATININE 0.6 0.6   CALCIUM 9.2 9.3   PROT 7.5 7.5   ALBUMIN 2.4* 2.4*   BILITOT 15.1* 12.8*   ALKPHOS 376* 411*   * 128*   ALT 76* 103*   ANIONGAP 11 11       Significant Imaging: I have reviewed all pertinent imaging results/findings within the past 24 hours.

## 2023-10-23 NOTE — PLAN OF CARE
[facility] ([facility])  Discharge Reassessment    Primary Care Provider: Jovanny Douglas MD    Expected Discharge Date: 10/26/2023    Reassessment (most recent)       Discharge Reassessment - 10/23/23 1209          Discharge Reassessment    Assessment Type Discharge Planning Reassessment     Did the patient's condition or plan change since previous assessment? No     Discharge Plan discussed with: Patient     Communicated [place] with patient/caregiver Yes     Discharge Plan A Home     Discharge Plan B Home     DME Needed Upon Discharge  none     Transition of Care Barriers None     Why the patient remains in the hospital Requires continued medical care        Post-Acute Status    Post-Acute Authorization Other     Other Status No Post-Acute Service Needs     Discharge Delays None known at this time                   Patient requires ongoing care for sickle cell pain crisis. Patient will return home at discharge and has transportation.     Discharge Plan A and Plan B have been determined by review of patient's clinical status, future medical and therapeutic needs, and coverage/benefits for post-acute care in coordination with multidisciplinary team members.    No SW needs identified at this time.     Fani Sandhu, MSW, LCSW  Manager - Case Management

## 2023-10-24 LAB
ALBUMIN SERPL BCP-MCNC: 2.4 G/DL (ref 3.5–5.2)
ALP SERPL-CCNC: 407 U/L (ref 55–135)
ALT SERPL W/O P-5'-P-CCNC: 111 U/L (ref 10–44)
ANION GAP SERPL CALC-SCNC: 9 MMOL/L (ref 8–16)
ANISOCYTOSIS BLD QL SMEAR: SLIGHT
AST SERPL-CCNC: 138 U/L (ref 10–40)
BACTERIA BLD CULT: NORMAL
BASOPHILS NFR BLD: 0 % (ref 0–1.9)
BILIRUB SERPL-MCNC: 9.4 MG/DL (ref 0.1–1)
BILIRUB UR QL STRIP: NEGATIVE
BUN SERPL-MCNC: 5 MG/DL (ref 6–20)
CALCIUM SERPL-MCNC: 9.2 MG/DL (ref 8.7–10.5)
CHLORIDE SERPL-SCNC: 98 MMOL/L (ref 95–110)
CLARITY UR REFRACT.AUTO: CLEAR
CO2 SERPL-SCNC: 25 MMOL/L (ref 23–29)
COLOR UR AUTO: NORMAL
CREAT SERPL-MCNC: 0.6 MG/DL (ref 0.5–1.4)
DIFFERENTIAL METHOD: ABNORMAL
EOSINOPHIL NFR BLD: 2 % (ref 0–8)
ERYTHROCYTE [DISTWIDTH] IN BLOOD BY AUTOMATED COUNT: 21.2 % (ref 11.5–14.5)
EST. GFR  (NO RACE VARIABLE): >60 ML/MIN/1.73 M^2
GLUCOSE SERPL-MCNC: 101 MG/DL (ref 70–110)
GLUCOSE UR QL STRIP: NEGATIVE
HCT VFR BLD AUTO: 22.6 % (ref 40–54)
HGB BLD-MCNC: 7.9 G/DL (ref 14–18)
HGB UR QL STRIP: NEGATIVE
HYPOCHROMIA BLD QL SMEAR: ABNORMAL
IMM GRANULOCYTES # BLD AUTO: ABNORMAL K/UL (ref 0–0.04)
IMM GRANULOCYTES NFR BLD AUTO: ABNORMAL % (ref 0–0.5)
KETONES UR QL STRIP: NEGATIVE
LEUKOCYTE ESTERASE UR QL STRIP: NEGATIVE
LYMPHOCYTES NFR BLD: 13 % (ref 18–48)
MCH RBC QN AUTO: 28.1 PG (ref 27–31)
MCHC RBC AUTO-ENTMCNC: 35 G/DL (ref 32–36)
MCV RBC AUTO: 80 FL (ref 82–98)
MONOCYTES NFR BLD: 9 % (ref 4–15)
NEUTROPHILS NFR BLD: 76 % (ref 38–73)
NITRITE UR QL STRIP: NEGATIVE
NRBC BLD-RTO: 1 /100 WBC
PH UR STRIP: 6 [PH] (ref 5–8)
PHOSPHATE SERPL-MCNC: 3.9 MG/DL (ref 2.7–4.5)
PLATELET # BLD AUTO: 297 K/UL (ref 150–450)
PLATELET BLD QL SMEAR: ABNORMAL
PMV BLD AUTO: 10.6 FL (ref 9.2–12.9)
POIKILOCYTOSIS BLD QL SMEAR: ABNORMAL
POLYCHROMASIA BLD QL SMEAR: ABNORMAL
POTASSIUM SERPL-SCNC: 3.7 MMOL/L (ref 3.5–5.1)
PROT SERPL-MCNC: 7.4 G/DL (ref 6–8.4)
PROT UR QL STRIP: NEGATIVE
RBC # BLD AUTO: 2.81 M/UL (ref 4.6–6.2)
SICKLE CELLS BLD QL SMEAR: ABNORMAL
SODIUM SERPL-SCNC: 132 MMOL/L (ref 136–145)
SP GR UR STRIP: 1.01 (ref 1–1.03)
TARGETS BLD QL SMEAR: ABNORMAL
URN SPEC COLLECT METH UR: NORMAL
WBC # BLD AUTO: 12.87 K/UL (ref 3.9–12.7)

## 2023-10-24 PROCEDURE — 80053 COMPREHEN METABOLIC PANEL: CPT | Performed by: HOSPITALIST

## 2023-10-24 PROCEDURE — 97116 GAIT TRAINING THERAPY: CPT

## 2023-10-24 PROCEDURE — 85007 BL SMEAR W/DIFF WBC COUNT: CPT | Performed by: HOSPITALIST

## 2023-10-24 PROCEDURE — 81003 URINALYSIS AUTO W/O SCOPE: CPT | Performed by: STUDENT IN AN ORGANIZED HEALTH CARE EDUCATION/TRAINING PROGRAM

## 2023-10-24 PROCEDURE — 25000003 PHARM REV CODE 250: Performed by: HOSPITALIST

## 2023-10-24 PROCEDURE — 12000002 HC ACUTE/MED SURGE SEMI-PRIVATE ROOM

## 2023-10-24 PROCEDURE — 87040 BLOOD CULTURE FOR BACTERIA: CPT | Performed by: STUDENT IN AN ORGANIZED HEALTH CARE EDUCATION/TRAINING PROGRAM

## 2023-10-24 PROCEDURE — 36415 COLL VENOUS BLD VENIPUNCTURE: CPT | Performed by: STUDENT IN AN ORGANIZED HEALTH CARE EDUCATION/TRAINING PROGRAM

## 2023-10-24 PROCEDURE — S5010 5% DEXTROSE AND 0.45% SALINE: HCPCS | Performed by: HOSPITALIST

## 2023-10-24 PROCEDURE — 63700000 PHARM REV CODE 250 ALT 637 W/O HCPCS: Performed by: STUDENT IN AN ORGANIZED HEALTH CARE EDUCATION/TRAINING PROGRAM

## 2023-10-24 PROCEDURE — 36415 COLL VENOUS BLD VENIPUNCTURE: CPT | Performed by: HOSPITALIST

## 2023-10-24 PROCEDURE — 25000003 PHARM REV CODE 250: Performed by: STUDENT IN AN ORGANIZED HEALTH CARE EDUCATION/TRAINING PROGRAM

## 2023-10-24 PROCEDURE — 84100 ASSAY OF PHOSPHORUS: CPT | Performed by: HOSPITALIST

## 2023-10-24 PROCEDURE — 85027 COMPLETE CBC AUTOMATED: CPT | Performed by: HOSPITALIST

## 2023-10-24 PROCEDURE — 25000003 PHARM REV CODE 250: Performed by: EMERGENCY MEDICINE

## 2023-10-24 RX ADMIN — OXYCODONE AND ACETAMINOPHEN 1 TABLET: 10; 325 TABLET ORAL at 06:10

## 2023-10-24 RX ADMIN — DEXTROSE AND SODIUM CHLORIDE: 5; 450 INJECTION, SOLUTION INTRAVENOUS at 06:10

## 2023-10-24 RX ADMIN — HYDROXYUREA 500 MG: 500 CAPSULE ORAL at 10:10

## 2023-10-24 RX ADMIN — OXYCODONE AND ACETAMINOPHEN 1 TABLET: 10; 325 TABLET ORAL at 10:10

## 2023-10-24 RX ADMIN — ACETAMINOPHEN 650 MG: 325 TABLET ORAL at 11:10

## 2023-10-24 RX ADMIN — APIXABAN 10 MG: 5 TABLET, FILM COATED ORAL at 10:10

## 2023-10-24 RX ADMIN — OXYCODONE AND ACETAMINOPHEN 1 TABLET: 10; 325 TABLET ORAL at 08:10

## 2023-10-24 RX ADMIN — MUPIROCIN: 20 OINTMENT TOPICAL at 10:10

## 2023-10-24 RX ADMIN — FOLIC ACID 1 MG: 1 TABLET ORAL at 10:10

## 2023-10-24 RX ADMIN — SENNOSIDES AND DOCUSATE SODIUM 1 TABLET: 50; 8.6 TABLET ORAL at 08:10

## 2023-10-24 RX ADMIN — POLYETHYLENE GLYCOL 3350 17 G: 17 POWDER, FOR SOLUTION ORAL at 10:10

## 2023-10-24 RX ADMIN — SENNOSIDES AND DOCUSATE SODIUM 1 TABLET: 50; 8.6 TABLET ORAL at 10:10

## 2023-10-24 RX ADMIN — MUPIROCIN: 20 OINTMENT TOPICAL at 08:10

## 2023-10-24 RX ADMIN — DEXTROSE AND SODIUM CHLORIDE: 5; 450 INJECTION, SOLUTION INTRAVENOUS at 05:10

## 2023-10-24 RX ADMIN — AZITHROMYCIN 250 MG: 250 TABLET, FILM COATED ORAL at 10:10

## 2023-10-24 RX ADMIN — FLUOXETINE 10 MG: 10 CAPSULE ORAL at 10:10

## 2023-10-24 RX ADMIN — APIXABAN 10 MG: 5 TABLET, FILM COATED ORAL at 08:10

## 2023-10-24 RX ADMIN — OXYCODONE AND ACETAMINOPHEN 1 TABLET: 10; 325 TABLET ORAL at 02:10

## 2023-10-24 RX ADMIN — OXYCODONE AND ACETAMINOPHEN 1 TABLET: 10; 325 TABLET ORAL at 04:10

## 2023-10-24 NOTE — PROGRESS NOTES
"[facility] ([address] Medicine  Progress Note    Patient Name: Parrish Davis  MRN: [id number]  Patient Class: IP- Inpatient   Admission Date: 10/15/2023  Length of Stay: 9 days  Attending Physician: Getachew Krishna DO  Primary Care Provider: Jovanny Douglas MD        Subjective:     Principal Problem:Sickle-cell disease with vaso-occlusive pain        HPI:  28 y.o. man with sickle cell anemia HbSS who presents to the ED complaining of  uncontrolled body aches/pain x1 day He reports the pain started late last night/early this morning with onset of severe lower back pain. It has progressed to involve his entire body per patient, stating that he hurts "all over". No reported cough, SOB, fevers, or chills. Pain poorly controlled with home PO medications, so he came to the ED.      Overview/Hospital Course:  10/16- wbc - 13->18, retic 10.4, Hb 9.4. bili 3.6. ast/alt- slight elevation. Cxr reviewed by me- No infiltrate, Interstitial markings look chronic.  Pain located in Back and knees. No abd pain. Reorts an episode of chest pain earlier now resolved. No SOB. He looks uncomfortable. Nurse to administer IV dialudid now. Continue IVFs.  His mother at bedside reports he had about 30 admission in [facility], approx 3 /yr, and 3 so far this year. /81  Pulse 71   Temp 97.3 °F (36.3 °C) (Oral)   Resp 18    SpO2 100%   2 liters oxygen.    Old CT chest from last admission 8.24.23, - Multifocal nodular opacities with adjacent ground-glass attenuation distributed throughout the bilateral lower lobes, differential considerations include infectious/inflammatory causes, aspiration, or pulmonary infarction in the setting of sickle cell disease.    10/17- /88 Pulse 100  SpO2 93%  . AF. Wbc 17. On empiric zosyn. Pt not room  when I came for a second visit 5 pm yesterday. He said he must have been in BR.  Cultures neg. Pain scale 10->8. Dilaudid PCA started on day 1 was stopped. 24 h " "MEDD 306.33 of 384.. On oxycodone 10 (MEDD 60), Dilaudid 1mg, 2 mg (MEDD 180) _ dilsudid pump. On IVFs.  Functional status per nurse:  Ambulating to BR, talking, eating, sleeping, has ADls. He reports not eating.  Will wean dilaudid to 1.5 q 3, today. Pain located in low back today. Looks comfortable after receiving dilaudid. Mother present in room. I gave them a lecture on how opioids work, their benefits and pitfalls, and how/why weaning or stopping the medication can cause rebound pain.  Recommended slow wean in supervised setting in hospital, while monitoring for complications of SSA. They expressed understanding  4:35 pm - started oxygen 2 liters, for sat 85%, now 92%, -110. Cxr ordered      10/18- Hb 9.4.  cxr 10/17- The lungs are symmetrically expanded bilaterally with coarse central hilar interstitial attenuation suggesting edema.  There is slightly increased parenchymal attenuation projected over the left lower lung zone, may reflect atelectasis however developing consolidation not excluded..  No large focal consolidation seen. There is no pneumothorax.  The osseous structures are unchanged.  On IVFs.   94% 1 iter NC.   /87    Pulse 138   Temp 99.7 °F   Resp 20 . ECHO 3/2023 - EF 60%.  Bl cult 10/16- NGSF. Ast/alt rising. Wbc 18-> 16.  Will add lactic acid, procal, repeat blood culture. CT chest/ abd/ pelvis.  Consulting Crit Care to follow. On Zosyn. Changing to cefepime. Adding vanc.   Nurse reports 7/10 pain this AM. still in lower back, not radiating anywhere. + rigors.  Low grade fever.   3:30 pm: CT + for Pulmonary Embolus, and forming infiltrate. Started FULL dose lovenox.  On cefepime and vanc.    Suspecting acute chest syndrome, hematology consulted.  Patient to be given blood transfusion, simple.  Per hematology: "If symptoms ( pain, hypoxemia) worsen he might benefit from partial/ complete exchange transfusion."Continue to monitor patient closely.    Patient is stable today. " He reports mild abdominal pain, reports no BM since Sunday. Denies any aggravating or alleviating factors to his abdominal pain. Discussed using laxativbes. He is currently taking Miralax. Appetite has been improving. Denies any chest pain, shortness of breath. Continues with be stable on nasal canula. Hematology would like to trend reticulocytes daily and incentive spirometry.     10/21: Patient was febrile overnight. He denies feeling febrile. Does report weakness, planning to consulting PT/OT. Bilirubin is trending up from 10 to 15. Heme consulted and is following. Aware of the fever and bilirubin.      10/22:  Patient is pleasant, denies any chest pain, abdominal pain, denies any nausea.  Discussed with the patient current plan.  Consulting Infectious Disease for further antibiotic management, appreciate assistance.  Continues to have an elevated bilirubin, reticulocytosis.  No acute events overnight.    10/23: Very pleasant, continues to report feeling well. Currently not on O2. Reports thigh pain, since admission. Able to ambulate better now. Discussed his labs wit him. Hgb stable. T.bili trending down. Afebrile. Nearing discharge in the next few days if he continues to improve.     10/24: Very pleasant, reports that he is not having any chest pain, abdominal pain, shortness and breath, diarrhea.  Patient reports feeling good.  He continues to be febrile overnight and during the day he had another temp.  Patient denies feeling chills or feeling the that his temperature is elevated.  Discussed the case with the hematologist.      Interval History: see above      Objective:     Vital Signs (Most Recent):  Temp: 100.1 °F (37.8 °C) (10/24/23 1514)  Pulse: 92 (10/24/23 1514)  Resp: 18 (10/24/23 1624)  BP: 120/69 (10/24/23 1514)  SpO2: (!) 93 % (10/24/23 1514) Vital Signs (24h Range):  Temp:  [98.5 °F (36.9 °C)-101.2 °F (38.4 °C)] 100.1 °F (37.8 °C)  Pulse:  [] 92  Resp:  [16-18] 18  SpO2:  [91 %-98 %] 93  %  BP: (120-138)/(69-80) 120/69     Weight: 83.9 kg (185 lb)  Body mass index is 24.41 kg/m².    Intake/Output Summary (Last 24 hours) at 10/24/2023 1385  Last data filed at 10/24/2023 0542  Gross per 24 hour   Intake --   Output 2100 ml   Net -2100 ml         Physical Exam  Constitutional:       General: He is not in acute distress.     Appearance: Normal appearance. He is not ill-appearing, toxic-appearing or diaphoretic.   HENT:      Head: Normocephalic and atraumatic.      Nose: Nose normal.      Mouth/Throat:      Mouth: Mucous membranes are moist.      Pharynx: Oropharynx is clear.   Eyes:      General: Scleral icterus (improved since yesterday) present.      Extraocular Movements: Extraocular movements intact.      Conjunctiva/sclera: Conjunctivae normal.      Pupils: Pupils are equal, round, and reactive to light.   Cardiovascular:      Rate and Rhythm: Regular rhythm. Tachycardia present.      Pulses: Normal pulses.   Pulmonary:      Effort: Pulmonary effort is normal. No respiratory distress.      Breath sounds: Normal breath sounds. No stridor. No wheezing, rhonchi or rales.   Chest:      Chest wall: No tenderness.   Abdominal:      General: Abdomen is flat. Bowel sounds are normal. There is no distension.      Palpations: Abdomen is soft.      Tenderness: There is no abdominal tenderness. There is no right CVA tenderness, left CVA tenderness, guarding or rebound.   Musculoskeletal:         General: No swelling. Normal range of motion.      Cervical back: Normal range of motion and neck supple. No rigidity or tenderness.      Right lower leg: No edema.      Left lower leg: No edema.   Skin:     General: Skin is warm and dry.      Coloration: Skin is not jaundiced.      Findings: No bruising, erythema or rash.   Neurological:      General: No focal deficit present.      Mental Status: He is alert and oriented to person, place, and time. Mental status is at baseline.      Motor: No weakness.      Gait: Gait  normal.   Psychiatric:         Mood and Affect: Mood normal.         Behavior: Behavior normal.         Thought Content: Thought content normal.         Judgment: Judgment normal.             Significant Labs: All pertinent labs within the past 24 hours have been reviewed.    Significant Imaging: I have reviewed all pertinent imaging results/findings within the past 24 hours.      Assessment/Plan:      * Sickle-cell disease with vaso-occlusive pain  -Pt. With severe joint pains typical of vaso-occlusive crises. No reported chest pain, no hypoxia, and CXR without infiltrate (improved from prior) not consistent with acute chest.   - ( Old CTchest 8/24/23, had bilat lower patchy interstitial edema.)   -Plan to treat with IV fluids, PCA pump ordered for more continuous pain med dosing as pt. Complained of severe persistent pain after interval dosing in ED. Continue home meds folic acid and hydroxyurea (voxeletor not on formulary)    10/16- on IVFs, dilaudid 2 q 3 prn, and oxy 10 q 4 prn. PCA discontinued. cxr and urine neg for infection. CT noted interstitial edema.     10/17 on IVFs, dilaudid 1.5 q 3. Oxycodone. Empiric zosyn, Pain in low back. No chest pain, SOB, not requiring oxygen    10/18- Repeating lactic acid, procal, repeat blood culture. CT chest/ abd/ pelvis.  Consulting Crit Care to follow. On Zosyn. Adding vanc, No change in pain meds today.     10/19:  Concern for ACS, hematology consulted this morning.  Recommending simple transfusion at this point.     10/21: Febrile overnight, patient denies any complaints. T.bili increased from 10->15. Heme aware.     Acute pulmonary embolism  Lovenox full dose started  Per CT chest/a/p.  Tachycardia 110-120, no chest pain, minimal hypoxia  US LE bilat  Will need to convert to eliquis. Discussed with hematologist and is ok with switching. Appreciate assistance.  Plan to switch to Eliquis tomorrow. 10 mg BID followed by 5mg BID. He already had 2 days lovenox treatment  dose.         Elevated transaminase level  Monitor consider hepatology consult      Severe sepsis  Monitor. Wbc 13-> 17 -> 16  Blood culture  Cxr, UA neg  No concern on abd exam  Likely reactive  Empiric zosyn.     1018- adding vanc, CT ch/ abd/ pelvis. cxr may have developing infiltrate, on IVFs. . Meets requirements for Severe SEPSIS with high wbc, tachycardia, hypoxia, RR 20, rising LFTs.  Consuted Crit care to follow.     10/22: consulting ID to de-escalate abx. Appreciate assistance.     10/24: continues to have fever. Sepsis workup today.     Hyperbilirubinemia  -Chronic, suspect due to combination of hemolysis causing unconjugated hyperbilirubinemia, and intrahepatic cholestasis from SC disease  -Continue to monitor while admitted, pt. Currently follows with hepatology as outpatient        VTE Risk Mitigation (From admission, onward)         Ordered     apixaban tablet 5 mg  2 times daily         10/20/23 1643     apixaban tablet 10 mg  2 times daily         10/20/23 1643     IP VTE LOW RISK PATIENT  Once         10/15/23 2012     IP VTE HIGH RISK PATIENT  Once         10/15/23 2012                Discharge Planning   [place]: 10/26/2023     Code Status: Full Code   Is the patient medically ready for discharge?: No    Reason for patient still in hospital (select all that apply): Patient trending condition, Laboratory test and Treatment  Discharge Plan A: Home   Discharge Delays: None known at this time              Getachew Krishna DO  Department of Hospital Medicine   [facility] ([place])

## 2023-10-24 NOTE — SUBJECTIVE & OBJECTIVE
Interval History: see above      Objective:     Vital Signs (Most Recent):  Temp: 100.1 °F (37.8 °C) (10/24/23 1514)  Pulse: 92 (10/24/23 1514)  Resp: 18 (10/24/23 1624)  BP: 120/69 (10/24/23 1514)  SpO2: (!) 93 % (10/24/23 1514) Vital Signs (24h Range):  Temp:  [98.5 °F (36.9 °C)-101.2 °F (38.4 °C)] 100.1 °F (37.8 °C)  Pulse:  [] 92  Resp:  [16-18] 18  SpO2:  [91 %-98 %] 93 %  BP: (120-138)/(69-80) 120/69     Weight: 83.9 kg (185 lb)  Body mass index is 24.41 kg/m².    Intake/Output Summary (Last 24 hours) at 10/24/2023 5061  Last data filed at 10/24/2023 0542  Gross per 24 hour   Intake --   Output 2100 ml   Net -2100 ml         Physical Exam  Constitutional:       General: He is not in acute distress.     Appearance: Normal appearance. He is not ill-appearing, toxic-appearing or diaphoretic.   HENT:      Head: Normocephalic and atraumatic.      Nose: Nose normal.      Mouth/Throat:      Mouth: Mucous membranes are moist.      Pharynx: Oropharynx is clear.   Eyes:      General: Scleral icterus (improved since yesterday) present.      Extraocular Movements: Extraocular movements intact.      Conjunctiva/sclera: Conjunctivae normal.      Pupils: Pupils are equal, round, and reactive to light.   Cardiovascular:      Rate and Rhythm: Regular rhythm. Tachycardia present.      Pulses: Normal pulses.   Pulmonary:      Effort: Pulmonary effort is normal. No respiratory distress.      Breath sounds: Normal breath sounds. No stridor. No wheezing, rhonchi or rales.   Chest:      Chest wall: No tenderness.   Abdominal:      General: Abdomen is flat. Bowel sounds are normal. There is no distension.      Palpations: Abdomen is soft.      Tenderness: There is no abdominal tenderness. There is no right CVA tenderness, left CVA tenderness, guarding or rebound.   Musculoskeletal:         General: No swelling. Normal range of motion.      Cervical back: Normal range of motion and neck supple. No rigidity or tenderness.       Right lower leg: No edema.      Left lower leg: No edema.   Skin:     General: Skin is warm and dry.      Coloration: Skin is not jaundiced.      Findings: No bruising, erythema or rash.   Neurological:      General: No focal deficit present.      Mental Status: He is alert and oriented to person, place, and time. Mental status is at baseline.      Motor: No weakness.      Gait: Gait normal.   Psychiatric:         Mood and Affect: Mood normal.         Behavior: Behavior normal.         Thought Content: Thought content normal.         Judgment: Judgment normal.             Significant Labs: All pertinent labs within the past 24 hours have been reviewed.    Significant Imaging: I have reviewed all pertinent imaging results/findings within the past 24 hours.

## 2023-10-24 NOTE — CARE UPDATE
Contacted by primary team in the morning regarding vital sign reading with temperature of 100.4, O2 saturation of 91% and hemoglobin of 7.9. Patient assessed at bedside. He notes no new symptoms and that his pain is consistently at 4-5/10. He is currently using PO pain medication and has not required IV medications. He denies any shortness of breath. Vitals rechecked at bedside. He is hemodynamically stable. He is currently saturating at 98% on room air at rest. Temperature rechecked with values of 98 and 99. Discussed with nurse at bedside, unclear if documented temperature was sustained. Physical exam shows good air flow in both lung fields with mild crackles at lung baseline that is relieved with deep inspiration; likely atelectasis. Reinforced use of incentive spirometer. No other changes in plan at this time.    Recommendations:  - Continue current care plan  - If clinical picture changes (increased respiratory distress, persistent fevers, worsening pain), please contact hematology consults on call  - In the meantime, would recommend symptom directed evaluation. If worsening respiratory distress, repeat chest x-ray is warranted. If persistent fevers, repeat septic work up including chest x-ray, blood cultures, and UA is warranted. Patient must also be stabilized from hemodynamic, and respiratory standpoint

## 2023-10-24 NOTE — PT/OT/SLP PROGRESS
Physical Therapy Treatment    Patient Name:  Parrish Davis   MRN:  [id number]    Recommendations:     Discharge Recommendations: No Therapy Indicated  Discharge Equipment Recommendations: walker, rolling  Barriers to discharge: None    Assessment:     Parrish Davis is a 28 y.o. male admitted with a medical diagnosis of Sickle-cell disease with vaso-occlusive pain.  He presents with the following impairments/functional limitations: impaired endurance, pain. Pt had decreased pain upon PT arrival, took this opportunity to assess stairs. Pt was able to ascend/descend 5 stairs with no physical assistance. He reported feeling good about stairs but wants one more visit with PT while he's in the hospital.    Rehab Prognosis: Good; patient would benefit from acute skilled PT services to address these deficits and reach maximum level of function.    Recent Surgery: * No surgery found *      Plan:     During this hospitalization, patient to be seen 3 x/week to address the identified rehab impairments via gait training, therapeutic activities and progress toward the following goals:    Plan of Care Expires:  11/21/23    Subjective     Chief Complaint: muscle cramping in legs  Patient/Family Comments/goals: wants one more PT visit while he's here  Pain/Comfort:  Pain Rating 1: 2/10  Pain Addressed 1: Distraction  Pain Rating Post-Intervention 1: 2/10      Objective:     Communicated with RN prior to session.  Patient found HOB elevated with peripheral IV, pulse ox (continuous) upon PT entry to room.     General Precautions: Standard, fall  Orthopedic Precautions: N/A  Braces: N/A  Respiratory Status: Room air     Functional Mobility:  Bed Mobility:     Scooting: independence  Transfers:     Sit to Stand:  independence with no AD  Gait: ~160ft, RW, supervision, steady gait  Stairs:  Pt ascended/descended 5 stair(s) with No Assistive Device with left handrail with Supervision or Set-up Assistance. Utilized non-reciprocal  pattern descending and reciprocal pattern ascending      AM-PAC 6 CLICK MOBILITY  Turning over in bed (including adjusting bedclothes, sheets and blankets)?: 4  Sitting down on and standing up from a chair with arms (e.g., wheelchair, bedside commode, etc.): 4  Moving from lying on back to sitting on the side of the bed?: 4  Moving to and from a bed to a chair (including a wheelchair)?: 4  Need to walk in hospital room?: 3  Climbing 3-5 steps with a railing?: 3  Basic Mobility Total Score: 22       Treatment & Education:  Utilized patients functional mobility to address pt's overall strength and endurance deficits.  Pt educated on role of PT and benefits of mobilization.  Discussed PT plan of care during hospitalization.  All questions answered within scope of PT.      Patient left HOB elevated with all lines intact, call button in reach, and RN notified..    GOALS:   Multidisciplinary Problems       Physical Therapy Goals          Problem: Physical Therapy    Goal Priority Disciplines Outcome Goal Variances Interventions   Physical Therapy Goal     PT, PT/OT Ongoing, Progressing     Description: Goals to be met by: 2023     Patient will increase functional independence with mobility by performin. Sit to stand transfer with [place]- met 10/24/23  2. Gait  x 200 feet with [place] using No Assistive Device.   3. Ascend/descend 15 stair with right Handrails Supervision using No Assistive Device.                          Time Tracking:     PT Received On: 10/24/23  PT Start Time: 956     PT Stop Time: 1007  PT Total Time (min): 11 min     Billable Minutes: Gait Training 11    Treatment Type: Treatment  PT/PTA: PT     Number of PTA visits since last PT visit: 0     10/24/2023

## 2023-10-24 NOTE — ASSESSMENT & PLAN NOTE
Monitor. Wbc 13-> 17 -> 16  Blood culture  Cxr, UA neg  No concern on abd exam  Likely reactive  Empiric zosyn.     1018- adding vanc, CT ch/ abd/ pelvis. cxr may have developing infiltrate, on IVFs. . Meets requirements for Severe SEPSIS with high wbc, tachycardia, hypoxia, RR 20, rising LFTs.  Consuted Crit care to follow.     10/22: consulting ID to de-escalate abx. Appreciate assistance.     10/24: continues to have fever. Sepsis workup today.

## 2023-10-24 NOTE — PLAN OF CARE
AAOX4. Oxygen saturation ranges between 92-98% on RA. No complaints of SOB. Continuous IV D5W/0.45%NS. Pain level consistently reported at 5/10 - PRN PO oxycodone- acetaminophen  mg given as requested. No adverse events noted during this shift.    Problem: Adult Inpatient Plan of Care  Goal: Plan of Care Review  Outcome: Ongoing, Progressing  Flowsheets (Taken 10/24/2023 0533)  Plan of Care Reviewed With: patient  Goal: Absence of Hospital-Acquired Illness or Injury  Outcome: Ongoing, Progressing  Intervention: Prevent Infection  Flowsheets (Taken 10/24/2023 0533)  Infection Prevention:   environmental surveillance performed   hand hygiene promoted   personal protective equipment utilized   rest/sleep promoted   single patient room provided  Goal: Optimal Comfort and Wellbeing  Outcome: Ongoing, Progressing  Intervention: Monitor Pain and Promote Comfort  Flowsheets (Taken 10/24/2023 0533)  Pain Management Interventions:   care clustered   medication offered   pillow support provided   position adjusted   quiet environment facilitated     Problem: Fluid Imbalance (Pneumonia)  Goal: Fluid Balance  Outcome: Ongoing, Progressing  Intervention: Monitor and Manage Fluid Balance  Flowsheets (Taken 10/24/2023 0533)  Fluid/Electrolyte Management: fluids provided     Problem: Infection (Pneumonia)  Goal: Resolution of Infection Signs and Symptoms  Outcome: Ongoing, Progressing  Intervention: Prevent Infection Progression  Flowsheets (Taken 10/24/2023 0533)  Fever Reduction/Comfort Measures: lightweight bedding     Problem: Respiratory Compromise (Pneumonia)  Goal: Effective Oxygenation and Ventilation  Outcome: Ongoing, Progressing  Intervention: Promote Airway Secretion Clearance  Flowsheets (Taken 10/24/2023 0533)  Breathing Techniques/Airway Clearance: diaphragmatic breathing promoted  Cough And Deep Breathing: done independently per patient     Problem: Fall Injury Risk  Goal: Absence of Fall and Fall-Related  Injury  Outcome: Ongoing, Progressing  Intervention: Promote Injury-Free Environment  Flowsheets (Taken 10/24/2023 0533)  Safety Promotion/Fall Prevention:   assistive device/personal item within reach   commode/urinal/bedpan at bedside   nonskid shoes/socks when out of bed   room near unit station   side rails raised x 2   medications reviewed     Problem: Pain Acute  Goal: Acceptable Pain Control and Functional Ability  Outcome: Ongoing, Progressing  Intervention: Prevent or Manage Pain  Flowsheets (Taken 10/24/2023 0533)  Sleep/Rest Enhancement:   awakenings minimized   regular sleep/rest pattern promoted   room darkened   noise level reduced

## 2023-10-25 LAB
ALBUMIN SERPL BCP-MCNC: 2.5 G/DL (ref 3.5–5.2)
ALP SERPL-CCNC: 453 U/L (ref 55–135)
ALT SERPL W/O P-5'-P-CCNC: 118 U/L (ref 10–44)
ANION GAP SERPL CALC-SCNC: 11 MMOL/L (ref 8–16)
ANISOCYTOSIS BLD QL SMEAR: SLIGHT
AST SERPL-CCNC: 131 U/L (ref 10–40)
BASOPHILS NFR BLD: 0 % (ref 0–1.9)
BILIRUB SERPL-MCNC: 7.7 MG/DL (ref 0.1–1)
BUN SERPL-MCNC: 6 MG/DL (ref 6–20)
CALCIUM SERPL-MCNC: 9.6 MG/DL (ref 8.7–10.5)
CHLORIDE SERPL-SCNC: 99 MMOL/L (ref 95–110)
CO2 SERPL-SCNC: 22 MMOL/L (ref 23–29)
CREAT SERPL-MCNC: 0.6 MG/DL (ref 0.5–1.4)
DIFFERENTIAL METHOD: ABNORMAL
EOSINOPHIL NFR BLD: 1 % (ref 0–8)
ERYTHROCYTE [DISTWIDTH] IN BLOOD BY AUTOMATED COUNT: 22.2 % (ref 11.5–14.5)
EST. GFR  (NO RACE VARIABLE): >60 ML/MIN/1.73 M^2
GGT SERPL-CCNC: 347 U/L (ref 8–55)
GLUCOSE SERPL-MCNC: 93 MG/DL (ref 70–110)
HCT VFR BLD AUTO: 22 % (ref 40–54)
HGB BLD-MCNC: 7.9 G/DL (ref 14–18)
IMM GRANULOCYTES # BLD AUTO: ABNORMAL K/UL (ref 0–0.04)
IMM GRANULOCYTES NFR BLD AUTO: ABNORMAL % (ref 0–0.5)
LYMPHOCYTES NFR BLD: 16 % (ref 18–48)
MCH RBC QN AUTO: 29.4 PG (ref 27–31)
MCHC RBC AUTO-ENTMCNC: 35.9 G/DL (ref 32–36)
MCV RBC AUTO: 82 FL (ref 82–98)
METAMYELOCYTES NFR BLD MANUAL: 1 %
MONOCYTES NFR BLD: 10 % (ref 4–15)
MYELOCYTES NFR BLD MANUAL: 1 %
NEUTROPHILS NFR BLD: 71 % (ref 38–73)
NRBC BLD-RTO: 1 /100 WBC
PHOSPHATE SERPL-MCNC: 3.9 MG/DL (ref 2.7–4.5)
PLATELET # BLD AUTO: 405 K/UL (ref 150–450)
PLATELET BLD QL SMEAR: ABNORMAL
PMV BLD AUTO: 10.4 FL (ref 9.2–12.9)
POIKILOCYTOSIS BLD QL SMEAR: ABNORMAL
POLYCHROMASIA BLD QL SMEAR: ABNORMAL
POTASSIUM SERPL-SCNC: 3.7 MMOL/L (ref 3.5–5.1)
PROT SERPL-MCNC: 7.8 G/DL (ref 6–8.4)
RBC # BLD AUTO: 2.69 M/UL (ref 4.6–6.2)
RETICS/RBC NFR AUTO: 9.3 % (ref 0.4–2)
SODIUM SERPL-SCNC: 132 MMOL/L (ref 136–145)
TARGETS BLD QL SMEAR: ABNORMAL
WBC # BLD AUTO: 14.64 K/UL (ref 3.9–12.7)

## 2023-10-25 PROCEDURE — 85007 BL SMEAR W/DIFF WBC COUNT: CPT | Performed by: HOSPITALIST

## 2023-10-25 PROCEDURE — 97535 SELF CARE MNGMENT TRAINING: CPT

## 2023-10-25 PROCEDURE — 25000003 PHARM REV CODE 250: Performed by: HOSPITALIST

## 2023-10-25 PROCEDURE — 12000002 HC ACUTE/MED SURGE SEMI-PRIVATE ROOM

## 2023-10-25 PROCEDURE — 25000003 PHARM REV CODE 250: Performed by: STUDENT IN AN ORGANIZED HEALTH CARE EDUCATION/TRAINING PROGRAM

## 2023-10-25 PROCEDURE — 36415 COLL VENOUS BLD VENIPUNCTURE: CPT | Performed by: STUDENT IN AN ORGANIZED HEALTH CARE EDUCATION/TRAINING PROGRAM

## 2023-10-25 PROCEDURE — 80053 COMPREHEN METABOLIC PANEL: CPT | Performed by: HOSPITALIST

## 2023-10-25 PROCEDURE — 85027 COMPLETE CBC AUTOMATED: CPT | Performed by: HOSPITALIST

## 2023-10-25 PROCEDURE — 25000003 PHARM REV CODE 250: Performed by: EMERGENCY MEDICINE

## 2023-10-25 PROCEDURE — 84100 ASSAY OF PHOSPHORUS: CPT | Performed by: HOSPITALIST

## 2023-10-25 PROCEDURE — 63700000 PHARM REV CODE 250 ALT 637 W/O HCPCS: Performed by: STUDENT IN AN ORGANIZED HEALTH CARE EDUCATION/TRAINING PROGRAM

## 2023-10-25 PROCEDURE — 85045 AUTOMATED RETICULOCYTE COUNT: CPT | Performed by: STUDENT IN AN ORGANIZED HEALTH CARE EDUCATION/TRAINING PROGRAM

## 2023-10-25 PROCEDURE — S5010 5% DEXTROSE AND 0.45% SALINE: HCPCS | Performed by: HOSPITALIST

## 2023-10-25 PROCEDURE — 97530 THERAPEUTIC ACTIVITIES: CPT

## 2023-10-25 PROCEDURE — 82977 ASSAY OF GGT: CPT | Performed by: STUDENT IN AN ORGANIZED HEALTH CARE EDUCATION/TRAINING PROGRAM

## 2023-10-25 RX ADMIN — SENNOSIDES AND DOCUSATE SODIUM 1 TABLET: 50; 8.6 TABLET ORAL at 08:10

## 2023-10-25 RX ADMIN — MUPIROCIN: 20 OINTMENT TOPICAL at 08:10

## 2023-10-25 RX ADMIN — FOLIC ACID 1 MG: 1 TABLET ORAL at 09:10

## 2023-10-25 RX ADMIN — OXYCODONE AND ACETAMINOPHEN 1 TABLET: 10; 325 TABLET ORAL at 05:10

## 2023-10-25 RX ADMIN — Medication 6 MG: at 10:10

## 2023-10-25 RX ADMIN — APIXABAN 10 MG: 5 TABLET, FILM COATED ORAL at 08:10

## 2023-10-25 RX ADMIN — SENNOSIDES AND DOCUSATE SODIUM 1 TABLET: 50; 8.6 TABLET ORAL at 09:10

## 2023-10-25 RX ADMIN — OXYCODONE AND ACETAMINOPHEN 1 TABLET: 10; 325 TABLET ORAL at 01:10

## 2023-10-25 RX ADMIN — OXYCODONE AND ACETAMINOPHEN 1 TABLET: 10; 325 TABLET ORAL at 10:10

## 2023-10-25 RX ADMIN — OXYCODONE AND ACETAMINOPHEN 1 TABLET: 10; 325 TABLET ORAL at 06:10

## 2023-10-25 RX ADMIN — HYDROXYUREA 500 MG: 500 CAPSULE ORAL at 09:10

## 2023-10-25 RX ADMIN — APIXABAN 10 MG: 5 TABLET, FILM COATED ORAL at 09:10

## 2023-10-25 RX ADMIN — AZITHROMYCIN 250 MG: 250 TABLET, FILM COATED ORAL at 09:10

## 2023-10-25 RX ADMIN — DEXTROSE AND SODIUM CHLORIDE: 5; 450 INJECTION, SOLUTION INTRAVENOUS at 02:10

## 2023-10-25 RX ADMIN — OXYCODONE AND ACETAMINOPHEN 1 TABLET: 10; 325 TABLET ORAL at 09:10

## 2023-10-25 RX ADMIN — OXYCODONE AND ACETAMINOPHEN 1 TABLET: 10; 325 TABLET ORAL at 02:10

## 2023-10-25 RX ADMIN — FLUOXETINE 10 MG: 10 CAPSULE ORAL at 09:10

## 2023-10-25 NOTE — PLAN OF CARE
Goals met to a satisfactory level, pt mobilizing independently. No further acute PT needs at this time.  Problem: Physical Therapy  Goal: Physical Therapy Goal  Description: Goals to be met by: 2023     Patient will increase functional independence with mobility by performin. Sit to stand transfer with [place]- met 10/24/23  2. Gait  x 200 feet with [place] using No Assistive Device.   3. Ascend/descend 15 stair with right Handrails Supervision using No Assistive Device.     Outcome: Met   Emma Ko, SPT

## 2023-10-25 NOTE — SUBJECTIVE & OBJECTIVE
Interval History: see above      Objective:     Vital Signs (Most Recent):  Temp: 99.5 °F (37.5 °C) (10/25/23 1540)  Pulse: 97 (10/25/23 1540)  Resp: 18 (10/25/23 1540)  BP: 125/64 (10/25/23 1540)  SpO2: 95 % (10/25/23 1540) Vital Signs (24h Range):  Temp:  [98.3 °F (36.8 °C)-101.3 °F (38.5 °C)] 99.5 °F (37.5 °C)  Pulse:  [] 97  Resp:  [16-20] 18  SpO2:  [87 %-99 %] 95 %  BP: (124-129)/(64-75) 125/64     Weight: 83.9 kg (185 lb)  Body mass index is 24.41 kg/m².    Intake/Output Summary (Last 24 hours) at 10/25/2023 1714  Last data filed at 10/24/2023 2030  Gross per 24 hour   Intake --   Output 900 ml   Net -900 ml         Physical Exam  Constitutional:       General: He is not in acute distress.     Appearance: Normal appearance. He is not ill-appearing, toxic-appearing or diaphoretic.   HENT:      Head: Normocephalic and atraumatic.      Nose: Nose normal.      Mouth/Throat:      Mouth: Mucous membranes are moist.      Pharynx: Oropharynx is clear.   Eyes:      General: Scleral icterus (improved since yesterday) present.      Extraocular Movements: Extraocular movements intact.      Conjunctiva/sclera: Conjunctivae normal.      Pupils: Pupils are equal, round, and reactive to light.   Cardiovascular:      Rate and Rhythm: Regular rhythm. Tachycardia present.      Pulses: Normal pulses.   Pulmonary:      Effort: Pulmonary effort is normal. No respiratory distress.      Breath sounds: Normal breath sounds. No stridor. No wheezing, rhonchi or rales.   Chest:      Chest wall: No tenderness.   Abdominal:      General: Abdomen is flat. Bowel sounds are normal. There is no distension.      Palpations: Abdomen is soft.      Tenderness: There is no abdominal tenderness. There is no right CVA tenderness, left CVA tenderness, guarding or rebound.   Musculoskeletal:         General: No swelling. Normal range of motion.      Cervical back: Normal range of motion and neck supple. No rigidity or tenderness.      Right  lower leg: No edema.      Left lower leg: No edema.   Skin:     General: Skin is warm and dry.      Coloration: Skin is not jaundiced.      Findings: No bruising, erythema or rash.   Neurological:      General: No focal deficit present.      Mental Status: He is alert and oriented to person, place, and time. Mental status is at baseline.      Motor: No weakness.      Gait: Gait normal.   Psychiatric:         Mood and Affect: Mood normal.         Behavior: Behavior normal.         Thought Content: Thought content normal.         Judgment: Judgment normal.             Significant Labs: All pertinent labs within the past 24 hours have been reviewed.    Significant Imaging: I have reviewed all pertinent imaging results/findings within the past 24 hours.

## 2023-10-25 NOTE — PLAN OF CARE
AAOX4. Pt.'s temperature increased to 101.3 F at 2336 and Pt. complained of pain at his midline site with infusions. PRN PO oxycodone 10 mg/acetaminophen 325 mg given and IV fluids were discontinued. Pt.'s temperature decreased to 99.3 F. Midline removed and PICC team consult placed. No adverse events noted during this shift.    Problem: Adult Inpatient Plan of Care  Goal: Plan of Care Review  Outcome: Ongoing, Progressing  Flowsheets (Taken 10/25/2023 0520)  Plan of Care Reviewed With: patient  Goal: Absence of Hospital-Acquired Illness or Injury  Outcome: Ongoing, Progressing  Intervention: Prevent Infection  Flowsheets (Taken 10/25/2023 0520)  Infection Prevention:   hand hygiene promoted   environmental surveillance performed   personal protective equipment utilized   rest/sleep promoted   single patient room provided  Goal: Optimal Comfort and Wellbeing  Outcome: Ongoing, Progressing  Intervention: Monitor Pain and Promote Comfort  Flowsheets (Taken 10/25/2023 0520)  Pain Management Interventions:   care clustered   medication offered   quiet environment facilitated   pillow support provided  Intervention: Provide Person-Centered Care  Flowsheets (Taken 10/25/2023 0520)  Trust Relationship/Rapport:   care explained   choices provided   questions answered   questions encouraged   thoughts/feelings acknowledged     Problem: Fluid Imbalance (Pneumonia)  Goal: Fluid Balance  Outcome: Ongoing, Progressing  Intervention: Monitor and Manage Fluid Balance  Flowsheets (Taken 10/25/2023 0520)  Fluid/Electrolyte Management: fluids provided     Problem: Adjustment to Illness (Sepsis/Septic Shock)  Goal: Optimal Coping  Outcome: Ongoing, Progressing  Intervention: Optimize Psychosocial Adjustment to Illness  Flowsheets (Taken 10/25/2023 0520)  Supportive Measures:   active listening utilized   self-care encouraged   verbalization of feelings encouraged

## 2023-10-25 NOTE — PT/OT/SLP PROGRESS
Physical Therapy Treatment and Discharge    Patient Name:  Parrish Davis   MRN:  [id number]    Recommendations:     Discharge Recommendations: No Therapy Indicated  Discharge Equipment Recommendations: walker, rolling  Barriers to discharge: None    Assessment:     Parrish Davis is a 28 y.o. male admitted with a medical diagnosis of Sickle-cell disease with vaso-occlusive pain.  He presents with the following impairments/functional limitations: impaired endurance. Pt is able to ambulate community distances with no physical assistance or LOB. All goals have been met at a satisfactory level and pt no longer requires skilled acute therapy. Told pt that if he feels like he has a decline in function to let medical team know and that PT can see him again.  Rehab Prognosis: Good; patient would benefit from acute skilled PT services to address these deficits and reach maximum level of function.    Recent Surgery: * No surgery found *      Plan:     During this hospitalization, patient to be seen 3 x/week to address the identified rehab impairments via gait training, therapeutic activities and progress toward the following goals:    Plan of Care Expires:  10/25/23    Subjective     Chief Complaint: none  Patient/Family Comments/goals: when he does get off balance he thinks it's because of pain  Pain/Comfort:  Pain Rating 1: 0/10  Pain Rating Post-Intervention 1: 0/10      Objective:     Communicated with RN prior to session.  Patient found supine with peripheral IV upon PT entry to room.     General Precautions: Standard, fall  Orthopedic Precautions: N/A  Braces: N/A  Respiratory Status: Room air     Functional Mobility:  Bed Mobility:     Supine to Sit: independence  Transfers:     Sit to Stand:  independence with no AD  Gait: ~500ft, no AD, independent with managing IV pole  Balance: independent with all balance      AM-PAC 6 CLICK MOBILITY  Turning over in bed (including adjusting bedclothes, sheets and blankets)?:  4  Sitting down on and standing up from a chair with arms (e.g., wheelchair, bedside commode, etc.): 4  Moving from lying on back to sitting on the side of the bed?: 4  Moving to and from a bed to a chair (including a wheelchair)?: 4  Need to walk in hospital room?: 4  Climbing 3-5 steps with a railing?: 3  Basic Mobility Total Score: 23       Treatment & Education:  Pt educated on role of PT and benefits of mobilization.  Discussed PT plan of care during hospitalization.  All questions answered within scope of PT.      Patient left sitting edge of bed with all lines intact..    GOALS:   Multidisciplinary Problems       Physical Therapy Goals          Problem: Physical Therapy    Goal Priority Disciplines Outcome Goal Variances Interventions   Physical Therapy Goal     PT, PT/OT Ongoing, Progressing     Description: Goals to be met by: 2023     Patient will increase functional independence with mobility by performin. Sit to stand transfer with [place]- met 10/24/23  2. Gait  x 200 feet with [place] using No Assistive Device.   3. Ascend/descend 15 stair with right Handrails Supervision using No Assistive Device.                          Time Tracking:     PT Received On: 10/25/23  PT Start Time: 1548     PT Stop Time: 1556  PT Total Time (min): 8 min     Billable Minutes: Therapeutic Activity 8    Treatment Type: Treatment  PT/PTA: PT     Number of PTA visits since last PT visit: 0     10/25/2023

## 2023-10-25 NOTE — ASSESSMENT & PLAN NOTE
Monitor consider hepatology consult  Alp trending up, obtained GGT which was elevated. Ordered RUQ US

## 2023-10-25 NOTE — PROGRESS NOTES
"[facility] - Intensive Care ([address] Medicine  Progress Note    Patient Name: Parrish Davis  MRN: [id number]  Patient Class: IP- Inpatient   Admission Date: 10/15/2023  Length of Stay: 10 days  Attending Physician: Getachew Krishna DO  Primary Care Provider: Jovanny Douglas MD        Subjective:     Principal Problem:Sickle-cell disease with vaso-occlusive pain        HPI:  28 y.o. man with sickle cell anemia HbSS who presents to the ED complaining of  uncontrolled body aches/pain x1 day He reports the pain started late last night/early this morning with onset of severe lower back pain. It has progressed to involve his entire body per patient, stating that he hurts "all over". No reported cough, SOB, fevers, or chills. Pain poorly controlled with home PO medications, so he came to the ED.      Overview/Hospital Course:  10/16- wbc - 13->18, retic 10.4, Hb 9.4. bili 3.6. ast/alt- slight elevation. Cxr reviewed by me- No infiltrate, Interstitial markings look chronic.  Pain located in Back and knees. No abd pain. Reorts an episode of chest pain earlier now resolved. No SOB. He looks uncomfortable. Nurse to administer IV dialudid now. Continue IVFs.  His mother at bedside reports he had about 30 admission in [facility], approx 3 /yr, and 3 so far this year. /81  Pulse 71   Temp 97.3 °F (36.3 °C) (Oral)   Resp 18    SpO2 100%   2 liters oxygen.    Old CT chest from last admission 8.24.23, - Multifocal nodular opacities with adjacent ground-glass attenuation distributed throughout the bilateral lower lobes, differential considerations include infectious/inflammatory causes, aspiration, or pulmonary infarction in the setting of sickle cell disease.    10/17- /88 Pulse 100  SpO2 93%  . AF. Wbc 17. On empiric zosyn. Pt not room  when I came for a second visit 5 pm yesterday. He said he must have been in BR.  Cultures neg. Pain scale 10->8. Dilaudid PCA started on day 1 was stopped. 24 h " "MEDD 306.33 of 384.. On oxycodone 10 (MEDD 60), Dilaudid 1mg, 2 mg (MEDD 180) _ dilsudid pump. On IVFs.  Functional status per nurse:  Ambulating to BR, talking, eating, sleeping, has ADls. He reports not eating.  Will wean dilaudid to 1.5 q 3, today. Pain located in low back today. Looks comfortable after receiving dilaudid. Mother present in room. I gave them a lecture on how opioids work, their benefits and pitfalls, and how/why weaning or stopping the medication can cause rebound pain.  Recommended slow wean in supervised setting in hospital, while monitoring for complications of SSA. They expressed understanding  4:35 pm - started oxygen 2 liters, for sat 85%, now 92%, -110. Cxr ordered      10/18- Hb 9.4.  cxr 10/17- The lungs are symmetrically expanded bilaterally with coarse central hilar interstitial attenuation suggesting edema.  There is slightly increased parenchymal attenuation projected over the left lower lung zone, may reflect atelectasis however developing consolidation not excluded..  No large focal consolidation seen. There is no pneumothorax.  The osseous structures are unchanged.  On IVFs.   94% 1 iter NC.   /87    Pulse 138   Temp 99.7 °F   Resp 20 . ECHO 3/2023 - EF 60%.  Bl cult 10/16- NGSF. Ast/alt rising. Wbc 18-> 16.  Will add lactic acid, procal, repeat blood culture. CT chest/ abd/ pelvis.  Consulting Crit Care to follow. On Zosyn. Changing to cefepime. Adding vanc.   Nurse reports 7/10 pain this AM. still in lower back, not radiating anywhere. + rigors.  Low grade fever.   3:30 pm: CT + for Pulmonary Embolus, and forming infiltrate. Started FULL dose lovenox.  On cefepime and vanc.    Suspecting acute chest syndrome, hematology consulted.  Patient to be given blood transfusion, simple.  Per hematology: "If symptoms ( pain, hypoxemia) worsen he might benefit from partial/ complete exchange transfusion."Continue to monitor patient closely.    Patient is stable today. " He reports mild abdominal pain, reports no BM since Sunday. Denies any aggravating or alleviating factors to his abdominal pain. Discussed using laxativbes. He is currently taking Miralax. Appetite has been improving. Denies any chest pain, shortness of breath. Continues with be stable on nasal canula. Hematology would like to trend reticulocytes daily and incentive spirometry.     10/21: Patient was febrile overnight. He denies feeling febrile. Does report weakness, planning to consulting PT/OT. Bilirubin is trending up from 10 to 15. Heme consulted and is following. Aware of the fever and bilirubin.      10/22:  Patient is pleasant, denies any chest pain, abdominal pain, denies any nausea.  Discussed with the patient current plan.  Consulting Infectious Disease for further antibiotic management, appreciate assistance.  Continues to have an elevated bilirubin, reticulocytosis.  No acute events overnight.    10/23: Very pleasant, continues to report feeling well. Currently not on O2. Reports thigh pain, since admission. Able to ambulate better now. Discussed his labs wit him. Hgb stable. T.bili trending down. Afebrile. Nearing discharge in the next few days if he continues to improve.     10/24: Very pleasant, reports that he is not having any chest pain, abdominal pain, shortness and breath, diarrhea.  Patient reports feeling good.  He continues to be febrile overnight and during the day he had another temp.  Patient denies feeling chills or feeling the that his temperature is elevated.  Discussed the case with the hematologist.    10/25:  Stable overnight, continues to be off of the oxygen.  ALP trending up, checked GGT which was elevated as well.  Obtaining right upper quadrant ultrasound.      Interval History: see above      Objective:     Vital Signs (Most Recent):  Temp: 99.5 °F (37.5 °C) (10/25/23 1540)  Pulse: 97 (10/25/23 1540)  Resp: 18 (10/25/23 1540)  BP: 125/64 (10/25/23 1540)  SpO2: 95 % (10/25/23  1540) Vital Signs (24h Range):  Temp:  [98.3 °F (36.8 °C)-101.3 °F (38.5 °C)] 99.5 °F (37.5 °C)  Pulse:  [] 97  Resp:  [16-20] 18  SpO2:  [87 %-99 %] 95 %  BP: (124-129)/(64-75) 125/64     Weight: 83.9 kg (185 lb)  Body mass index is 24.41 kg/m².    Intake/Output Summary (Last 24 hours) at 10/25/2023 1714  Last data filed at 10/24/2023 2030  Gross per 24 hour   Intake --   Output 900 ml   Net -900 ml         Physical Exam  Constitutional:       General: He is not in acute distress.     Appearance: Normal appearance. He is not ill-appearing, toxic-appearing or diaphoretic.   HENT:      Head: Normocephalic and atraumatic.      Nose: Nose normal.      Mouth/Throat:      Mouth: Mucous membranes are moist.      Pharynx: Oropharynx is clear.   Eyes:      General: Scleral icterus (improved since yesterday) present.      Extraocular Movements: Extraocular movements intact.      Conjunctiva/sclera: Conjunctivae normal.      Pupils: Pupils are equal, round, and reactive to light.   Cardiovascular:      Rate and Rhythm: Regular rhythm. Tachycardia present.      Pulses: Normal pulses.   Pulmonary:      Effort: Pulmonary effort is normal. No respiratory distress.      Breath sounds: Normal breath sounds. No stridor. No wheezing, rhonchi or rales.   Chest:      Chest wall: No tenderness.   Abdominal:      General: Abdomen is flat. Bowel sounds are normal. There is no distension.      Palpations: Abdomen is soft.      Tenderness: There is no abdominal tenderness. There is no right CVA tenderness, left CVA tenderness, guarding or rebound.   Musculoskeletal:         General: No swelling. Normal range of motion.      Cervical back: Normal range of motion and neck supple. No rigidity or tenderness.      Right lower leg: No edema.      Left lower leg: No edema.   Skin:     General: Skin is warm and dry.      Coloration: Skin is not jaundiced.      Findings: No bruising, erythema or rash.   Neurological:      General: No focal  deficit present.      Mental Status: He is alert and oriented to person, place, and time. Mental status is at baseline.      Motor: No weakness.      Gait: Gait normal.   Psychiatric:         Mood and Affect: Mood normal.         Behavior: Behavior normal.         Thought Content: Thought content normal.         Judgment: Judgment normal.             Significant Labs: All pertinent labs within the past 24 hours have been reviewed.    Significant Imaging: I have reviewed all pertinent imaging results/findings within the past 24 hours.      Assessment/Plan:      * Sickle-cell disease with vaso-occlusive pain  -Pt. With severe joint pains typical of vaso-occlusive crises. No reported chest pain, no hypoxia, and CXR without infiltrate (improved from prior) not consistent with acute chest.   - ( Old CTchest 8/24/23, had bilat lower patchy interstitial edema.)   -Plan to treat with IV fluids, PCA pump ordered for more continuous pain med dosing as pt. Complained of severe persistent pain after interval dosing in ED. Continue home meds folic acid and hydroxyurea (voxeletor not on formulary)    10/16- on IVFs, dilaudid 2 q 3 prn, and oxy 10 q 4 prn. PCA discontinued. cxr and urine neg for infection. CT noted interstitial edema.     10/17 on IVFs, dilaudid 1.5 q 3. Oxycodone. Empiric zosyn, Pain in low back. No chest pain, SOB, not requiring oxygen    10/18- Repeating lactic acid, procal, repeat blood culture. CT chest/ abd/ pelvis.  Consulting Crit Care to follow. On Zosyn. Adding vanc, No change in pain meds today.     10/19:  Concern for ACS, hematology consulted this morning.  Recommending simple transfusion at this point.     10/21: Febrile overnight, patient denies any complaints. T.bili increased from 10->15. Heme aware.     Acute pulmonary embolism  Lovenox full dose started  Per CT chest/a/p.  Tachycardia 110-120, no chest pain, minimal hypoxia  US LE bilat  Will need to convert to eliquis. Discussed with  hematologist and is ok with switching. Appreciate assistance.  Plan to switch to Eliquis tomorrow. 10 mg BID followed by 5mg BID. He already had 2 days lovenox treatment dose.         Elevated transaminase level  Monitor consider hepatology consult  Alp trending up, obtained GGT which was elevated. Ordered RUQ US    Severe sepsis  Monitor. Wbc 13-> 17 -> 16  Blood culture  Cxr, UA neg  No concern on abd exam  Likely reactive  Empiric zosyn.     1018- adding vanc, CT ch/ abd/ pelvis. cxr may have developing infiltrate, on IVFs. . Meets requirements for Severe SEPSIS with high wbc, tachycardia, hypoxia, RR 20, rising LFTs.  Consuted Crit care to follow.     10/22: consulting ID to de-escalate abx. Appreciate assistance.     10/24: continues to have fever. Sepsis workup today.     Hyperbilirubinemia  -Chronic, suspect due to combination of hemolysis causing unconjugated hyperbilirubinemia, and intrahepatic cholestasis from SC disease  -Continue to monitor while admitted, pt. Currently follows with hepatology as outpatient        VTE Risk Mitigation (From admission, onward)         Ordered     apixaban tablet 5 mg  2 times daily         10/20/23 1643     apixaban tablet 10 mg  2 times daily         10/20/23 1643     IP VTE LOW RISK PATIENT  Once         10/15/23 2012     IP VTE HIGH RISK PATIENT  Once         10/15/23 2012                Discharge Planning   [place]: 10/27/2023     Code Status: Full Code   Is the patient medically ready for discharge?: No    Reason for patient still in hospital (select all that apply): Patient trending condition, Laboratory test, Treatment and Imaging  Discharge Plan A: Home   Discharge Delays: None known at this time              Getachew Krishna DO  Department of Hospital Medicine   [facility] - Intensive Care ([place]-[facility])

## 2023-10-25 NOTE — PT/OT/SLP PROGRESS
"Occupational Therapy   Treatment    Name: Parrish Davis  MRN: [id number]  Admitting Diagnosis:  Sickle-cell disease with vaso-occlusive pain       Recommendations:     Discharge Recommendations: No Therapy Indicated  Discharge Equipment Recommendations:  walker, rolling  Barriers to discharge:  None    Assessment:     Parrish Davis is a 28 y.o. male with a medical diagnosis of Sickle-cell disease with vaso-occlusive pain. Performance deficits affecting function are impaired endurance, pain. Pt agreeable to therapy and tolerated well. Pt remains limited in ADLs, functional mobility, and functional transfers. Pt would continue to benefit from skilled OT services to maximize safety and (I) in functional mobility and self-care skills.      Rehab Prognosis:  Good; patient would benefit from acute skilled OT services to address these deficits and reach maximum level of function.       Plan:     Patient to be seen 3 x/week to address the above listed problems via self-care/home management, therapeutic activities, therapeutic exercises  Plan of Care Expires: 11/22/23  Plan of Care Reviewed with: patient    Subjective     Chief Complaint: Pain in L hip  Patient/Family Comments/goals: "I feel like the pain is the only thing holding me back"   Pain/Comfort:  Pain Rating 1: 2/10  Location - Side 1: Left  Location 1: hip  Pain Addressed 1: Reposition, Distraction  Pain Rating Post-Intervention 1: 0/10    Objective:     Communicated with: RN prior to session.  Patient found HOB elevated with  (Pt w/ no active lines at this time) upon OT entry to room.    General Precautions: Standard, fall    Orthopedic Precautions:N/A  Braces: N/A  Respiratory Status: Room air     Occupational Performance:     Bed Mobility:    Patient completed Supine to Sit with supervision  Patient completed Sit to Supine with supervision     Functional Mobility/Transfers:  Patient completed Sit <> Stand Transfer with supervision  with  no assistive " device   Functional Mobility: Pt performed ~200ft functional mobility throughout hallway and room modeling mobility required for (I) and safe home mobility and community integration CGA. Pt noted to guard LLE when walking 2/2 increased pain during functional tasks     Activities of Daily Living:  Lower Body Dressing: independence Pt donned/doffed slide on shoes sitting EOB      [facility] 6 Click ADL: 24    Treatment & Education:  -Education on energy conservation and task modification to maximize safety and (I) during ADLs and mobility  -Education on importance of OOB activity to improve overall activity tolerance and promote recovery  -Pt educated to call for assistance and to transfer with hospital staff only  -Provided education regarding role of OT, POC, & discharge recommendations with pt verbalizing understanding.  Pt had no further questions & when asked whether there were any concerns pt reported none.      Patient left HOB elevated with all lines intact, call button in reach, and RN notified    GOALS:   Multidisciplinary Problems       Occupational Therapy Goals          Problem: Occupational Therapy    Goal Priority Disciplines Outcome Interventions   Occupational Therapy Goal     OT, PT/OT Ongoing, Progressing    Description: Goals to be met by: 11/13/2023    Patient will increase functional independence with ADLs by performing:    Grooming while standing at sink with Patrick.  Toileting from toilet with Patrick for hygiene and clothing management.   Stand pivot transfers with Patrick.  Toilet transfer to toilet with Patrick.                         Time Tracking:     OT Date of Treatment: 10/25/23  OT Start Time: 1306  OT Stop Time: 1315  OT Total Time (min): 9 min    Billable Minutes:Self Care/Home Management 9 min    OT/ELMER: OT          10/25/2023

## 2023-10-25 NOTE — PLAN OF CARE
Problem: Adult Inpatient Plan of Care  Goal: Plan of Care Review  Outcome: Ongoing, Progressing  Goal: Patient-Specific Goal (Individualized)  Outcome: Ongoing, Progressing  Goal: Absence of Hospital-Acquired Illness or Injury  Outcome: Ongoing, Progressing  Goal: Optimal Comfort and Wellbeing  Outcome: Ongoing, Progressing  Goal: Readiness for Transition of Care  Outcome: Ongoing, Progressing     Problem: Pain Acute  Goal: Acceptable Pain Control and Functional Ability  Outcome: Ongoing, Progressing     Pain being managed throughout shift with po pain meds.  Patient has Abdomen US done today.

## 2023-10-26 VITALS
HEART RATE: 93 BPM | WEIGHT: 185 LBS | DIASTOLIC BLOOD PRESSURE: 72 MMHG | OXYGEN SATURATION: 96 % | RESPIRATION RATE: 18 BRPM | SYSTOLIC BLOOD PRESSURE: 116 MMHG | BODY MASS INDEX: 24.52 KG/M2 | TEMPERATURE: 98 F | HEIGHT: 73 IN

## 2023-10-26 LAB
ALBUMIN SERPL BCP-MCNC: 2.4 G/DL (ref 3.5–5.2)
ALP SERPL-CCNC: 434 U/L (ref 55–135)
ALT SERPL W/O P-5'-P-CCNC: 108 U/L (ref 10–44)
ANION GAP SERPL CALC-SCNC: 9 MMOL/L (ref 8–16)
ANISOCYTOSIS BLD QL SMEAR: SLIGHT
AST SERPL-CCNC: 116 U/L (ref 10–40)
BASOPHILS NFR BLD: 0 % (ref 0–1.9)
BILIRUB SERPL-MCNC: 6.4 MG/DL (ref 0.1–1)
BUN SERPL-MCNC: 5 MG/DL (ref 6–20)
CALCIUM SERPL-MCNC: 9.1 MG/DL (ref 8.7–10.5)
CHLORIDE SERPL-SCNC: 102 MMOL/L (ref 95–110)
CO2 SERPL-SCNC: 23 MMOL/L (ref 23–29)
CREAT SERPL-MCNC: 0.6 MG/DL (ref 0.5–1.4)
DIFFERENTIAL METHOD: ABNORMAL
EOSINOPHIL NFR BLD: 2 % (ref 0–8)
ERYTHROCYTE [DISTWIDTH] IN BLOOD BY AUTOMATED COUNT: 23.3 % (ref 11.5–14.5)
EST. GFR  (NO RACE VARIABLE): >60 ML/MIN/1.73 M^2
GLUCOSE SERPL-MCNC: 99 MG/DL (ref 70–110)
HCT VFR BLD AUTO: 21.1 % (ref 40–54)
HGB BLD-MCNC: 7.4 G/DL (ref 14–18)
HYPOCHROMIA BLD QL SMEAR: ABNORMAL
IMM GRANULOCYTES # BLD AUTO: ABNORMAL K/UL (ref 0–0.04)
IMM GRANULOCYTES NFR BLD AUTO: ABNORMAL % (ref 0–0.5)
LYMPHOCYTES NFR BLD: 16 % (ref 18–48)
MCH RBC QN AUTO: 28.8 PG (ref 27–31)
MCHC RBC AUTO-ENTMCNC: 35.1 G/DL (ref 32–36)
MCV RBC AUTO: 82 FL (ref 82–98)
METAMYELOCYTES NFR BLD MANUAL: 1 %
MONOCYTES NFR BLD: 10 % (ref 4–15)
MYELOCYTES NFR BLD MANUAL: 1 %
NEUTROPHILS NFR BLD: 70 % (ref 38–73)
NRBC BLD-RTO: 1 /100 WBC
OVALOCYTES BLD QL SMEAR: ABNORMAL
PHOSPHATE SERPL-MCNC: 4.3 MG/DL (ref 2.7–4.5)
PLATELET # BLD AUTO: 507 K/UL (ref 150–450)
PLATELET BLD QL SMEAR: ABNORMAL
PMV BLD AUTO: 10 FL (ref 9.2–12.9)
POIKILOCYTOSIS BLD QL SMEAR: ABNORMAL
POLYCHROMASIA BLD QL SMEAR: ABNORMAL
POTASSIUM SERPL-SCNC: 3.4 MMOL/L (ref 3.5–5.1)
PROT SERPL-MCNC: 7.4 G/DL (ref 6–8.4)
RBC # BLD AUTO: 2.57 M/UL (ref 4.6–6.2)
SCHISTOCYTES BLD QL SMEAR: ABNORMAL
SODIUM SERPL-SCNC: 134 MMOL/L (ref 136–145)
TARGETS BLD QL SMEAR: ABNORMAL
WBC # BLD AUTO: 12.98 K/UL (ref 3.9–12.7)

## 2023-10-26 PROCEDURE — 25000003 PHARM REV CODE 250: Performed by: EMERGENCY MEDICINE

## 2023-10-26 PROCEDURE — 36415 COLL VENOUS BLD VENIPUNCTURE: CPT | Performed by: HOSPITALIST

## 2023-10-26 PROCEDURE — 25000003 PHARM REV CODE 250: Performed by: STUDENT IN AN ORGANIZED HEALTH CARE EDUCATION/TRAINING PROGRAM

## 2023-10-26 PROCEDURE — 84100 ASSAY OF PHOSPHORUS: CPT | Performed by: HOSPITALIST

## 2023-10-26 PROCEDURE — S5010 5% DEXTROSE AND 0.45% SALINE: HCPCS | Performed by: HOSPITALIST

## 2023-10-26 PROCEDURE — 80053 COMPREHEN METABOLIC PANEL: CPT | Performed by: HOSPITALIST

## 2023-10-26 PROCEDURE — 25000003 PHARM REV CODE 250: Performed by: HOSPITALIST

## 2023-10-26 PROCEDURE — 63700000 PHARM REV CODE 250 ALT 637 W/O HCPCS: Performed by: STUDENT IN AN ORGANIZED HEALTH CARE EDUCATION/TRAINING PROGRAM

## 2023-10-26 PROCEDURE — 85007 BL SMEAR W/DIFF WBC COUNT: CPT | Performed by: HOSPITALIST

## 2023-10-26 PROCEDURE — 85027 COMPLETE CBC AUTOMATED: CPT | Performed by: HOSPITALIST

## 2023-10-26 RX ADMIN — DEXTROSE AND SODIUM CHLORIDE: 5; 450 INJECTION, SOLUTION INTRAVENOUS at 04:10

## 2023-10-26 RX ADMIN — FLUOXETINE 10 MG: 10 CAPSULE ORAL at 09:10

## 2023-10-26 RX ADMIN — AZITHROMYCIN 250 MG: 250 TABLET, FILM COATED ORAL at 09:10

## 2023-10-26 RX ADMIN — OXYCODONE AND ACETAMINOPHEN 1 TABLET: 10; 325 TABLET ORAL at 04:10

## 2023-10-26 RX ADMIN — FOLIC ACID 1 MG: 1 TABLET ORAL at 09:10

## 2023-10-26 RX ADMIN — HYDROXYUREA 500 MG: 500 CAPSULE ORAL at 09:10

## 2023-10-26 RX ADMIN — MUPIROCIN: 20 OINTMENT TOPICAL at 09:10

## 2023-10-26 RX ADMIN — APIXABAN 5 MG: 5 TABLET, FILM COATED ORAL at 09:10

## 2023-10-26 NOTE — PLAN OF CARE
CM spoke with pt's mother Alyson Davis [phone], instructed that MD has d/c'd pt.  CG states she will give pt a ride home, denies DME needs.  Instructed CG that as soon as pt gets d/c teaching from nurse and any bedside meds from pharmacy for any new meds, he can go.  CG v/u.    EMANUEL spoke with pt, instructed in d/c process.  Pt v/u.    KAMARI TillmanN, BS, RN, CCM

## 2023-10-26 NOTE — PROGRESS NOTES
"[facility] - Intensive Care ([facility]-)  Adult Nutrition  Progress Note    SUMMARY     Recommendations    1) Continue regular diet   2) If intake <50% add boost plus BID   3) RD following    Goals: meet % een/epn by next RD f/u  Nutrition Goal Status: new  Communication of RD Recs: other (comment) (POC)    Assessment and Plan    No nutrition problems at this time    Reason for Assessment    Reason For Assessment: length of stay  Diagnosis: other (see comments) (Sickle-cell disease with vaso-occlusive pain)  Relevant Medical History: sickle cell anemia  Interdisciplinary Rounds: did not attend    General Information Comments: LOS x 11. Pt noted with % een/epn by next RD f/u. No n/v/d/c. Per charted wt review, pt's nourished. RD following.     Nutrition Discharge Planning: adequate intake    Nutrition Risk Screen    Nutrition Risk Screen: no indicators present    Nutrition/Diet History    Spiritual, Cultural Beliefs, Sabianism Practices, Values that Affect Care: no  Food Allergies: NKFA    Anthropometrics    Temp: 98.1 °F (36.7 °C)  Height Method: Stated  Height: 6' 1" (185.4 cm)  Height (inches): 73 in  Weight Method: Bed Scale  Weight: 83.9 kg (185 lb)  Weight (lb): 185 lb  Ideal Body Weight (IBW), Male: 184 lb  % Ideal Body Weight, Male (lb): 100.54 %  BMI (Calculated): 24.4  BMI Grade: 18.5-24.9 - normal       Lab/Procedures/Meds    Pertinent Labs Reviewed: reviewed  Pertinent Labs Comments: H/H: 7.4/21.1, Na: 134, Potassium: 3.4, Bun: 5, AST: 116, ALT:108  Pertinent Medications Reviewed: reviewed  Pertinent Medications Comments: polyethylene glycol, senna docusate, folic acid    Estimated/Assessed Needs    Weight Used For Calorie Calculations: 83.9 kg (185 lb)  Energy Calorie Requirements (kcal): 2043 (MSj x1.1 g/kg)  Energy Need Method: Doyle- Ravior  Protein Requirements: 100 (1.2 g/kg)  Weight Used For Protein Calculations: 83.9 kg (185 lb)     Estimated Fluid Requirement Method: RDA " Method  RDA Method (mL): 2043         Nutrition Prescription Ordered    Current Diet Order: Regular    Evaluation of Received Nutrient/Fluid Intake    I/O: -18 L since admit  Comments: LBM 10/25  Tolerance: tolerating  % Intake of Estimated Energy Needs: 75 - 100 %  % Meal Intake: 75 - 100 %    Nutrition Risk    Level of Risk/Frequency of Follow-up: low (f/u 1x/week)     Monitor and Evaluation    Food and Nutrient Intake: energy intake, food and beverage intake  Food and Nutrient Adminstration: diet order  Physical Activity and Function: nutrition-related ADLs and IADLs  Anthropometric Measurements: height/length, weight, weight change, body mass index  Biochemical Data, Medical Tests and Procedures: electrolyte and renal panel, gastrointestinal profile, glucose/endocrine profile, inflammatory profile, lipid profile     Nutrition Follow-Up    RD Follow-up?: Yes    Tika Schulte RD, LDN

## 2023-10-26 NOTE — PLAN OF CARE
Problem: Adult Inpatient Plan of Care  Goal: Plan of Care Review  Outcome: Ongoing, Progressing     Problem: Anemia  Goal: Anemia Symptom Improvement  Outcome: Ongoing, Progressing     Problem: Pain (Sickle Cell Disease)  Goal: Acceptable Pain Control  Outcome: Ongoing, Progressing     Patient aaox4. Plan of care continued with pain management prn and continuous IV fluids.

## 2023-10-26 NOTE — PLAN OF CARE
Recommendations    1) Continue regular diet   2) If intake <50% add boost plus BID   3) RD following    Goals: meet % een/epn by next RD f/u  Nutrition Goal Status: new  Communication of RD Recs: other (comment) (POC)

## 2023-10-27 ENCOUNTER — PATIENT OUTREACH (OUTPATIENT)
Dept: ADMINISTRATIVE | Facility: CLINIC | Age: 28
End: 2023-10-27
Payer: COMMERCIAL

## 2023-10-27 NOTE — ASSESSMENT & PLAN NOTE
-Pt. With severe joint pains typical of vaso-occlusive crises. No reported chest pain, no hypoxia, and CXR without infiltrate (improved from prior) not consistent with acute chest.   - ( Old CTchest 8/24/23, had bilat lower patchy interstitial edema.)   -Plan to treat with IV fluids, PCA pump ordered for more continuous pain med dosing as pt. Complained of severe persistent pain after interval dosing in ED. Continue home meds folic acid and hydroxyurea (voxeletor not on formulary)    10/16- on IVFs, dilaudid 2 q 3 prn, and oxy 10 q 4 prn. PCA discontinued. cxr and urine neg for infection. CT noted interstitial edema.     10/17 on IVFs, dilaudid 1.5 q 3. Oxycodone. Empiric zosyn, Pain in low back. No chest pain, SOB, not requiring oxygen    10/18- Repeating lactic acid, procal, repeat blood culture. CT chest/ abd/ pelvis.  Consulting Crit Care to follow. On Zosyn. Adding vanc, No change in pain meds today.     10/19:  Concern for ACS, hematology consulted this morning.  Recommending simple transfusion at this point.     10/21: Febrile overnight, patient denies any complaints. T.bili increased from 10->15. Heme aware.     - pain resolved at discharge

## 2023-10-27 NOTE — ASSESSMENT & PLAN NOTE
Lovenox full dose started  Per CT chest/a/p.  Tachycardia 110-120, no chest pain, minimal hypoxia  US LE bilat  Will need to convert to eliquis. Discussed with hematologist and is ok with switching. Appreciate assistance.  Plan to switch to Eliquis tomorrow. 10 mg BID followed by 5mg BID. He already had 2 days lovenox treatment dose.     - cont eliquis at discharge

## 2023-10-27 NOTE — ASSESSMENT & PLAN NOTE
Monitor consider hepatology consult  Alp trending up, obtained GGT which was elevated. Ordered RUQ US    - RUQ negative except for chronic hepatomegaly, was following with hepatology outpt.  Recommend f/u with hepatology for further workup

## 2023-10-27 NOTE — DISCHARGE SUMMARY
"[facility] - Intensive Care ([place] [address])  [facility] Medicine  Discharge Summary      Patient Name: Parrish Davis  MRN: [id number]  [facility]: [id number]  Patient Class: IP- Inpatient  Admission Date: 10/15/2023  Hospital Length of Stay: 11 days  Discharge Date and Time: 10/26/2023  5:34 PM  Attending Physician: Cristina att. providers found   Discharging Provider: Laurence Lowery MD  Primary Care Provider: Jovanny Douglas MD  [facility] Medicine Team: [facility] HOSP MED Q Laurence Lowery MD  Primary Care Team: [facility] HOSP MED Q    HPI:   28 y.o. man with sickle cell anemia HbSS who presents to the ED complaining of  uncontrolled body aches/pain x1 day He reports the pain started late last night/early this morning with onset of severe lower back pain. It has progressed to involve his entire body per patient, stating that he hurts "all over". No reported cough, SOB, fevers, or chills. Pain poorly controlled with home PO medications, so he came to the ED.      * No surgery found *      Hospital Course:   Patient admitted for sickle cell pain crisis. Pain regimen and IVF started. Febrile and hypoxic throughout hospital course so started on empiric antibiotics and septic workup initiated. Leukocytosis noted but cultures negative and no source identified. CXR with no consolidation, effusion, or pneumothorax.  Bilateral discoid atelectasis/scarring noted.  Overall aeration improved from prior study. CT chest abd pelvis done showing new PE, started on full dose lovenox and transitioned to eliquis. Hematology was consulted given concern for possible acute chest. Transfused RBC and per Heme " "If symptoms ( pain, hypoxemia) worsen he might benefit from partial/ complete exchange transfusion." Symptoms improved and patient was able to wean to RA, H/H remained stable., no exchange transfusion needed.     Liver enzymes trended up, abd ultrasound only noted hepatomegaly, which was not new. Patient had previously seen hepatology for this and their " plan was to trend enzymes and defer liver biopsy at this time. No signs of infection elsewhere and cultures remained negative. Completed course of broad spectrum antibiotics with no focal signs of infection identified.     Symptoms improved and pain resolved. Remained afebrile, still with leukocytosis but all cultures negative and downtrending at discharge. Completed course of antibiotics. Weaned to RA with stable H/H. Liver enzymes still elevated but patient requesting discharge. Recommend f/u with hepatology outpt to cont to trend liver enzymes and complete workup with consideration for liver biopsy if they feel needed.       Patient stable on RA, afebrile >24 hours. Cultures all negative. Reports pain has resolved. RUQ u/s negative other than chronic hepatomegaly. Still with elevated liver enzymes, recommend f/u with hepatology outpt for continued workup. Stable for discharge with f/u.        Goals of Care Treatment Preferences:  Code Status: Full Code      Consults:   Consults (From admission, onward)          Status Ordering Provider     [facility]  [facility]        Provider:  (Not yet assigned)    Completed OSEI BOB     Inpatient consult to Midline team  Once        Provider:  (Not yet assigned)    Completed NAYELI HUDDLESTON     Inpatient consult to Infectious Diseases  Once        Provider:  (Not yet assigned)    Completed NAYELI HUDDLESTON     Inpatient consult to Hematology/Oncology  Once        Provider:  (Not yet assigned)    Completed NAYELI HUDDLESTON     Inpatient consult to Critical Care Medicine  Once        Provider:  (Not yet assigned)    Completed JULIUS CAMPOS            Pulmonary  CAP (community acquired pneumonia)    S/p abx course    ID  Severe sepsis  Monitor. Wbc 13-> 17 -> 16  Blood culture  Cxr, UA neg  No concern on abd exam  Likely reactive  Empiric zosyn.     1018- adding vanc, CT ch/ abd/ pelvis. cxr may have developing infiltrate, on IVFs. . Meets  requirements for Severe SEPSIS with high wbc, tachycardia, hypoxia, RR 20, rising LFTs.  Consuted Crit care to follow.     10/22: consulting ID to de-escalate abx. Appreciate assistance.     10/24: continues to have fever. Sepsis workup today.     All cultures negative and patient afebrile. No source identified but completed broad spectrum abx course.     Hematology  Acute pulmonary embolism  Lovenox full dose started  Per CT chest/a/p.  Tachycardia 110-120, no chest pain, minimal hypoxia  US LE bilat  Will need to convert to eliquis. Discussed with hematologist and is ok with switching. Appreciate assistance.  Plan to switch to Eliquis tomorrow. 10 mg BID followed by 5mg BID. He already had 2 days lovenox treatment dose.     - cont eliquis at discharge         Oncology  * Sickle-cell disease with vaso-occlusive pain  -Pt. With severe joint pains typical of vaso-occlusive crises. No reported chest pain, no hypoxia, and CXR without infiltrate (improved from prior) not consistent with acute chest.   - ( Old CTchest 8/24/23, had bilat lower patchy interstitial edema.)   -Plan to treat with IV fluids, PCA pump ordered for more continuous pain med dosing as pt. Complained of severe persistent pain after interval dosing in ED. Continue home meds folic acid and hydroxyurea (voxeletor not on formulary)    10/16- on IVFs, dilaudid 2 q 3 prn, and oxy 10 q 4 prn. PCA discontinued. cxr and urine neg for infection. CT noted interstitial edema.     10/17 on IVFs, dilaudid 1.5 q 3. Oxycodone. Empiric zosyn, Pain in low back. No chest pain, SOB, not requiring oxygen    10/18- Repeating lactic acid, procal, repeat blood culture. CT chest/ abd/ pelvis.  Consulting Crit Care to follow. On Zosyn. Adding vanc, No change in pain meds today.     10/19:  Concern for ACS, hematology consulted this morning.  Recommending simple transfusion at this point.     10/21: Febrile overnight, patient denies any complaints. T.bili increased from  10->15. Heme aware.     - pain resolved at discharge     GI  Elevated transaminase level  Monitor consider hepatology consult  Alp trending up, obtained GGT which was elevated. Ordered RUQ US    - RUQ negative except for chronic hepatomegaly, was following with hepatology outpt.  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 Currently follows with hepatology as outpatient    - recommend f/u with hepatology         Final Active Diagnoses:    Diagnosis Date Noted POA    PRINCIPAL PROBLEM:  Sickle-cell disease with vaso-occlusive pain [D57.00] 10/19/2022 Yes    Acute pulmonary embolism [I26.99] 10/18/2023 No    Hyperbilirubinemia [E80.6] 08/24/2023 Yes    Severe sepsis [A41.9, R65.20] 08/24/2023 Yes    Elevated transaminase level [R74.01] 08/24/2023 Yes    CAP (community acquired pneumonia) [J18.9] 08/24/2023 Yes      Problems Resolved During this Admission:       Discharged Condition: stable    Disposition: Home or Self Care    Follow Up:    Patient Instructions:      Comprehensive metabolic panel   Standing Status: Future Standing Exp. Date: 12/24/24     Ambulatory referral/consult to Hepatology   Standing Status: Future   Referral Priority: Urgent Referral Type: Consultation   Referral Reason: Specialty Services Required   Requested Specialty: Hepatology   Number of Visits Requested: 1     Ambulatory referral/consult to Hematology / Oncology   Standing Status: Future   Referral Priority: Urgent Referral Type: Consultation   Referral Reason: Specialty Services Required   Requested Specialty: Hematology and Oncology   Number of Visits Requested: 1     Diet Adult Regular     Notify your health care provider if you experience any of the following:  temperature >100.4     Notify your health care provider if you  "experience any of the following:  difficulty breathing or increased cough     Activity as tolerated       Significant Diagnostic Studies: Labs: All labs within the past 24 hours have been reviewed  Microbiology:   Blood Culture   Lab Results   Component Value Date    LABBLOO No Growth to date 10/24/2023    LABBLOO No Growth to date 10/24/2023    LABBLOO No Growth to date 10/24/2023   , Sputum Culture No results found for: "GSRESP", "RESPIRATORYC" and Urine Culture  No results found for: "LABURIN"    Pending Diagnostic Studies:       None           Medications:  Reconciled Home Medications:      Medication List        START taking these medications      ELIQUIS 5 mg Tab  Generic drug: apixaban  Take 1 tablet (5 mg total) by mouth 2 (two) times daily.            CONTINUE taking these medications      acetaminophen 500 MG tablet  Commonly known as: TYLENOL  Take 500 mg by mouth every 8 (eight) hours as needed for Pain.     calcium carbonate 200 mg calcium (500 mg) chewable tablet  Commonly known as: TUMS  Take 2-3 tablets by mouth 2 (two) times daily as needed for Heartburn.     FLUoxetine 10 MG capsule  Take 1 capsule (10 mg total) by mouth once daily.     folic acid 1 MG tablet  Commonly known as: FOLVITE  TAKE 1 TABLET BY MOUTH EVERY DAY     hydroxyurea 500 mg Cap  Commonly known as: HYDREA  TAKE 1 CAPSULE (500 MG TOTAL) BY MOUTH ONCE DAILY.     ibuprofen 100 MG tablet  Commonly known as: ADVIL,MOTRIN  Take 100 mg by mouth every 6 (six) hours as needed.     OXBRYTA 500 mg Tab  Generic drug: voxelotor  Take 3 tablets (1,500 mg total) by mouth Daily.     phenyleph-min oil-petrolatum 0.25-14-74.9 % Oint  Place 1 applicator rectally 4 (four) times daily as needed (Hemorrhoid discomfort).              Indwelling Lines/Drains at time of discharge:   Lines/Drains/Airways       None                   Time spent on the discharge of patient: 40 minutes         Laurence Lowery MD  Department of Hospital Medicine  [facility] - " Intensive Care ([place] [id number])

## 2023-10-27 NOTE — PLAN OF CARE
[facility] - Intensive Care ([facility])  Discharge Final Note    Primary Care Provider: Jovanny Douglas MD    Expected Discharge Date: 10/26/2023    Final Discharge Note (most recent)       Final Note - 10/27/23 0810          Final Note    Assessment Type Final Discharge Note (P)      Anticipated Discharge Disposition Home or Self Care (P)         Post-Acute Status    Coverage [employer] (P)      Discharge Delays None known at this time (P)                      Important Message from Medicare    Pt d/c'd to home.    KAMARI TillmanN, BS, RN, CCM

## 2023-10-27 NOTE — ASSESSMENT & PLAN NOTE
Monitor. Wbc 13-> 17 -> 16  Blood culture  Cxr, UA neg  No concern on abd exam  Likely reactive  Empiric zosyn.     1018- adding vanc, CT ch/ abd/ pelvis. cxr may have developing infiltrate, on IVFs. . Meets requirements for Severe SEPSIS with high wbc, tachycardia, hypoxia, RR 20, rising LFTs.  Consuted Crit care to follow.     10/22: consulting ID to de-escalate abx. Appreciate assistance.     10/24: continues to have fever. Sepsis workup today.     All cultures negative and patient afebrile. No source identified but completed broad spectrum abx course.

## 2023-10-27 NOTE — ASSESSMENT & PLAN NOTE
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 Currently follows with hepatology as outpatient    - recommend f/u with hepatology

## 2023-10-27 NOTE — PROGRESS NOTES
C3 nurse spoke with Parrish Davis for a TCC post hospital discharge follow up call. Pt denies any new symptoms or complaints. The patient does not have a scheduled HOSFU appointment with his PCP, Jovanny Douglas MD within 5-7 days post hospital discharge date of 10/26/23. Pt says he usually follows up with his hematologist, who he has the contact info for to schedule his followup appointments. No messages routed at this time.

## 2023-10-29 LAB — BACTERIA BLD CULT: NORMAL

## 2023-11-30 DIAGNOSIS — D57.1 SICKLE CELL DISEASE WITHOUT CRISIS: ICD-10-CM

## 2023-11-30 DIAGNOSIS — D64.9 SYMPTOMATIC ANEMIA: ICD-10-CM

## 2023-11-30 RX ORDER — HYDROXYUREA 500 MG/1
500 CAPSULE ORAL DAILY
Qty: 30 CAPSULE | Refills: 1 | Status: SHIPPED | OUTPATIENT
Start: 2023-11-30 | End: 2024-01-01

## 2023-12-26 RX ORDER — FOLIC ACID 1 MG/1
1000 TABLET ORAL DAILY
Qty: 90 TABLET | Refills: 3 | OUTPATIENT
Start: 2023-12-26

## 2023-12-29 ENCOUNTER — PATIENT MESSAGE (OUTPATIENT)
Dept: RESEARCH | Facility: HOSPITAL | Age: 28
End: 2023-12-29
Payer: COMMERCIAL

## 2024-01-01 DIAGNOSIS — D57.1 SICKLE CELL DISEASE WITHOUT CRISIS: ICD-10-CM

## 2024-01-01 DIAGNOSIS — D64.9 SYMPTOMATIC ANEMIA: ICD-10-CM

## 2024-01-01 RX ORDER — HYDROXYUREA 500 MG/1
500 CAPSULE ORAL DAILY
Qty: 90 CAPSULE | Refills: 1 | Status: SHIPPED | OUTPATIENT
Start: 2024-01-01

## 2024-01-02 DIAGNOSIS — D57.00 SICKLE-CELL DISEASE WITH VASO-OCCLUSIVE PAIN: Primary | ICD-10-CM

## 2024-01-02 DIAGNOSIS — D57.1 SICKLE CELL DISEASE WITHOUT CRISIS: ICD-10-CM

## 2024-01-05 ENCOUNTER — TELEPHONE (OUTPATIENT)
Dept: HEPATOLOGY | Facility: CLINIC | Age: 29
End: 2024-01-05
Payer: COMMERCIAL

## 2024-01-05 NOTE — TELEPHONE ENCOUNTER
Spoke with Parrish and he states will be able to go have labs done very early Monday 1/8 that will hopefully be ready for his appt at 11

## 2024-01-08 ENCOUNTER — LAB VISIT (OUTPATIENT)
Dept: LAB | Facility: HOSPITAL | Age: 29
End: 2024-01-08
Attending: INTERNAL MEDICINE
Payer: COMMERCIAL

## 2024-01-08 ENCOUNTER — OFFICE VISIT (OUTPATIENT)
Dept: HEPATOLOGY | Facility: CLINIC | Age: 29
End: 2024-01-08
Payer: COMMERCIAL

## 2024-01-08 VITALS
BODY MASS INDEX: 24.66 KG/M2 | HEIGHT: 73 IN | HEART RATE: 72 BPM | DIASTOLIC BLOOD PRESSURE: 57 MMHG | SYSTOLIC BLOOD PRESSURE: 119 MMHG | WEIGHT: 186.06 LBS | RESPIRATION RATE: 19 BRPM | OXYGEN SATURATION: 97 %

## 2024-01-08 DIAGNOSIS — D57.00 VASO-OCCLUSIVE SICKLE CELL CRISIS: ICD-10-CM

## 2024-01-08 DIAGNOSIS — R74.01 ELEVATED TRANSAMINASE LEVEL: ICD-10-CM

## 2024-01-08 DIAGNOSIS — R16.0 HEPATOMEGALY: ICD-10-CM

## 2024-01-08 DIAGNOSIS — R74.01 ELEVATED TRANSAMINASE LEVEL: Primary | ICD-10-CM

## 2024-01-08 LAB
ALBUMIN SERPL BCP-MCNC: 3.9 G/DL (ref 3.5–5.2)
ALP SERPL-CCNC: 124 U/L (ref 55–135)
ALT SERPL W/O P-5'-P-CCNC: 34 U/L (ref 10–44)
ANION GAP SERPL CALC-SCNC: 8 MMOL/L (ref 8–16)
AST SERPL-CCNC: 49 U/L (ref 10–40)
BASOPHILS # BLD AUTO: 0.07 K/UL (ref 0–0.2)
BASOPHILS NFR BLD: 0.9 % (ref 0–1.9)
BILIRUB DIRECT SERPL-MCNC: 0.9 MG/DL (ref 0.1–0.3)
BILIRUB SERPL-MCNC: 6.2 MG/DL (ref 0.1–1)
BUN SERPL-MCNC: 5 MG/DL (ref 6–20)
CALCIUM SERPL-MCNC: 9 MG/DL (ref 8.7–10.5)
CHLORIDE SERPL-SCNC: 110 MMOL/L (ref 95–110)
CO2 SERPL-SCNC: 21 MMOL/L (ref 23–29)
CREAT SERPL-MCNC: 0.7 MG/DL (ref 0.5–1.4)
DIFFERENTIAL METHOD BLD: ABNORMAL
EOSINOPHIL # BLD AUTO: 0.3 K/UL (ref 0–0.5)
EOSINOPHIL NFR BLD: 3.6 % (ref 0–8)
ERYTHROCYTE [DISTWIDTH] IN BLOOD BY AUTOMATED COUNT: 21.8 % (ref 11.5–14.5)
EST. GFR  (NO RACE VARIABLE): >60 ML/MIN/1.73 M^2
GLUCOSE SERPL-MCNC: 86 MG/DL (ref 70–110)
HCT VFR BLD AUTO: 26.1 % (ref 40–54)
HGB BLD-MCNC: 9.2 G/DL (ref 14–18)
IMM GRANULOCYTES # BLD AUTO: 0.03 K/UL (ref 0–0.04)
IMM GRANULOCYTES NFR BLD AUTO: 0.4 % (ref 0–0.5)
INR PPP: 1.1 (ref 0.8–1.2)
LYMPHOCYTES # BLD AUTO: 2.1 K/UL (ref 1–4.8)
LYMPHOCYTES NFR BLD: 26.9 % (ref 18–48)
MCH RBC QN AUTO: 31.4 PG (ref 27–31)
MCHC RBC AUTO-ENTMCNC: 35.2 G/DL (ref 32–36)
MCV RBC AUTO: 89 FL (ref 82–98)
MONOCYTES # BLD AUTO: 0.9 K/UL (ref 0.3–1)
MONOCYTES NFR BLD: 11 % (ref 4–15)
NEUTROPHILS # BLD AUTO: 4.5 K/UL (ref 1.8–7.7)
NEUTROPHILS NFR BLD: 57.2 % (ref 38–73)
NRBC BLD-RTO: 1 /100 WBC
PLATELET # BLD AUTO: 315 K/UL (ref 150–450)
PMV BLD AUTO: 11.3 FL (ref 9.2–12.9)
POTASSIUM SERPL-SCNC: 4.4 MMOL/L (ref 3.5–5.1)
PROT SERPL-MCNC: 8 G/DL (ref 6–8.4)
PROTHROMBIN TIME: 11.4 SEC (ref 9–12.5)
RBC # BLD AUTO: 2.93 M/UL (ref 4.6–6.2)
SODIUM SERPL-SCNC: 139 MMOL/L (ref 136–145)
WBC # BLD AUTO: 7.83 K/UL (ref 3.9–12.7)

## 2024-01-08 PROCEDURE — 99214 OFFICE O/P EST MOD 30 MIN: CPT | Mod: S$GLB,,, | Performed by: INTERNAL MEDICINE

## 2024-01-08 PROCEDURE — 1160F RVW MEDS BY RX/DR IN RCRD: CPT | Mod: CPTII,S$GLB,, | Performed by: INTERNAL MEDICINE

## 2024-01-08 PROCEDURE — 85610 PROTHROMBIN TIME: CPT | Performed by: INTERNAL MEDICINE

## 2024-01-08 PROCEDURE — 3008F BODY MASS INDEX DOCD: CPT | Mod: CPTII,S$GLB,, | Performed by: INTERNAL MEDICINE

## 2024-01-08 PROCEDURE — 85025 COMPLETE CBC W/AUTO DIFF WBC: CPT | Performed by: INTERNAL MEDICINE

## 2024-01-08 PROCEDURE — 3074F SYST BP LT 130 MM HG: CPT | Mod: CPTII,S$GLB,, | Performed by: INTERNAL MEDICINE

## 2024-01-08 PROCEDURE — 3078F DIAST BP <80 MM HG: CPT | Mod: CPTII,S$GLB,, | Performed by: INTERNAL MEDICINE

## 2024-01-08 PROCEDURE — 1159F MED LIST DOCD IN RCRD: CPT | Mod: CPTII,S$GLB,, | Performed by: INTERNAL MEDICINE

## 2024-01-08 PROCEDURE — 80053 COMPREHEN METABOLIC PANEL: CPT | Performed by: INTERNAL MEDICINE

## 2024-01-08 PROCEDURE — 99999 PR PBB SHADOW E&M-EST. PATIENT-LVL V: CPT | Mod: PBBFAC,,, | Performed by: INTERNAL MEDICINE

## 2024-01-08 PROCEDURE — 82248 BILIRUBIN DIRECT: CPT | Performed by: INTERNAL MEDICINE

## 2024-01-08 PROCEDURE — 36415 COLL VENOUS BLD VENIPUNCTURE: CPT | Performed by: INTERNAL MEDICINE

## 2024-01-08 NOTE — PATIENT INSTRUCTIONS
No excess iron or scar in the liver (no cirrhosis)  When do labs check direct bilirubin as well as indirect  Get vaccinated for hep A and B- Dr Rosales can do this  Return 6 months with labs

## 2024-01-08 NOTE — PROGRESS NOTES
HEPATOLOGY FOLLOW UP    Referring Physician: Chandan Chambers DO  Current Corresponding Physician: Chandan Chambers DO, Jovanny Douglas MD, Nidhi Terry MD    Parrish Davis is here for follow up of Elevated Hepatic Enzymes      HPI  Parrish Davis is a 28 y.o. male with a hx of sickle cell anemia (hemoglobin SS disease) who was recently admitted with a sickle cell crisis (8/24/23-8/27/23) and was noted to have elevated liver enzymes (elevated since 6/1/2016), hyperbilirubinemia and hepatomegaly. I saw him in consultation 8/31/23:     During crisis 08/23 and 10/23 and 6/2016  Labs 8/27/23: ALT 78, , ALKP 345, Tbil 6.8  8/25/23: ALT 48, AST 66, ALKP 281, Tbil 8.7, direct bilirubin 5.7  HCV Ab-, HBsAg-, HBcIgM-, HAV IgG-     10/27/22: ALT 37, AST 56, ALKP 170     6/1/16: ALT 59, , Tbil 8.4, direct 1.6     Inbetween crises  3/31/23: ALT 41, AST 62, ALKP 134, Tbil 7.8  6/30/23: ALT 44, AST 54, [id number], Tbil 2.5  12/7/22: ALT 20, AST 37, ALKP 85, Tbil 8.7  5/24/22: ALT 20, asT 34, ALKP 76, Tbil 5.2        Abdo US 8/26/23: Liver: Enlarged, measuring 18.9 cm. Homogeneous echotexture. No focal hepatic lesions.Liver: Enlarged, measuring 18.9 cm. Homogeneous echotexture. No focal hepatic lesions.  CT abdo pelvis w contrast 8/24/23: In the abdomen, liver is enlarged 19.2 cm.  No focal hepatic mass.  Gallbladder is been removed.      The patient denied any symptoms of decompensated cirrhosis, including no ascites or edema, cognitive problems that would suggest hepatic encephalopathy, or GI bleeding from varices.      Sickle cell hx:  --dx as infant  --many crises as a child-even had a port placed around the age of 7 for blood transfusions  --more recently fewer crises: 2016 then none until 10/22 and 8/23.  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)  --prozac x at least one year  --hx gallstones-s/p cholecystectomy  --during cirsis takes tylenol ES per  bottle directions and NSAIDS/narcotics    Interval History  Since Parrish Davis's last visit:    Labs 9/14/23: Tbil 3.0, ALT 33, aST 43, ALKP 135 (all markedly improved)  Serologic w/u: REYNALDO-, ASMA+ (titer only 1:40-essentially negative); AMA-, HCV ab-, HBsAg-, HbcAb-, HbsAb-, HAV IgG-, alpha 1 antitrypsin level normal,     --rechecked iron studies:ferritin 129, iron sat 10%  MRE 9/29/23- no iron overload and no signifiicant fibrosis  MRCP 9/29/23- no choledocholithiasis/dilated bile ducts    Voxelotor-still taking    --feeling well, no abdo pain, N/V or pruritus    Outpatient Encounter Medications as of 1/8/2024   Medication Sig Dispense Refill    acetaminophen (TYLENOL) 500 MG tablet Take 500 mg by mouth every 8 (eight) hours as needed for Pain.      calcium carbonate (TUMS) 200 mg calcium (500 mg) chewable tablet Take 2-3 tablets by mouth 2 (two) times daily as needed for Heartburn.      FLUoxetine 10 MG capsule Take 1 capsule (10 mg total) by mouth once daily. 90 capsule 3    hydroxyurea (HYDREA) 500 mg Cap TAKE 1 CAPSULE (500 MG TOTAL) BY MOUTH ONCE DAILY. 90 capsule 1    ibuprofen (ADVIL,MOTRIN) 100 MG tablet Take 100 mg by mouth every 6 (six) hours as needed.      phenyleph-min oil-petrolatum 0.25-14-74.9 % Oint Place 1 applicator rectally 4 (four) times daily as needed (Hemorrhoid discomfort). 56 g 1    voxelotor (OXBRYTA) 500 mg Tab Take 3 tablets (1,500 mg total) by mouth Daily. 90 tablet 3    apixaban (ELIQUIS) 5 mg Tab Take 1 tablet (5 mg total) by mouth 2 (two) times daily. 60 tablet 11    folic acid (FOLVITE) 1 MG tablet TAKE 1 TABLET BY MOUTH EVERY DAY (Patient not taking: Reported on 1/8/2024) 90 tablet 3    [DISCONTINUED] hydroxyurea (HYDREA) 500 mg Cap TAKE 1 CAPSULE (500 MG TOTAL) BY MOUTH ONCE DAILY. 30 capsule 1     No facility-administered encounter medications on file as of 1/8/2024.     Review of patient's allergies indicates:  No Known Allergies  Past Medical History:   Diagnosis Date     Sickle cell anemia     Sickle cell disease        Review of Systems   Constitutional: Negative.    HENT: Negative.     Eyes: Negative.    Respiratory: Negative.     Cardiovascular: Negative.    Gastrointestinal: Negative.    Genitourinary: Negative.    Musculoskeletal: Negative.    Skin: Negative.    Neurological: Negative.    Psychiatric/Behavioral: Negative.       Vitals:    01/08/24 1048   BP: (!) 119/57   Pulse: 72   Resp: 19       Physical Exam  Vitals reviewed.   Constitutional:       Appearance: He is well-developed.   HENT:      Head: Normocephalic and atraumatic.   Eyes:      General: No scleral icterus.     Conjunctiva/sclera: Conjunctivae normal.      Pupils: Pupils are equal, round, and reactive to light.   Neck:      Thyroid: No thyromegaly.   Cardiovascular:      Rate and Rhythm: Normal rate and regular rhythm.      Heart sounds: Normal heart sounds.   Pulmonary:      Effort: Pulmonary effort is normal.      Breath sounds: Normal breath sounds. No rales.   Abdominal:      General: Bowel sounds are normal. There is no distension.      Palpations: Abdomen is soft. There is no mass.      Tenderness: There is no abdominal tenderness.   Musculoskeletal:         General: Normal range of motion.      Cervical back: Normal range of motion and neck supple.   Skin:     General: Skin is warm and dry.      Findings: No rash.   Neurological:      Mental Status: He is alert and oriented to person, place, and time.         MELD 3.0: 15 at 1/8/2024  7:29 AM  MELD-Na: 14 at 1/8/2024  7:29 AM  Calculated from:  Serum Creatinine: 0.7 mg/dL (Using min of 1 mg/dL) at 1/8/2024  7:29 AM  Serum Sodium: 139 mmol/L (Using max of 137 mmol/L) at 1/8/2024  7:29 AM  Total Bilirubin: 6.2 mg/dL at 1/8/2024  7:29 AM  Serum Albumin: 3.9 g/dL (Using max of 3.5 g/dL) at 1/8/2024  7:29 AM  INR(ratio): 1.1 at 1/8/2024  7:29 AM  Age at listing (hypothetical): 28 years  Sex: Male at 1/8/2024  7:29 AM      Lab Results   Component Value Date     GLU 86 01/08/2024    BUN 5 (L) 01/08/2024    CREATININE 0.7 01/08/2024    CALCIUM 9.0 01/08/2024     01/08/2024    K 4.4 01/08/2024     01/08/2024    PROT 8.0 01/08/2024    CO2 21 (L) 01/08/2024    ANIONGAP 8 01/08/2024    WBC 7.83 01/08/2024    RBC 2.93 (L) 01/08/2024    HGB 9.2 (L) 01/08/2024    HCT 26.1 (L) 01/08/2024    MCV 89 01/08/2024    MCH 31.4 (H) 01/08/2024    MCHC 35.2 01/08/2024     Lab Results   Component Value Date    RDW 21.8 (H) 01/08/2024     01/08/2024    MPV 11.3 01/08/2024    GRAN 4.5 01/08/2024    GRAN 57.2 01/08/2024    LYMPH 2.1 01/08/2024    LYMPH 26.9 01/08/2024    MONO 0.9 01/08/2024    MONO 11.0 01/08/2024    EOSINOPHIL 3.6 01/08/2024    BASOPHIL 0.9 01/08/2024    EOS 0.3 01/08/2024    BASO 0.07 01/08/2024    APTT 27.7 10/20/2022    GROUPTRH O POS 10/19/2023    BNP 15 10/19/2023    CHOL 101 (L) 11/29/2021    TRIG 103 11/29/2021    HDL 35 (L) 11/29/2021    CHOLHDL 34.7 11/29/2021    TOTALCHOLEST 2.9 11/29/2021    ALBUMIN 3.9 01/08/2024    BILIDIR 0.9 (H) 01/08/2024    AST 49 (H) 01/08/2024    ALT 34 01/08/2024    ALKPHOS 124 01/08/2024    MG 1.8 10/22/2023    LABPROT 11.4 01/08/2024    INR 1.1 01/08/2024       Assessment and Plan:  Parrish Davis is a 28 y.o. male with Elevated Hepatic Enzymes  This occurred at the time of a crisis. He had an elevated Tbil that was mostly direct. Both the enyzmes and Bruno have improved and he is back down to baseline. The liver tests/direct bilirubin were likely elevated due to sickle cell hepatopathy which has improved. W/u also ruled out other causes of liver disease. Iron overload ruled out and choledocholithiasis ruled out. MRE suggests NO significant fibrosis. He does not have viral hepatitis A or B. I am recommending that liver biopsy be deferred. Will continue to monitor liver tests monthly (hematologist ordering) and will see him back in 6 months. He should consider getting vaccinated for hepatitis A and B. His pcp can do  this.     Return 3 months  A total of 35 minutes was spent reviewing the patient's chart, examining the patient, reviewing labs and imaging and coordinating care with the patient's care team.

## 2024-01-10 ENCOUNTER — RESEARCH ENCOUNTER (OUTPATIENT)
Dept: RESEARCH | Facility: HOSPITAL | Age: 29
End: 2024-01-10
Payer: COMMERCIAL

## 2024-01-10 ENCOUNTER — PATIENT MESSAGE (OUTPATIENT)
Dept: RESEARCH | Facility: HOSPITAL | Age: 29
End: 2024-01-10
Payer: COMMERCIAL

## 2024-01-10 DIAGNOSIS — Z00.6 EXAMINATION OF PARTICIPANT IN CLINICAL TRIAL: ICD-10-CM

## 2024-01-10 DIAGNOSIS — D57.1 SICKLE CELL DISEASE WITHOUT CRISIS: Primary | ICD-10-CM

## 2024-01-10 NOTE — PROGRESS NOTES
Mr. Davis was called today regarding his participation in (IRB #[id number] PI: Chitra).   The Verbal Informed Consent was read and discussed by the consenter. The following was discussed:  Types of specimens to be collected  All medical information released to researchers will be stripped of identifiers and no patient information will be given to anyone outside of this research project.   Participating in a research study is not the same as getting regular medical care and will not improve the patient's health. The purpose of a research study is to gather information.  Being in this study does not interfere with your regular medical care.  The patient does not have to participate in this study. If they do not join, their care at [facility] will not be affected.  The person granting permission was provided adequate time to ask questions regarding the scope and purpose of the study.  Permission was obtained by telephone.   The above statements were read by the person obtaining permission to the person granting permission and witnessed by Gabby Jordan. The witness information was documented on the verbal consent form as well.  This Verbal Informed Consent process was conducted prior to initiation of any study procedures.

## 2024-01-17 ENCOUNTER — LAB VISIT (OUTPATIENT)
Dept: LAB | Facility: HOSPITAL | Age: 29
End: 2024-01-17
Attending: INTERNAL MEDICINE
Payer: COMMERCIAL

## 2024-01-17 ENCOUNTER — OFFICE VISIT (OUTPATIENT)
Dept: HEMATOLOGY/ONCOLOGY | Facility: CLINIC | Age: 29
End: 2024-01-17
Payer: COMMERCIAL

## 2024-01-17 VITALS — HEIGHT: 73 IN | WEIGHT: 197.06 LBS | BODY MASS INDEX: 26.12 KG/M2

## 2024-01-17 DIAGNOSIS — R16.0 HEPATOMEGALY: ICD-10-CM

## 2024-01-17 DIAGNOSIS — E80.6 HYPERBILIRUBINEMIA: ICD-10-CM

## 2024-01-17 DIAGNOSIS — I26.99 OTHER ACUTE PULMONARY EMBOLISM WITHOUT ACUTE COR PULMONALE: Primary | ICD-10-CM

## 2024-01-17 DIAGNOSIS — D57.1 SICKLE CELL DISEASE WITHOUT CRISIS: ICD-10-CM

## 2024-01-17 DIAGNOSIS — D57.00 VASO-OCCLUSIVE SICKLE CELL CRISIS: ICD-10-CM

## 2024-01-17 DIAGNOSIS — D57.00 SICKLE-CELL DISEASE WITH VASO-OCCLUSIVE PAIN: ICD-10-CM

## 2024-01-17 DIAGNOSIS — Z00.6 EXAMINATION OF PARTICIPANT IN CLINICAL TRIAL: ICD-10-CM

## 2024-01-17 LAB
ALBUMIN SERPL BCP-MCNC: 3.9 G/DL (ref 3.5–5.2)
ALP SERPL-CCNC: 114 U/L (ref 55–135)
ALT SERPL W/O P-5'-P-CCNC: 30 U/L (ref 10–44)
ANION GAP SERPL CALC-SCNC: 6 MMOL/L (ref 8–16)
ANISOCYTOSIS BLD QL SMEAR: ABNORMAL
AST SERPL-CCNC: 52 U/L (ref 10–40)
BASO STIPL BLD QL SMEAR: ABNORMAL
BASOPHILS # BLD AUTO: 0.08 K/UL (ref 0–0.2)
BASOPHILS NFR BLD: 1 % (ref 0–1.9)
BILIRUB SERPL-MCNC: 5 MG/DL (ref 0.1–1)
BUN SERPL-MCNC: 5 MG/DL (ref 6–20)
CALCIUM SERPL-MCNC: 8.8 MG/DL (ref 8.7–10.5)
CHLORIDE SERPL-SCNC: 110 MMOL/L (ref 95–110)
CO2 SERPL-SCNC: 23 MMOL/L (ref 23–29)
CREAT SERPL-MCNC: 0.7 MG/DL (ref 0.5–1.4)
DACRYOCYTES BLD QL SMEAR: ABNORMAL
DIFFERENTIAL METHOD BLD: ABNORMAL
DRUG STUDY SPECIMEN TYPE: 2
DRUG STUDY TEST NAME: NORMAL
DRUG STUDY TEST RESULT: NORMAL
EOSINOPHIL # BLD AUTO: 0.4 K/UL (ref 0–0.5)
EOSINOPHIL NFR BLD: 4.9 % (ref 0–8)
ERYTHROCYTE [DISTWIDTH] IN BLOOD BY AUTOMATED COUNT: 21.5 % (ref 11.5–14.5)
EST. GFR  (NO RACE VARIABLE): >60 ML/MIN/1.73 M^2
FERRITIN SERPL-MCNC: 147 NG/ML (ref 20–300)
GLUCOSE SERPL-MCNC: 84 MG/DL (ref 70–110)
HCT VFR BLD AUTO: 24.6 % (ref 40–54)
HGB BLD-MCNC: 8.8 G/DL (ref 14–18)
IMM GRANULOCYTES # BLD AUTO: 0.04 K/UL (ref 0–0.04)
IMM GRANULOCYTES NFR BLD AUTO: 0.5 % (ref 0–0.5)
IRON SERPL-MCNC: 50 UG/DL (ref 45–160)
LYMPHOCYTES # BLD AUTO: 3.3 K/UL (ref 1–4.8)
LYMPHOCYTES NFR BLD: 39.9 % (ref 18–48)
MCH RBC QN AUTO: 31.8 PG (ref 27–31)
MCHC RBC AUTO-ENTMCNC: 35.8 G/DL (ref 32–36)
MCV RBC AUTO: 89 FL (ref 82–98)
MONOCYTES # BLD AUTO: 1 K/UL (ref 0.3–1)
MONOCYTES NFR BLD: 12.3 % (ref 4–15)
NEUTROPHILS # BLD AUTO: 3.5 K/UL (ref 1.8–7.7)
NEUTROPHILS NFR BLD: 41.4 % (ref 38–73)
NRBC BLD-RTO: 1 /100 WBC
PAPPENHEIMER BOD BLD QL SMEAR: PRESENT
PLATELET # BLD AUTO: 410 K/UL (ref 150–450)
PLATELET BLD QL SMEAR: ABNORMAL
PMV BLD AUTO: 10.3 FL (ref 9.2–12.9)
POIKILOCYTOSIS BLD QL SMEAR: ABNORMAL
POLYCHROMASIA BLD QL SMEAR: ABNORMAL
POTASSIUM SERPL-SCNC: 3.9 MMOL/L (ref 3.5–5.1)
PROT SERPL-MCNC: 7.7 G/DL (ref 6–8.4)
RBC # BLD AUTO: 2.77 M/UL (ref 4.6–6.2)
RETICS/RBC NFR AUTO: 17.5 % (ref 0.4–2)
SATURATED IRON: 12 % (ref 20–50)
SCHISTOCYTES BLD QL SMEAR: ABNORMAL
SCHISTOCYTES BLD QL SMEAR: PRESENT
SICKLE CELLS BLD QL SMEAR: ABNORMAL
SODIUM SERPL-SCNC: 139 MMOL/L (ref 136–145)
TARGETS BLD QL SMEAR: ABNORMAL
TOTAL IRON BINDING CAPACITY: 419 UG/DL (ref 250–450)
TOXIC GRANULES BLD QL SMEAR: PRESENT
TRANSFERRIN SERPL-MCNC: 283 MG/DL (ref 200–375)
WBC # BLD AUTO: 8.38 K/UL (ref 3.9–12.7)
WBC TOXIC VACUOLES BLD QL SMEAR: PRESENT

## 2024-01-17 PROCEDURE — 85025 COMPLETE CBC W/AUTO DIFF WBC: CPT | Performed by: INTERNAL MEDICINE

## 2024-01-17 PROCEDURE — 85045 AUTOMATED RETICULOCYTE COUNT: CPT | Performed by: INTERNAL MEDICINE

## 2024-01-17 PROCEDURE — 99000 SPECIMEN HANDLING OFFICE-LAB: CPT | Performed by: INTERNAL MEDICINE

## 2024-01-17 PROCEDURE — 99214 OFFICE O/P EST MOD 30 MIN: CPT | Mod: S$GLB,,, | Performed by: INTERNAL MEDICINE

## 2024-01-17 PROCEDURE — 1159F MED LIST DOCD IN RCRD: CPT | Mod: CPTII,S$GLB,, | Performed by: INTERNAL MEDICINE

## 2024-01-17 PROCEDURE — 82728 ASSAY OF FERRITIN: CPT | Performed by: INTERNAL MEDICINE

## 2024-01-17 PROCEDURE — 3008F BODY MASS INDEX DOCD: CPT | Mod: CPTII,S$GLB,, | Performed by: INTERNAL MEDICINE

## 2024-01-17 PROCEDURE — 36415 COLL VENOUS BLD VENIPUNCTURE: CPT | Performed by: INTERNAL MEDICINE

## 2024-01-17 PROCEDURE — 83540 ASSAY OF IRON: CPT | Performed by: INTERNAL MEDICINE

## 2024-01-17 PROCEDURE — 80053 COMPREHEN METABOLIC PANEL: CPT | Performed by: INTERNAL MEDICINE

## 2024-01-17 PROCEDURE — 99999 PR PBB SHADOW E&M-EST. PATIENT-LVL IV: CPT | Mod: PBBFAC,,, | Performed by: INTERNAL MEDICINE

## 2024-01-17 RX ORDER — FOLIC ACID 1 MG/1
1000 TABLET ORAL DAILY
Qty: 90 TABLET | Refills: 3 | Status: SHIPPED | OUTPATIENT
Start: 2024-01-17 | End: 2024-04-13 | Stop reason: SDUPTHER

## 2024-01-17 NOTE — PROGRESS NOTES
"      CC: Sickle cell anemia, hematology follow up    HPI: , 28, is here for hematology follow up for sickle cell anemia. He was being followed by pediatrics until age 21. Last pain crisis was in 2016.   He has been experiencing back pain of late. Pain is intermittent, no clear precipitating or alleviating factors.   He had priapism several years ago.      He had one ER visit in October 2022 and again hospitalization for sickle pain nikolay in the same month. No clear triggers were identified for these episodes.    Interval History: He feels tired today.  He started oxbryta in May 2023. He is tolerating it well . He was hospitalized in Aug [address] for sickle pain crisis and acute chest syndrome. He was started on multimodal pain regimen consisting of Tylenol 650 TID, p.o. opiates, IV opiates, and heating pads . EKG showed normal sinus rhythm, no ST abnormalities. Fever of 101 documented. CXR showed improvement compared to admission. No focal abcesses. UA +1 bili, otherwise unremarkable.He was initially treated with iv vancomycin, cefepime. Transitioned to PO Abx for continued CAP coverage outpatient.  He was hopsitalize din October 2023 with sickle pain crisis for 11 days. . He was febrile and hypoxic and was on empiric antibiotics and septic workup initiated.   Leukocytosis was noted but cultures negative and no source identified. CXR with no consolidation, effusion, or pneumothorax.  Bilateral discoid atelectasis/scarring noted.    CT chest abd pelvis done showing new PE, started on full dose lovenox and transitioned to eliquis. Hematology was consulted given concern for possible acute chest. Transfused RBC and per Heme " "If symptoms ( pain, hypoxemia) worsen he might benefit from partial/ complete exchange transfusion." Symptoms improved and patient was able to wean to RA, H/H remained stable., no exchange transfusion needed.         Review of patient's allergies indicates:  No Known " Allergies      Current Outpatient Medications   Medication Sig    acetaminophen (TYLENOL) 500 MG tablet Take 500 mg by mouth every 8 (eight) hours as needed for Pain.    calcium carbonate (TUMS) 200 mg calcium (500 mg) chewable tablet Take 2-3 tablets by mouth 2 (two) times daily as needed for Heartburn.    FLUoxetine 10 MG capsule Take 1 capsule (10 mg total) by mouth once daily.    hydroxyurea (HYDREA) 500 mg Cap TAKE 1 CAPSULE (500 MG TOTAL) BY MOUTH ONCE DAILY.    ibuprofen (ADVIL,MOTRIN) 100 MG tablet Take 100 mg by mouth every 6 (six) hours as needed.    phenyleph-min oil-petrolatum 0.25-14-74.9 % Oint Place 1 applicator rectally 4 (four) times daily as needed (Hemorrhoid discomfort).    voxelotor (OXBRYTA) 500 mg Tab Take 3 tablets (1,500 mg total) by mouth Daily.    apixaban (ELIQUIS) 5 mg Tab Take 1 tablet (5 mg total) by mouth 2 (two) times daily.    folic acid (FOLVITE) 1 MG tablet TAKE 1 TABLET BY MOUTH EVERY DAY (Patient not taking: Reported on 1/8/2024)     No current facility-administered medications for this visit.          Review of Systems   Constitutional:  Positive for malaise/fatigue. Negative for weight loss.   HENT:  Negative for ear discharge and ear pain.    Eyes:  Negative for photophobia, pain and discharge.   Respiratory:  Negative for hemoptysis, sputum production and shortness of breath.    Cardiovascular:  Negative for chest pain, palpitations, orthopnea and claudication.   Gastrointestinal:  Negative for blood in stool, constipation, diarrhea, heartburn, melena, nausea and vomiting.   Genitourinary:  Negative for dysuria, frequency, hematuria and urgency.   Musculoskeletal:  Negative for back pain, myalgias and neck pain.   Neurological:  Negative for dizziness and tremors.   Endo/Heme/Allergies:  Does not bruise/bleed easily.   Psychiatric/Behavioral:  Negative for substance abuse.          Vitals:    01/17/24 1408   BP: (P) 111/61   Pulse: (P) 64   Resp: (P) 16   Temp: (P)  98.3 °F (36.8 °C)       Physical Exam  Constitutional:       Appearance: Normal appearance.   HENT:      Head: Normocephalic.   Eyes:      General: Scleral icterus present.   Cardiovascular:      Rate and Rhythm: Normal rate.      Heart sounds: Normal heart sounds. No murmur heard.  Pulmonary:      Effort: Pulmonary effort is normal. No respiratory distress.      Breath sounds: No stridor.   Abdominal:      General: There is no distension.      Palpations: There is no mass.      Tenderness: There is no abdominal tenderness.   Genitourinary:     Penis: Normal.       Testes: Normal.   Musculoskeletal:         General: No swelling or tenderness.   Lymphadenopathy:      Cervical: No cervical adenopathy.   Skin:     Coloration: Skin is not jaundiced or pale.   Neurological:      General: No focal deficit present.      Mental Status: He is alert and oriented to person, place, and time.      Cranial Nerves: No cranial nerve deficit.       2/21/22 24hr urine   Component Ref Range & Units 3 mo ago    Urine Volume mL 2900    Urine Collection Duration Hr 24    Protein, Urine 0 - 15 mg/dL <7    Urine Protein, Timed 0 - 100 mg/Spec Unable to calculate           3/3/22 DEXA scan    Impression:     *Bone density is below the expected range for age.      3/7/22 ECHO     The left ventricle is mildly enlarged with mild eccentric hypertrophy and normal systolic function.  The estimated ejection fraction is 60%.  The quantitatively derived ejection fraction is 60%.  Normal left ventricular diastolic function.  Mild right ventricular enlargement with normal right ventricular systolic function.  The estimated PA systolic pressure is 30 mmHg.  Intermediate central venous pressure (8 mmHg).       Component      Latest Ref Rn 1/17/2024   WBC      3.90 - 12.70 K/uL 8.38    RBC      4.60 - 6.20 M/uL 2.77 (L)    Hemoglobin      14.0 - 18.0 g/dL 8.8 (L)    Hematocrit      40.0 - 54.0 % 24.6 (L)    MCV      82 - 98 fL 89    MCH      27.0 - 31.0  pg 31.8 (H)    MCHC      32.0 - 36.0 g/dL 35.8    RDW      11.5 - 14.5 % 21.5 (H)    Platelet Count      150 - 450 K/uL 410    MPV      9.2 - 12.9 fL 10.3    Immature Granulocytes      0.0 - 0.5 % 0.5    Gran # (ANC)      1.8 - 7.7 K/uL 3.5    Immature Grans (Abs)      0.00 - 0.04 K/uL 0.04    Lymph #      1.0 - 4.8 K/uL 3.3    Mono #      0.3 - 1.0 K/uL 1.0    Eos #      0.0 - 0.5 K/uL 0.4    Baso #      0.00 - 0.20 K/uL 0.08    nRBC      0 /100 WBC 1 !    Gran %      38.0 - 73.0 % 41.4    Lymph %      18.0 - 48.0 % 39.9    Mono %      4.0 - 15.0 % 12.3    Eosinophil %      0.0 - 8.0 % 4.9    Basophil %      0.0 - 1.9 % 1.0    Differential Method Automated    Sodium      136 - 145 mmol/L 139    Potassium      3.5 - 5.1 mmol/L 3.9    Chloride      95 - 110 mmol/L 110    CO2      23 - 29 mmol/L 23    Glucose      70 - 110 mg/dL 84    BUN      6 - 20 mg/dL 5 (L)    Creatinine      0.5 - 1.4 mg/dL 0.7    Calcium      8.7 - 10.5 mg/dL 8.8    PROTEIN TOTAL      6.0 - 8.4 g/dL 7.7    Albumin      3.5 - 5.2 g/dL 3.9    BILIRUBIN TOTAL      0.1 - 1.0 mg/dL 5.0 (H)    ALP      55 - 135 U/L 114    AST      10 - 40 U/L 52 (H)    ALT      10 - 44 U/L 30    eGFR      >60 mL/min/1.73 m^2 >60.0    Anion Gap      8 - 16 mmol/L 6 (L)    Iron      45 - 160 ug/dL 50    Transferrin      200 - 375 mg/dL 283    TIBC      250 - 450 ug/dL 419    Saturated Iron      20 - 50 % 12 (L)    Ferritin      20.0 - 300.0 ng/mL 147    Retic      0.4 - 2.0 % 17.5 (H)       Legend:  (L) Low  (H) High  ! Abnormal      Assessment:    1. Hemoglobin SS disease    -He has had 3-4 pain crises in 2022, 3 episodes so far in 2023 requiring hopsitalization  -on voxeletor, tolerating it well so far     -Sickle cell checklist:    1. Annual ophtho exam:   Last done: 6/29/22, follow up recommended  Findings:      2. Bone health: Ca++d recommended, q3 yr dexa recommended. DEXA done on 3/3/22 showed bone density below the expected range for age.  Vit D: Needs periodic  monitoring    3. Screening echo for pHTN:     Last done: 3/7/22; estimated PA systolic pressure is 30 mmHg.    4. Annual UA + MicroAlb/Cr Ratio:     Last done: 24hr urine protein excretion was negligible , on 2/21/22    5. Folic acid: currently taking 1mg daily    6. Hydrea: currently taking 500 mg daily    7. Transfusions / iron : Cumulative transfusions: 2  Baseline h/h:   Ferritin: 72  ng/ml on 12/7/22, 485ng/ml on 8/31/23, 129ng/ml on 9/14/23    6. Pain regimen: no regular regimen, PRN Acetaminophen, NSAID  -no frequent admissions with pain crises  -Pain contract: none    7. Osteonecrosis screening: yes    8. Stem cell transplant? No    9. H/o cva? No    10. Priapism: Yes, once since last visit ( April 2022)  --education provided    10. HCV screening? Negative in 2016    11. Immunizations:     Pneumococcal. 12 then 23. 23 q5 yrs: current  COVID 19 : uptodate  H influenzae:  Annual flu: uptodate  Meningococcal:          2. Pulmonary embolism    --Note don CT chest done in Oct 2023  -recommend eliquis for 6 months, then ASA 81mg daily          BMT Chart Routing      Follow up with physician 3 months.   Follow up with [facility]    Provider visit type    Infusion scheduling note    Injection scheduling note    Labs CBC, CMP, LDH, reticulocytes, ferritin and iron and TIBC   Scheduling:  Preferred lab:  Lab interval:     Imaging    Pharmacy appointment    Other referrals

## 2024-01-24 ENCOUNTER — RESEARCH ENCOUNTER (OUTPATIENT)
Dept: RESEARCH | Facility: HOSPITAL | Age: 29
End: 2024-01-24
Payer: COMMERCIAL

## 2024-01-24 NOTE — PROGRESS NOTES
[facility] Protocol # 2015.101  MARTIN Buenrostro          Whole blood (2 x 2.5 ml) was retrieved from BMT lab on 1/17/2024 and brought to the [facility] for processing as per specifics of project.

## 2024-01-29 PROBLEM — A41.9 SEVERE SEPSIS: Status: RESOLVED | Noted: 2023-08-24 | Resolved: 2024-01-29

## 2024-01-29 PROBLEM — R65.20 SEVERE SEPSIS: Status: RESOLVED | Noted: 2023-08-24 | Resolved: 2024-01-29

## 2024-01-29 PROBLEM — I26.99 ACUTE PULMONARY EMBOLISM: Status: RESOLVED | Noted: 2023-10-18 | Resolved: 2024-01-29

## 2024-01-29 PROBLEM — J18.9 CAP (COMMUNITY ACQUIRED PNEUMONIA): Status: RESOLVED | Noted: 2023-08-24 | Resolved: 2024-01-29

## 2024-01-30 ENCOUNTER — LAB VISIT (OUTPATIENT)
Dept: LAB | Facility: HOSPITAL | Age: 29
End: 2024-01-30
Payer: COMMERCIAL

## 2024-01-30 DIAGNOSIS — D57.1 SICKLE CELL DISEASE WITHOUT CRISIS: ICD-10-CM

## 2024-01-30 DIAGNOSIS — D57.00 SICKLE-CELL DISEASE WITH VASO-OCCLUSIVE PAIN: ICD-10-CM

## 2024-01-30 DIAGNOSIS — D64.9 SYMPTOMATIC ANEMIA: ICD-10-CM

## 2024-01-30 LAB
ALBUMIN SERPL BCP-MCNC: 3.9 G/DL (ref 3.5–5.2)
ALP SERPL-CCNC: 118 U/L (ref 55–135)
ALT SERPL W/O P-5'-P-CCNC: 24 U/L (ref 10–44)
ANION GAP SERPL CALC-SCNC: 6 MMOL/L (ref 8–16)
AST SERPL-CCNC: 43 U/L (ref 10–40)
BASOPHILS # BLD AUTO: 0.1 K/UL (ref 0–0.2)
BASOPHILS NFR BLD: 1.1 % (ref 0–1.9)
BILIRUB SERPL-MCNC: 5 MG/DL (ref 0.1–1)
BUN SERPL-MCNC: 8 MG/DL (ref 6–20)
CALCIUM SERPL-MCNC: 9.2 MG/DL (ref 8.7–10.5)
CHLORIDE SERPL-SCNC: 111 MMOL/L (ref 95–110)
CO2 SERPL-SCNC: 21 MMOL/L (ref 23–29)
CREAT SERPL-MCNC: 0.7 MG/DL (ref 0.5–1.4)
DIFFERENTIAL METHOD BLD: ABNORMAL
EOSINOPHIL # BLD AUTO: 0.7 K/UL (ref 0–0.5)
EOSINOPHIL NFR BLD: 7.6 % (ref 0–8)
ERYTHROCYTE [DISTWIDTH] IN BLOOD BY AUTOMATED COUNT: 22.3 % (ref 11.5–14.5)
EST. GFR  (NO RACE VARIABLE): >60 ML/MIN/1.73 M^2
FERRITIN SERPL-MCNC: 89 NG/ML (ref 20–300)
GLUCOSE SERPL-MCNC: 78 MG/DL (ref 70–110)
HCT VFR BLD AUTO: 23 % (ref 40–54)
HGB BLD-MCNC: 8.2 G/DL (ref 14–18)
IMM GRANULOCYTES # BLD AUTO: 0.07 K/UL (ref 0–0.04)
IMM GRANULOCYTES NFR BLD AUTO: 0.7 % (ref 0–0.5)
IRON SERPL-MCNC: 71 UG/DL (ref 45–160)
LYMPHOCYTES # BLD AUTO: 3.1 K/UL (ref 1–4.8)
LYMPHOCYTES NFR BLD: 33.5 % (ref 18–48)
MCH RBC QN AUTO: 31.3 PG (ref 27–31)
MCHC RBC AUTO-ENTMCNC: 35.7 G/DL (ref 32–36)
MCV RBC AUTO: 88 FL (ref 82–98)
MONOCYTES # BLD AUTO: 1.4 K/UL (ref 0.3–1)
MONOCYTES NFR BLD: 14.9 % (ref 4–15)
NEUTROPHILS # BLD AUTO: 3.9 K/UL (ref 1.8–7.7)
NEUTROPHILS NFR BLD: 42.2 % (ref 38–73)
NRBC BLD-RTO: 1 /100 WBC
PLATELET # BLD AUTO: 275 K/UL (ref 150–450)
PMV BLD AUTO: 10.6 FL (ref 9.2–12.9)
POTASSIUM SERPL-SCNC: 4 MMOL/L (ref 3.5–5.1)
PROT SERPL-MCNC: 7.8 G/DL (ref 6–8.4)
RBC # BLD AUTO: 2.62 M/UL (ref 4.6–6.2)
RETICS/RBC NFR AUTO: 16.3 % (ref 0.4–2)
SATURATED IRON: 18 % (ref 20–50)
SODIUM SERPL-SCNC: 138 MMOL/L (ref 136–145)
TOTAL IRON BINDING CAPACITY: 401 UG/DL (ref 250–450)
TRANSFERRIN SERPL-MCNC: 271 MG/DL (ref 200–375)
WBC # BLD AUTO: 9.34 K/UL (ref 3.9–12.7)

## 2024-01-30 PROCEDURE — 85025 COMPLETE CBC W/AUTO DIFF WBC: CPT | Performed by: INTERNAL MEDICINE

## 2024-01-30 PROCEDURE — 80053 COMPREHEN METABOLIC PANEL: CPT | Performed by: INTERNAL MEDICINE

## 2024-01-30 PROCEDURE — 85045 AUTOMATED RETICULOCYTE COUNT: CPT | Performed by: INTERNAL MEDICINE

## 2024-01-30 PROCEDURE — 36415 COLL VENOUS BLD VENIPUNCTURE: CPT | Performed by: INTERNAL MEDICINE

## 2024-01-30 PROCEDURE — 82728 ASSAY OF FERRITIN: CPT | Performed by: INTERNAL MEDICINE

## 2024-01-30 PROCEDURE — 83540 ASSAY OF IRON: CPT | Performed by: INTERNAL MEDICINE

## 2024-02-05 DIAGNOSIS — D57.1 SICKLE CELL DISEASE WITHOUT CRISIS: ICD-10-CM

## 2024-02-06 RX ORDER — VOXELOTOR 500 MG/1
1500 TABLET, FILM COATED ORAL DAILY
Qty: 90 TABLET | Refills: 3 | Status: ACTIVE | OUTPATIENT
Start: 2024-02-06

## 2024-04-08 ENCOUNTER — TELEPHONE (OUTPATIENT)
Dept: HEPATOLOGY | Facility: CLINIC | Age: 29
End: 2024-04-08
Payer: COMMERCIAL

## 2024-04-08 NOTE — TELEPHONE ENCOUNTER
Mr. Senior returned call , accepting July follow up appt. And labs to be done at [facility] per his request

## 2024-04-13 DIAGNOSIS — D57.1 SICKLE CELL DISEASE WITHOUT CRISIS: ICD-10-CM

## 2024-04-15 RX ORDER — FOLIC ACID 1 MG/1
1000 TABLET ORAL DAILY
Qty: 90 TABLET | Refills: 3 | Status: SHIPPED | OUTPATIENT
Start: 2024-04-15

## 2024-04-17 ENCOUNTER — OFFICE VISIT (OUTPATIENT)
Dept: HEMATOLOGY/ONCOLOGY | Facility: CLINIC | Age: 29
End: 2024-04-17
Payer: COMMERCIAL

## 2024-04-17 ENCOUNTER — LAB VISIT (OUTPATIENT)
Dept: LAB | Facility: HOSPITAL | Age: 29
End: 2024-04-17
Payer: COMMERCIAL

## 2024-04-17 VITALS
HEART RATE: 66 BPM | RESPIRATION RATE: 18 BRPM | SYSTOLIC BLOOD PRESSURE: 112 MMHG | WEIGHT: 190.5 LBS | OXYGEN SATURATION: 98 % | DIASTOLIC BLOOD PRESSURE: 58 MMHG | HEIGHT: 73 IN | BODY MASS INDEX: 25.25 KG/M2 | TEMPERATURE: 98 F

## 2024-04-17 DIAGNOSIS — D57.1 SICKLE CELL DISEASE WITHOUT CRISIS: ICD-10-CM

## 2024-04-17 DIAGNOSIS — D57.09 PRIAPISM DUE TO SICKLE CELL DISEASE: ICD-10-CM

## 2024-04-17 DIAGNOSIS — D64.9 SYMPTOMATIC ANEMIA: ICD-10-CM

## 2024-04-17 DIAGNOSIS — D57.00 SICKLE-CELL DISEASE WITH VASO-OCCLUSIVE PAIN: Primary | ICD-10-CM

## 2024-04-17 DIAGNOSIS — E80.6 HYPERBILIRUBINEMIA: ICD-10-CM

## 2024-04-17 DIAGNOSIS — N48.32 PRIAPISM DUE TO SICKLE CELL DISEASE: ICD-10-CM

## 2024-04-17 DIAGNOSIS — Z91.89 AT RISK FOR OSTEOPOROSIS: ICD-10-CM

## 2024-04-17 LAB
ALBUMIN SERPL BCP-MCNC: 3.9 G/DL (ref 3.5–5.2)
ALP SERPL-CCNC: 116 U/L (ref 55–135)
ALT SERPL W/O P-5'-P-CCNC: 34 U/L (ref 10–44)
ANION GAP SERPL CALC-SCNC: 7 MMOL/L (ref 8–16)
AST SERPL-CCNC: 49 U/L (ref 10–40)
BASOPHILS # BLD AUTO: 0.09 K/UL (ref 0–0.2)
BASOPHILS NFR BLD: 1.1 % (ref 0–1.9)
BILIRUB SERPL-MCNC: 2.4 MG/DL (ref 0.1–1)
BUN SERPL-MCNC: 9 MG/DL (ref 6–20)
CALCIUM SERPL-MCNC: 9.1 MG/DL (ref 8.7–10.5)
CHLORIDE SERPL-SCNC: 111 MMOL/L (ref 95–110)
CO2 SERPL-SCNC: 20 MMOL/L (ref 23–29)
CREAT SERPL-MCNC: 0.8 MG/DL (ref 0.5–1.4)
DIFFERENTIAL METHOD BLD: ABNORMAL
EOSINOPHIL # BLD AUTO: 0 K/UL (ref 0–0.5)
EOSINOPHIL NFR BLD: 0 % (ref 0–8)
ERYTHROCYTE [DISTWIDTH] IN BLOOD BY AUTOMATED COUNT: 19.2 % (ref 11.5–14.5)
EST. GFR  (NO RACE VARIABLE): >60 ML/MIN/1.73 M^2
FERRITIN SERPL-MCNC: 83 NG/ML (ref 20–300)
GLUCOSE SERPL-MCNC: 85 MG/DL (ref 70–110)
HCT VFR BLD AUTO: 24.9 % (ref 40–54)
HGB BLD-MCNC: 8.6 G/DL (ref 14–18)
IMM GRANULOCYTES # BLD AUTO: 0.02 K/UL (ref 0–0.04)
IMM GRANULOCYTES NFR BLD AUTO: 0.2 % (ref 0–0.5)
IRON SERPL-MCNC: 37 UG/DL (ref 45–160)
LDH SERPL L TO P-CCNC: 305 U/L (ref 110–260)
LYMPHOCYTES # BLD AUTO: 1.9 K/UL (ref 1–4.8)
LYMPHOCYTES NFR BLD: 23.2 % (ref 18–48)
MCH RBC QN AUTO: 25.9 PG (ref 27–31)
MCHC RBC AUTO-ENTMCNC: 34.5 G/DL (ref 32–36)
MCV RBC AUTO: 75 FL (ref 82–98)
MONOCYTES # BLD AUTO: 0.8 K/UL (ref 0.3–1)
MONOCYTES NFR BLD: 9.7 % (ref 4–15)
NEUTROPHILS # BLD AUTO: 5.5 K/UL (ref 1.8–7.7)
NEUTROPHILS NFR BLD: 65.8 % (ref 38–73)
NRBC BLD-RTO: 0 /100 WBC
PLATELET # BLD AUTO: 249 K/UL (ref 150–450)
PMV BLD AUTO: 10.4 FL (ref 9.2–12.9)
POTASSIUM SERPL-SCNC: 4.4 MMOL/L (ref 3.5–5.1)
PROT SERPL-MCNC: 8 G/DL (ref 6–8.4)
RBC # BLD AUTO: 3.32 M/UL (ref 4.6–6.2)
RETICS/RBC NFR AUTO: 5.9 % (ref 0.4–2)
SATURATED IRON: 8 % (ref 20–50)
SODIUM SERPL-SCNC: 138 MMOL/L (ref 136–145)
TOTAL IRON BINDING CAPACITY: 465 UG/DL (ref 250–450)
TRANSFERRIN SERPL-MCNC: 314 MG/DL (ref 200–375)
WBC # BLD AUTO: 8.36 K/UL (ref 3.9–12.7)

## 2024-04-17 PROCEDURE — 3008F BODY MASS INDEX DOCD: CPT | Mod: CPTII,S$GLB,, | Performed by: INTERNAL MEDICINE

## 2024-04-17 PROCEDURE — 3078F DIAST BP <80 MM HG: CPT | Mod: CPTII,S$GLB,, | Performed by: INTERNAL MEDICINE

## 2024-04-17 PROCEDURE — 82728 ASSAY OF FERRITIN: CPT | Performed by: INTERNAL MEDICINE

## 2024-04-17 PROCEDURE — 3074F SYST BP LT 130 MM HG: CPT | Mod: CPTII,S$GLB,, | Performed by: INTERNAL MEDICINE

## 2024-04-17 PROCEDURE — 36415 COLL VENOUS BLD VENIPUNCTURE: CPT | Performed by: INTERNAL MEDICINE

## 2024-04-17 PROCEDURE — 99214 OFFICE O/P EST MOD 30 MIN: CPT | Mod: S$GLB,,, | Performed by: INTERNAL MEDICINE

## 2024-04-17 PROCEDURE — 85025 COMPLETE CBC W/AUTO DIFF WBC: CPT | Performed by: INTERNAL MEDICINE

## 2024-04-17 PROCEDURE — 83540 ASSAY OF IRON: CPT | Performed by: INTERNAL MEDICINE

## 2024-04-17 PROCEDURE — 1159F MED LIST DOCD IN RCRD: CPT | Mod: CPTII,S$GLB,, | Performed by: INTERNAL MEDICINE

## 2024-04-17 PROCEDURE — 85045 AUTOMATED RETICULOCYTE COUNT: CPT | Performed by: INTERNAL MEDICINE

## 2024-04-17 PROCEDURE — 99999 PR PBB SHADOW E&M-EST. PATIENT-LVL III: CPT | Mod: PBBFAC,,, | Performed by: INTERNAL MEDICINE

## 2024-04-17 PROCEDURE — 80053 COMPREHEN METABOLIC PANEL: CPT | Performed by: INTERNAL MEDICINE

## 2024-04-17 PROCEDURE — 83615 LACTATE (LD) (LDH) ENZYME: CPT | Performed by: INTERNAL MEDICINE

## 2024-04-17 NOTE — PROGRESS NOTES
"      CC: Sickle cell anemia, hematology follow up    HPI: , 28, is here for hematology follow up for sickle cell anemia. He was being followed by pediatrics until age 21. Last pain crisis was in 2016.   He has been experiencing back pain of late. Pain is intermittent, no clear precipitating or alleviating factors.   He had priapism several years ago.      He had one ER visit in October 2022 and again hospitalization for sickle pain nikolay in the same month. No clear triggers were identified for these episodes.    Interval History: He feels tired today.  He started oxbryta in May 2023. He is tolerating it well . He was hospitalized in Aug [id number] for sickle pain crisis and acute chest syndrome. He was started on multimodal pain regimen consisting of Tylenol 650 TID, p.o. opiates, IV opiates, and heating pads . EKG showed normal sinus rhythm, no ST abnormalities. Fever of 101 documented. CXR showed improvement compared to admission. No focal abcesses. UA +1 bili, otherwise unremarkable.He was initially treated with iv vancomycin, cefepime. Transitioned to PO Abx for continued CAP coverage outpatient.  He was hopsitalize din October 2023 with sickle pain crisis for 11 days. . He was febrile and hypoxic and was on empiric antibiotics and septic workup initiated.   Leukocytosis was noted but cultures negative and no source identified. CXR with no consolidation, effusion, or pneumothorax.  Bilateral discoid atelectasis/scarring noted.    CT chest abd pelvis done showing new PE, started on full dose lovenox and transitioned to eliquis. Hematology was consulted given concern for possible acute chest. Transfused RBC and per Heme " "If symptoms ( pain, hypoxemia) worsen he might benefit from partial/ complete exchange transfusion." Symptoms improved and patient was able to wean to RA, H/H remained stable., no exchange transfusion needed.         Review of patient's allergies indicates:  No Known " Allergies      Current Outpatient Medications   Medication Sig Dispense Refill    acetaminophen (TYLENOL) 500 MG tablet Take 500 mg by mouth every 8 (eight) hours as needed for Pain.      apixaban (ELIQUIS) 5 mg Tab Take 1 tablet (5 mg total) by mouth 2 (two) times daily. 60 tablet 11    calcium carbonate (TUMS) 200 mg calcium (500 mg) chewable tablet Take 2-3 tablets by mouth 2 (two) times daily as needed for Heartburn.      FLUoxetine 10 MG capsule Take 1 capsule (10 mg total) by mouth once daily. 90 capsule 3    folic acid (FOLVITE) 1 MG tablet Take 1 tablet (1,000 mcg total) by mouth once daily. 90 tablet 3    hydroxyurea (HYDREA) 500 mg Cap TAKE 1 CAPSULE (500 MG TOTAL) BY MOUTH ONCE DAILY. 90 capsule 1    ibuprofen (ADVIL,MOTRIN) 100 MG tablet Take 100 mg by mouth every 6 (six) hours as needed.      phenyleph-min oil-petrolatum 0.25-14-74.9 % Oint Place 1 applicator rectally 4 (four) times daily as needed (Hemorrhoid discomfort). 56 g 1    voxelotor (OXBRYTA) 500 mg Tab Take 3 tablets (1,500 mg total) by mouth Daily. 90 tablet 3     No current facility-administered medications for this visit.          Review of Systems   Constitutional:  Positive for malaise/fatigue. Negative for weight loss.   HENT:  Negative for ear discharge and ear pain.    Eyes:  Negative for photophobia, pain and discharge.   Respiratory:  Negative for hemoptysis, sputum production and shortness of breath.    Cardiovascular:  Negative for chest pain, palpitations, orthopnea and claudication.   Gastrointestinal:  Negative for blood in stool, constipation, diarrhea, heartburn, melena, nausea and vomiting.   Genitourinary:  Negative for dysuria, frequency, hematuria and urgency.   Musculoskeletal:  Negative for back pain, myalgias and neck pain.   Neurological:  Negative for dizziness and tremors.   Endo/Heme/Allergies:  Does not bruise/bleed easily.   Psychiatric/Behavioral:  Negative for substance abuse.          There were no vitals  filed for this visit.      Physical Exam  Constitutional:       Appearance: Normal appearance.   HENT:      Head: Normocephalic.   Eyes:      General: Scleral icterus present.   Cardiovascular:      Rate and Rhythm: Normal rate.      Heart sounds: Normal heart sounds. No murmur heard.  Pulmonary:      Effort: Pulmonary effort is normal. No respiratory distress.      Breath sounds: No stridor.   Abdominal:      General: There is no distension.      Palpations: There is no mass.      Tenderness: There is no abdominal tenderness.   Genitourinary:     Penis: Normal.       Testes: Normal.   Musculoskeletal:         General: No swelling or tenderness.   Lymphadenopathy:      Cervical: No cervical adenopathy.   Skin:     Coloration: Skin is not jaundiced or pale.   Neurological:      General: No focal deficit present.      Mental Status: He is alert and oriented to person, place, and time.      Cranial Nerves: No cranial nerve deficit.         2/21/22 24hr urine   Component Ref Range & Units 3 mo ago    Urine Volume mL 2900    Urine Collection Duration Hr 24    Protein, Urine 0 - 15 mg/dL <7    Urine Protein, Timed 0 - 100 mg/Spec Unable to calculate           3/3/22 DEXA scan    Impression:     *Bone density is below the expected range for age.      3/7/22 ECHO     The left ventricle is mildly enlarged with mild eccentric hypertrophy and normal systolic function.  The estimated ejection fraction is 60%.  The quantitatively derived ejection fraction is 60%.  Normal left ventricular diastolic function.  Mild right ventricular enlargement with normal right ventricular systolic function.  The estimated PA systolic pressure is 30 mmHg.  Intermediate central venous pressure (8 mmHg).       Component      Latest Ref Rn 4/17/2024   WBC      3.90 - 12.70 K/uL 8.36    RBC      4.60 - 6.20 M/uL 3.32 (L)    Hemoglobin      14.0 - 18.0 g/dL 8.6 (L)    Hematocrit      40.0 - 54.0 % 24.9 (L)    MCV      82 - 98 fL 75 (L)    MCH       27.0 - 31.0 pg 25.9 (L)    MCHC      32.0 - 36.0 g/dL 34.5    RDW      11.5 - 14.5 % 19.2 (H)    Platelet Count      150 - 450 K/uL 249    MPV      9.2 - 12.9 fL 10.4    Immature Granulocytes      0.0 - 0.5 % 0.2    Gran # (ANC)      1.8 - 7.7 K/uL 5.5    Immature Grans (Abs)      0.00 - 0.04 K/uL 0.02    Lymph #      1.0 - 4.8 K/uL 1.9    Mono #      0.3 - 1.0 K/uL 0.8    Eos #      0.0 - 0.5 K/uL 0.0    Baso #      0.00 - 0.20 K/uL 0.09    nRBC      0 /100 WBC 0    Gran %      38.0 - 73.0 % 65.8    Lymph %      18.0 - 48.0 % 23.2    Mono %      4.0 - 15.0 % 9.7    Eos %      0.0 - 8.0 % 0.0    Basophil %      0.0 - 1.9 % 1.1    Differential Method Automated    Lactate Dehydrogenase      110 - 260 U/L 305 (H)    Retic      0.4 - 2.0 % 5.9 (H)    Ferritin      20.0 - 300.0 ng/mL 83       Legend:  (L) Low  (H) High          Assessment:    1. Hemoglobin SS disease    -He has had 3-4 pain crises in 2022, 3 episodes 2023 requiring hospitalization, no hospitalizations so far in 2024  -on voxeletor, tolerating it well so far     -Sickle cell checklist:    1. Annual ophtho exam:   Last done: 6/29/22, follow up recommended  Findings:      2. Bone health: Ca++d recommended, q3 yr dexa recommended. DEXA done on 3/3/22 showed bone density below the expected range for age.  Vit D: Needs periodic monitoring    3. Screening echo for pHTN:     Last done: 3/7/22; estimated PA systolic pressure is 30 mmHg.    4. Annual UA + MicroAlb/Cr Ratio:     Last done: 24hr urine protein excretion was negligible , on 2/21/22    5. Folic acid: currently taking 1mg daily    6. Hydrea: currently taking 500 mg daily    7. Transfusions / iron : Cumulative transfusions: 2  Baseline h/h:   Ferritin: 72  ng/ml on 12/7/22, 485ng/ml on 8/31/23, 129ng/ml on 9/14/23    6. Pain regimen: no regular regimen, PRN Acetaminophen, NSAID  -no frequent admissions with pain crises  -Pain contract: none    7. Disease modifying therapy:    --voxeletor: currently  taking  --crizalinzamab : no     8. Osteonecrosis screening: yes    9. Stem cell transplant? No    10. H/o cva? No    10. Priapism:  last ocurence April 2022  --education provided    11. HCV screening? Negative in 2016    12. Immunizations:     Pneumococcal. 12 then 23. 23 q5 yrs: current  COVID 19 : uptodate  H influenzae:  Annual flu: uptodate  Meningococcal:          2. Pulmonary embolism    --Note don CT chest done in Oct 2023  -he has completed eliquis for 6 months as of April 2024  -he will start  ASA 81mg daily            BMT Chart Routing      Follow up with physician 3 months.   Follow up with [facility]    Provider visit type    Infusion scheduling note    Injection scheduling note    Labs CMP, CBC, LDH and reticulocytes   Scheduling:  Preferred lab:  Lab interval:     Imaging    Pharmacy appointment    Other referrals

## 2024-05-13 ENCOUNTER — LAB VISIT (OUTPATIENT)
Dept: LAB | Facility: HOSPITAL | Age: 29
End: 2024-05-13
Attending: FAMILY MEDICINE
Payer: COMMERCIAL

## 2024-05-13 ENCOUNTER — OFFICE VISIT (OUTPATIENT)
Dept: PRIMARY CARE CLINIC | Facility: CLINIC | Age: 29
End: 2024-05-13
Payer: COMMERCIAL

## 2024-05-13 VITALS
SYSTOLIC BLOOD PRESSURE: 118 MMHG | DIASTOLIC BLOOD PRESSURE: 62 MMHG | WEIGHT: 191.38 LBS | BODY MASS INDEX: 25.36 KG/M2 | OXYGEN SATURATION: 97 % | HEART RATE: 92 BPM | HEIGHT: 73 IN

## 2024-05-13 DIAGNOSIS — M72.2 PLANTAR FASCIITIS, BILATERAL: ICD-10-CM

## 2024-05-13 DIAGNOSIS — K64.8 BLEEDING INTERNAL HEMORRHOIDS: ICD-10-CM

## 2024-05-13 DIAGNOSIS — D57.1 SICKLE CELL DISEASE WITHOUT CRISIS: ICD-10-CM

## 2024-05-13 DIAGNOSIS — Z79.899 DRUG-INDUCED IMMUNODEFICIENCY: ICD-10-CM

## 2024-05-13 DIAGNOSIS — Z00.00 GENERAL MEDICAL EXAM: ICD-10-CM

## 2024-05-13 DIAGNOSIS — D84.821 DRUG-INDUCED IMMUNODEFICIENCY: ICD-10-CM

## 2024-05-13 DIAGNOSIS — F32.1 CURRENT MODERATE EPISODE OF MAJOR DEPRESSIVE DISORDER WITHOUT PRIOR EPISODE: ICD-10-CM

## 2024-05-13 DIAGNOSIS — Z71.85 VACCINE COUNSELING: ICD-10-CM

## 2024-05-13 DIAGNOSIS — Z00.00 GENERAL MEDICAL EXAM: Primary | ICD-10-CM

## 2024-05-13 LAB
CHOLEST SERPL-MCNC: 109 MG/DL (ref 120–199)
CHOLEST/HDLC SERPL: 2.7 {RATIO} (ref 2–5)
HDLC SERPL-MCNC: 41 MG/DL (ref 40–75)
HDLC SERPL: 37.6 % (ref 20–50)
LDLC SERPL CALC-MCNC: 48 MG/DL (ref 63–159)
NONHDLC SERPL-MCNC: 68 MG/DL
TRIGL SERPL-MCNC: 100 MG/DL (ref 30–150)
TSH SERPL DL<=0.005 MIU/L-ACNC: 0.51 UIU/ML (ref 0.4–4)

## 2024-05-13 PROCEDURE — 84443 ASSAY THYROID STIM HORMONE: CPT | Performed by: FAMILY MEDICINE

## 2024-05-13 PROCEDURE — 99999 PR PBB SHADOW E&M-EST. PATIENT-LVL V: CPT | Mod: PBBFAC,,, | Performed by: FAMILY MEDICINE

## 2024-05-13 PROCEDURE — 1160F RVW MEDS BY RX/DR IN RCRD: CPT | Mod: CPTII,S$GLB,, | Performed by: FAMILY MEDICINE

## 2024-05-13 PROCEDURE — 80061 LIPID PANEL: CPT | Performed by: FAMILY MEDICINE

## 2024-05-13 PROCEDURE — 3008F BODY MASS INDEX DOCD: CPT | Mod: CPTII,S$GLB,, | Performed by: FAMILY MEDICINE

## 2024-05-13 PROCEDURE — 3078F DIAST BP <80 MM HG: CPT | Mod: CPTII,S$GLB,, | Performed by: FAMILY MEDICINE

## 2024-05-13 PROCEDURE — 36415 COLL VENOUS BLD VENIPUNCTURE: CPT | Mod: PN | Performed by: FAMILY MEDICINE

## 2024-05-13 PROCEDURE — 99214 OFFICE O/P EST MOD 30 MIN: CPT | Mod: 25,S$GLB,, | Performed by: FAMILY MEDICINE

## 2024-05-13 PROCEDURE — 1159F MED LIST DOCD IN RCRD: CPT | Mod: CPTII,S$GLB,, | Performed by: FAMILY MEDICINE

## 2024-05-13 PROCEDURE — 99395 PREV VISIT EST AGE 18-39: CPT | Mod: S$GLB,,, | Performed by: FAMILY MEDICINE

## 2024-05-13 PROCEDURE — 3074F SYST BP LT 130 MM HG: CPT | Mod: CPTII,S$GLB,, | Performed by: FAMILY MEDICINE

## 2024-05-13 RX ORDER — FLUOXETINE 10 MG/1
10 CAPSULE ORAL DAILY
Qty: 90 CAPSULE | Refills: 3 | Status: SHIPPED | OUTPATIENT
Start: 2024-05-13

## 2024-05-13 NOTE — PATIENT INSTRUCTIONS
Arch/plantar fasciitis stretches demonstrated.    Ice.  Frozen water bottles are great for some stretching and for ice    Feetures brand or other brand Plantar fasciitis sleeve,   Superfeet insoles and/or heel cups discussed --showed online appropriate once.  Avoid going barefoot in the home.  Consider sandals with good arch supports or tennis shoes with good arch supports as above for wearing inside the home  Anti-inflammatories

## 2024-05-14 PROBLEM — Z71.85 VACCINE COUNSELING: Status: ACTIVE | Noted: 2024-05-14

## 2024-05-14 PROBLEM — M72.2 PLANTAR FASCIITIS, BILATERAL: Status: ACTIVE | Noted: 2024-05-14

## 2024-05-14 PROBLEM — K64.8 BLEEDING INTERNAL HEMORRHOIDS: Status: ACTIVE | Noted: 2024-05-14

## 2024-05-14 NOTE — PROGRESS NOTES
"      /62 (BP Location: Right arm, Patient Position: Sitting, BP Method: Medium (Manual))   Pulse 92   Ht 6' 1" (1.854 m)   Wt 86.8 kg (191 lb 5.8 oz)   SpO2 97%   BMI 25.25 kg/m²       ===========    Chief Complaint: Annual Exam and Foot Pain              Parrish Davis is a 28 y.o. male     here for    Annual exam.    Health maintenance reviewed with patient in detail inc any recent labs and studies and needs for future screening labs.  Age-appropriate vaccines and other age-appropriate screening studies reviewed with patient in detail.  Sleep health reviewed with patient.  Skin health regarding possible skin cancer screening reviewed with patient.  General regularity of bowel movements and urinations reviewed with patient including any possibility of urine leakage.  Vision screening reviewed with patient.          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  Past Surgical History:   Procedure Laterality Date    chemoport      removed    CHOLECYSTECTOMY  2002    UMBILICAL HERNIA REPAIR  1995     ALLERGIES updated and reviewed.  Review of patient's allergies indicates:  No Known Allergies    CURRENT OUTPATIENT MEDICATIONS updated and reviewed    Current Outpatient Medications:     acetaminophen (TYLENOL) 500 MG tablet, Take 500 mg by mouth every 8 (eight) hours as needed for Pain., Disp: , Rfl:     calcium carbonate (TUMS) 200 mg calcium (500 mg) chewable tablet, Take 2-3 " tablets by mouth 2 (two) times daily as needed for Heartburn., Disp: , Rfl:     folic acid (FOLVITE) 1 MG tablet, Take 1 tablet (1,000 mcg total) by mouth once daily., Disp: 90 tablet, Rfl: 3    hydroxyurea (HYDREA) 500 mg Cap, TAKE 1 CAPSULE (500 MG TOTAL) BY MOUTH ONCE DAILY., Disp: 90 capsule, Rfl: 1    ibuprofen (ADVIL,MOTRIN) 100 MG tablet, Take 100 mg by mouth every 6 (six) hours as needed., Disp: , Rfl:     phenyleph-min oil-petrolatum 0.25-14-74.9 % Oint, Place 1 applicator rectally 4 (four) times daily as needed (Hemorrhoid discomfort)., Disp: 56 g, Rfl: 1    voxelotor (OXBRYTA) 500 mg Tab, Take 3 tablets (1,500 mg total) by mouth Daily., Disp: 90 tablet, Rfl: 3    diphth,pertus,acell,,tetanus (BOOSTRIX TDAP) 2.5-8-5 Lf-mcg-Lf/0.5mL Syrg injection, Inject 0.5mLs into the muscle., Disp: 0.5 mL, Rfl: 0    FLUoxetine 10 MG capsule, Take 1 capsule (10 mg total) by mouth once daily., Disp: 90 capsule, Rfl: 3    Review of Systems   Constitutional:  Negative for activity change, appetite change, chills, diaphoresis, fatigue, fever and unexpected weight change.   HENT:  Negative for congestion, ear discharge, ear pain, facial swelling, hearing loss, nosebleeds, postnasal drip, rhinorrhea, sinus pressure, sneezing, sore throat, tinnitus, trouble swallowing and voice change.    Eyes:  Negative for photophobia, pain, discharge, redness, itching and visual disturbance.   Respiratory:  Negative for cough, chest tightness, shortness of breath and wheezing.    Cardiovascular:  Negative for chest pain, palpitations and leg swelling.   Gastrointestinal:  Positive for anal bleeding and blood in stool. Negative for abdominal distention, abdominal pain, constipation, diarrhea, nausea, rectal pain and vomiting.   Endocrine: Negative for cold intolerance, heat intolerance, polydipsia, polyphagia and polyuria.   Genitourinary:  Negative for difficulty urinating, dysuria and flank pain.   Musculoskeletal:  Negative for  "arthralgias, back pain, joint swelling, myalgias and neck pain.   Skin:  Negative for rash.   Neurological:  Negative for dizziness, tremors, seizures, syncope, speech difficulty, weakness, light-headedness, numbness and headaches.   Psychiatric/Behavioral:  Negative for behavioral problems, confusion, decreased concentration, dysphoric mood, sleep disturbance and suicidal ideas. The patient is not nervous/anxious and is not hyperactive.        /62 (BP Location: Right arm, Patient Position: Sitting, BP Method: Medium (Manual))   Pulse 92   Ht 6' 1" (1.854 m)   Wt 86.8 kg (191 lb 5.8 oz)   SpO2 97%   BMI 25.25 kg/m²   Physical Exam  Vitals and nursing note reviewed.   Constitutional:       General: He is not in acute distress.     Appearance: Normal appearance. He is well-developed. He is not ill-appearing or toxic-appearing.   HENT:      Head: Normocephalic and atraumatic.      Right Ear: Tympanic membrane, ear canal and external ear normal.      Left Ear: Tympanic membrane, ear canal and external ear normal.      Nose: Nose normal.      Mouth/Throat:      Lips: Pink.      Mouth: Mucous membranes are moist.      Pharynx: No oropharyngeal exudate or posterior oropharyngeal erythema.   Eyes:      General: No scleral icterus.        Right eye: No discharge.         Left eye: No discharge.      Extraocular Movements: Extraocular movements intact.      Conjunctiva/sclera: Conjunctivae normal.   Cardiovascular:      Rate and Rhythm: Normal rate and regular rhythm.      Pulses: Normal pulses.      Heart sounds: Normal heart sounds. No murmur heard.  Pulmonary:      Effort: Pulmonary effort is normal. No respiratory distress.      Breath sounds: Normal breath sounds. No wheezing or rales.   Abdominal:      General: Bowel sounds are normal. There is no distension.      Palpations: Abdomen is soft. There is no mass.      Tenderness: There is no abdominal tenderness. There is no right CVA tenderness, left CVA " tenderness, guarding or rebound.      Hernia: No hernia is present.   Musculoskeletal:      Cervical back: Normal range of motion and neck supple. No rigidity or tenderness.   Feet:      Comments: No significant heel/sole tenderness upon deep palpation  Lymphadenopathy:      Cervical: No cervical adenopathy.   Skin:     General: Skin is warm and dry.   Neurological:      General: No focal deficit present.      Mental Status: He is alert. Mental status is at baseline.   Psychiatric:         Mood and Affect: Mood normal.         Behavior: Behavior normal. Behavior is cooperative.         ASSESSMENT/PLAN    Parrish was seen today for annual exam and foot pain.    Diagnoses and all orders for this visit:    General medical exam  -     TSH; Future  -     Lipid Panel; Future          Most recent some lab results reviewed with patient.  Any new prescription medications gone over in detail including reason for taking the medication, most common possible side effects and possible costs, etcetera.    Chronic conditions updated. Other than changes or additions as above, cont current medications and maintain follow-up with specialists if indicated.     Jovanny Douglas MD  A dictation device was used to produce this document. Use of such devices sometimes results in grammatical errors or replacement of words that sound similarly.    ==========================        ========================  Patient presents with acute complaints today in the setting of a wellness office visit.  The below portion documents patient's acute complaint(s)  addressed within the primary visit for annual preventative visit    HPI: Pt complains of       Having some blood on tissue and sometimes in stool.  Was told after hospitalization that he likely has some internal hemorrhoids.  Saw GI in December of 2021 who recommended not only a colonoscopy but an EGD.  Did not proceed with procedures    Also has some bilateral heel/sole of foot pain.  This is  especially after standing for a long period of time such as recently at an event.  Not better with over-the-counter remedies.      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  Past Surgical History:   Procedure Laterality Date    chemoport      removed    CHOLECYSTECTOMY  2002    UMBILICAL HERNIA REPAIR  1995     ALLERGIES updated and reviewed.  Review of patient's allergies indicates:  No Known Allergies    CURRENT OUTPATIENT MEDICATIONS updated and reviewed    Current Outpatient Medications:     acetaminophen (TYLENOL) 500 MG tablet, Take 500 mg by mouth every 8 (eight) hours as needed for Pain., Disp: , Rfl:     calcium carbonate (TUMS) 200 mg calcium (500 mg) chewable tablet, Take 2-3 tablets by mouth 2 (two) times daily as needed for Heartburn., Disp: , Rfl:     folic acid (FOLVITE) 1 MG tablet, Take 1 tablet (1,000 mcg total) by mouth once daily., Disp: 90 tablet, Rfl: 3    hydroxyurea (HYDREA) 500 mg Cap, TAKE 1 CAPSULE (500 MG TOTAL) BY MOUTH ONCE DAILY., Disp: 90 capsule, Rfl: 1    ibuprofen (ADVIL,MOTRIN) 100 MG tablet, Take 100 mg by mouth every 6 (six) hours as needed., Disp: , Rfl:     phenyleph-min oil-petrolatum 0.25-14-74.9 % Oint, Place 1 applicator rectally 4 (four) times daily as needed (Hemorrhoid discomfort)., Disp: 56 g, Rfl: 1    voxelotor (OXBRYTA) 500 mg Tab, Take 3 tablets (1,500 mg total) by mouth Daily., Disp:  "90 tablet, Rfl: 3    diphth,pertus,acell,,tetanus (BOOSTRIX TDAP) 2.5-8-5 Lf-mcg-Lf/0.5mL Syrg injection, Inject 0.5mLs into the muscle., Disp: 0.5 mL, Rfl: 0    FLUoxetine 10 MG capsule, Take 1 capsule (10 mg total) by mouth once daily., Disp: 90 capsule, Rfl: 3    Review of Systems   Constitutional:  Negative for activity change, appetite change, chills, diaphoresis, fatigue, fever and unexpected weight change.   HENT:  Negative for congestion, ear discharge, ear pain, facial swelling, hearing loss, nosebleeds, postnasal drip, rhinorrhea, sinus pressure, sneezing, sore throat, tinnitus, trouble swallowing and voice change.    Eyes:  Negative for photophobia, pain, discharge, redness, itching and visual disturbance.   Respiratory:  Negative for cough, chest tightness, shortness of breath and wheezing.    Cardiovascular:  Negative for chest pain, palpitations and leg swelling.   Gastrointestinal:  Positive for anal bleeding and blood in stool. Negative for abdominal distention, abdominal pain, constipation, diarrhea, nausea, rectal pain and vomiting.   Endocrine: Negative for cold intolerance, heat intolerance, polydipsia, polyphagia and polyuria.   Genitourinary:  Negative for difficulty urinating, dysuria and flank pain.   Musculoskeletal:  Negative for arthralgias, back pain, joint swelling, myalgias and neck pain.   Skin:  Negative for rash.   Neurological:  Negative for dizziness, tremors, seizures, syncope, speech difficulty, weakness, light-headedness, numbness and headaches.   Psychiatric/Behavioral:  Negative for behavioral problems, confusion, decreased concentration, dysphoric mood, sleep disturbance and suicidal ideas. The patient is not nervous/anxious and is not hyperactive.        /62 (BP Location: Right arm, Patient Position: Sitting, BP Method: Medium (Manual))   Pulse 92   Ht 6' 1" (1.854 m)   Wt 86.8 kg (191 lb 5.8 oz)   SpO2 97%   BMI 25.25 kg/m²   Physical Exam  Vitals and nursing " note reviewed.   Constitutional:       General: He is not in acute distress.     Appearance: Normal appearance. He is well-developed. He is not ill-appearing or toxic-appearing.   HENT:      Head: Normocephalic and atraumatic.      Right Ear: Tympanic membrane, ear canal and external ear normal.      Left Ear: Tympanic membrane, ear canal and external ear normal.      Nose: Nose normal.      Mouth/Throat:      Lips: Pink.      Mouth: Mucous membranes are moist.      Pharynx: No oropharyngeal exudate or posterior oropharyngeal erythema.   Eyes:      General: No scleral icterus.        Right eye: No discharge.         Left eye: No discharge.      Extraocular Movements: Extraocular movements intact.      Conjunctiva/sclera: Conjunctivae normal.   Cardiovascular:      Rate and Rhythm: Normal rate and regular rhythm.      Pulses: Normal pulses.      Heart sounds: Normal heart sounds. No murmur heard.  Pulmonary:      Effort: Pulmonary effort is normal. No respiratory distress.      Breath sounds: Normal breath sounds. No wheezing or rales.   Abdominal:      General: Bowel sounds are normal. There is no distension.      Palpations: Abdomen is soft. There is no mass.      Tenderness: There is no abdominal tenderness. There is no right CVA tenderness, left CVA tenderness, guarding or rebound.      Hernia: No hernia is present.   Musculoskeletal:      Cervical back: Normal range of motion and neck supple. No rigidity or tenderness.   Feet:      Comments: No significant heel/sole tenderness upon deep palpation  Lymphadenopathy:      Cervical: No cervical adenopathy.   Skin:     General: Skin is warm and dry.   Neurological:      General: No focal deficit present.      Mental Status: He is alert. Mental status is at baseline.   Psychiatric:         Mood and Affect: Mood normal.         Behavior: Behavior normal. Behavior is cooperative.         ASSESSMENT/PLAN    Parrish was seen today for annual exam and foot  pain.        Bleeding internal hemorrhoids  -     Ambulatory referral/consult to Colorectal Surgery; Future  Avoid NSAIDs.  Discussed possible referral to GI versus colorectal surgery.  With the present problem will refer to colorectal surgery but he still may need to see GI as recommended in past for EGD       Sickle cell disease without crisis  Chronic.  Follows up regularly with Hematology.  Continue such.  Monitor.      Current moderate episode of major depressive disorder without prior episode  Stable.  Reports doing well with fluoxetine.  Continue.  Monitor.  Drug-induced immunodeficiency  Hydroxyurea.  Continue.  Monitor.  Plantar fasciitis, bilateral  Arch/plantar fasciitis stretches demonstrated.    Ice.  Frozen water bottles are great for some stretching and for ice    Feetures brand or other brand Plantar fasciitis sleeve,   Superfeet insoles and/or heel cups discussed --showed online appropriate choices  Avoid going barefoot in the home.  Consider sandals with good arch supports or tennis shoes with good arch supports as above for wearing inside the home  Could do some corticosteroids but it is not that severe.  Prn Tylenol.  Will avoid NSAIDs for now given above    Vaccine counseling  Tdap today.  Advised on pneumococcal in the future soon     See above regarding depression and fluoxetine  Other orders  -     FLUoxetine 10 MG capsule; Take 1 capsule (10 mg total) by mouth once daily.            Most recent some lab results reviewed with patient.  Any new prescription medications gone over in detail including reason for taking the medication, most common possible side effects and possible costs, etcetera.    Chronic conditions updated. Other than changes or additions as above, cont current medications and maintain follow-up with specialists if indicated.     Jovanny Douglas MD  A dictation device was used to produce this document. Use of such devices sometimes results in grammatical errors or  replacement of words that sound similarly.    ==========================

## 2024-06-24 NOTE — PROGRESS NOTES
"CRS Office Visit History and Physical    Referring Md:   Jovanny Douglas Md  [address] [place] [address]    SUBJECTIVE:     Chief Complaint: Rectal bleeding    History of Present Illness:  The patient is new patient to this practice.   Course is as follows:  Patient is a 29 y.o. male presents with rectal bleeding with Bms.  Sometimes mixed in stool.  Symptoms have been present for 3-4 years.  Was recently on 6mo of Eliquis b/c of a Sickle-Cell Crisis, no change during or after that.  Prior colonoscopy: No  Prior abdominal surgery: No  Prior pelvic or anorectal surgery: No  Family history of colon/rectal cancer: No  Family history of IBD: No  Steroid or other immunosuppression: No  Blood thinners: No  Current stool softeners or fiber supplement: Yes - occasional  Active smoking: No    [place] (FI):  Total: 0    Altomare (ODS):  How long on toilet:   6-10min (1)   How many times/day: 2 (1)    Digitation:   Never (0)   Laxatives:   Never (0)   Enemas:    Never (0)   Incomplete stool: Monthly (1)   Strain:   <25% (1)   Total: 3    Stool consistency/[place]: 2  Bowel movements per month:  Daily   Leakage of urine: No  Trouble emptying your bladder: No      Review of patient's allergies indicates:  No Known Allergies    Past Medical History:   Diagnosis Date    Sickle cell anemia     Sickle cell disease      Past Surgical History:   Procedure Laterality Date    chemoport      removed    CHOLECYSTECTOMY  2002    UMBILICAL HERNIA REPAIR  1995     No family history on file.  Social History     Tobacco Use    Smoking status: Never    Smokeless tobacco: Never   Substance Use Topics    Alcohol use: Yes     Comment: Social drinker on weekends    Drug use: No        Review of Systems:  ROS    OBJECTIVE:     Vital Signs (Most Recent)  BP (!) 140/73 (BP Location: Left arm, Patient Position: Sitting)   Pulse (!) 51   Ht 6' 0.99" (1.854 m)   Wt 88.5 kg (195 lb 1.7 oz)   BMI 25.75 kg/m²     Physical " Exam:  General: Black or  male in no distress   Neuro: Alert and oriented x 4.  Moves all extremities.     HEENT: No icterus.  Trachea midline  Respiratory: Respirations are even and unlabored  Cardiac: Regular rate  Extremities: Warm dry and intact  Skin: No rashes     Patient was examined w/ a chaperone present.    Anorectal:   External exam: Perianal skin healthy, small posterior skin tag, no fissure  Digital exam revealed normal tone. No masses.  No gross blood.    Anoscopy:  Verbal consent was obtained.   Clear plastic anoscope inserted.    Grade I/II hemorrhoids seen.  No stigmata of recent bleeding      ASSESSMENT/PLAN:     30yo M w/ rectal bleeding    The patient was instructed to:  Increase water intake to at least 8-10 glasses of water per day.  Take a daily fiber supplement (Konsyl, Benefiber, Metamucil) and increase dietary intake to 20-30 grams/day.  Avoid straining or spending >5min/event with bowel movements.  Schedule colonoscopy in 4 weeks if not resolved with above (message send to schedulers)    Erika Elias MD  Staff Surgeon, Colon and Rectal Surgery  [facility]

## 2024-06-25 ENCOUNTER — TELEPHONE (OUTPATIENT)
Dept: SURGERY | Facility: CLINIC | Age: 29
End: 2024-06-25
Payer: COMMERCIAL

## 2024-06-25 DIAGNOSIS — D57.1 SICKLE CELL DISEASE WITHOUT CRISIS: ICD-10-CM

## 2024-06-25 DIAGNOSIS — D64.9 SYMPTOMATIC ANEMIA: ICD-10-CM

## 2024-06-26 ENCOUNTER — OFFICE VISIT (OUTPATIENT)
Dept: SURGERY | Facility: CLINIC | Age: 29
End: 2024-06-26
Attending: COLON & RECTAL SURGERY
Payer: COMMERCIAL

## 2024-06-26 VITALS
DIASTOLIC BLOOD PRESSURE: 73 MMHG | SYSTOLIC BLOOD PRESSURE: 140 MMHG | BODY MASS INDEX: 25.86 KG/M2 | HEART RATE: 51 BPM | HEIGHT: 73 IN | WEIGHT: 195.13 LBS

## 2024-06-26 DIAGNOSIS — K64.8 BLEEDING INTERNAL HEMORRHOIDS: ICD-10-CM

## 2024-06-26 PROCEDURE — 46600 DIAGNOSTIC ANOSCOPY SPX: CPT | Mod: S$GLB,,, | Performed by: COLON & RECTAL SURGERY

## 2024-06-26 PROCEDURE — 99999 PR PBB SHADOW E&M-EST. PATIENT-LVL IV: CPT | Mod: PBBFAC,,, | Performed by: COLON & RECTAL SURGERY

## 2024-06-26 PROCEDURE — 1159F MED LIST DOCD IN RCRD: CPT | Mod: CPTII,S$GLB,, | Performed by: COLON & RECTAL SURGERY

## 2024-06-26 PROCEDURE — 3077F SYST BP >= 140 MM HG: CPT | Mod: CPTII,S$GLB,, | Performed by: COLON & RECTAL SURGERY

## 2024-06-26 PROCEDURE — 1160F RVW MEDS BY RX/DR IN RCRD: CPT | Mod: CPTII,S$GLB,, | Performed by: COLON & RECTAL SURGERY

## 2024-06-26 PROCEDURE — 99203 OFFICE O/P NEW LOW 30 MIN: CPT | Mod: 25,S$GLB,, | Performed by: COLON & RECTAL SURGERY

## 2024-06-26 PROCEDURE — 3078F DIAST BP <80 MM HG: CPT | Mod: CPTII,S$GLB,, | Performed by: COLON & RECTAL SURGERY

## 2024-06-26 PROCEDURE — 3008F BODY MASS INDEX DOCD: CPT | Mod: CPTII,S$GLB,, | Performed by: COLON & RECTAL SURGERY

## 2024-06-26 NOTE — PATIENT INSTRUCTIONS
[facility]  DIET RECOMMENDATIONS    Fruits:  Use all fruits and juices liberally; fresh, cooked, dried or canned. Eat fruit raw and with skins when possible. Have at least four servings of fruit daily including a citrus fruit and a stewed dried fruit. Hard seeds of fruits (berries, figs, Grapes, mangoes, tomatoes) etc. may be removed.    Vegetables: Use all vegetables liberally. Green leafy vegetables, such as cabbage, spinach, lettuce, broccoli, and other greens are particularly good.    Potato: As desired. Serve baked frequently and eat the skin. Other starchy foods such as rice, macaroni, etc., may be occasionally substituted. Chew popcorn well and do not eat hard kernels.    Meat, Fish, Poultry: One or two servings daily.    Eggs: One daily if you are not on a low cholesterol diet.    Milk: Include at least one-half pint daily. Whole milk or skimmed may be used.     Cereals: Use whole grain breads and cereals such as bran, bran flakes, grape nut flakes, puffed wheat, oatmeal, Wheaties, etc., as much as possible. Refined breaks and cereals are not restricted; however, they do not contain the bulk necessary for this diet.     Sugars, Sweets: Use very moderately. Depend on fruit as dessert.    Fats: Use butter or margarine as desired. Oil or dressing on salads as desired. Fried foods may be used in moderation. Nuts may be eaten if you chew them well and may be ground or finely chopped for cooking.   Sample Menu                                                                                 Breakfast                          Lunch  Orange juice, 4 ounces                                                Vegetable soup                    Stewed fruit                                                                 Fresh fruit plate with cottage cheese  Shredded wheat                                                           Whole wheat toast  Scrambled eggs                                                            Butter  Whole wheat toast                                                       Coffee or tea  Dinner                                                                         Bedtime  Large glass tomato juice                                             1 glass milk  Roast beef                                                                   stewed fruit  Baked potato with skin  Buttered spinach  Raw vegetable salad  Baked apple with skin   Coffee or tea      [facility]  High Fiber Diet    25 grams of fiber per day is recommended  Fiber cereal = 5 grams (Raisin Bran, Shredded Wheat, Grape Nuts)  Konsyl 1 teaspoon = 6 grams  Metamucil 1 tablespoon = 3 grams  Citrucel 1 tablespoon = 2 grams  Fiber Choice = 3-4 per day    This diet furnishes adequate amounts of all the essential nutrients needed by the body and a very liberal fiber or roughage content. Roughage is indigestible fiber found in fruits, vegetables and whole grain cereals. It provides bulk to the large intestine and, accompanied by an adequate fluid intake, is a stimulant to elimination. Regular eating and elimination habits are vital to good health.     How to Increase Your Fiber Intake    Drink at least 4-5 glasses of liquids per day or the fiber can be constipating rather then stimulating to your gut.  Boil and bake potatoes in their skin. Eat the skin, too.  Include fresh fruits and raw vegetables in your daily diet. Raw fruits and vegetables have more useful fiber than those that have been peeled, cooked, pureed, or otherwise processed.  Eat a wide variety of fibrous foods in reasonable amounts. Increase fiber intake slowly especially if you have been on a low-fiber diet.  Eat more legumes-peas, beans, soybeans.  For snacks, try dried fruit, whole wheat and rye crackers.  Avoid instant-cook hot cereals. Use the longer cooking cereals.  Use bran whenever possible. Sprinkle it on top of cereal, mix it into mashed potatoes or  hamburger meat, or use it in combination dishes such as meat loaf.   Substitute whole grain, whole wheat and bran products for white flour products.  Eat slowly and chew your food thoroughly.

## 2024-06-28 RX ORDER — HYDROXYUREA 500 MG/1
500 CAPSULE ORAL DAILY
Qty: 30 CAPSULE | Refills: 5 | Status: SHIPPED | OUTPATIENT
Start: 2024-06-28

## 2024-07-17 ENCOUNTER — OFFICE VISIT (OUTPATIENT)
Dept: HEMATOLOGY/ONCOLOGY | Facility: CLINIC | Age: 29
End: 2024-07-17
Payer: COMMERCIAL

## 2024-07-17 ENCOUNTER — LAB VISIT (OUTPATIENT)
Dept: LAB | Facility: HOSPITAL | Age: 29
End: 2024-07-17
Attending: INTERNAL MEDICINE
Payer: COMMERCIAL

## 2024-07-17 VITALS
RESPIRATION RATE: 20 BRPM | WEIGHT: 190.69 LBS | SYSTOLIC BLOOD PRESSURE: 110 MMHG | HEART RATE: 72 BPM | OXYGEN SATURATION: 97 % | HEIGHT: 73 IN | TEMPERATURE: 98 F | BODY MASS INDEX: 25.27 KG/M2 | DIASTOLIC BLOOD PRESSURE: 60 MMHG

## 2024-07-17 DIAGNOSIS — D64.9 SYMPTOMATIC ANEMIA: Primary | ICD-10-CM

## 2024-07-17 DIAGNOSIS — D57.1 SICKLE CELL DISEASE WITHOUT CRISIS: ICD-10-CM

## 2024-07-17 LAB
ALBUMIN SERPL BCP-MCNC: 4.1 G/DL (ref 3.5–5.2)
ALP SERPL-CCNC: 128 U/L (ref 55–135)
ALT SERPL W/O P-5'-P-CCNC: 47 U/L (ref 10–44)
ANION GAP SERPL CALC-SCNC: 8 MMOL/L (ref 8–16)
AST SERPL-CCNC: 67 U/L (ref 10–40)
BASOPHILS # BLD AUTO: 0.06 K/UL (ref 0–0.2)
BASOPHILS NFR BLD: 0.7 % (ref 0–1.9)
BILIRUB SERPL-MCNC: 2 MG/DL (ref 0.1–1)
BUN SERPL-MCNC: 7 MG/DL (ref 6–20)
CALCIUM SERPL-MCNC: 9.3 MG/DL (ref 8.7–10.5)
CHLORIDE SERPL-SCNC: 105 MMOL/L (ref 95–110)
CO2 SERPL-SCNC: 23 MMOL/L (ref 23–29)
CREAT SERPL-MCNC: 0.8 MG/DL (ref 0.5–1.4)
DIFFERENTIAL METHOD BLD: ABNORMAL
EOSINOPHIL # BLD AUTO: 0.1 K/UL (ref 0–0.5)
EOSINOPHIL NFR BLD: 0.6 % (ref 0–8)
ERYTHROCYTE [DISTWIDTH] IN BLOOD BY AUTOMATED COUNT: 19 % (ref 11.5–14.5)
EST. GFR  (NO RACE VARIABLE): >60 ML/MIN/1.73 M^2
GLUCOSE SERPL-MCNC: 82 MG/DL (ref 70–110)
HCT VFR BLD AUTO: 29.5 % (ref 40–54)
HGB BLD-MCNC: 10.2 G/DL (ref 14–18)
IMM GRANULOCYTES # BLD AUTO: 0.04 K/UL (ref 0–0.04)
IMM GRANULOCYTES NFR BLD AUTO: 0.5 % (ref 0–0.5)
LDH SERPL L TO P-CCNC: 272 U/L (ref 110–260)
LYMPHOCYTES # BLD AUTO: 1.8 K/UL (ref 1–4.8)
LYMPHOCYTES NFR BLD: 20.1 % (ref 18–48)
MCH RBC QN AUTO: 25.5 PG (ref 27–31)
MCHC RBC AUTO-ENTMCNC: 34.6 G/DL (ref 32–36)
MCV RBC AUTO: 74 FL (ref 82–98)
MONOCYTES # BLD AUTO: 0.8 K/UL (ref 0.3–1)
MONOCYTES NFR BLD: 8.7 % (ref 4–15)
NEUTROPHILS # BLD AUTO: 6.1 K/UL (ref 1.8–7.7)
NEUTROPHILS NFR BLD: 69.4 % (ref 38–73)
NRBC BLD-RTO: 1 /100 WBC
PLATELET # BLD AUTO: 327 K/UL (ref 150–450)
PMV BLD AUTO: 9.8 FL (ref 9.2–12.9)
POTASSIUM SERPL-SCNC: 3.9 MMOL/L (ref 3.5–5.1)
PROT SERPL-MCNC: 8.6 G/DL (ref 6–8.4)
RBC # BLD AUTO: 4 M/UL (ref 4.6–6.2)
RETICS/RBC NFR AUTO: 1.9 % (ref 0.4–2)
SODIUM SERPL-SCNC: 136 MMOL/L (ref 136–145)
WBC # BLD AUTO: 8.82 K/UL (ref 3.9–12.7)

## 2024-07-17 PROCEDURE — 83615 LACTATE (LD) (LDH) ENZYME: CPT | Performed by: INTERNAL MEDICINE

## 2024-07-17 PROCEDURE — 3078F DIAST BP <80 MM HG: CPT | Mod: CPTII,S$GLB,, | Performed by: INTERNAL MEDICINE

## 2024-07-17 PROCEDURE — 1159F MED LIST DOCD IN RCRD: CPT | Mod: CPTII,S$GLB,, | Performed by: INTERNAL MEDICINE

## 2024-07-17 PROCEDURE — 99214 OFFICE O/P EST MOD 30 MIN: CPT | Mod: S$GLB,,, | Performed by: INTERNAL MEDICINE

## 2024-07-17 PROCEDURE — 85025 COMPLETE CBC W/AUTO DIFF WBC: CPT | Performed by: INTERNAL MEDICINE

## 2024-07-17 PROCEDURE — 36415 COLL VENOUS BLD VENIPUNCTURE: CPT | Performed by: INTERNAL MEDICINE

## 2024-07-17 PROCEDURE — 3008F BODY MASS INDEX DOCD: CPT | Mod: CPTII,S$GLB,, | Performed by: INTERNAL MEDICINE

## 2024-07-17 PROCEDURE — 99999 PR PBB SHADOW E&M-EST. PATIENT-LVL III: CPT | Mod: PBBFAC,,, | Performed by: INTERNAL MEDICINE

## 2024-07-17 PROCEDURE — 80053 COMPREHEN METABOLIC PANEL: CPT | Performed by: INTERNAL MEDICINE

## 2024-07-17 PROCEDURE — 3074F SYST BP LT 130 MM HG: CPT | Mod: CPTII,S$GLB,, | Performed by: INTERNAL MEDICINE

## 2024-07-17 PROCEDURE — 85045 AUTOMATED RETICULOCYTE COUNT: CPT | Performed by: INTERNAL MEDICINE

## 2024-07-17 RX ORDER — ONDANSETRON 8 MG/1
8 TABLET, ORALLY DISINTEGRATING ORAL EVERY 8 HOURS PRN
Qty: 30 TABLET | Refills: 1 | Status: SHIPPED | OUTPATIENT
Start: 2024-07-17 | End: 2025-07-17

## 2024-07-17 NOTE — PROGRESS NOTES
"      CC: Sickle cell anemia, hematology follow up    HPI: , 29, is here for hematology follow up for sickle cell anemia. He was being followed by pediatrics until age 21. Last pain crisis was in 2016.   He has been experiencing back pain of late. Pain is intermittent, no clear precipitating or alleviating factors.   He had priapism several years ago.      He had one ER visit in October 2022 and again hospitalization for sickle pain nikolay in the same month. No clear triggers were identified for these episodes.    Interval History: He feels tired today.  He started oxbryta in May 2023. He is tolerating it well . He was hospitalized in Aug 42931 for sickle pain crisis and acute chest syndrome. He was started on multimodal pain regimen consisting of Tylenol 650 TID, p.o. opiates, IV opiates, and heating pads . EKG showed normal sinus rhythm, no ST abnormalities. Fever of 101 documented. CXR showed improvement compared to admission. No focal abcesses. UA +1 bili, otherwise unremarkable.He was initially treated with iv vancomycin, cefepime. Transitioned to PO Abx for continued CAP coverage outpatient.  He was hopsitalize din October 2023 with sickle pain crisis for 11 days. . He was febrile and hypoxic and was on empiric antibiotics and septic workup initiated.   Leukocytosis was noted but cultures negative and no source identified. CXR with no consolidation, effusion, or pneumothorax.  Bilateral discoid atelectasis/scarring noted.    CT chest abd pelvis done showing new PE, started on full dose lovenox and transitioned to eliquis. Hematology was consulted given concern for possible acute chest. Transfused RBC and per Heme " "If symptoms ( pain, hypoxemia) worsen he might benefit from partial/ complete exchange transfusion." Symptoms improved and patient was able to wean to RA, H/H remained stable., no exchange transfusion needed.         Review of patient's allergies indicates:  No Known " Allergies      Current Outpatient Medications   Medication Sig    acetaminophen (TYLENOL) 500 MG tablet Take 500 mg by mouth every 8 (eight) hours as needed for Pain.    calcium carbonate (TUMS) 200 mg calcium (500 mg) chewable tablet Take 2-3 tablets by mouth 2 (two) times daily as needed for Heartburn.    diphth,pertus,acell,,tetanus (BOOSTRIX TDAP) 2.5-8-5 Lf-mcg-Lf/0.5mL Syrg injection Inject 0.5mLs into the muscle.    FLUoxetine 10 MG capsule Take 1 capsule (10 mg total) by mouth once daily.    folic acid (FOLVITE) 1 MG tablet Take 1 tablet (1,000 mcg total) by mouth once daily.    hydroxyurea (HYDREA) 500 mg Cap TAKE 1 CAPSULE (500 MG TOTAL) BY MOUTH ONCE DAILY.    ibuprofen (ADVIL,MOTRIN) 100 MG tablet Take 100 mg by mouth every 6 (six) hours as needed.    phenyleph-min oil-petrolatum 0.25-14-74.9 % Oint Place 1 applicator rectally 4 (four) times daily as needed (Hemorrhoid discomfort).    voxelotor (OXBRYTA) 500 mg Tab Take 3 tablets (1,500 mg total) by mouth Daily.     No current facility-administered medications for this visit.          Review of Systems   Constitutional:  Positive for malaise/fatigue. Negative for weight loss.   HENT:  Negative for ear discharge and ear pain.    Eyes:  Negative for photophobia, pain and discharge.   Respiratory:  Negative for hemoptysis, sputum production and shortness of breath.    Cardiovascular:  Negative for chest pain, palpitations, orthopnea and claudication.   Gastrointestinal:  Negative for blood in stool, constipation, diarrhea, heartburn, melena, nausea and vomiting.   Genitourinary:  Negative for dysuria, frequency, hematuria and urgency.   Musculoskeletal:  Negative for back pain, myalgias and neck pain.   Neurological:  Negative for dizziness and tremors.   Endo/Heme/Allergies:  Does not bruise/bleed easily.   Psychiatric/Behavioral:  Negative for substance abuse.          There were no vitals filed for this visit.      Physical Exam  Constitutional:        Appearance: Normal appearance.   HENT:      Head: Normocephalic.   Eyes:      General: Scleral icterus present.   Cardiovascular:      Rate and Rhythm: Normal rate.      Heart sounds: Normal heart sounds. No murmur heard.  Pulmonary:      Effort: Pulmonary effort is normal. No respiratory distress.      Breath sounds: No stridor.   Abdominal:      General: There is no distension.      Palpations: There is no mass.      Tenderness: There is no abdominal tenderness.   Genitourinary:     Penis: Normal.       Testes: Normal.   Musculoskeletal:         General: No swelling or tenderness.   Lymphadenopathy:      Cervical: No cervical adenopathy.   Skin:     Coloration: Skin is not jaundiced or pale.   Neurological:      General: No focal deficit present.      Mental Status: He is alert and oriented to person, place, and time.      Cranial Nerves: No cranial nerve deficit.       2/21/22 24hr urine   Component Ref Range & Units 3 mo ago    Urine Volume mL 2900    Urine Collection Duration Hr 24    Protein, Urine 0 - 15 mg/dL <7    Urine Protein, Timed 0 - 100 mg/Spec Unable to calculate           3/3/22 DEXA scan    Impression:     *Bone density is below the expected range for age.      3/7/22 ECHO     The left ventricle is mildly enlarged with mild eccentric hypertrophy and normal systolic function.  The estimated ejection fraction is 60%.  The quantitatively derived ejection fraction is 60%.  Normal left ventricular diastolic function.  Mild right ventricular enlargement with normal right ventricular systolic function.  The estimated PA systolic pressure is 30 mmHg.  Intermediate central venous pressure (8 mmHg).       Component      Latest Ref Rn 7/17/2024   WBC      3.90 - 12.70 K/uL 8.82    RBC      4.60 - 6.20 M/uL 4.00 (L)    Hemoglobin      14.0 - 18.0 g/dL 10.2 (L)    Hematocrit      40.0 - 54.0 % 29.5 (L)    MCV      82 - 98 fL 74 (L)    MCH      27.0 - 31.0 pg 25.5 (L)    MCHC      32.0 - 36.0 g/dL 34.6     RDW      11.5 - 14.5 % 19.0 (H)    Platelet Count      150 - 450 K/uL 327    MPV      9.2 - 12.9 fL 9.8    Immature Granulocytes      0.0 - 0.5 % 0.5    Gran # (ANC)      1.8 - 7.7 K/uL 6.1    Immature Grans (Abs)      0.00 - 0.04 K/uL 0.04    Lymph #      1.0 - 4.8 K/uL 1.8    Mono #      0.3 - 1.0 K/uL 0.8    Eos #      0.0 - 0.5 K/uL 0.1    Baso #      0.00 - 0.20 K/uL 0.06    nRBC      0 /100 WBC 1 !    Gran %      38.0 - 73.0 % 69.4    Lymph %      18.0 - 48.0 % 20.1    Mono %      4.0 - 15.0 % 8.7    Eos %      0.0 - 8.0 % 0.6    Basophil %      0.0 - 1.9 % 0.7    Differential Method Automated    Sodium      136 - 145 mmol/L 136    Potassium      3.5 - 5.1 mmol/L 3.9    Chloride      95 - 110 mmol/L 105    CO2      23 - 29 mmol/L 23    Glucose      70 - 110 mg/dL 82    BUN      6 - 20 mg/dL 7    Creatinine      0.5 - 1.4 mg/dL 0.8    Calcium      8.7 - 10.5 mg/dL 9.3    PROTEIN TOTAL      6.0 - 8.4 g/dL 8.6 (H)    Albumin      3.5 - 5.2 g/dL 4.1    BILIRUBIN TOTAL      0.1 - 1.0 mg/dL 2.0 (H)    ALP      55 - 135 U/L 128    AST      10 - 40 U/L 67 (H)    ALT      10 - 44 U/L 47 (H)    eGFR      >60 mL/min/1.73 m^2 >60.0    Anion Gap      8 - 16 mmol/L 8    Lactate Dehydrogenase      110 - 260 U/L 272 (H)    Retic      0.4 - 2.0 % 1.9       Legend:  (L) Low  (H) High  ! Abnormal  Assessment:    1. Hemoglobin SS disease    -He has had 3-4 pain crises in 2022, 3 episodes 2023 requiring hospitalization, no hospitalizations so far in 2024  -on voxeletor, tolerating it well so far     -Sickle cell checklist:    1. Annual ophtho exam:   Last done: 6/29/22, follow up recommended  Findings:      2. Bone health: Ca++d recommended, q3 yr dexa recommended. DEXA done on 3/3/22 showed bone density below the expected range for age.  Vit D: Needs periodic monitoring    3. Screening echo for pHTN:     Last done: 3/7/22; estimated PA systolic pressure is 30 mmHg.    4. Annual UA + MicroAlb/Cr Ratio:     Last done: 24hr urine  protein excretion was negligible , on 2/21/22    5. Folic acid: currently taking 1mg daily    6. Hydrea: currently taking 500 mg daily    7. Transfusions / iron : Cumulative transfusions: 2  Baseline h/h:   Ferritin: 72  ng/ml on 12/7/22, 485ng/ml on 8/31/23, 129ng/ml on 9/14/23    6. Pain regimen: no regular regimen, PRN Acetaminophen, NSAID  -no frequent admissions with pain crises  -Pain contract: none    7. Disease modifying therapy:    --voxeletor: currently taking  --crizalinzamab : no     8. Osteonecrosis screening: yes    9. Stem cell transplant? No    10. H/o cva? No    10. Priapism:  last ocurence April 2022  --education provided    11. HCV screening? Negative in 2016    12. Immunizations:     Pneumococcal. 13 then 23. 23 q5 yrs: current  COVID 19 : uptodate  H influenzae:  Annual flu: uptodate  Meningococcal:          2. Pulmonary embolism    --Note don CT chest done in Oct 2023  -he has completed eliquis for 6 months as of April 2024  -he will start  ASA 81mg daily        BMT Chart Routing      Follow up with physician 4 months. in benign heme clinic   Follow up with [facility]    Provider visit type    Infusion scheduling note    Injection scheduling note    Labs CBC, CMP, LDH and reticulocytes   Scheduling:  Preferred lab:  Lab interval:  cbc, cmp, retic  every 8 weeks; cbc, cmp, ldh, reti sarah 4 mon   Imaging    Pharmacy appointment    Other referrals

## 2024-07-18 DIAGNOSIS — D64.9 SYMPTOMATIC ANEMIA: Primary | ICD-10-CM

## 2024-07-22 ENCOUNTER — TELEPHONE (OUTPATIENT)
Dept: ENDOSCOPY | Facility: HOSPITAL | Age: 29
End: 2024-07-22
Payer: COMMERCIAL

## 2024-07-22 VITALS — BODY MASS INDEX: 25.73 KG/M2 | HEIGHT: 72 IN | WEIGHT: 190 LBS

## 2024-07-22 DIAGNOSIS — Z12.11 SCREEN FOR COLON CANCER: Primary | ICD-10-CM

## 2024-07-22 DIAGNOSIS — K62.5 RECTAL BLEEDING: Primary | ICD-10-CM

## 2024-07-22 RX ORDER — SODIUM, POTASSIUM,MAG SULFATES 17.5-3.13G
1 SOLUTION, RECONSTITUTED, ORAL ORAL DAILY
Qty: 1 KIT | Refills: 0 | Status: SHIPPED | OUTPATIENT
Start: 2024-07-22 | End: 2024-07-24

## 2024-07-22 NOTE — TELEPHONE ENCOUNTER
Spoke to Parrish to schedule procedure(s) Colonoscopy       Physician to perform procedure(s) Dr. DAV Elias  Date of Procedure (s) 9/23/24  Arrival Time 7:15AM  Time of Procedure(s) 8:15 AM   Location of Procedure(s) [facility]  Type of Rx Prep sent to patient: Suprep  Instructions provided to patient via [facility]    Patient was informed on the following information and verbalized understanding. Screening questionnaire reviewed with patient and complete. If procedure requires anesthesia, a responsible adult needs to be present to accompany the patient home, patient cannot drive after receiving anesthesia. Appointment details are tentative, especially check-in time. Patient will receive a prep-op call 7 days prior to confirm check-in time for procedure. If applicable the patient should contact their pharmacy to verify Rx for procedure prep is ready for pick-up. Patient was advised to call the scheduling department at [phone] if pharmacy states no Rx is available. Patient was advised to call the endoscopy scheduling department if any questions or concerns arise.      [facility] Scheduling Department

## 2024-07-22 NOTE — TELEPHONE ENCOUNTER
"----- Message from Liyah Orantes sent at 7/22/2024  8:30 AM CDT -----    ----- Message -----  From: Erika Elias MD  Sent: 7/22/2024  12:00 AM CDT  To: [facility]    Procedure: Colonoscopy    Diagnosis: Rectal bleeding    Procedure Timing: Within 4 weeks (Urgent)    #If within 4 weeks selected, please jose as high priority#    #If greater than 12 weeks, please select "5-12 weeks" and delay sending until 3 months prior to requested date#     Location: Any Site    Additional Scheduling Information: No scheduling concerns    Prep Specifications:Standard prep    Is the patient taking a GLP-1 Agonist:no    Have you attached a patient to this message: yes  "

## 2024-08-02 DIAGNOSIS — D57.1 SICKLE CELL DISEASE WITHOUT CRISIS: ICD-10-CM

## 2024-08-02 RX ORDER — VOXELOTOR 500 MG/1
1500 TABLET, FILM COATED ORAL DAILY
Qty: 90 TABLET | Refills: 3 | Status: ACTIVE | OUTPATIENT
Start: 2024-08-02

## 2024-08-19 PROBLEM — K64.8 BLEEDING INTERNAL HEMORRHOIDS: Status: RESOLVED | Noted: 2024-05-14 | Resolved: 2024-08-19

## 2024-09-18 ENCOUNTER — TELEPHONE (OUTPATIENT)
Dept: ENDOSCOPY | Facility: HOSPITAL | Age: 29
End: 2024-09-18
Payer: COMMERCIAL

## 2024-09-18 ENCOUNTER — PATIENT MESSAGE (OUTPATIENT)
Dept: ENDOSCOPY | Facility: HOSPITAL | Age: 29
End: 2024-09-18
Payer: COMMERCIAL

## 2024-09-18 NOTE — TELEPHONE ENCOUNTER
Spoke to pt for pre-call to confirm scheduled Colonoscopy and patient verbalized understanding of the following:       Date of Procedure (s)  verified 9/23/24  Arrival Time 7:30 AM verified.  Location of Procedure(s) [place] 4th Floor verified.  NPO status reinforced. Ok to continue clear liquids up until 4 hours prior to the Endoscopy procedure.     patient denies taking blood thinning or glp1 medications    Pt confirmed receipt of prep instructions and Rx prep (if applicable).  Instructions provided to patient via [facility]  Pt confirmed ride home after procedure if procedure requires anesthesia.   Pre-call screening questionnaire reviewed and completed with patient.   Appointment details are tentative, including check-in time.  If the patient begins taking any blood thinning medications, injectable weight loss/diabetes medications (other than insulin), or Adipex (phentermine) patient was instructed to contact the endoscopy scheduling department as soon as possible.  Patient was advised to call the endoscopy scheduling department if any questions or concerns arise.       [facility] Scheduling Department

## 2024-09-23 ENCOUNTER — HOSPITAL ENCOUNTER (OUTPATIENT)
Facility: HOSPITAL | Age: 29
Discharge: HOME | End: 2024-09-23
Attending: COLON & RECTAL SURGERY | Admitting: COLON & RECTAL SURGERY
Payer: COMMERCIAL

## 2024-09-23 ENCOUNTER — ANESTHESIA EVENT (OUTPATIENT)
Dept: ENDOSCOPY | Facility: HOSPITAL | Age: 29
End: 2024-09-23
Payer: COMMERCIAL

## 2024-09-23 ENCOUNTER — ANESTHESIA (OUTPATIENT)
Dept: ENDOSCOPY | Facility: HOSPITAL | Age: 29
End: 2024-09-23
Payer: COMMERCIAL

## 2024-09-23 VITALS
SYSTOLIC BLOOD PRESSURE: 123 MMHG | OXYGEN SATURATION: 100 % | TEMPERATURE: 98 F | HEART RATE: 79 BPM | DIASTOLIC BLOOD PRESSURE: 70 MMHG | BODY MASS INDEX: 26.41 KG/M2 | RESPIRATION RATE: 18 BRPM | WEIGHT: 195 LBS | HEIGHT: 72 IN

## 2024-09-23 DIAGNOSIS — K62.5 RECTAL BLEED: ICD-10-CM

## 2024-09-23 PROCEDURE — 37000009 HC ANESTHESIA EA ADD 15 MINS: Performed by: COLON & RECTAL SURGERY

## 2024-09-23 PROCEDURE — E9220 PRA ENDO ANESTHESIA: HCPCS | Mod: ,,, | Performed by: NURSE ANESTHETIST, CERTIFIED REGISTERED

## 2024-09-23 PROCEDURE — 63600175 PHARM REV CODE 636 W HCPCS: Performed by: NURSE ANESTHETIST, CERTIFIED REGISTERED

## 2024-09-23 PROCEDURE — 45378 DIAGNOSTIC COLONOSCOPY: CPT | Performed by: COLON & RECTAL SURGERY

## 2024-09-23 PROCEDURE — 45378 DIAGNOSTIC COLONOSCOPY: CPT | Mod: ,,, | Performed by: COLON & RECTAL SURGERY

## 2024-09-23 PROCEDURE — 37000008 HC ANESTHESIA 1ST 15 MINUTES: Performed by: COLON & RECTAL SURGERY

## 2024-09-23 PROCEDURE — 25000003 PHARM REV CODE 250: Performed by: NURSE ANESTHETIST, CERTIFIED REGISTERED

## 2024-09-23 RX ORDER — PROPOFOL 10 MG/ML
VIAL (ML) INTRAVENOUS
Status: DISCONTINUED | OUTPATIENT
Start: 2024-09-23 | End: 2024-09-23

## 2024-09-23 RX ORDER — LIDOCAINE HYDROCHLORIDE 20 MG/ML
INJECTION INTRAVENOUS
Status: DISCONTINUED | OUTPATIENT
Start: 2024-09-23 | End: 2024-09-23

## 2024-09-23 RX ORDER — PHENYLEPHRINE HYDROCHLORIDE 10 MG/ML
INJECTION INTRAVENOUS
Status: DISCONTINUED | OUTPATIENT
Start: 2024-09-23 | End: 2024-09-23

## 2024-09-23 RX ORDER — SODIUM CHLORIDE 9 MG/ML
INJECTION, SOLUTION INTRAVENOUS ONCE
Status: COMPLETED | OUTPATIENT
Start: 2024-09-23 | End: 2024-09-23

## 2024-09-23 RX ORDER — ONDANSETRON HYDROCHLORIDE 2 MG/ML
INJECTION, SOLUTION INTRAVENOUS
Status: DISCONTINUED | OUTPATIENT
Start: 2024-09-23 | End: 2024-09-23

## 2024-09-23 RX ORDER — MIDAZOLAM HYDROCHLORIDE 1 MG/ML
INJECTION INTRAMUSCULAR; INTRAVENOUS
Status: DISCONTINUED | OUTPATIENT
Start: 2024-09-23 | End: 2024-09-23

## 2024-09-23 RX ORDER — SODIUM CHLORIDE 9 MG/ML
INJECTION, SOLUTION INTRAVENOUS CONTINUOUS
Status: DISCONTINUED | OUTPATIENT
Start: 2024-09-23 | End: 2024-09-23 | Stop reason: HOSPADM

## 2024-09-23 RX ADMIN — Medication 50 MG: at 07:09

## 2024-09-23 RX ADMIN — Medication 100 MG: at 07:09

## 2024-09-23 RX ADMIN — ONDANSETRON 4 MG: 2 INJECTION INTRAMUSCULAR; INTRAVENOUS at 07:09

## 2024-09-23 RX ADMIN — SODIUM CHLORIDE: 0.9 INJECTION, SOLUTION INTRAVENOUS at 07:09

## 2024-09-23 RX ADMIN — PHENYLEPHRINE HYDROCHLORIDE 100 MCG: 10 INJECTION INTRAVENOUS at 08:09

## 2024-09-23 RX ADMIN — SODIUM CHLORIDE: 9 INJECTION, SOLUTION INTRAVENOUS at 08:09

## 2024-09-23 RX ADMIN — LIDOCAINE HYDROCHLORIDE 50 MG: 20 INJECTION INTRAVENOUS at 07:09

## 2024-09-23 RX ADMIN — MIDAZOLAM HYDROCHLORIDE 2 MG: 2 INJECTION, SOLUTION INTRAMUSCULAR; INTRAVENOUS at 07:09

## 2024-09-23 NOTE — H&P
COLONOSCOPY HISTORY AND PHYSICAL EXAM    Procedure : Colonoscopy      INDICATIONS: rectal bleeding    Family Hx of CRC: none    Last Colonoscopy:  never       Past Medical History:   Diagnosis Date    Sickle cell anemia     Sickle cell disease      Sedation Problems: NO  No family history on file.  Fam Hx of Sedation Problems: NO  Social History     Socioeconomic History    Marital status: Single   Tobacco Use    Smoking status: Never    Smokeless tobacco: Never   Substance and Sexual Activity    Alcohol use: Yes     Comment: Social drinker on weekends    Drug use: No    Sexual activity: Not Currently     Social Determinants of Health     Financial Resource Strain: Medium Risk (1/17/2024)    Overall Financial Resource Strain (CARDIA)     Difficulty of Paying Living Expenses: Somewhat hard   Food Insecurity: Food Insecurity Present (1/17/2024)    Hunger Vital Sign     Worried About Running Out of Food in the Last Year: Sometimes true     Ran Out of Food in the Last Year: Never true   Transportation Needs: Unmet Transportation Needs (1/17/2024)    PRAPARE - Transportation     Lack of Transportation (Medical): Yes     Lack of Transportation (Non-Medical): No   Physical Activity: Inactive (1/17/2024)    Exercise Vital Sign     Days of Exercise per Week: 0 days     Minutes of Exercise per Session: 0 min   Stress: No Stress Concern Present (1/17/2024)    [employer] - Occupational Stress Questionnaire     Feeling of Stress : Only a little   Housing Stability: Unknown (1/17/2024)    Housing Stability Vital Sign     Unable to Pay for Housing in the Last Year: Patient declined     Number of Places Lived in the Last Year: 1     Unstable Housing in the Last Year: No       Review of Systems - Negative except   Respiratory ROS: no dyspnea  Cardiovascular ROS: no exertional chest pain  Gastrointestinal ROS: NO abdominal discomfort,  NO rectal bleeding  Musculoskeletal ROS: no muscular  pain  Neurological ROS: no recent stroke    Physical Exam:  There were no vitals taken for this visit.  General: no distress  Head: normocephalic  Mallampati Score   Neck: supple, symmetrical, trachea midline  Lungs:  clear to auscultation bilaterally and normal respiratory effort  Heart: regular rate and rhythm  Abdomen: soft, non-tender non-distented; bowel sounds normal; no masses,  no organomegaly  Extremities: no cyanosis or edema, or clubbing    ASA:  II    PLAN  COLONOSCOPY.    SedationPlan :MAC    The details of the procedure, the possible need for biopsy or polypectomy and the potential risks including bleeding, perforation, missed polyps were discussed in detail.

## 2024-09-23 NOTE — TRANSFER OF CARE
Anesthesia Transfer of Care Note    Patient: Parrish Davis    Procedure(s) Performed: Procedure(s) (LRB):  COLONOSCOPY (N/A)    Patient location: GI    Anesthesia Type: general    Transport from OR: Transported from OR on room air with adequate spontaneous ventilation    Post pain: adequate analgesia    Post assessment: no apparent anesthetic complications    Post vital signs: stable    Level of consciousness: awake    Nausea/Vomiting: no nausea/vomiting    Complications: none    Transfer of care protocol was followed      Last vitals: Visit Vitals  BP 85/46    Pulse 90   Temp 36.7 °C (98.1 °F)   Resp 16   Ht 6' (1.829 m)   Wt 88.5 kg (195 lb)   SpO2 98%   BMI 26.45 kg/m²

## 2024-09-23 NOTE — ANESTHESIA PREPROCEDURE EVALUATION
09/23/2024  Parrish Davis is a 29 y.o., male.  Past Medical History:   Diagnosis Date    Sickle cell anemia     Sickle cell disease      Past Surgical History:   Procedure Laterality Date    chemoport      removed    CHOLECYSTECTOMY  2002    UMBILICAL HERNIA REPAIR  1995         Pre-op Assessment    I have reviewed the Patient Summary Reports.     I have reviewed the Nursing Notes. I have reviewed the NPO Status.   I have reviewed the Medications.     Review of Systems  Anesthesia Hx:  No problems with previous Anesthesia             Denies Family Hx of Anesthesia complications.    Denies Personal Hx of Anesthesia complications.                    Social:  Social Alcohol Use       Hematology/Oncology:    Oncology Normal    -- Anemia:               Hematology Comments: Sickle cell anemia                    EENT/Dental:  EENT/Dental Normal           Cardiovascular:  Cardiovascular Normal                                            Pulmonary:  Pulmonary Normal                       Renal/:  Renal/ Normal                 Hepatic/GI:      Liver Disease,            Musculoskeletal:  Musculoskeletal Normal                Neurological:  Neurology Normal                                      Endocrine:  Endocrine Normal            Dermatological:  Skin Normal    Psych:  Psychiatric History                  Physical Exam  General: Well nourished    Airway:  Mallampati: II   Mouth Opening: Normal  TM Distance: Normal  Tongue: Normal  Neck ROM: Normal ROM    Dental:  Intact        Anesthesia Plan  Type of Anesthesia, risks & benefits discussed:    Anesthesia Type: Gen Natural Airway  Intra-op Monitoring Plan: Standard ASA Monitors  Post Op Pain Control Plan: multimodal analgesia and IV/PO Opioids PRN  Induction:  IV  ASA Score: 2  Day of Surgery Review of History & Physical: H&P Update referred to the  surgeon/provider.    Ready For Surgery From Anesthesia Perspective.     .

## 2024-09-23 NOTE — PROVATION PATIENT INSTRUCTIONS
Discharge Summary/Instructions after an Endoscopic Procedure  Patient Name: Parrish Davis  Patient MRN: [id number]  Patient YOB: 1995 Monday, September 23, 2024  Erika Elias MD  Dear patient,  As a result of recent federal legislation (The Federal Cures Act), you may   receive lab or pathology results from your procedure in your [facility]   account before your physician is able to contact you. Your physician or   their representative will relay the results to you with their   recommendations at their soonest availability.  Thank you,  RESTRICTIONS:  During your procedure today, you received medications for sedation.  These   medications may affect your judgment, balance and coordination.  Therefore,   for 24 hours, you have the following restrictions:   - DO NOT drive a car, operate machinery, make legal/financial decisions,   sign important papers or drink alcohol.    ACTIVITY:  Today: no heavy lifting, straining or running due to procedural   sedation/anesthesia.  The following day: return to full activity including work.  DIET:  Eat and drink normally unless instructed otherwise.     TREATMENT FOR COMMON SIDE EFFECTS:  - Mild abdominal pain, nausea, belching, bloating or excessive gas:  rest,   eat lightly and use a heating pad.  - Sore Throat: treat with throat lozenges and/or gargle with warm salt   water.  - Because air was used during the procedure, expelling large amounts of air   from your rectum or belching is normal.  - If a bowel prep was taken, you may not have a bowel movement for 1-3 days.    This is normal.  SYMPTOMS TO WATCH FOR AND REPORT TO YOUR PHYSICIAN:  1. Abdominal pain or bloating, other than gas cramps.  2. Chest pain.  3. Back pain.  4. Signs of infection such as: chills or fever occurring within 24 hours   after the procedure.  5. Rectal bleeding, which would show as bright red, maroon, or black stools.   (A tablespoon of blood from the rectum is not serious,  especially if   hemorrhoids are present.)  6. Vomiting.  7. Weakness or dizziness.  GO DIRECTLY TO THE NEAREST EMERGENCY ROOM IF YOU HAVE ANY OF THE FOLLOWING:      Difficulty breathing              Chills and/or fever over 101 F   Persistent vomiting and/or vomiting blood   Severe abdominal pain   Severe chest pain   Black, tarry stools   Bleeding- more than one tablespoon   Any other symptom or condition that you feel may need urgent attention  Your doctor recommends these additional instructions:  If any biopsies were taken, your doctors clinic will contact you in 1 to 2   weeks with any results.  - Discharge patient to home.   - Resume previous diet.   - Continue present medications.   - Repeat colonoscopy Age 44 for screening purposes.   - Written discharge instructions were provided to the patient.   - The signs and symptoms of potential delayed complications were discussed   with the patient.   - Patient has a contact number available for emergencies.   - Return to normal activities tomorrow.  For questions, problems or results please call your physician - Erika Elias MD at Work:  [phone].  [facility], EMERGENCY ROOM PHONE NUMBER: [phone]  IF A COMPLICATION OR EMERGENCY SITUATION ARISES AND YOU ARE UNABLE TO REACH   YOUR PHYSICIAN - GO DIRECTLY TO THE EMERGENCY ROOM.  Erika Elias MD  9/23/2024 8:13:21 AM  This report has been verified and signed electronically.  Dear patient,  As a result of recent federal legislation (The Federal Cures Act), you may   receive lab or pathology results from your procedure in your [facility]   account before your physician is able to contact you. Your physician or   their representative will relay the results to you with their   recommendations at their soonest availability.  Thank you,  PROVATION

## 2024-09-23 NOTE — ANESTHESIA POSTPROCEDURE EVALUATION
Anesthesia Post Evaluation    Patient: Parrish Davis    Procedure(s) Performed: Procedure(s) (LRB):  COLONOSCOPY (N/A)    Final Anesthesia Type: general      Patient location during evaluation: PACU  Patient participation: Yes- Able to Participate  Level of consciousness: awake and alert and oriented  Post-procedure vital signs: reviewed and stable  Pain management: adequate  Airway patency: patent    PONV status at discharge: No PONV  Anesthetic complications: no      Cardiovascular status: blood pressure returned to baseline  Respiratory status: unassisted, room air and spontaneous ventilation  Hydration status: euvolemic  Follow-up not needed.              Vitals Value Taken Time   BP 96/51 09/23/24 0841   Temp 36.8 °C (98.2 °F) 09/23/24 0820   Pulse 70 09/23/24 0841   Resp 18 09/23/24 0841   SpO2 99 % 09/23/24 0841         No case tracking events are documented in the log.      Pain/Kianna Score: Kianna Score: 9 (9/23/2024  8:44 AM)

## 2024-09-23 NOTE — PLAN OF CARE
Patient ready for discharge criteria met. Discharge instructions reviewed with patient and family. Understanding verbalized.

## 2024-11-13 ENCOUNTER — OFFICE VISIT (OUTPATIENT)
Dept: HEPATOLOGY | Facility: CLINIC | Age: 29
End: 2024-11-13
Payer: COMMERCIAL

## 2024-11-13 VITALS
HEIGHT: 72 IN | OXYGEN SATURATION: 98 % | BODY MASS INDEX: 26.45 KG/M2 | HEART RATE: 68 BPM | WEIGHT: 195.31 LBS | DIASTOLIC BLOOD PRESSURE: 70 MMHG | SYSTOLIC BLOOD PRESSURE: 120 MMHG

## 2024-11-13 DIAGNOSIS — E80.6 HYPERBILIRUBINEMIA: ICD-10-CM

## 2024-11-13 DIAGNOSIS — R16.0 HEPATOMEGALY: ICD-10-CM

## 2024-11-13 DIAGNOSIS — R74.01 ELEVATED TRANSAMINASE LEVEL: Primary | ICD-10-CM

## 2024-11-13 PROCEDURE — 99214 OFFICE O/P EST MOD 30 MIN: CPT | Mod: S$GLB,,, | Performed by: INTERNAL MEDICINE

## 2024-11-13 PROCEDURE — 1160F RVW MEDS BY RX/DR IN RCRD: CPT | Mod: CPTII,S$GLB,, | Performed by: INTERNAL MEDICINE

## 2024-11-13 PROCEDURE — 99999 PR PBB SHADOW E&M-EST. PATIENT-LVL IV: CPT | Mod: PBBFAC,,, | Performed by: INTERNAL MEDICINE

## 2024-11-13 PROCEDURE — 3074F SYST BP LT 130 MM HG: CPT | Mod: CPTII,S$GLB,, | Performed by: INTERNAL MEDICINE

## 2024-11-13 PROCEDURE — 3008F BODY MASS INDEX DOCD: CPT | Mod: CPTII,S$GLB,, | Performed by: INTERNAL MEDICINE

## 2024-11-13 PROCEDURE — 3078F DIAST BP <80 MM HG: CPT | Mod: CPTII,S$GLB,, | Performed by: INTERNAL MEDICINE

## 2024-11-13 PROCEDURE — 1159F MED LIST DOCD IN RCRD: CPT | Mod: CPTII,S$GLB,, | Performed by: INTERNAL MEDICINE

## 2024-11-13 NOTE — PROGRESS NOTES
HEPATOLOGY FOLLOW UP    Referring Physician: Chandan Chambers DO  Current Corresponding Physician: Chandan Chambers DO, Jovanny Douglas MD, Shaniqua Alamo ND    Parrish Davis is here for follow up of Elevated Hepatic Enzymes      HPI  Parrish Davis is a 29 y.o. male with a hx of sickle cell anemia (hemoglobin SS disease) who was admitted with a sickle cell crisis (8/24/23-8/27/23) and was noted to have elevated liver enzymes (elevated since 6/1/2016), hyperbilirubinemia and hepatomegaly. I saw him in consultation 8/31/23:     During crisis 08/23 and 10/23 and 6/2016  Labs 8/27/23: ALT 78, , ALKP 345, Tbil 6.8  8/25/23: ALT 48, AST 66, ALKP 281, Tbil 8.7, direct bilirubin 5.7  HCV Ab-, HBsAg-, HBcIgM-, HAV IgG-     10/27/22: ALT 37, AST 56, ALKP 170     6/1/16: ALT 59, , Tbil 8.4, direct 1.6     Inbetween crises  3/31/23: ALT 41, AST 62, ALKP 134, Tbil 7.8  6/30/23: ALT 44, AST 54, [id number], Tbil 2.5  12/7/22: ALT 20, AST 37, ALKP 85, Tbil 8.7  5/24/22: ALT 20, asT 34, ALKP 76, Tbil 5.2        Abdo US 8/26/23: Liver: Enlarged, measuring 18.9 cm. Homogeneous echotexture. No focal hepatic lesions.Liver: Enlarged, measuring 18.9 cm. Homogeneous echotexture. No focal hepatic lesions.  CT abdo pelvis w contrast 8/24/23: In the abdomen, liver is enlarged 19.2 cm.  No focal hepatic mass.  Gallbladder is been removed.      The patient denied any symptoms of decompensated cirrhosis, including no ascites or edema, cognitive problems that would suggest hepatic encephalopathy, or GI bleeding from varices.      Sickle cell hx:  --dx as infant  --many crises as a child-even had a port placed around the age of 7 for blood transfusions  --more recently fewer crises: 2016 then none until 10/22 and 8/23.  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)  --prozac x at least one year  --hx gallstones-s/p cholecystectomy  --during cirsis takes tylenol ES per bottle  directions and NSAIDS/narcotics    Interval History  Since Parrish Davis's last few visits:  --feeling well, no abdo pain, N/V or pruritus  --~1 sickle cell crisis per year  --Voxelotor-no longer taking- company stopped making it    Labs 9/14/23: Tbil 3.0, ALT 33, aST 43, ALKP 135 (all markedly improved)  Serologic w/u: REYNALDO-, ASMA+ (titer only 1:40-essentially negative); AMA-, HCV ab-, HBsAg-, HbcAb-, HbsAb-, HAV IgG-, alpha 1 antitrypsin level normal,   Labs 7/17/24: ALT 47, AST 67, ALKP 128, Tbil 2.0    --rechecked iron studies:ferritin 129, iron sat 10%  MRE 9/29/23- no iron overload and no signifiicant fibrosis  MRCP 9/29/23- no choledocholithiasis/dilated bile ducts      Outpatient Encounter Medications as of 11/13/2024   Medication Sig Dispense Refill    acetaminophen (TYLENOL) 500 MG tablet Take 500 mg by mouth every 8 (eight) hours as needed for Pain.      calcium carbonate (TUMS) 200 mg calcium (500 mg) chewable tablet Take 2-3 tablets by mouth 2 (two) times daily as needed for Heartburn.      FLUoxetine 10 MG capsule Take 1 capsule (10 mg total) by mouth once daily. 90 capsule 3    folic acid (FOLVITE) 1 MG tablet Take 1 tablet (1,000 mcg total) by mouth once daily. 90 tablet 3    hydroxyurea (HYDREA) 500 mg Cap TAKE 1 CAPSULE (500 MG TOTAL) BY MOUTH ONCE DAILY. 30 capsule 5    ibuprofen (ADVIL,MOTRIN) 100 MG tablet Take 100 mg by mouth every 6 (six) hours as needed.      ondansetron (ZOFRAN-ODT) 8 MG TbDL Take 1 tablet (8 mg total) by mouth every 8 (eight) hours as needed. 30 tablet 1    phenyleph-min oil-petrolatum 0.25-14-74.9 % Oint Place 1 applicator rectally 4 (four) times daily as needed (Hemorrhoid discomfort). 56 g 1    [DISCONTINUED] diphth,pertus,acell,,tetanus (BOOSTRIX TDAP) 2.5-8-5 Lf-mcg-Lf/0.5mL Syrg injection Inject 0.5mLs into the muscle. 0.5 mL 0    [DISCONTINUED] voxelotor (OXBRYTA) 500 mg Tab Take 3 tablets (1,500 mg total) by mouth Daily. 90 tablet 3     No facility-administered  encounter medications on file as of 11/13/2024.     Review of patient's allergies indicates:  No Known Allergies  Past Medical History:   Diagnosis Date    Sickle cell anemia     Sickle cell disease        Review of Systems   Constitutional: Negative.    HENT: Negative.     Eyes: Negative.    Respiratory: Negative.     Cardiovascular: Negative.    Gastrointestinal: Negative.    Genitourinary: Negative.    Musculoskeletal: Negative.    Skin: Negative.    Neurological: Negative.    Psychiatric/Behavioral: Negative.       Vitals:    11/13/24 0811   BP: 120/70   Pulse: 68       Physical Exam  Vitals reviewed.   Constitutional:       Appearance: He is well-developed.   HENT:      Head: Normocephalic and atraumatic.   Eyes:      General: No scleral icterus.     Conjunctiva/sclera: Conjunctivae normal.      Pupils: Pupils are equal, round, and reactive to light.   Neck:      Thyroid: No thyromegaly.   Cardiovascular:      Rate and Rhythm: Normal rate and regular rhythm.      Heart sounds: Normal heart sounds.   Pulmonary:      Effort: Pulmonary effort is normal.      Breath sounds: Normal breath sounds. No rales.   Abdominal:      General: Bowel sounds are normal. There is no distension.      Palpations: Abdomen is soft. There is no mass.      Tenderness: There is no abdominal tenderness.   Musculoskeletal:         General: Normal range of motion.      Cervical back: Normal range of motion and neck supple.   Skin:     General: Skin is warm and dry.      Findings: No rash.   Neurological:      Mental Status: He is alert and oriented to person, place, and time.         MELD 3.0: 15 at 1/8/2024  7:29 AM  MELD-Na: 14 at 1/8/2024  7:29 AM  Calculated from:  Serum Creatinine: 0.7 mg/dL (Using min of 1 mg/dL) at 1/8/2024  7:29 AM  Serum Sodium: 139 mmol/L (Using max of 137 mmol/L) at 1/8/2024  7:29 AM  Total Bilirubin: 6.2 mg/dL at 1/8/2024  7:29 AM  Serum Albumin: 3.9 g/dL (Using max of 3.5 g/dL) at 1/8/2024  7:29  AM  INR(ratio): 1.1 at 1/8/2024  7:29 AM  Age at listing (hypothetical): 28 years  Sex: Male at 1/8/2024  7:29 AM      Lab Results   Component Value Date    GLU 82 07/17/2024    BUN 7 07/17/2024    CREATININE 0.8 07/17/2024    CALCIUM 9.3 07/17/2024     07/17/2024    K 3.9 07/17/2024     07/17/2024    PROT 8.6 (H) 07/17/2024    CO2 23 07/17/2024    ANIONGAP 8 07/17/2024    WBC 8.82 07/17/2024    RBC 4.00 (L) 07/17/2024    HGB 10.2 (L) 07/17/2024    HCT 29.5 (L) 07/17/2024    MCV 74 (L) 07/17/2024    MCH 25.5 (L) 07/17/2024    MCHC 34.6 07/17/2024     Lab Results   Component Value Date    RDW 19.0 (H) 07/17/2024     07/17/2024    MPV 9.8 07/17/2024    GRAN 6.1 07/17/2024    GRAN 69.4 07/17/2024    LYMPH 1.8 07/17/2024    LYMPH 20.1 07/17/2024    MONO 0.8 07/17/2024    MONO 8.7 07/17/2024    EOSINOPHIL 0.6 07/17/2024    BASOPHIL 0.7 07/17/2024    EOS 0.1 07/17/2024    BASO 0.06 07/17/2024    APTT 27.7 10/20/2022    GROUPTRH O POS 10/19/2023    BNP 15 10/19/2023    CHOL 109 (L) 05/13/2024    TRIG 100 05/13/2024    HDL 41 05/13/2024    CHOLHDL 37.6 05/13/2024    TOTALCHOLEST 2.7 05/13/2024    ALBUMIN 4.1 07/17/2024    BILIDIR 0.9 (H) 01/08/2024    AST 67 (H) 07/17/2024    ALT 47 (H) 07/17/2024    ALKPHOS 128 07/17/2024    MG 1.8 10/22/2023    LABPROT 11.4 01/08/2024    INR 1.1 01/08/2024       Assessment and Plan:  Parrish Davis is a 29 y.o. male with Elevated Hepatic Enzymes  This occurred at the time of a crisis. He had an elevated Tbil that was mostly direct. Both the enyzmes and Bruno improved and his labs returned to baseline. The liver tests/direct bilirubin were likely elevated due to sickle cell hepatopathy which improved. Now elevations in Tbil are mostly indirect hyperbilirubinemia.    W/u also ruled out other causes of liver disease. Iron overload was ruled out and choledocholithiasis ruled out. MRE suggested NO significant fibrosis. He does not have viral hepatitis A or B. Liver biopsy  was deferred.    Will continue to monitor liver tests-hematologist ordering (will do labs next week) and will see him back in 12 months. Will consider doing a repeat MRE in ~2 years to monitor for iron overload. He should consider getting vaccinated for hepatitis A and B. His pcp can do this.       A total of 35 minutes was spent reviewing the patient's chart, examining the patient, reviewing labs and imaging and coordinating care with the patient's care team.

## 2024-11-19 DIAGNOSIS — D64.9 SYMPTOMATIC ANEMIA: Primary | ICD-10-CM

## 2024-11-19 DIAGNOSIS — D57.00 SICKLE-CELL DISEASE WITH VASO-OCCLUSIVE PAIN: ICD-10-CM

## 2024-11-21 ENCOUNTER — LAB VISIT (OUTPATIENT)
Dept: LAB | Facility: HOSPITAL | Age: 29
End: 2024-11-21
Attending: INTERNAL MEDICINE
Payer: COMMERCIAL

## 2024-11-21 ENCOUNTER — OFFICE VISIT (OUTPATIENT)
Dept: HEMATOLOGY/ONCOLOGY | Facility: CLINIC | Age: 29
End: 2024-11-21
Payer: COMMERCIAL

## 2024-11-21 VITALS
SYSTOLIC BLOOD PRESSURE: 136 MMHG | TEMPERATURE: 99 F | WEIGHT: 190.69 LBS | HEART RATE: 87 BPM | BODY MASS INDEX: 25.83 KG/M2 | DIASTOLIC BLOOD PRESSURE: 71 MMHG | OXYGEN SATURATION: 95 % | HEIGHT: 72 IN

## 2024-11-21 DIAGNOSIS — R17 SCLERAL ICTERUS: ICD-10-CM

## 2024-11-21 DIAGNOSIS — D64.9 SYMPTOMATIC ANEMIA: ICD-10-CM

## 2024-11-21 DIAGNOSIS — D57.1 SICKLE CELL DISEASE WITHOUT CRISIS: Primary | ICD-10-CM

## 2024-11-21 DIAGNOSIS — M25.561 RIGHT KNEE PAIN, UNSPECIFIED CHRONICITY: ICD-10-CM

## 2024-11-21 DIAGNOSIS — D57.00 SICKLE-CELL DISEASE WITH VASO-OCCLUSIVE PAIN: ICD-10-CM

## 2024-11-21 DIAGNOSIS — I26.99 OTHER ACUTE PULMONARY EMBOLISM WITHOUT ACUTE COR PULMONALE: ICD-10-CM

## 2024-11-21 DIAGNOSIS — Z91.89 AT RISK FOR OSTEOPOROSIS: ICD-10-CM

## 2024-11-21 LAB
ALBUMIN SERPL BCP-MCNC: 4.1 G/DL (ref 3.5–5.2)
ALP SERPL-CCNC: 80 U/L (ref 40–150)
ALT SERPL W/O P-5'-P-CCNC: 16 U/L (ref 10–44)
ANION GAP SERPL CALC-SCNC: 9 MMOL/L (ref 8–16)
AST SERPL-CCNC: 32 U/L (ref 10–40)
BASOPHILS # BLD AUTO: 0.08 K/UL (ref 0–0.2)
BASOPHILS NFR BLD: 1 % (ref 0–1.9)
BILIRUB SERPL-MCNC: 5.6 MG/DL (ref 0.1–1)
BILIRUB UR QL STRIP: NEGATIVE
BUN SERPL-MCNC: 8 MG/DL (ref 6–20)
CALCIUM SERPL-MCNC: 9 MG/DL (ref 8.7–10.5)
CHLORIDE SERPL-SCNC: 108 MMOL/L (ref 95–110)
CLARITY UR REFRACT.AUTO: CLEAR
CO2 SERPL-SCNC: 21 MMOL/L (ref 23–29)
COLOR UR AUTO: YELLOW
CREAT SERPL-MCNC: 0.7 MG/DL (ref 0.5–1.4)
DIFFERENTIAL METHOD BLD: ABNORMAL
EOSINOPHIL # BLD AUTO: 0.2 K/UL (ref 0–0.5)
EOSINOPHIL NFR BLD: 3 % (ref 0–8)
ERYTHROCYTE [DISTWIDTH] IN BLOOD BY AUTOMATED COUNT: 21.8 % (ref 11.5–14.5)
EST. GFR  (NO RACE VARIABLE): >60 ML/MIN/1.73 M^2
GLUCOSE SERPL-MCNC: 96 MG/DL (ref 70–110)
GLUCOSE UR QL STRIP: NEGATIVE
HCT VFR BLD AUTO: 21.8 % (ref 40–54)
HGB BLD-MCNC: 7.6 G/DL (ref 14–18)
HGB UR QL STRIP: NEGATIVE
IMM GRANULOCYTES # BLD AUTO: 0.03 K/UL (ref 0–0.04)
IMM GRANULOCYTES NFR BLD AUTO: 0.4 % (ref 0–0.5)
KETONES UR QL STRIP: NEGATIVE
LDH SERPL L TO P-CCNC: 370 U/L (ref 110–260)
LEUKOCYTE ESTERASE UR QL STRIP: NEGATIVE
LYMPHOCYTES # BLD AUTO: 2.7 K/UL (ref 1–4.8)
LYMPHOCYTES NFR BLD: 34.9 % (ref 18–48)
MCH RBC QN AUTO: 29.2 PG (ref 27–31)
MCHC RBC AUTO-ENTMCNC: 34.9 G/DL (ref 32–36)
MCV RBC AUTO: 84 FL (ref 82–98)
MONOCYTES # BLD AUTO: 1.2 K/UL (ref 0.3–1)
MONOCYTES NFR BLD: 15.8 % (ref 4–15)
NEUTROPHILS # BLD AUTO: 3.5 K/UL (ref 1.8–7.7)
NEUTROPHILS NFR BLD: 44.9 % (ref 38–73)
NITRITE UR QL STRIP: NEGATIVE
NRBC BLD-RTO: 1 /100 WBC
PH UR STRIP: 6 [PH] (ref 5–8)
PLATELET # BLD AUTO: 365 K/UL (ref 150–450)
PMV BLD AUTO: 10.2 FL (ref 9.2–12.9)
POTASSIUM SERPL-SCNC: 4.1 MMOL/L (ref 3.5–5.1)
PROT SERPL-MCNC: 8 G/DL (ref 6–8.4)
PROT UR QL STRIP: NEGATIVE
RBC # BLD AUTO: 2.6 M/UL (ref 4.6–6.2)
RETICS/RBC NFR AUTO: 14.2 % (ref 0.4–2)
SODIUM SERPL-SCNC: 138 MMOL/L (ref 136–145)
SP GR UR STRIP: 1.01 (ref 1–1.03)
URN SPEC COLLECT METH UR: NORMAL
WBC # BLD AUTO: 7.74 K/UL (ref 3.9–12.7)

## 2024-11-21 PROCEDURE — 81003 URINALYSIS AUTO W/O SCOPE: CPT | Performed by: STUDENT IN AN ORGANIZED HEALTH CARE EDUCATION/TRAINING PROGRAM

## 2024-11-21 PROCEDURE — 3078F DIAST BP <80 MM HG: CPT | Mod: CPTII,S$GLB,, | Performed by: INTERNAL MEDICINE

## 2024-11-21 PROCEDURE — 85045 AUTOMATED RETICULOCYTE COUNT: CPT | Performed by: INTERNAL MEDICINE

## 2024-11-21 PROCEDURE — 36415 COLL VENOUS BLD VENIPUNCTURE: CPT | Performed by: INTERNAL MEDICINE

## 2024-11-21 PROCEDURE — 3075F SYST BP GE 130 - 139MM HG: CPT | Mod: CPTII,S$GLB,, | Performed by: INTERNAL MEDICINE

## 2024-11-21 PROCEDURE — 3008F BODY MASS INDEX DOCD: CPT | Mod: CPTII,S$GLB,, | Performed by: INTERNAL MEDICINE

## 2024-11-21 PROCEDURE — 1159F MED LIST DOCD IN RCRD: CPT | Mod: CPTII,S$GLB,, | Performed by: INTERNAL MEDICINE

## 2024-11-21 PROCEDURE — 99999 PR PBB SHADOW E&M-EST. PATIENT-LVL IV: CPT | Mod: PBBFAC,,, | Performed by: INTERNAL MEDICINE

## 2024-11-21 PROCEDURE — 83615 LACTATE (LD) (LDH) ENZYME: CPT | Performed by: INTERNAL MEDICINE

## 2024-11-21 PROCEDURE — 99215 OFFICE O/P EST HI 40 MIN: CPT | Mod: S$GLB,,, | Performed by: INTERNAL MEDICINE

## 2024-11-21 PROCEDURE — 85025 COMPLETE CBC W/AUTO DIFF WBC: CPT | Performed by: INTERNAL MEDICINE

## 2024-11-21 PROCEDURE — 80053 COMPREHEN METABOLIC PANEL: CPT | Performed by: INTERNAL MEDICINE

## 2024-11-21 NOTE — PROGRESS NOTES
"PATIENT: Parrish Davis  MRN: [id number]  DATE: 11/21/2024    Chief Complaint: No chief complaint on file.    HPI: Parrish Davis is a 29 y.o. male is here transferring his care from Dr. Hi. He is here for hematology follow up for sickle cell anemia. He was diagnosed with SCD as an infant and was followed by pediatrics until age 21. He had priapism several years ago.       He started oxbryta in May 2023. He is tolerating it well.     CT chest abd pelvis done in Oct 2023 showing new PE, started on full dose lovenox and transitioned to eliquis.     Subjective:   Interval History: He is doing fairly well. Pain well controlled without requiring any medications. His friends have noted him having "tilt" while walking. He denies any hip pain but is having R knee and ankle pain. He thought this was secondary to his lower back pain. Last hospitalization was in 10/2023. He is compliant with his meds except for Eliquis.      Sickle Cell End Organ Damage:   Current Disease Modifying Therapy: Folic acid 1 mg and Hydroxyurea 500 mg daily     Prior Disease Modifying Therapy: Voxelotor     Home Pain Regimen: Tylenol as needed     Hemoglobin Baseline: 8-9     IV Access: Not required     Health Maintenance  ECHO: 10/14/2023 PAP 30 mmHg. Due every 2 years   UA: today (11/21/2024)   Eye Exam: due. He is aware to follow up with his optometrist. Last exam 2022.    Dexa: Last in 03/2022 with Lumbar spine T-score -3.7. On Calcium and VitD   Immunizations    Age Appropriate Cancer Screenings    Hospital Admissions this Year  - 10/2023 for 11 days     Past Medical History:   Past Medical History:   Diagnosis Date    Sickle cell anemia     Sickle cell disease        Past Surgical HIstory:   Past Surgical History:   Procedure Laterality Date    chemoport      removed    CHOLECYSTECTOMY  2002    COLONOSCOPY N/A 9/23/2024    Procedure: COLONOSCOPY;  Surgeon: Erika Elias MD;  Location: [facility] ([address] [place] [address]);  Service: Endoscopy;  " Laterality: N/A;  suprep/ referral paruche/ prep inst sent to pt via portal  9/18-pre call complete-tb    UMBILICAL HERNIA REPAIR  1995       Family History: No family history on file.    Social History:  reports that he has never smoked. He has never used smokeless tobacco. He reports current alcohol use. He reports that he does not use drugs.    Allergies:  Review of patient's allergies indicates:  No Known Allergies    Medications:  Current Outpatient Medications   Medication Sig Dispense Refill    acetaminophen (TYLENOL) 500 MG tablet Take 500 mg by mouth every 8 (eight) hours as needed for Pain.      calcium carbonate (TUMS) 200 mg calcium (500 mg) chewable tablet Take 2-3 tablets by mouth 2 (two) times daily as needed for Heartburn.      FLUoxetine 10 MG capsule Take 1 capsule (10 mg total) by mouth once daily. 90 capsule 3    folic acid (FOLVITE) 1 MG tablet Take 1 tablet (1,000 mcg total) by mouth once daily. 90 tablet 3    hydroxyurea (HYDREA) 500 mg Cap TAKE 1 CAPSULE (500 MG TOTAL) BY MOUTH ONCE DAILY. 30 capsule 5    ibuprofen (ADVIL,MOTRIN) 100 MG tablet Take 100 mg by mouth every 6 (six) hours as needed.      ondansetron (ZOFRAN-ODT) 8 MG TbDL Take 1 tablet (8 mg total) by mouth every 8 (eight) hours as needed. 30 tablet 1    phenyleph-min oil-petrolatum 0.25-14-74.9 % Oint Place 1 applicator rectally 4 (four) times daily as needed (Hemorrhoid discomfort). 56 g 1    apixaban (ELIQUIS) 5 mg Tab Take 1 tablet (5 mg total) by mouth 2 (two) times daily. 60 tablet 12     No current facility-administered medications for this visit.       Review of Systems   Constitutional:  Negative for activity change, appetite change, chills and fatigue.   HENT: Negative.     Eyes: Negative.    Respiratory: Negative.     Cardiovascular: Negative.    Gastrointestinal: Negative.    Endocrine: Negative.    Genitourinary: Negative.    Musculoskeletal:  Positive for arthralgias, back pain and gait problem.   Skin: Negative.     Allergic/Immunologic: Negative.    Hematological: Negative.    Psychiatric/Behavioral: Negative.         ECOG Performance Status:   ECOG SCORE             Objective:      Vitals:   Vitals:    11/21/24 1006   BP: 136/71   BP Location: Left arm   Patient Position: Sitting   Pulse: 87   Temp: 99.2 °F (37.3 °C)   TempSrc: Oral   SpO2: 95%   Weight: 86.5 kg (190 lb 11.2 oz)   Height: 6' (1.829 m)     BMI: Body mass index is 25.86 kg/m².    Physical Exam    Laboratory Data:  Office Visit on 11/21/2024   Component Date Value Ref Range Status    Specimen UA 11/21/2024 Urine, Clean Catch   Final    Color, UA 11/21/2024 Yellow  Yellow, Straw, Meghana Final    Appearance, UA 11/21/2024 Clear  Clear Final    pH, UA 11/21/2024 6.0  5.0 - 8.0 Final    Specific Gravity, UA 11/21/2024 1.010  1.005 - 1.030 Final    Protein, UA 11/21/2024 Negative  Negative Final    Comment: Recommend a 24 hour urine protein or a urine   protein/creatinine ratio if globulin induced proteinuria is  clinically suspected.      Glucose, UA 11/21/2024 Negative  Negative Final    Ketones, UA 11/21/2024 Negative  Negative Final    Bilirubin (UA) 11/21/2024 Negative  Negative Final    Occult Blood UA 11/21/2024 Negative  Negative Final    Nitrite, UA 11/21/2024 Negative  Negative Final    Leukocytes, UA 11/21/2024 Negative  Negative Final   Lab Visit on 11/21/2024   Component Date Value Ref Range Status    WBC 11/21/2024 7.74  3.90 - 12.70 K/uL Final    RBC 11/21/2024 2.60 (L)  4.60 - 6.20 M/uL Final    Hemoglobin 11/21/2024 7.6 (L)  14.0 - 18.0 g/dL Final    Hematocrit 11/21/2024 21.8 (L)  40.0 - 54.0 % Final    MCV 11/21/2024 84  82 - 98 fL Final    MCH 11/21/2024 29.2  27.0 - 31.0 pg Final    MCHC 11/21/2024 34.9  32.0 - 36.0 g/dL Final    RDW 11/21/2024 21.8 (H)  11.5 - 14.5 % Final    Platelets 11/21/2024 365  150 - 450 K/uL Final    MPV 11/21/2024 10.2  9.2 - 12.9 fL Final    Immature Granulocytes 11/21/2024 0.4  0.0 - 0.5 % Final    Gran # (ANC)  11/21/2024 3.5  1.8 - 7.7 K/uL Final    Immature Grans (Abs) 11/21/2024 0.03  0.00 - 0.04 K/uL Final    Comment: Mild elevation in immature granulocytes is non specific and   can be seen in a variety of conditions including stress response,   acute inflammation, trauma and pregnancy. Correlation with other   laboratory and clinical findings is essential.      Lymph # 11/21/2024 2.7  1.0 - 4.8 K/uL Final    Mono # 11/21/2024 1.2 (H)  0.3 - 1.0 K/uL Final    Eos # 11/21/2024 0.2  0.0 - 0.5 K/uL Final    Baso # 11/21/2024 0.08  0.00 - 0.20 K/uL Final    nRBC 11/21/2024 1 (A)  0 /100 WBC Final    Gran % 11/21/2024 44.9  38.0 - 73.0 % Final    Lymph % 11/21/2024 34.9  18.0 - 48.0 % Final    Mono % 11/21/2024 15.8 (H)  4.0 - 15.0 % Final    Eosinophil % 11/21/2024 3.0  0.0 - 8.0 % Final    Basophil % 11/21/2024 1.0  0.0 - 1.9 % Final    Differential Method 11/21/2024 Automated   Final    Sodium 11/21/2024 138  136 - 145 mmol/L Final    Potassium 11/21/2024 4.1  3.5 - 5.1 mmol/L Final    Chloride 11/21/2024 108  95 - 110 mmol/L Final    CO2 11/21/2024 21 (L)  23 - 29 mmol/L Final    Glucose 11/21/2024 96  70 - 110 mg/dL Final    BUN 11/21/2024 8  6 - 20 mg/dL Final    Creatinine 11/21/2024 0.7  0.5 - 1.4 mg/dL Final    Calcium 11/21/2024 9.0  8.7 - 10.5 mg/dL Final    Total Protein 11/21/2024 8.0  6.0 - 8.4 g/dL Final    Albumin 11/21/2024 4.1  3.5 - 5.2 g/dL Final    Total Bilirubin 11/21/2024 5.6 (H)  0.1 - 1.0 mg/dL Final    Comment: For infants and newborns, interpretation of results should be based  on gestational age, weight and in agreement with clinical  observations.    Premature Infant recommended reference ranges:  Up to 24 hours.............<8.0 mg/dL  Up to 48 hours............<12.0 mg/dL  3-5 days..................<15.0 mg/dL  6-29 days.................<15.0 mg/dL      Alkaline Phosphatase 11/21/2024 80  40 - 150 U/L Final    AST 11/21/2024 32  10 - 40 U/L Final    ALT 11/21/2024 16  10 - 44 U/L Final    eGFR  11/21/2024 >60.0  >60 mL/min/1.73 m^2 Final    Anion Gap 11/21/2024 9  8 - 16 mmol/L Final    LD 11/21/2024 370 (H)  110 - 260 U/L Final    Results are increased in hemolyzed samples.    Retic 11/21/2024 14.2 (H)  0.4 - 2.0 % Final         Imaging: -  Assessment:     1. Sickle cell disease without crisis    2. At risk for osteoporosis    3. Symptomatic anemia    4. Other acute pulmonary embolism without acute cor pulmonale    5. Scleral icterus    6. Right knee pain, unspecified chronicity      - Current Disease Modifying Therapy: Folic acid 1 mg and Hydroxyurea 500 mg daily   - Prior Disease Modifying Therapy: Voxelotor   - Home Pain Regimen: Tylenol as needed   - Hemoglobin Baseline: 8-9   - He is due to annual eye exam and UA. UA was obtained in the clinic which was negative for protein. He will make an appointment with his optometrist for annual eye exam.   - Continue to take Calcium 1200 mg daily with VitD 1000 units daily   - Continue Eliquis; refilled   - R knee pain: XR R hip and knee to rule out osteonecrosis     Health Maintenance  ECHO: 10/14/2023 PAP 30 mmHg. Due every 2 years.    UA: 11/21/2024 negative for protein. Due annually.   Eye Exam: overdue. He is aware to follow up with his optometrist. Last exam 2022.    Dexa: Last in 03/2022 with Lumbar spine T-score -3.7.     Plan:     Problem List Items Addressed This Visit       Sickle cell anemia - Primary    Relevant Orders    Microalbumin/creatinine urine ratio    Urinalysis, Reflex to Urine Culture Urine, Clean Catch (Completed)    X-Ray Knee 1 or 2 View Right    X-Ray Hip 2 or 3 views Right with Pelvis when performed    At risk for osteoporosis    Relevant Orders    X-Ray Knee 1 or 2 View Right    X-Ray Hip 2 or 3 views Right with Pelvis when performed    Symptomatic anemia     Other Visit Diagnoses       Other acute pulmonary embolism without acute cor pulmonale        Relevant Medications    apixaban (ELIQUIS) 5 mg Tab    Scleral icterus         Right knee pain, unspecified chronicity                 Keith Benavides MD   Hematology and Medical Oncology Fellow   [facility]

## 2025-02-18 DIAGNOSIS — D57.1 SICKLE CELL DISEASE WITHOUT CRISIS: Primary | ICD-10-CM

## 2025-04-07 ENCOUNTER — OFFICE VISIT (OUTPATIENT)
Dept: HEMATOLOGY/ONCOLOGY | Facility: CLINIC | Age: 30
End: 2025-04-07
Payer: COMMERCIAL

## 2025-04-07 ENCOUNTER — LAB VISIT (OUTPATIENT)
Dept: LAB | Facility: HOSPITAL | Age: 30
End: 2025-04-07
Payer: COMMERCIAL

## 2025-04-07 VITALS
TEMPERATURE: 99 F | OXYGEN SATURATION: 93 % | HEART RATE: 82 BPM | SYSTOLIC BLOOD PRESSURE: 128 MMHG | DIASTOLIC BLOOD PRESSURE: 60 MMHG | BODY MASS INDEX: 27.19 KG/M2 | WEIGHT: 200.75 LBS | HEIGHT: 72 IN

## 2025-04-07 DIAGNOSIS — D64.9 SYMPTOMATIC ANEMIA: ICD-10-CM

## 2025-04-07 DIAGNOSIS — I82.401 DEEP VEIN THROMBOSIS (DVT) OF RIGHT LOWER EXTREMITY, UNSPECIFIED CHRONICITY, UNSPECIFIED VEIN: Primary | ICD-10-CM

## 2025-04-07 DIAGNOSIS — D57.1 SICKLE CELL DISEASE WITHOUT CRISIS: ICD-10-CM

## 2025-04-07 LAB
ABSOLUTE EOSINOPHIL (OHS): 0.63 K/UL
ABSOLUTE MONOCYTE (OHS): 1.3 K/UL (ref 0.3–1)
ABSOLUTE NEUTROPHIL COUNT (OHS): 4.75 K/UL (ref 1.8–7.7)
ALBUMIN SERPL BCP-MCNC: 3.8 G/DL (ref 3.5–5.2)
ALP SERPL-CCNC: 105 UNIT/L (ref 40–150)
ALT SERPL W/O P-5'-P-CCNC: 19 UNIT/L (ref 10–44)
ANION GAP (OHS): 7 MMOL/L (ref 8–16)
ANISOCYTOSIS BLD QL SMEAR: SLIGHT
AST SERPL-CCNC: 35 UNIT/L (ref 11–45)
BASOPHILS # BLD AUTO: 0.09 K/UL
BASOPHILS NFR BLD AUTO: 0.9 %
BILIRUB SERPL-MCNC: 5.7 MG/DL (ref 0.1–1)
BUN SERPL-MCNC: 6 MG/DL (ref 6–20)
CALCIUM SERPL-MCNC: 8.8 MG/DL (ref 8.7–10.5)
CHLORIDE SERPL-SCNC: 108 MMOL/L (ref 95–110)
CO2 SERPL-SCNC: 23 MMOL/L (ref 23–29)
CREAT SERPL-MCNC: 0.7 MG/DL (ref 0.5–1.4)
DACRYOCYTES BLD QL SMEAR: NORMAL
ERYTHROCYTE [DISTWIDTH] IN BLOOD BY AUTOMATED COUNT: 25.3 % (ref 11.5–14.5)
GFR SERPLBLD CREATININE-BSD FMLA CKD-EPI: >60 ML/MIN/1.73/M2
GLUCOSE SERPL-MCNC: 95 MG/DL (ref 70–110)
HCT VFR BLD AUTO: 21.2 % (ref 40–54)
HGB BLD-MCNC: 7.4 GM/DL (ref 14–18)
HYPOCHROMIA BLD QL SMEAR: NORMAL
IMM GRANULOCYTES # BLD AUTO: 0.1 K/UL (ref 0–0.04)
IMM GRANULOCYTES NFR BLD AUTO: 1 % (ref 0–0.5)
LDH SERPL-CCNC: 335 U/L (ref 110–260)
LYMPHOCYTES # BLD AUTO: 3.27 K/UL (ref 1–4.8)
MCH RBC QN AUTO: 28.7 PG (ref 27–31)
MCHC RBC AUTO-ENTMCNC: 34.9 G/DL (ref 32–36)
MCV RBC AUTO: 82 FL (ref 82–98)
NUCLEATED RBC (/100WBC) (OHS): 1 /100 WBC
OVALOCYTES BLD QL SMEAR: NORMAL
PAPPENHEIMER BOD BLD QL SMEAR: PRESENT
PLATELET # BLD AUTO: 338 K/UL (ref 150–450)
PLATELET BLD QL SMEAR: NORMAL
PMV BLD AUTO: 9.9 FL (ref 9.2–12.9)
POIKILOCYTOSIS BLD QL SMEAR: SLIGHT
POLYCHROMASIA BLD QL SMEAR: NORMAL
POTASSIUM SERPL-SCNC: 4.3 MMOL/L (ref 3.5–5.1)
PROT SERPL-MCNC: 8.1 GM/DL (ref 6–8.4)
RBC # BLD AUTO: 2.58 M/UL (ref 4.6–6.2)
RELATIVE EOSINOPHIL (OHS): 6.2 %
RELATIVE LYMPHOCYTE (OHS): 32.2 % (ref 18–48)
RELATIVE MONOCYTE (OHS): 12.8 % (ref 4–15)
RELATIVE NEUTROPHIL (OHS): 46.9 % (ref 38–73)
RETICS/RBC NFR AUTO: >23 % (ref 0.4–2)
SCHISTOCYTES BLD QL SMEAR: NORMAL
SICKLE CELLS BLD QL SMEAR: NORMAL
SODIUM SERPL-SCNC: 138 MMOL/L (ref 136–145)
TARGETS BLD QL SMEAR: NORMAL
WBC # BLD AUTO: 10.14 K/UL (ref 3.9–12.7)

## 2025-04-07 PROCEDURE — 83615 LACTATE (LD) (LDH) ENZYME: CPT

## 2025-04-07 PROCEDURE — 36415 COLL VENOUS BLD VENIPUNCTURE: CPT

## 2025-04-07 PROCEDURE — 1159F MED LIST DOCD IN RCRD: CPT | Mod: CPTII,S$GLB,, | Performed by: INTERNAL MEDICINE

## 2025-04-07 PROCEDURE — 3008F BODY MASS INDEX DOCD: CPT | Mod: CPTII,S$GLB,, | Performed by: INTERNAL MEDICINE

## 2025-04-07 PROCEDURE — 3078F DIAST BP <80 MM HG: CPT | Mod: CPTII,S$GLB,, | Performed by: INTERNAL MEDICINE

## 2025-04-07 PROCEDURE — 99214 OFFICE O/P EST MOD 30 MIN: CPT | Mod: S$GLB,,, | Performed by: INTERNAL MEDICINE

## 2025-04-07 PROCEDURE — 99999 PR PBB SHADOW E&M-EST. PATIENT-LVL IV: CPT | Mod: PBBFAC,,, | Performed by: INTERNAL MEDICINE

## 2025-04-07 PROCEDURE — 82040 ASSAY OF SERUM ALBUMIN: CPT

## 2025-04-07 PROCEDURE — 85045 AUTOMATED RETICULOCYTE COUNT: CPT

## 2025-04-07 PROCEDURE — 3074F SYST BP LT 130 MM HG: CPT | Mod: CPTII,S$GLB,, | Performed by: INTERNAL MEDICINE

## 2025-04-07 PROCEDURE — 85025 COMPLETE CBC W/AUTO DIFF WBC: CPT

## 2025-04-07 RX ORDER — APIXABAN 5 MG/1
5 TABLET, FILM COATED ORAL 2 TIMES DAILY
COMMUNITY
Start: 2024-12-23

## 2025-04-07 RX ORDER — HYDROXYUREA 500 MG/1
500 CAPSULE ORAL DAILY
Qty: 30 CAPSULE | Refills: 5 | Status: SHIPPED | OUTPATIENT
Start: 2025-04-07

## 2025-04-07 NOTE — PROGRESS NOTES
PATIENT: Parrish Davis  MRN: [id number]  DATE: 4/7/2025    Chief Complaint: Sickle Cell Anemia    HPI: Parrish Davis is a 29 y.o. male is for Hemoglobin SS follow-up, routine. Notes recent URI with fever, respiratory symptoms and chest pain. Also out of hydrea. Hb decreased, retic count elevated, and total bilirubin elevated.     CT chest abd pelvis done in Oct 2023 showing new PE, started on full dose lovenox and transitioned to eliquis. Eliquis now cost prohibitive - sending Rx for Xarelto to check cost.    Subjective:     Sickle Cell End Organ Damage:   Current Disease Modifying Therapy: Folic acid 1 mg and Hydroxyurea 500 mg daily     Prior Disease Modifying Therapy: Voxelotor     Home Pain Regimen: Tylenol as needed     Hemoglobin Baseline: 8-9     IV Access: Not required     Health Maintenance  ECHO: 10/14/2023 PAP 30 mmHg. Due every 2 years - October 2025  UA: negative for protein  (11/21/2024)   Eye Exam: due. Needs referral.    Dexa: Last in 03/2022 with Lumbar spine T-score -3.7. On Calcium and VitD   Immunizations    Age Appropriate Cancer Screenings    Hospital Admissions this Year  - 10/2023 for 11 days     Past Medical History:   Past Medical History:   Diagnosis Date    Sickle cell anemia     Sickle cell disease        Past Surgical HIstory:   Past Surgical History:   Procedure Laterality Date    chemoport      removed    CHOLECYSTECTOMY  2002    COLONOSCOPY N/A 9/23/2024    Procedure: COLONOSCOPY;  Surgeon: Erika Elias MD;  Location: [address]);  Service: Endoscopy;  Laterality: N/A;  suprep/ referral gabino/ prep inst sent to pt via portal  9/18-pre call complete-tb    UMBILICAL HERNIA REPAIR  1995       Family History: No family history on file.    Social History:  reports that he has never smoked. He has never used smokeless tobacco. He reports current alcohol use. He reports that he does not use drugs.    Allergies:  Review of patient's allergies indicates:  No Known  Allergies    Medications:  Current Outpatient Medications   Medication Sig Dispense Refill    acetaminophen (TYLENOL) 500 MG tablet Take 500 mg by mouth every 8 (eight) hours as needed for Pain.      calcium carbonate (TUMS) 200 mg calcium (500 mg) chewable tablet Take 2-3 tablets by mouth 2 (two) times daily as needed for Heartburn.      FLUoxetine 10 MG capsule Take 1 capsule (10 mg total) by mouth once daily. 90 capsule 3    folic acid (FOLVITE) 1 MG tablet Take 1 tablet (1,000 mcg total) by mouth once daily. 90 tablet 3    ibuprofen (ADVIL,MOTRIN) 100 MG tablet Take 100 mg by mouth every 6 (six) hours as needed.      ondansetron (ZOFRAN-ODT) 8 MG TbDL Take 1 tablet (8 mg total) by mouth every 8 (eight) hours as needed. 30 tablet 1    phenyleph-min oil-petrolatum 0.25-14-74.9 % Oint Place 1 applicator rectally 4 (four) times daily as needed (Hemorrhoid discomfort). 56 g 1    ELIQUIS 5 mg Tab Take 5 mg by mouth 2 (two) times daily.      hydroxyurea (HYDREA) 500 mg Cap Take 1 capsule (500 mg total) by mouth once daily. 30 capsule 5    rivaroxaban (XARELTO) 10 mg Tab Take 1 tablet (10 mg total) by mouth daily with dinner or evening meal. 30 tablet 5     No current facility-administered medications for this visit.       Review of Systems   Constitutional:  Negative for activity change, appetite change, chills and fatigue.   HENT: Negative.     Eyes: Negative.    Respiratory: Negative.     Cardiovascular: Negative.    Gastrointestinal: Negative.    Endocrine: Negative.    Genitourinary: Negative.    Musculoskeletal:  Positive for arthralgias, back pain and gait problem.   Skin: Negative.    Allergic/Immunologic: Negative.    Hematological: Negative.    Psychiatric/Behavioral: Negative.         ECOG Performance Status:   ECOG SCORE             Objective:      Vitals:   Vitals:    04/07/25 1536   BP: 128/60   BP Location: Left arm   Patient Position: Sitting   Pulse: 82   Temp: 99.4 °F (37.4 °C)   TempSrc: Oral   SpO2:  (!) 93%   Weight: 91 kg (200 lb 11.7 oz)   Height: 6' (1.829 m)     BMI: Body mass index is 27.22 kg/m².    Physical Exam    Laboratory Data:  Lab Visit on 04/07/2025   Component Date Value Ref Range Status    Sodium 04/07/2025 138  136 - 145 mmol/L Final    Potassium 04/07/2025 4.3  3.5 - 5.1 mmol/L Final    Chloride 04/07/2025 108  95 - 110 mmol/L Final    CO2 04/07/2025 23  23 - 29 mmol/L Final    Glucose 04/07/2025 95  70 - 110 mg/dL Final    BUN 04/07/2025 6  6 - 20 mg/dL Final    Creatinine 04/07/2025 0.7  0.5 - 1.4 mg/dL Final    Calcium 04/07/2025 8.8  8.7 - 10.5 mg/dL Final    Protein Total 04/07/2025 8.1  6.0 - 8.4 gm/dL Final    Albumin 04/07/2025 3.8  3.5 - 5.2 g/dL Final    Bilirubin Total 04/07/2025 5.7 (H)  0.1 - 1.0 mg/dL Final    For infants and newborns, interpretation of results should be based   on gestational age, weight and in agreement with clinical   observations.    Premature Infant recommended reference ranges:   0-24 hours:  <8.0 mg/dL   24-48 hours: <12.0 mg/dL   3-5 days:    <15.0 mg/dL   6-29 days:   <15.0 mg/dL    ALP 04/07/2025 105  40 - 150 unit/L Final    AST 04/07/2025 35  11 - 45 unit/L Final    ALT 04/07/2025 19  10 - 44 unit/L Final    Anion Gap 04/07/2025 7 (L)  8 - 16 mmol/L Final    eGFR 04/07/2025 >60  >60 mL/min/1.73/m2 Final    Estimated GFR calculated using the CKD-EPI creatinine (2021) equation.    Lactate Dehydrogenase 04/07/2025 335 (H)  110 - 260 U/L Final    Retic Count, Automated 04/07/2025 >23.0 (H)  0.4 - 2.0 % Final    WBC 04/07/2025 10.14  3.90 - 12.70 K/uL Preliminary    RBC 04/07/2025 2.58 (L)  4.60 - 6.20 M/uL Preliminary    HGB 04/07/2025 7.4 (L)  14.0 - 18.0 gm/dL Preliminary    HCT 04/07/2025 21.2 (L)  40.0 - 54.0 % Preliminary    MCV 04/07/2025 82  82 - 98 fL Preliminary    MCH 04/07/2025 28.7  27.0 - 31.0 pg Preliminary    MCHC 04/07/2025 34.9  32.0 - 36.0 g/dL Preliminary    RDW 04/07/2025 25.3 (H)  11.5 - 14.5 % Preliminary    Platelet Count 04/07/2025  338  150 - 450 K/uL Preliminary    MPV 04/07/2025 9.9  9.2 - 12.9 fL Preliminary    Nucleated RBC 04/07/2025 1 (H)  <=0 /100 WBC Preliminary         Imaging: -  Assessment:     1. Deep vein thrombosis (DVT) of right lower extremity, unspecified chronicity, unspecified vein    2. Sickle cell disease without crisis    3. Symptomatic anemia      - Current Disease Modifying Therapy: Folic acid 1 mg and Hydroxyurea 500 mg daily   - Prior Disease Modifying Therapy: Voxelotor   - Home Pain Regimen: Tylenol as needed   - Hemoglobin Baseline: 8-9   - He is due to annual eye exam and UA. UA was obtained in the clinic which was negative for protein. He will make an appointment with his optometrist for annual eye exam.   - Continue to take Calcium 1200 mg daily with VitD 1000 units daily   - Continue Eliquis; refilled   - R knee pain: XR R hip and knee to rule out osteonecrosis     Health Maintenance  ECHO: 10/14/2023 PAP 30 mmHg. Due every 2 years.    UA: 11/21/2024 negative for protein. Due annually.   Eye Exam: overdue. He is aware to follow up with his optometrist. Last exam 2022.    Dexa: Last in 03/2022 with Lumbar spine T-score -3.7.     Plan:     Problem List Items Addressed This Visit          Oncology    Sickle cell anemia    Relevant Medications    hydroxyurea (HYDREA) 500 mg Cap    rivaroxaban (XARELTO) 10 mg Tab    Other Relevant Orders    Ambulatory referral/consult to Ophthalmology    CBC w/ DIFF    CMP    Ferritin    Folate    Reticulocytes    Symptomatic anemia    Relevant Medications    hydroxyurea (HYDREA) 500 mg Cap     Other Visit Diagnoses         Deep vein thrombosis (DVT) of right lower extremity, unspecified chronicity, unspecified vein    -  Primary    Relevant Medications    rivaroxaban (XARELTO) 10 mg Tab             BMT Chart Routing      Follow up with physician 4 months.   Follow up with [facility]    Provider visit type    Infusion scheduling note    Injection scheduling note    Labs CBC, CMP, ferritin,  folate and reticulocytes   Scheduling:  Preferred lab:  Lab interval:     Imaging    Pharmacy appointment    Other referrals               A total of 20 minutes was spent in pre-visit chart review, personal interpretation of labs and imaging, and medication review. Total visit time 30 minutes, >50 % counseling.

## 2025-04-21 DIAGNOSIS — D57.1 SICKLE CELL DISEASE WITHOUT CRISIS: ICD-10-CM

## 2025-04-21 RX ORDER — FOLIC ACID 1 MG/1
1000 TABLET ORAL DAILY
Qty: 90 TABLET | Refills: 3 | Status: SHIPPED | OUTPATIENT
Start: 2025-04-21

## 2025-05-13 RX ORDER — FLUOXETINE 10 MG/1
10 CAPSULE ORAL DAILY
Qty: 90 CAPSULE | Refills: 0 | Status: SHIPPED | OUTPATIENT
Start: 2025-05-13

## 2025-05-13 NOTE — TELEPHONE ENCOUNTER
Refill Routing Note   Medication(s) are not appropriate for processing by [facility] for the following reason(s):        Drug-disease interaction    [facility] action(s):  Defer   Requires appointment : Yes        Medication Therapy Plan: Drug-Disease: FLUoxetine and Hepatomegaly; Elevated transaminase level    Pharmacist review requested: Yes     Appointments  past 12m or future 3m with PCP    Date Provider   Last Visit   5/13/2024 Jovanny Douglas MD   Next Visit   Visit date not found Jovanny Douglas MD   ED visits in past 90 days: 0        Note composed:6:59 AM 05/13/2025

## 2025-05-13 NOTE — TELEPHONE ENCOUNTER
Care Due:                  Date            Visit Type   Department     Provider  --------------------------------------------------------------------------------                                MYCHART                              ANNUAL                              CHECKUP/PHY  [facility]  Last Visit: 05-      ASHLEY Douglas  Next Visit: None Scheduled  None         None Found                                                            Last  Test          Frequency    Reason                     Performed    Due Date  --------------------------------------------------------------------------------    Office Visit  15 months..  FLUoxetine...............  05- 08-    [facility] Embedded Care Due Messages. Reference number: [id number].   5/13/2025 12:17:47 AM CDT

## 2025-05-13 NOTE — TELEPHONE ENCOUNTER
Provider Staff:  Action required for this patient    Requires appointment      Please see care gap opportunities below in Care Due Message.    Thanks!  [facility]     Appointments      Date Provider   Last Visit   5/13/2024 Jovanny Douglas MD   Next Visit   Visit date not found Jovanny Douglas MD     Refill Decision Note   Parrish Davis  is requesting a refill authorization.  Brief Assessment and Rationale for Refill:  Approve     Medication Therapy Plan:         Pharmacist review requested: Yes   Extended chart review required: Yes   Comments:     Note composed:7:58 AM 05/13/2025

## 2025-05-13 NOTE — TELEPHONE ENCOUNTER
Spoke to the patient. Informed him of the available 90 days for fluoxetine and future refills will require an in-person visit. He is due for his annual. Patient will schedule himself on the portal. Patient is also aware he cannot receive any future refills without being seen.

## 2025-05-13 NOTE — TELEPHONE ENCOUNTER
----- Message from Jovanny Douglas MD sent at 5/13/2025  8:12 AM CDT -----  Refill clinic sent patient 90 days refill of fluoxetine.  Last office visit 1 year ago today.  Needs office visit before any further refills.  Thank you.

## 2025-07-02 ENCOUNTER — OFFICE VISIT (OUTPATIENT)
Dept: PRIMARY CARE CLINIC | Facility: CLINIC | Age: 30
End: 2025-07-02
Payer: COMMERCIAL

## 2025-07-02 VITALS
DIASTOLIC BLOOD PRESSURE: 60 MMHG | HEART RATE: 89 BPM | HEIGHT: 72 IN | WEIGHT: 199.94 LBS | RESPIRATION RATE: 18 BRPM | OXYGEN SATURATION: 99 % | BODY MASS INDEX: 27.08 KG/M2 | SYSTOLIC BLOOD PRESSURE: 122 MMHG

## 2025-07-02 DIAGNOSIS — Z71.85 VACCINE COUNSELING: ICD-10-CM

## 2025-07-02 DIAGNOSIS — Z00.00 ROUTINE MEDICAL EXAM: Primary | ICD-10-CM

## 2025-07-02 DIAGNOSIS — F32.1 CURRENT MODERATE EPISODE OF MAJOR DEPRESSIVE DISORDER WITHOUT PRIOR EPISODE: ICD-10-CM

## 2025-07-02 DIAGNOSIS — D57.1 SICKLE CELL DISEASE WITHOUT CRISIS: ICD-10-CM

## 2025-07-02 PROCEDURE — 99395 PREV VISIT EST AGE 18-39: CPT | Mod: S$GLB,,, | Performed by: FAMILY MEDICINE

## 2025-07-02 PROCEDURE — 3074F SYST BP LT 130 MM HG: CPT | Mod: CPTII,S$GLB,, | Performed by: FAMILY MEDICINE

## 2025-07-02 PROCEDURE — 3078F DIAST BP <80 MM HG: CPT | Mod: CPTII,S$GLB,, | Performed by: FAMILY MEDICINE

## 2025-07-02 PROCEDURE — 99999 PR PBB SHADOW E&M-EST. PATIENT-LVL V: CPT | Mod: PBBFAC,,, | Performed by: FAMILY MEDICINE

## 2025-07-02 PROCEDURE — 3008F BODY MASS INDEX DOCD: CPT | Mod: CPTII,S$GLB,, | Performed by: FAMILY MEDICINE

## 2025-07-02 PROCEDURE — 1160F RVW MEDS BY RX/DR IN RCRD: CPT | Mod: CPTII,S$GLB,, | Performed by: FAMILY MEDICINE

## 2025-07-02 PROCEDURE — 1159F MED LIST DOCD IN RCRD: CPT | Mod: CPTII,S$GLB,, | Performed by: FAMILY MEDICINE

## 2025-07-02 RX ORDER — FLUOXETINE 20 MG/1
20 CAPSULE ORAL DAILY
Qty: 90 CAPSULE | Refills: 3 | Status: SHIPPED | OUTPATIENT
Start: 2025-07-02

## 2025-07-02 RX ORDER — CICLOPIROX OLAMINE 7.7 MG/G
1 CREAM TOPICAL 2 TIMES DAILY
COMMUNITY
Start: 2025-05-12

## 2025-07-05 ENCOUNTER — LAB VISIT (OUTPATIENT)
Dept: LAB | Facility: HOSPITAL | Age: 30
End: 2025-07-05
Attending: INTERNAL MEDICINE
Payer: COMMERCIAL

## 2025-07-05 DIAGNOSIS — Z00.00 ROUTINE MEDICAL EXAM: ICD-10-CM

## 2025-07-05 LAB
CHOLEST SERPL-MCNC: 135 MG/DL (ref 120–199)
CHOLEST/HDLC SERPL: 3.6 {RATIO} (ref 2–5)
HDLC SERPL-MCNC: 37 MG/DL (ref 40–75)
HDLC SERPL: 27.4 % (ref 20–50)
LDLC SERPL CALC-MCNC: 63.8 MG/DL (ref 63–159)
NONHDLC SERPL-MCNC: 98 MG/DL
TRIGL SERPL-MCNC: 171 MG/DL (ref 30–150)

## 2025-07-05 PROCEDURE — 80061 LIPID PANEL: CPT

## 2025-07-05 PROCEDURE — 36415 COLL VENOUS BLD VENIPUNCTURE: CPT

## 2025-07-07 ENCOUNTER — PATIENT MESSAGE (OUTPATIENT)
Dept: PRIMARY CARE CLINIC | Facility: CLINIC | Age: 30
End: 2025-07-07
Payer: COMMERCIAL

## 2025-07-08 ENCOUNTER — LAB VISIT (OUTPATIENT)
Dept: LAB | Facility: HOSPITAL | Age: 30
End: 2025-07-08
Attending: FAMILY MEDICINE
Payer: COMMERCIAL

## 2025-07-08 DIAGNOSIS — D57.1 SICKLE CELL DISEASE WITHOUT CRISIS: ICD-10-CM

## 2025-07-08 DIAGNOSIS — Z00.00 ROUTINE MEDICAL EXAM: ICD-10-CM

## 2025-07-08 LAB
ABSOLUTE EOSINOPHIL (OHS): 0.23 K/UL
ABSOLUTE MONOCYTE (OHS): 1.04 K/UL (ref 0.3–1)
ABSOLUTE NEUTROPHIL COUNT (OHS): 3.63 K/UL (ref 1.8–7.7)
ALBUMIN SERPL BCP-MCNC: 4.6 G/DL (ref 3.5–5.2)
ALP SERPL-CCNC: 84 UNIT/L (ref 40–150)
ALT SERPL W/O P-5'-P-CCNC: 25 UNIT/L (ref 10–44)
ANION GAP (OHS): 9 MMOL/L (ref 8–16)
ANISOCYTOSIS BLD QL SMEAR: SLIGHT
AST SERPL-CCNC: 40 UNIT/L (ref 11–45)
BASOPHILS # BLD AUTO: 0.11 K/UL
BASOPHILS NFR BLD AUTO: 1.3 %
BILIRUB SERPL-MCNC: 9.2 MG/DL (ref 0.1–1)
BUN SERPL-MCNC: 6 MG/DL (ref 6–20)
CALCIUM SERPL-MCNC: 8.8 MG/DL (ref 8.7–10.5)
CHLORIDE SERPL-SCNC: 105 MMOL/L (ref 95–110)
CO2 SERPL-SCNC: 22 MMOL/L (ref 23–29)
CREAT SERPL-MCNC: 0.5 MG/DL (ref 0.5–1.4)
DACRYOCYTES BLD QL SMEAR: NORMAL
ERYTHROCYTE [DISTWIDTH] IN BLOOD BY AUTOMATED COUNT: 22.8 % (ref 11.5–14.5)
FERRITIN SERPL-MCNC: 80 NG/ML (ref 20–300)
FOLATE SERPL-MCNC: 12.9 NG/ML (ref 4–24)
GFR SERPLBLD CREATININE-BSD FMLA CKD-EPI: >60 ML/MIN/1.73/M2
GLUCOSE SERPL-MCNC: 83 MG/DL (ref 70–110)
HCT VFR BLD AUTO: 21 % (ref 40–54)
HGB BLD-MCNC: 7.3 GM/DL (ref 14–18)
HYPOCHROMIA BLD QL SMEAR: NORMAL
IMM GRANULOCYTES # BLD AUTO: 0.05 K/UL (ref 0–0.04)
IMM GRANULOCYTES NFR BLD AUTO: 0.6 % (ref 0–0.5)
LYMPHOCYTES # BLD AUTO: 3.54 K/UL (ref 1–4.8)
MCH RBC QN AUTO: 29.7 PG (ref 27–31)
MCHC RBC AUTO-ENTMCNC: 34.8 G/DL (ref 32–36)
MCV RBC AUTO: 85 FL (ref 82–98)
NUCLEATED RBC (/100WBC) (OHS): 1 /100 WBC
OVALOCYTES BLD QL SMEAR: NORMAL
PLATELET # BLD AUTO: 325 K/UL (ref 150–450)
PLATELET BLD QL SMEAR: NORMAL
PMV BLD AUTO: 10.9 FL (ref 9.2–12.9)
POIKILOCYTOSIS BLD QL SMEAR: SLIGHT
POLYCHROMASIA BLD QL SMEAR: NORMAL
POTASSIUM SERPL-SCNC: 4 MMOL/L (ref 3.5–5.1)
PROT SERPL-MCNC: 8.3 GM/DL (ref 6–8.4)
RBC # BLD AUTO: 2.46 M/UL (ref 4.6–6.2)
RELATIVE EOSINOPHIL (OHS): 2.7 %
RELATIVE LYMPHOCYTE (OHS): 41.2 % (ref 18–48)
RELATIVE MONOCYTE (OHS): 12.1 % (ref 4–15)
RELATIVE NEUTROPHIL (OHS): 42.1 % (ref 38–73)
RETICS/RBC NFR AUTO: 13.9 % (ref 0.4–2)
SCHISTOCYTES BLD QL SMEAR: NORMAL
SCHISTOCYTES BLD QL SMEAR: PRESENT
SICKLE CELLS BLD QL SMEAR: NORMAL
SODIUM SERPL-SCNC: 136 MMOL/L (ref 136–145)
TARGETS BLD QL SMEAR: NORMAL
TSH SERPL-ACNC: 1.36 UIU/ML (ref 0.4–4)
WBC # BLD AUTO: 8.6 K/UL (ref 3.9–12.7)

## 2025-07-08 PROCEDURE — 84443 ASSAY THYROID STIM HORMONE: CPT

## 2025-07-08 PROCEDURE — 85025 COMPLETE CBC W/AUTO DIFF WBC: CPT

## 2025-07-08 PROCEDURE — 85045 AUTOMATED RETICULOCYTE COUNT: CPT

## 2025-07-08 PROCEDURE — 82746 ASSAY OF FOLIC ACID SERUM: CPT

## 2025-07-08 PROCEDURE — 36415 COLL VENOUS BLD VENIPUNCTURE: CPT

## 2025-07-08 PROCEDURE — 82040 ASSAY OF SERUM ALBUMIN: CPT

## 2025-07-08 PROCEDURE — 82728 ASSAY OF FERRITIN: CPT

## 2025-07-09 NOTE — PROGRESS NOTES
/60 (BP Location: Left arm)   Pulse 89   Resp 18   Ht 6' (1.829 m)   Wt 90.7 kg (199 lb 15.3 oz)   SpO2 99%   BMI 27.12 kg/m²       ===========              Parrish Davis is a 30 y.o. male     here for    Annual exam.    Health maintenance reviewed with patient in detail inc any recent labs and studies and needs for future screening labs.  Age-appropriate vaccines and other age-appropriate screening studies reviewed with patient in detail.  Sleep health reviewed with patient.  Skin health regarding possible skin cancer screening reviewed with patient.  General regularity of bowel movements and urinations reviewed with patient including any possibility of urine leakage.  Vision screening reviewed with patient.      Patient queried and denies any further complaints      Problem List[1]    SURGICAL AND MEDICAL HISTORY: updated and reviewed.  Past Surgical History:   Procedure Laterality Date    chemoport      removed    CHOLECYSTECTOMY  2002    COLONOSCOPY N/A 9/23/2024    Procedure: COLONOSCOPY;  Surgeon: Erika Elias MD;  Location: [facility] ([address] [place] [address]);  Service: Endoscopy;  Laterality: N/A;  suprep/ referral gabino/ prep inst sent to pt via portal  9/18-pre call complete-tb    UMBILICAL HERNIA REPAIR  1995     ALLERGIES updated and reviewed.  Review of patient's allergies indicates:  No Known Allergies    CURRENT OUTPATIENT MEDICATIONS updated and reviewed  Current Medications[2]    Review of Systems   Constitutional:  Negative for activity change, appetite change, chills, diaphoresis, fatigue, fever and unexpected weight change.   HENT:  Negative for congestion, ear discharge, ear pain, facial swelling, hearing loss, nosebleeds, postnasal drip, rhinorrhea, sinus pressure, sneezing, sore throat, tinnitus, trouble swallowing and voice change.    Eyes:  Negative for photophobia, pain, discharge, redness, itching and visual disturbance.   Respiratory:  Negative for cough, chest tightness,  shortness of breath and wheezing.    Cardiovascular:  Negative for chest pain, palpitations and leg swelling.   Gastrointestinal:  Negative for abdominal distention, abdominal pain, anal bleeding, blood in stool, constipation, diarrhea, nausea, rectal pain and vomiting.   Endocrine: Negative for cold intolerance, heat intolerance, polydipsia, polyphagia and polyuria.   Genitourinary:  Negative for difficulty urinating, dysuria and flank pain.   Musculoskeletal:  Negative for arthralgias, back pain, joint swelling, myalgias and neck pain.   Skin:  Negative for rash.   Neurological:  Negative for dizziness, tremors, seizures, syncope, speech difficulty, weakness, light-headedness, numbness and headaches.   Psychiatric/Behavioral:  Negative for behavioral problems, confusion, decreased concentration, dysphoric mood, sleep disturbance and suicidal ideas. The patient is not nervous/anxious and is not hyperactive.        /60 (BP Location: Left arm)   Pulse 89   Resp 18   Ht 6' (1.829 m)   Wt 90.7 kg (199 lb 15.3 oz)   SpO2 99%   BMI 27.12 kg/m²   Physical Exam  Vitals and nursing note reviewed.   Constitutional:       General: He is not in acute distress.     Appearance: Normal appearance. He is well-developed. He is not ill-appearing or toxic-appearing.   HENT:      Head: Normocephalic and atraumatic.      Right Ear: Tympanic membrane, ear canal and external ear normal.      Left Ear: Tympanic membrane, ear canal and external ear normal.      Nose: Nose normal.      Mouth/Throat:      Lips: Pink.      Mouth: Mucous membranes are moist.      Pharynx: No oropharyngeal exudate or posterior oropharyngeal erythema.   Eyes:      General: Scleral icterus present.         Right eye: No discharge.         Left eye: No discharge.      Extraocular Movements: Extraocular movements intact.      Conjunctiva/sclera: Conjunctivae normal.   Cardiovascular:      Rate and Rhythm: Normal rate and regular rhythm.      Pulses:  Normal pulses.      Heart sounds: Normal heart sounds. No murmur heard.  Pulmonary:      Effort: Pulmonary effort is normal. No respiratory distress.      Breath sounds: Normal breath sounds. No wheezing or rales.   Abdominal:      General: Bowel sounds are normal. There is no distension.      Palpations: Abdomen is soft. There is no mass.      Tenderness: There is no abdominal tenderness. There is no right CVA tenderness, left CVA tenderness, guarding or rebound.      Hernia: No hernia is present.   Musculoskeletal:      Cervical back: Normal range of motion and neck supple. No rigidity or tenderness.   Lymphadenopathy:      Cervical: No cervical adenopathy.   Skin:     General: Skin is warm and dry.   Neurological:      General: No focal deficit present.      Mental Status: He is alert. Mental status is at baseline.   Psychiatric:         Mood and Affect: Mood normal.         Behavior: Behavior normal. Behavior is cooperative.         ASSESSMENT/PLAN    Parrish was seen today for annual exam.    Diagnoses and all orders for this visit:    Routine medical exam  -     Lipid Panel; Future  -     TSH; Future  -     Ambulatory referral/consult to Optometry; Future    Current moderate episode of major depressive disorder without prior episode  Stable.  Continue fluoxetine 20 mg daily.  Monitor.    Sickle cell disease without crisis  Stable.  Follows up regularly with specialist.  Continue.  Monitor.      Vaccine counseling.  Up-to-date other than could consider additional COVID vaccine     Consider new 1 in the fall     Consider flu shot in the fall.          Most recent some lab results reviewed with patient.  Any new prescription medications gone over in detail including reason for taking the medication, most common possible side effects and possible costs, etcetera.    Chronic conditions updated. Other than changes or additions as above, cont current medications and maintain follow-up with specialists if indicated.      Jovanny Douglas MD  A dictation device was used to produce this document. Use of such devices sometimes results in grammatical errors or replacement of words that sound similarly.                         [1]   Patient Active Problem List  Diagnosis    Sickle cell anemia    BRBPR (bright red blood per rectum)    At risk for osteoporosis    Priapism due to sickle cell disease    Drug-induced immunodeficiency    Current moderate episode of major depressive disorder without prior episode    Sickle-cell disease with vaso-occlusive pain    Rectal pain    Symptomatic anemia    Hyperbilirubinemia    Elevated transaminase level    Hepatomegaly    Plantar fasciitis, bilateral    Vaccine counseling   [2]   Current Outpatient Medications:     acetaminophen (TYLENOL) 500 MG tablet, Take 500 mg by mouth every 8 (eight) hours as needed for Pain., Disp: , Rfl:     calcium carbonate (TUMS) 200 mg calcium (500 mg) chewable tablet, Take 2-3 tablets by mouth 2 (two) times daily as needed for Heartburn., Disp: , Rfl:     ciclopirox (LOPROX) 0.77 % Crea, Apply 1 g topically 2 (two) times daily., Disp: , Rfl:     folic acid (FOLVITE) 1 MG tablet, Take 1 tablet (1,000 mcg total) by mouth once daily., Disp: 90 tablet, Rfl: 3    hydroxyurea (HYDREA) 500 mg Cap, Take 1 capsule (500 mg total) by mouth once daily., Disp: 30 capsule, Rfl: 5    ibuprofen (ADVIL,MOTRIN) 100 MG tablet, Take 100 mg by mouth every 6 (six) hours as needed., Disp: , Rfl:     ondansetron (ZOFRAN-ODT) 8 MG TbDL, Take 1 tablet (8 mg total) by mouth every 8 (eight) hours as needed., Disp: 30 tablet, Rfl: 1    phenyleph-min oil-petrolatum 0.25-14-74.9 % Oint, Place 1 applicator rectally 4 (four) times daily as needed (Hemorrhoid discomfort)., Disp: 56 g, Rfl: 1    FLUoxetine 20 MG capsule, Take 1 capsule (20 mg total) by mouth once daily., Disp: 90 capsule, Rfl: 3

## 2025-07-10 DIAGNOSIS — I82.401 DEEP VEIN THROMBOSIS (DVT) OF RIGHT LOWER EXTREMITY, UNSPECIFIED CHRONICITY, UNSPECIFIED VEIN: Primary | ICD-10-CM

## 2025-07-11 ENCOUNTER — TELEPHONE (OUTPATIENT)
Dept: PRIMARY CARE CLINIC | Facility: CLINIC | Age: 30
End: 2025-07-11
Payer: COMMERCIAL

## 2025-07-11 NOTE — TELEPHONE ENCOUNTER
"----- Message from Jovanny Douglas MD sent at 7/11/2025  8:22 AM CDT -----  It appears he has a lab appt soon with a lot of repeat labs by Dr. Alamo of Hem/Onc. I am assuming those are correct and hematologist wants those again only a month later, but I am not sure.     Re lowering chol, send below msg.          What is now considered good as per the American College of Cardiology in the American Heart Association regarding LDL cholesterol is actually less than 100.  Triglycerides less than 150 are considered good.    HDL greater than 50  is considered good.    Top 5 lifestyle changes to improve your cholesterol  Lifestyle changes can help improve your cholesterol -- and boost the cholesterol-lowering power of medications.    High cholesterol increases your risk of heart disease and heart attacks. Medications can help improve your cholesterol. But if you'd rather first make lifestyle changes to improve your cholesterol,   try these five healthy changes.  If you already take medications, these changes can improve their cholesterol-lowering effect.  1. Eat heart-healthy foods  A few changes in your diet can reduce cholesterol and improve your heart health:  --Reduce saturated fats. Saturated fats, found primarily in red meat and full-fat dairy products, raise your total cholesterol. Decreasing your consumption of saturated fats can reduce your   low-density lipoprotein (LDL) cholesterol -- the "bad" cholesterol.  --Eliminate trans fats. Trans fats, sometimes listed on food labels as "partially hydrogenated vegetable oil," are often used in margarines and store-bought cookies, crackers and cakes. Trans fats   raise overall cholesterol levels. The Food and Drug Administration has banned the use of partially hydrogenated vegetable oils by Jan. 1, 2021.  --Eat foods rich in omega-3 fatty acids. Omega-3 fatty acids don't affect LDL cholesterol. But they have other heart-healthy benefits, including reducing blood pressure. " "Foods with omega-3 fatty   acids include salmon, mackerel, herring, walnuts and flaxseeds.  --Increase soluble fiber. Soluble fiber can reduce the absorption of cholesterol into your bloodstream. Soluble fiber is found in such foods as oatmeal, kidney beans, [place] sprouts, apples and   pears.  2. Exercise on most days of the week and increase your physical activity  Exercise can improve cholesterol. Moderate physical activity can help raise high-density lipoprotein (HDL) cholesterol, the "good" cholesterol. With your doctor's OK, work up to at least 30 minutes   of exercise five times a week or vigorous aerobic activity for 20 minutes three times a week.  Adding physical activity, even in short intervals several times a day, can help you begin to lose weight. Consider:  --Taking a brisk daily walk during your lunch hour  --Riding your bike to work  --Playing a favorite sport  To stay motivated, consider finding an exercise bharti or joining an exercise group.  3. Quit smoking  Quitting smoking improves your HDL cholesterol level. The benefits occur quickly:  --Within 20 minutes of quitting, your blood pressure and heart rate recover from the cigarette-induced spike  --Within three months of quitting, your blood circulation and lung function begin to improve  --Within a year of quitting, your risk of heart disease is half that of a smoker  4. Lose weight  Carrying even a few extra pounds contributes to high cholesterol. Small changes add up. If you drink sugary beverages, switch to tap water. Snack on air-popped popcorn or pretzels -- but keep track   of the calories. If you crave something sweet, try sherbet or candies with little or no fat, such as jelly beans.  Look for ways to incorporate more activity into your daily routine, such as using the stairs instead of taking the elevator or parking farther from your office. Take walks during breaks at work. Try   to increase standing activities, such as cooking or " "doing yardwork.  5. Drink alcohol only in moderation  Moderate use of alcohol has been linked with higher levels of HDL cholesterol -- but the benefits aren't strong enough to recommend alcohol for anyone who doesn't already drink.  If you drink alcohol, do so in moderation. For healthy adults, that means up to one drink a day for women of all ages and men older than age 65, and up to two drinks a day for men age 65 and younger.  Too much alcohol can lead to serious health problems, including high blood pressure, heart failure and strokes.  If lifestyle changes aren't enough   Sometimes healthy lifestyle changes aren't enough to lower cholesterol levels. If your doctor recommends medication to help lower your cholesterol, take it as prescribed while continuing your   lifestyle changes. Lifestyle changes can help you keep your medication dose low.  -----------------------------------------------------  Cholesterol: Top foods to improve your numbers  Diet can play an important role in lowering your cholesterol. Here are some foods to improve your cholesterol and protect your heart.    Can a bowl of oatmeal help lower your cholesterol? How about a handful of almonds? A few simple tweaks to your diet -- along with exercise and other heart-healthy habits -- might help you lower your   cholesterol.  Oatmeal, oat bran and high-fiber foods  Oatmeal contains soluble fiber, which reduces your low-density lipoprotein (LDL) cholesterol, the "bad" cholesterol. Soluble fiber is also found in such foods as kidney beans, [place] sprouts,   apples and pears.  Soluble fiber can reduce the absorption of cholesterol into your bloodstream. Five to 10 grams or more of soluble fiber a day decreases your LDL cholesterol. One serving of a breakfast cereal with   oatmeal or oat bran provides 3 to 4 grams of fiber. If you add fruit, such as a banana or berries, you'll get even more fiber.  Fish and omega-3 fatty acids  Fatty fish has high " levels of omega-3 fatty acids, which can reduce your triglycerides -- a type of fat found in blood -- as well as reduce your blood pressure and risk of developing blood clots. In   people who have already had heart attacks, omega-3 fatty acids may reduce the risk of sudden death.  Omega-3 fatty acids don't affect LDL cholesterol levels. But because of those acids' other heart benefits, the American Heart Association recommends eating at least two servings of fish a week.   Baking or grilling the fish avoids adding unhealthy fats.  The highest levels of omega-3 fatty acids are in mackeral, herring, tuna, salmon, trout.    Foods such as walnuts, flaxseed and canola oil also have small amounts of omega-3 fatty acids.  Omega-3 and fish oil supplements are available. Talk to your doctor before taking any supplements.  Almonds and other nuts  Almonds and other tree nuts can improve blood cholesterol. A recent study concluded that a diet supplemented with walnuts can lower the risk of heart complications in people with history of a heart   attack. All nuts are high in calories, so a handful added to a salad or eaten as a snack will do.  Avocados  Avocados are a potent source of nutrients as well as monounsaturated fatty acids (MUFAs). Research suggests that adding an avocado a day to a heart-healthy diet can help improve LDL cholesterol   levels in people who are overweight or obese.  People tend to be most familiar with avocados in [facility], which usually is eaten with high-fat corn chips. Try adding avocado slices to salads and sandwiches or eating them as a side dish. Also try   guacamole with raw cut vegetables, such as cucumber slices.  Replacing saturated fats, such as those found in meats, with MUFAs are part of what makes the Mediterranean diet heart healthy.  Reno oil  Try using olive oil in place of other fats in your diet. You can saute vegetables in olive oil, add it to a marinade or mix it with vinegar as a  "salad dressing. You can also use olive oil as a   substitute for butter when basting meat or as a dip for bread.  Foods with added plant sterols or stanols  Sterols and stanols are substances found in plants that help block the absorption of cholesterol. Foods that have been fortified with sterols or stanols are available.  Margarines and orange juice with added plant sterols can help reduce LDL cholesterol. Adding 2 grams of sterol to your diet every day can lower your LDL cholesterol by 5 to 15 percent.  It's not clear whether food with plant sterols or stanols reduces your risk of heart attack or stroke -- although experts assume that foods that reduce cholesterol do reduce the risk. Plant sterols   or stanols don't appear to affect levels of triglycerides or of high-density lipoprotein (HDL) cholesterol, the "good" cholesterol.  Whey protein  Whey protein, which is found in dairy products, may account for many of the health benefits attributed to dairy. Studies have shown that whey protein given as a supplement lowers both LDL and total   cholesterol as well as blood pressure. You can find whey protein powders in health food stores and some grocery stores.  Other changes to your diet  Getting the full benefit of these foods requires other changes to your diet and lifestyle. One of the most beneficial changes is limiting the saturated and trans fats you eat.  Saturated fats -- such as those in meat, butter, cheese and other full-fat dairy products -- raise your total cholesterol. Decreasing your consumption of saturated fats to less than 7 percent of your   total daily calorie intake can reduce your LDL cholesterol by 8 to 10 percent.  Trans fats, sometimes listed on food labels as "partially hydrogenated vegetable oil," are often used in margarines and store-bought cookies, crackers and cakes. Trans fats raise overall cholesterol   levels.     A dictation device was used to produce portions of this document. Use " of such devices sometimes results in grammatical errors or replacement of words that sound similarly.  =====================================  Mediterranean Diet     This is a heart healthy diet. It is based on widely used foods and cooking styles from many countries around the [place]. The main pattern for the diet is more plant foods and   monounsaturated fats, or good fats, like olive oil. Protein in this diet comes from seafood, legumes, nuts, seeds, and poultry and eggs in lowered amounts. You will also eat more whole grains,   vegetables, and fruits and moderate amounts of alcohol are also included. This diet has less red meats, dairy products, and processed foods.  What will the results be?   Your diet will have less saturated fat, cholesterol, calories, sodium, and added sugars. Your diet will be higher in fiber. This will help to keep your blood sugar steady. This diet lowers the chance   of heart disease and other health problems.  What lifestyle changes are needed?   If you do not often eat this way, you will need to change your eating habits. Be sure to get regular exercise. It is believed to help the health benefits of this diet.  What changes to diet are needed?   You may need to limit the amount of red meat and processed foods in your diet. Ask your dietitian for help planning meals that are right for you.  What foods are good to eat?   Plenty of fish and other seafood  Fresh, frozen, or canned fruits and vegetables  Nuts and nut butters and dried beans, lentils, or peas  Foods high in fiber like whole grains and whole grain products  Olive oil (good fat), peanut or canola oil, margarine, or spreads that list vegetable oil as the first ingredient and do not  contain trans fat or partially hydrogenated oil  Small amounts of poultry and eggs  What foods should be limited or avoided?   Red meats  Sweets, desserts, and processed foods  Butter, oils, and fats that contain trans fats or are  hydrogenated or partially hydrogenated  Gravies and sauces  What problems could happen?   Your weight may rise because your diet will be higher in fat from olive oil and nuts.  You may have lower iron levels. Be sure to eat foods rich in iron. Also, eat foods rich in vitamin C. This will help your body take in iron.  You may have lower calcium levels because you are eating less dairy products. Ask your doctor if you need to take a calcium supplement.  Wine is often thought of as part of a Mediterranean diet.  This message is not a recommendation to drink wine.  Wine is not needed as part of the Mediterranean diet and you may choose not to include   it. Avoid wine if you are prone to alcohol abuse or are pregnant. Also, avoid it if you are at risk for breast cancer, have liver problems, or have other illnesses that make it important for you to   not have alcohol such as diabetes.  There are significant amounts of carbohydrates in a glass of wine; which equals significant amounts of calories also.  There may be anywhere from 110-150 calories   in a glass of wine.  One glass is 5 oz only.  Where can I learn more?   Academy of Nutrition and Dietetics  American Heart Association         If you have many questions then please come in to discuss more.  =====================================               ----- Message -----  From: Lab, Background User  Sent: 7/8/2025   3:46 PM CDT  To: Jovanny Douglas MD

## 2025-08-05 ENCOUNTER — OFFICE VISIT (OUTPATIENT)
Dept: HEMATOLOGY/ONCOLOGY | Facility: CLINIC | Age: 30
End: 2025-08-05
Payer: COMMERCIAL

## 2025-08-05 ENCOUNTER — LAB VISIT (OUTPATIENT)
Dept: LAB | Facility: HOSPITAL | Age: 30
End: 2025-08-05
Attending: INTERNAL MEDICINE
Payer: COMMERCIAL

## 2025-08-05 VITALS
WEIGHT: 199.06 LBS | HEART RATE: 83 BPM | OXYGEN SATURATION: 97 % | DIASTOLIC BLOOD PRESSURE: 56 MMHG | BODY MASS INDEX: 26.96 KG/M2 | HEIGHT: 72 IN | RESPIRATION RATE: 16 BRPM | SYSTOLIC BLOOD PRESSURE: 123 MMHG | TEMPERATURE: 98 F

## 2025-08-05 DIAGNOSIS — D64.9 SYMPTOMATIC ANEMIA: ICD-10-CM

## 2025-08-05 DIAGNOSIS — I82.401 DEEP VEIN THROMBOSIS (DVT) OF RIGHT LOWER EXTREMITY, UNSPECIFIED CHRONICITY, UNSPECIFIED VEIN: ICD-10-CM

## 2025-08-05 DIAGNOSIS — D57.1 SICKLE CELL DISEASE WITHOUT CRISIS: ICD-10-CM

## 2025-08-05 LAB
ABSOLUTE EOSINOPHIL (OHS): 0.31 K/UL
ABSOLUTE MONOCYTE (OHS): 1.19 K/UL (ref 0.3–1)
ABSOLUTE NEUTROPHIL COUNT (OHS): 6.04 K/UL (ref 1.8–7.7)
ALBUMIN SERPL BCP-MCNC: 4.3 G/DL (ref 3.5–5.2)
ALP SERPL-CCNC: 79 UNIT/L (ref 40–150)
ALT SERPL W/O P-5'-P-CCNC: 27 UNIT/L (ref 0–55)
ANION GAP (OHS): 7 MMOL/L (ref 8–16)
AST SERPL-CCNC: 60 UNIT/L (ref 0–50)
BASOPHILS # BLD AUTO: 0.1 K/UL
BASOPHILS NFR BLD AUTO: 0.9 %
BILIRUB SERPL-MCNC: 6.9 MG/DL (ref 0.1–1)
BUN SERPL-MCNC: 7 MG/DL (ref 6–20)
CALCIUM SERPL-MCNC: 9 MG/DL (ref 8.7–10.5)
CHLORIDE SERPL-SCNC: 108 MMOL/L (ref 95–110)
CO2 SERPL-SCNC: 22 MMOL/L (ref 23–29)
CREAT SERPL-MCNC: 0.6 MG/DL (ref 0.5–1.4)
ERYTHROCYTE [DISTWIDTH] IN BLOOD BY AUTOMATED COUNT: 21.2 % (ref 11.5–14.5)
FERRITIN SERPL-MCNC: 174 NG/ML (ref 20–300)
FOLATE SERPL-MCNC: 13.1 NG/ML (ref 4–24)
GFR SERPLBLD CREATININE-BSD FMLA CKD-EPI: >60 ML/MIN/1.73/M2
GLUCOSE SERPL-MCNC: 76 MG/DL (ref 70–110)
HCT VFR BLD AUTO: 21.2 % (ref 40–54)
HGB BLD-MCNC: 7.4 GM/DL (ref 14–18)
IMM GRANULOCYTES # BLD AUTO: 0.05 K/UL (ref 0–0.04)
IMM GRANULOCYTES NFR BLD AUTO: 0.5 % (ref 0–0.5)
LYMPHOCYTES # BLD AUTO: 3.03 K/UL (ref 1–4.8)
MCH RBC QN AUTO: 29.4 PG (ref 27–31)
MCHC RBC AUTO-ENTMCNC: 34.9 G/DL (ref 32–36)
MCV RBC AUTO: 84 FL (ref 82–98)
NUCLEATED RBC (/100WBC) (OHS): 1 /100 WBC
PLATELET # BLD AUTO: 347 K/UL (ref 150–450)
PMV BLD AUTO: 10.2 FL (ref 9.2–12.9)
POTASSIUM SERPL-SCNC: 4.3 MMOL/L (ref 3.5–5.1)
PROT SERPL-MCNC: 7.9 GM/DL (ref 6–8.4)
RBC # BLD AUTO: 2.52 M/UL (ref 4.6–6.2)
RELATIVE EOSINOPHIL (OHS): 2.9 %
RELATIVE LYMPHOCYTE (OHS): 28.3 % (ref 18–48)
RELATIVE MONOCYTE (OHS): 11.1 % (ref 4–15)
RELATIVE NEUTROPHIL (OHS): 56.3 % (ref 38–73)
RETICS/RBC NFR AUTO: 14.7 % (ref 0.4–2)
SODIUM SERPL-SCNC: 137 MMOL/L (ref 136–145)
WBC # BLD AUTO: 10.72 K/UL (ref 3.9–12.7)

## 2025-08-05 PROCEDURE — 82040 ASSAY OF SERUM ALBUMIN: CPT

## 2025-08-05 PROCEDURE — 99999 PR PBB SHADOW E&M-EST. PATIENT-LVL III: CPT | Mod: PBBFAC,,, | Performed by: INTERNAL MEDICINE

## 2025-08-05 PROCEDURE — 85045 AUTOMATED RETICULOCYTE COUNT: CPT

## 2025-08-05 PROCEDURE — 1159F MED LIST DOCD IN RCRD: CPT | Mod: CPTII,S$GLB,, | Performed by: INTERNAL MEDICINE

## 2025-08-05 PROCEDURE — 3074F SYST BP LT 130 MM HG: CPT | Mod: CPTII,S$GLB,, | Performed by: INTERNAL MEDICINE

## 2025-08-05 PROCEDURE — 36415 COLL VENOUS BLD VENIPUNCTURE: CPT

## 2025-08-05 PROCEDURE — 3078F DIAST BP <80 MM HG: CPT | Mod: CPTII,S$GLB,, | Performed by: INTERNAL MEDICINE

## 2025-08-05 PROCEDURE — 85025 COMPLETE CBC W/AUTO DIFF WBC: CPT

## 2025-08-05 PROCEDURE — G2211 COMPLEX E/M VISIT ADD ON: HCPCS | Mod: S$GLB,,, | Performed by: INTERNAL MEDICINE

## 2025-08-05 PROCEDURE — 99214 OFFICE O/P EST MOD 30 MIN: CPT | Mod: S$GLB,,, | Performed by: INTERNAL MEDICINE

## 2025-08-05 PROCEDURE — 3008F BODY MASS INDEX DOCD: CPT | Mod: CPTII,S$GLB,, | Performed by: INTERNAL MEDICINE

## 2025-08-05 PROCEDURE — 82728 ASSAY OF FERRITIN: CPT

## 2025-08-05 PROCEDURE — 82746 ASSAY OF FOLIC ACID SERUM: CPT

## 2025-08-05 RX ORDER — HYDROXYUREA 500 MG/1
500 CAPSULE ORAL DAILY
Qty: 30 CAPSULE | Refills: 5 | Status: SHIPPED | OUTPATIENT
Start: 2025-08-05

## 2025-08-05 NOTE — PROGRESS NOTES
PATIENT: Parrish Davis  MRN: [id number]  DATE: 8/5/2025    Chief Complaint: No chief complaint on file.    HPI: Parrish Davis is a 30 y.o. male is for Hemoglobin SS follow-up, routine. No acute interval events.  No ER visits or hospital admission for pain control or vasocclusive events. CBC shows hemoglobin decreased from baseline. Increased retic count. Needs refill of hydrea. Annual eye exam is scheduled for 8/22/2025.    Subjective:     Sickle Cell End Organ Damage:   Current Disease Modifying Therapy: Folic acid 1 mg and Hydroxyurea 500 mg daily     Prior Disease Modifying Therapy: Voxelotor     Home Pain Regimen: Tylenol as needed     Hemoglobin Baseline: 8-9     IV Access: Not required     Health Maintenance  ECHO: 10/14/2023 PAP 30 mmHg. Due every 2 years - October 2025 (planning to move to [place] before repeat exam)  UA: negative for protein  (11/21/2024)   Eye Exam: due. Scheduled for 8/22/2025  Dexa: Last in 03/2022 with Lumbar spine T-score -3.7. On Calcium and VitD   Immunizations    Age Appropriate Cancer Screenings  NA    Hospital Admissions this Year  - 10/2023 for 11 days     Past Medical History:   Past Medical History:   Diagnosis Date    Sickle cell anemia     Sickle cell disease        Past Surgical HIstory:   Past Surgical History:   Procedure Laterality Date    chemoport      removed    CHOLECYSTECTOMY  2002    COLONOSCOPY N/A 9/23/2024    Procedure: COLONOSCOPY;  Surgeon: Erika Elias MD;  Location: [address];  Service: Endoscopy;  Laterality: N/A;  suprep/ referral gabino/ prep inst sent to pt via portal  9/18-pre call complete-tb    UMBILICAL HERNIA REPAIR  1995       Family History: No family history on file.    Social History:  reports that he has never smoked. He has never used smokeless tobacco. He reports current alcohol use. He reports that he does not use drugs.    Allergies:  Review of patient's allergies indicates:  No Known  Allergies    Medications:  Current Outpatient Medications   Medication Sig Dispense Refill    acetaminophen (TYLENOL) 500 MG tablet Take 500 mg by mouth every 8 (eight) hours as needed for Pain.      calcium carbonate (TUMS) 200 mg calcium (500 mg) chewable tablet Take 2-3 tablets by mouth 2 (two) times daily as needed for Heartburn.      ciclopirox (LOPROX) 0.77 % Crea Apply 1 g topically 2 (two) times daily.      FLUoxetine 20 MG capsule Take 1 capsule (20 mg total) by mouth once daily. 90 capsule 3    folic acid (FOLVITE) 1 MG tablet Take 1 tablet (1,000 mcg total) by mouth once daily. 90 tablet 3    hydroxyurea (HYDREA) 500 mg Cap Take 1 capsule (500 mg total) by mouth once daily. 30 capsule 5    ibuprofen (ADVIL,MOTRIN) 100 MG tablet Take 100 mg by mouth every 6 (six) hours as needed.      phenyleph-min oil-petrolatum 0.25-14-74.9 % Oint Place 1 applicator rectally 4 (four) times daily as needed (Hemorrhoid discomfort). 56 g 1     No current facility-administered medications for this visit.       Review of Systems   Constitutional:  Negative for activity change, appetite change, chills, fatigue and unexpected weight change.   HENT: Negative.  Negative for mouth sores.    Eyes: Negative.  Negative for visual disturbance.   Respiratory: Negative.  Negative for cough and shortness of breath.    Cardiovascular: Negative.    Gastrointestinal: Negative.  Negative for abdominal pain and diarrhea.   Endocrine: Negative.    Genitourinary: Negative.  Negative for frequency.   Musculoskeletal:  Positive for arthralgias, back pain and gait problem.   Skin: Negative.    Allergic/Immunologic: Negative.    Neurological:  Negative for headaches.   Hematological: Negative.  Negative for adenopathy.   Psychiatric/Behavioral: Negative.  The patient is not nervous/anxious.        ECOG Performance Status:   ECOG SCORE             Objective:      Vitals:   Vitals:    08/05/25 1351   BP: (!) 123/56   BP Location: Left arm   Patient  Position: Sitting   Pulse: 83   Resp: 16   Temp: 98.3 °F (36.8 °C)   TempSrc: Oral   SpO2: 97%   Weight: 90.3 kg (199 lb 1.2 oz)   Height: 6' (1.829 m)     BMI: Body mass index is 27 kg/m².    Physical Exam    Laboratory Data:  Lab Visit on 08/05/2025   Component Date Value Ref Range Status    WBC 08/05/2025 10.72  3.90 - 12.70 K/uL Final    RBC 08/05/2025 2.52 (L)  4.60 - 6.20 M/uL Final    HGB 08/05/2025 7.4 (L)  14.0 - 18.0 gm/dL Final    HCT 08/05/2025 21.2 (L)  40.0 - 54.0 % Final    MCV 08/05/2025 84  82 - 98 fL Final    MCH 08/05/2025 29.4  27.0 - 31.0 pg Final    MCHC 08/05/2025 34.9  32.0 - 36.0 g/dL Final    RDW 08/05/2025 21.2 (H)  11.5 - 14.5 % Final    Platelet Count 08/05/2025 347  150 - 450 K/uL Final    MPV 08/05/2025 10.2  9.2 - 12.9 fL Final    Nucleated RBC 08/05/2025 1 (H)  <=0 /100 WBC Final    Neut % 08/05/2025 56.3  38 - 73 % Final    Lymph % 08/05/2025 28.3  18 - 48 % Final    Mono % 08/05/2025 11.1  4 - 15 % Final    Eos % 08/05/2025 2.9  <=8 % Final    Basophil % 08/05/2025 0.9  <=1.9 % Final    Imm Grans % 08/05/2025 0.5  0.0 - 0.5 % Final    Neut # 08/05/2025 6.04  1.8 - 7.7 K/uL Final    Lymph # 08/05/2025 3.03  1 - 4.8 K/uL Final    Mono # 08/05/2025 1.19 (H)  0.3 - 1 K/uL Final    Eos # 08/05/2025 0.31  <=0.5 K/uL Final    Baso # 08/05/2025 0.10  <=0.2 K/uL Final    Imm Grans # 08/05/2025 0.05 (H)  0.00 - 0.04 K/uL Final    Mild elevation in immature granulocytes is non specific and can be seen in a variety of conditions including stress response, acute inflammation, trauma and pregnancy. Correlation with other laboratory and clinical findings is essential.         Imaging: -  Assessment:     No diagnosis found.    - Current Disease Modifying Therapy: Folic acid 1 mg and Hydroxyurea 500 mg daily   - Prior Disease Modifying Therapy: Voxelotor   - Home Pain Regimen: Tylenol as needed   - Hemoglobin Baseline: 8-9   - He is due to annual eye exam - scheduled for 8/22/2025  - Continue to  take Calcium 1200 mg daily with VitD 1000 units daily   - Continue Eliquis; refilled     Health Maintenance  ECHO: 10/14/2023 PAP 30 mmHg. Due every 2 years.    UA: 11/21/2024 negative for protein. Due annually.   Eye Exam:  8/22/2025  Dexa: Last in 03/2022 with Lumbar spine T-score -3.7.     Plan:     Problem List Items Addressed This Visit    None         BMT Chart Routing      Follow up with physician 4 months.   Follow up with [facility]    Provider visit type Benign hem   Infusion scheduling note    Injection scheduling note    Labs CBC, CMP, ferritin, folate and hemoglobin   Scheduling:  Preferred lab:  Lab interval:  labs a few days before return visit   Imaging    Pharmacy appointment    Other referrals                 A total of 20 minutes was spent in pre-visit chart review, personal interpretation of labs and imaging, and medication review. Total visit time 30 minutes, >50 % counseling.

## 2025-08-06 ENCOUNTER — OFFICE VISIT (OUTPATIENT)
Dept: PRIMARY CARE CLINIC | Facility: CLINIC | Age: 30
End: 2025-08-06
Payer: COMMERCIAL

## 2025-08-06 DIAGNOSIS — F32.1 CURRENT MODERATE EPISODE OF MAJOR DEPRESSIVE DISORDER WITHOUT PRIOR EPISODE: Primary | ICD-10-CM

## 2025-08-06 PROCEDURE — 98005 SYNCH AUDIO-VIDEO EST LOW 20: CPT | Mod: 95,,, | Performed by: FAMILY MEDICINE

## 2025-08-06 PROCEDURE — 1159F MED LIST DOCD IN RCRD: CPT | Mod: CPTII,95,, | Performed by: FAMILY MEDICINE

## 2025-08-06 PROCEDURE — 1160F RVW MEDS BY RX/DR IN RCRD: CPT | Mod: CPTII,95,, | Performed by: FAMILY MEDICINE

## 2025-08-06 RX ORDER — FLUOXETINE HYDROCHLORIDE 40 MG/1
40 CAPSULE ORAL DAILY
Qty: 30 CAPSULE | Refills: 1 | Status: SHIPPED | OUTPATIENT
Start: 2025-08-06